# Patient Record
Sex: FEMALE | Race: WHITE | NOT HISPANIC OR LATINO | Employment: FULL TIME | URBAN - METROPOLITAN AREA
[De-identification: names, ages, dates, MRNs, and addresses within clinical notes are randomized per-mention and may not be internally consistent; named-entity substitution may affect disease eponyms.]

---

## 2017-01-04 ENCOUNTER — OFFICE VISIT (OUTPATIENT)
Dept: FAMILY MEDICINE | Facility: CLINIC | Age: 52
End: 2017-01-04
Payer: COMMERCIAL

## 2017-01-04 VITALS
OXYGEN SATURATION: 99 % | HEIGHT: 71 IN | TEMPERATURE: 99 F | SYSTOLIC BLOOD PRESSURE: 120 MMHG | HEART RATE: 55 BPM | WEIGHT: 217.25 LBS | RESPIRATION RATE: 16 BRPM | DIASTOLIC BLOOD PRESSURE: 82 MMHG | BODY MASS INDEX: 30.41 KG/M2

## 2017-01-04 DIAGNOSIS — H92.02 OTALGIA OF LEFT EAR: ICD-10-CM

## 2017-01-04 DIAGNOSIS — J01.90 ACUTE RHINOSINUSITIS: Primary | ICD-10-CM

## 2017-01-04 PROCEDURE — 1159F MED LIST DOCD IN RCRD: CPT | Mod: S$GLB,,, | Performed by: NURSE PRACTITIONER

## 2017-01-04 PROCEDURE — 99214 OFFICE O/P EST MOD 30 MIN: CPT | Mod: S$GLB,,, | Performed by: NURSE PRACTITIONER

## 2017-01-04 PROCEDURE — 99999 PR PBB SHADOW E&M-EST. PATIENT-LVL IV: CPT | Mod: PBBFAC,,, | Performed by: NURSE PRACTITIONER

## 2017-01-04 RX ORDER — PSEUDOEPHEDRINE HCL 120 MG/1
120 TABLET, FILM COATED, EXTENDED RELEASE ORAL 2 TIMES DAILY
Qty: 20 TABLET | Refills: 0
Start: 2017-01-04 | End: 2017-01-14

## 2017-01-04 RX ORDER — AZELASTINE 1 MG/ML
1 SPRAY, METERED NASAL 2 TIMES DAILY
Qty: 30 ML | Refills: 0 | Status: SHIPPED | OUTPATIENT
Start: 2017-01-04 | End: 2018-05-26

## 2017-01-04 RX ORDER — AZITHROMYCIN 250 MG/1
TABLET, FILM COATED ORAL
Qty: 6 TABLET | Refills: 0 | Status: SHIPPED | OUTPATIENT
Start: 2017-01-04 | End: 2017-07-18

## 2017-01-04 NOTE — PATIENT INSTRUCTIONS
Sinusitis (Antibiotic Treatment)    The sinuses are air-filled spaces within the bones of the face. They connect to the inside of the nose. Sinusitis is an inflammation of the tissue lining the sinus cavity. Sinus inflammation can occur during a cold. It can also be due to allergies to pollens and other particles in the air. Sinusitis can cause symptoms of sinus congestion and fullness. A sinus infection causes fever, headache and facial pain. There is often green or yellow drainage from the nose or into the back of the throat (post-nasal drip). You have been given antibiotics to treat this condition.  Home care:  · Take the full course of antibiotics as instructed. Do not stop taking them, even if you feel better.  · Drink plenty of water, hot tea, and other liquids. This may help thin mucus. It also may promote sinus drainage.  · Heat may help soothe painful areas of the face. Use a towel soaked in hot water. Or,  the shower and direct the hot spray onto your face. Using a vaporizer along with a menthol rub at night may also help.   · An expectorant containing guaifenesin may help thin the mucus and promote drainage from the sinuses.  · Over-the-counter decongestants may be used unless a similar medicine was prescribed. Nasal sprays work the fastest. Use one that contains phenylephrine or oxymetazoline. First blow the nose gently. Then use the spray. Do not use these medicines more often than directed on the label or symptoms may get worse. You may also use tablets containing pseudoephedrine. Avoid products that combine ingredients, because side effects may be increased. Read labels. You can also ask the pharmacist for help. (NOTE: Persons with high blood pressure should not use decongestants. They can raise blood pressure.)  · Over-the-counter antihistamines may help if allergies contributed to your sinusitis.    · Do not use nasal rinses or irrigation during an acute sinus infection, unless told to by  your health care provider. Rinsing may spread the infection to other sinuses.  · Use acetaminophen or ibuprofen to control pain, unless another pain medicine was prescribed. (If you have chronic liver or kidney disease or ever had a stomach ulcer, talk with your doctor before using these medicines. Aspirin should never be used in anyone under 18 years of age who is ill with a fever. It may cause severe liver damage.)  · Don't smoke. This can worsen symptoms.  Follow-up care  Follow up with your healthcare provider or our staff if you are not improving within the next week.  When to seek medical advice  Call your healthcare provider if any of these occur:  · Facial pain or headache becoming more severe  · Stiff neck  · Unusual drowsiness or confusion  · Swelling of the forehead or eyelids  · Vision problems, including blurred or double vision  · Fever of 100.4ºF (38ºC) or higher, or as directed by your healthcare provider  · Seizure  · Breathing problems  · Symptoms not resolving within 10 days  © 9785-3802 [employer]. [address] [place] [address], [place] [address]. All rights reserved. This information is not intended as a substitute for professional medical care. Always follow your healthcare professional's instructions.

## 2017-01-04 NOTE — MR AVS SNAPSHOT
[facility]  [address]  [place] [address]  Phone: [phone]  Fax: [phone]                  Estephania Rogers   2017 12:30 PM   Office Visit    Description:  Female : 1965   Provider:  [facility]   Department:  [facility]           Reason for Visit     Sinus Problem     Otalgia           Diagnoses this Visit        Comments    Acute rhinosinusitis    -  Primary     Otalgia of left ear                To Do List           Future Appointments        Provider Department Dept Phone    2017 3:30 PM CHAIR 01 [facility] [phone]      Goals (5 Years of Data)     None      Follow-Up and Disposition     Return if symptoms worsen or fail to improve.       These Medications        Disp Refills Start End    azithromycin (Z-LUCHO) 250 MG tablet 6 tablet 0 2017     Take 2 tablets by mouth on day 1; Take 1 tablet by mouth on days 2-5    Pharmacy: \[facility]"" [facility] - [facility] [address] Ph #: [phone]       azelastine (ASTELIN) 137 mcg (0.1 %) nasal spray 30 mL 0 2017    1 spray (137 mcg total) by Nasal route 2 (two) times daily. - Nasal    Pharmacy: [facility] [address] Ph #: [phone]       pseudoephedrine (SUDAFED 12 HOUR) 120 mg 12 hr tablet 20 tablet 0 2017    Take 1 tablet (120 mg total) by mouth 2 (two) times daily. - Oral    Pharmacy: [facility] [place] [address] Ph #: [phone]         [facility]     [facility] -  Assistance  Registered nurses in the [facility] provide clinical advisement, health education, appointment booking, and other advisory services.  Call for this free service at [phone].             Medications           Message regarding Medications     Verify the changes and/or additions to your medication regime listed below are the  same as discussed with your clinician today.  If any of these changes or additions are incorrect, please notify your healthcare provider.        START taking these NEW medications        Refills    azithromycin (Z-LUCHO) 250 MG tablet 0    Sig: Take 2 tablets by mouth on day 1; Take 1 tablet by mouth on days 2-5    Class: Normal    azelastine (ASTELIN) 137 mcg (0.1 %) nasal spray 0    Si spray (137 mcg total) by Nasal route 2 (two) times daily.    Class: Normal    Route: Nasal    pseudoephedrine (SUDAFED 12 HOUR) 120 mg 12 hr tablet 0    Sig: Take 1 tablet (120 mg total) by mouth 2 (two) times daily.    Class: No Print    Route: Oral           Verify that the below list of medications is an accurate representation of the medications you are currently taking.  If none reported, the list may be blank. If incorrect, please contact your healthcare provider. Carry this list with you in case of emergency.           Current Medications     alprazolam (XANAX) 0.25 MG tablet Take 1 tablet (0.25 mg total) by mouth daily as needed.    ascorbic acid (VITAMIN C) 500 MG tablet Take 500 mg by mouth once daily.      butalbital-acetaminophen-caffeine -40 mg (FIORICET, ESGIC) -40 mg per tablet Take 1 tablet by mouth every 6 (six) hours as needed for Headaches.    calcium citrate-vitamin D (CITRACAL+D) 315-200 mg-unit per tablet Take 1 tablet by mouth 2 (two) times daily.      cholecalciferol, vitamin D3, 50,000 unit Wafr Take 50,000 Units by mouth once daily.      etodolac (LODINE XL) 400 MG 24 hr tablet Take 1 tablet (400 mg total) by mouth daily as needed.    multivitamin (MULTIVITAMIN) per tablet Take 1 tablet by mouth before breakfast.      RESTASIS 0.05 % ophthalmic emulsion     vitamin A 69905 UNIT capsule Take 25,000 Units by mouth once daily.      azelastine (ASTELIN) 137 mcg (0.1 %) nasal spray 1 spray (137 mcg total) by Nasal route 2 (two) times daily.    azithromycin (Z-LUCHO) 250 MG tablet Take 2 tablets by  "mouth on day 1; Take 1 tablet by mouth on days 2-5    levocetirizine (XYZAL) 5 MG tablet Take 1 tablet (5 mg total) by mouth every evening.    pseudoephedrine (SUDAFED 12 HOUR) 120 mg 12 hr tablet Take 1 tablet (120 mg total) by mouth 2 (two) times daily.           Clinical Reference Information           Vital Signs - Last Recorded  Most recent update: 1/4/2017 12:26 PM by Jennifer Limon LPN    BP Pulse Temp Resp Ht Wt    120/82 (BP Location: Right arm, Patient Position: Sitting, BP Method: Manual) (!) 55 98.6 °F (37 °C) (Oral) 16 5' 11" (1.803 m) 98.5 kg (217 lb 4.2 oz)    LMP SpO2 BMI          11/16/2014 99% 30.3 kg/m2        Blood Pressure          Most Recent Value    BP  120/82      Allergies as of 1/4/2017     No Known Allergies      Immunizations Administered on Date of Encounter - 1/4/2017     None      Instructions      Sinusitis (Antibiotic Treatment)    The sinuses are air-filled spaces within the bones of the face. They connect to the inside of the nose. Sinusitis is an inflammation of the tissue lining the sinus cavity. Sinus inflammation can occur during a cold. It can also be due to allergies to pollens and other particles in the air. Sinusitis can cause symptoms of sinus congestion and fullness. A sinus infection causes fever, headache and facial pain. There is often green or yellow drainage from the nose or into the back of the throat (post-nasal drip). You have been given antibiotics to treat this condition.  Home care:  · Take the full course of antibiotics as instructed. Do not stop taking them, even if you feel better.  · Drink plenty of water, hot tea, and other liquids. This may help thin mucus. It also may promote sinus drainage.  · Heat may help soothe painful areas of the face. Use a towel soaked in hot water. Or,  the shower and direct the hot spray onto your face. Using a vaporizer along with a menthol rub at night may also help.   · An expectorant containing guaifenesin may help " thin the mucus and promote drainage from the sinuses.  · Over-the-counter decongestants may be used unless a similar medicine was prescribed. Nasal sprays work the fastest. Use one that contains phenylephrine or oxymetazoline. First blow the nose gently. Then use the spray. Do not use these medicines more often than directed on the label or symptoms may get worse. You may also use tablets containing pseudoephedrine. Avoid products that combine ingredients, because side effects may be increased. Read labels. You can also ask the pharmacist for help. (NOTE: Persons with high blood pressure should not use decongestants. They can raise blood pressure.)  · Over-the-counter antihistamines may help if allergies contributed to your sinusitis.    · Do not use nasal rinses or irrigation during an acute sinus infection, unless told to by your health care provider. Rinsing may spread the infection to other sinuses.  · Use acetaminophen or ibuprofen to control pain, unless another pain medicine was prescribed. (If you have chronic liver or kidney disease or ever had a stomach ulcer, talk with your doctor before using these medicines. Aspirin should never be used in anyone under 18 years of age who is ill with a fever. It may cause severe liver damage.)  · Don't smoke. This can worsen symptoms.  Follow-up care  Follow up with your healthcare provider or our staff if you are not improving within the next week.  When to seek medical advice  Call your healthcare provider if any of these occur:  · Facial pain or headache becoming more severe  · Stiff neck  · Unusual drowsiness or confusion  · Swelling of the forehead or eyelids  · Vision problems, including blurred or double vision  · Fever of 100.4ºF (38ºC) or higher, or as directed by your healthcare provider  · Seizure  · Breathing problems  · Symptoms not resolving within 10 days  © 9567-2540 [employer]. [address] [place] [address], [place] [address]. All rights  reserved. This information is not intended as a substitute for professional medical care. Always follow your healthcare professional's instructions.

## 2017-01-04 NOTE — PROGRESS NOTES
Subjective:       Patient ID: Estephania Rogers is a 51 y.o. female.    Chief Complaint: Sinus Problem (on and off x's 10 days) and Otalgia    Sinus Problem   This is a new problem. The current episode started 1 to 4 weeks ago. The problem has been gradually worsening since onset. There has been no fever. Her pain is at a severity of 5/10. The pain is mild. Associated symptoms include congestion, ear pain, headaches and sinus pressure. Pertinent negatives include no chills, coughing, diaphoresis, neck pain, shortness of breath, sneezing, sore throat or swollen glands. Past treatments include spray decongestants and saline sprays.   Otalgia    There is pain in the left ear. This is a new problem. The current episode started 1 to 4 weeks ago. The problem occurs every few minutes. The problem has been gradually worsening. There has been no fever. The pain is at a severity of 5/10. The pain is moderate. Associated symptoms include headaches and rhinorrhea. Pertinent negatives include no coughing, diarrhea, ear discharge, hearing loss, neck pain, sore throat or vomiting. She has tried nothing for the symptoms.     Review of Systems   Constitutional: Positive for fatigue. Negative for activity change, appetite change, chills, diaphoresis and fever.   HENT: Positive for congestion, ear pain, postnasal drip, rhinorrhea and sinus pressure. Negative for ear discharge, hearing loss, sneezing, sore throat and voice change.    Respiratory: Negative for cough, shortness of breath and wheezing.    Cardiovascular: Negative for chest pain.   Gastrointestinal: Negative for diarrhea, nausea and vomiting.   Musculoskeletal: Negative for neck pain.   Neurological: Positive for headaches.       Objective:      Physical Exam   Constitutional: She is oriented to person, place, and time. She appears well-developed and well-nourished. No distress.   HENT:   Head: Normocephalic and atraumatic.   Right Ear: Tympanic membrane, external ear  and ear canal normal.   Left Ear: Tympanic membrane, external ear and ear canal normal.   Nose: Mucosal edema and rhinorrhea present. Right sinus exhibits maxillary sinus tenderness and frontal sinus tenderness. Left sinus exhibits maxillary sinus tenderness and frontal sinus tenderness.   Mouth/Throat: Uvula is midline, oropharynx is clear and moist and mucous membranes are normal. Mucous membranes are not dry. No oropharyngeal exudate, posterior oropharyngeal edema or posterior oropharyngeal erythema.   Cardiovascular: Normal rate and regular rhythm.    No murmur heard.  Pulmonary/Chest: Effort normal and breath sounds normal. She has no wheezes. She has no rales.   Lymphadenopathy:     She has no cervical adenopathy.   Neurological: She is alert and oriented to person, place, and time.   Skin: Skin is warm and dry.   Psychiatric: She has a normal mood and affect.   Nursing note and vitals reviewed.      Assessment:       1. Acute rhinosinusitis    2. Otalgia of left ear        Plan:       Estephania was seen today for sinus problem and otalgia.    Diagnoses and all orders for this visit:    Acute rhinosinusitis  -     azithromycin (Z-LUCHO) 250 MG tablet; Take 2 tablets by mouth on day 1; Take 1 tablet by mouth on days 2-5  -     azelastine (ASTELIN) 137 mcg (0.1 %) nasal spray; 1 spray (137 mcg total) by Nasal route 2 (two) times daily.  -     pseudoephedrine (SUDAFED 12 HOUR) 120 mg 12 hr tablet; Take 1 tablet (120 mg total) by mouth 2 (two) times daily.    Otalgia of left ear  -     azithromycin (Z-LUCHO) 250 MG tablet; Take 2 tablets by mouth on day 1; Take 1 tablet by mouth on days 2-5  -     azelastine (ASTELIN) 137 mcg (0.1 %) nasal spray; 1 spray (137 mcg total) by Nasal route 2 (two) times daily.  -     pseudoephedrine (SUDAFED 12 HOUR) 120 mg 12 hr tablet; Take 1 tablet (120 mg total) by mouth 2 (two) times daily.    Home care:  · Take the full course of antibiotics as instructed. Do not stop taking them,  even if you feel better.  · Drink plenty of water, hot tea, and other liquids. This may help thin mucus. It also may promote sinus drainage.  · Heat may help soothe painful areas of the face. Use a towel soaked in hot water. Or,  the shower and direct the hot spray onto your face. Using a vaporizer along with a menthol rub at night may also help.   · An expectorant containing guaifenesin may help thin the mucus and promote drainage from the sinuses.  · Over-the-counter decongestants may be used unless a similar medicine was prescribed. Nasal sprays work the fastest. Use one that contains phenylephrine or oxymetazoline. First blow the nose gently. Then use the spray. Do not use these medicines more often than directed on the label or symptoms may get worse. You may also use tablets containing pseudoephedrine. Avoid products that combine ingredients, because side effects may be increased. Read labels. You can also ask the pharmacist for help. (NOTE: Persons with high blood pressure should not use decongestants. They can raise blood pressure.)  · Over-the-counter antihistamines may help if allergies contributed to your sinusitis.    · Do not use nasal rinses or irrigation during an acute sinus infection, unless told to by your health care provider. Rinsing may spread the infection to other sinuses.  · Use acetaminophen or ibuprofen to control pain, unless another pain medicine was prescribed. (If you have chronic liver or kidney disease or ever had a stomach ulcer, talk with your doctor before using these medicines. Aspirin should never be used in anyone under 18 years of age who is ill with a fever. It may cause severe liver damage.)  · Don't smoke. This can worsen symptoms.  Follow-up care  Follow up with your healthcare provider or our staff if you are not improving within the next week.  When to seek medical advice  Call your healthcare provider if any of these occur:  · Facial pain or headache becoming  more severe  · Stiff neck  · Unusual drowsiness or confusion  · Swelling of the forehead or eyelids  · Vision problems, including blurred or double vision  · Fever of 100.4ºF (38ºC) or higher, or as directed by your healthcare provider  · Seizure  · Breathing problems  · Symptoms not resolving within 10 days  © 2657-4246 [employer]. [address] [place] [address], [place] [address]. All rights reserved. This information is not intended as a substitute for professional medical care. Always follow your healthcare professional's instructions.

## 2017-02-02 DIAGNOSIS — D50.9 IRON DEFICIENCY ANEMIA, UNSPECIFIED IRON DEFICIENCY ANEMIA TYPE: Primary | ICD-10-CM

## 2017-02-22 ENCOUNTER — INFUSION (OUTPATIENT)
Dept: INFUSION THERAPY | Facility: HOSPITAL | Age: 52
End: 2017-02-22
Attending: INTERNAL MEDICINE
Payer: COMMERCIAL

## 2017-02-22 DIAGNOSIS — D50.9 IRON DEFICIENCY ANEMIA: Primary | ICD-10-CM

## 2017-02-22 PROCEDURE — 63600175 PHARM REV CODE 636 W HCPCS: Performed by: INTERNAL MEDICINE

## 2017-02-22 PROCEDURE — 25000003 PHARM REV CODE 250: Performed by: INTERNAL MEDICINE

## 2017-02-22 PROCEDURE — 96365 THER/PROPH/DIAG IV INF INIT: CPT

## 2017-02-22 RX ORDER — HEPARIN 100 UNIT/ML
500 SYRINGE INTRAVENOUS
Status: CANCELLED | OUTPATIENT
Start: 2017-02-22

## 2017-02-22 RX ORDER — SODIUM CHLORIDE 0.9 % (FLUSH) 0.9 %
10 SYRINGE (ML) INJECTION
Status: CANCELLED | OUTPATIENT
Start: 2017-02-22

## 2017-02-22 RX ADMIN — IRON SUCROSE 100 MG: 20 INJECTION, SOLUTION INTRAVENOUS at 03:02

## 2017-03-07 ENCOUNTER — TELEPHONE (OUTPATIENT)
Dept: ADMINISTRATIVE | Facility: OTHER | Age: 52
End: 2017-03-07

## 2017-03-07 NOTE — TELEPHONE ENCOUNTER
----- Message from Janene Kwok sent at 3/6/2017  1:43 PM CST -----  Contact: Pt can be reached at [phone]  Scheduled on 3/9, iesha!    ----- Message -----     From: Otilio Chun     Sent: 3/6/2017   1:37 PM       To: Janene Mar, please give me a time on 3/8 the schedule is full. Thanks   ----- Message -----     From: Janene Kwok     Sent: 3/6/2017  12:31 PM       To: Otilio Chun    Scheduled on 3/8    ----- Message -----     From: Gabi Chappell     Sent: 3/6/2017  12:09 PM       To: Dmitry SANTANA Staff    Pt is calling to reschedule her appt time, please contact pt for rescheduling.      Thank you!

## 2017-03-16 ENCOUNTER — TELEPHONE (OUTPATIENT)
Dept: ADMINISTRATIVE | Facility: OTHER | Age: 52
End: 2017-03-16

## 2017-03-16 NOTE — TELEPHONE ENCOUNTER
----- Message from Lucila Steve sent at 3/7/2017  9:46 AM CST -----  Contact: Pt  Pt states she missed a call from you regarding rescheduling her appt with      Pt contact number [phone]  Thanks

## 2017-04-19 ENCOUNTER — INFUSION (OUTPATIENT)
Dept: INFUSION THERAPY | Facility: HOSPITAL | Age: 52
End: 2017-04-19
Attending: INTERNAL MEDICINE
Payer: COMMERCIAL

## 2017-04-19 VITALS — SYSTOLIC BLOOD PRESSURE: 140 MMHG | DIASTOLIC BLOOD PRESSURE: 75 MMHG | RESPIRATION RATE: 16 BRPM | HEART RATE: 54 BPM

## 2017-04-19 DIAGNOSIS — D64.9 ANEMIA: Primary | ICD-10-CM

## 2017-04-19 PROCEDURE — 25000003 PHARM REV CODE 250: Performed by: INTERNAL MEDICINE

## 2017-04-19 PROCEDURE — 63600175 PHARM REV CODE 636 W HCPCS: Performed by: INTERNAL MEDICINE

## 2017-04-19 PROCEDURE — 96365 THER/PROPH/DIAG IV INF INIT: CPT

## 2017-04-19 RX ORDER — HEPARIN 100 UNIT/ML
500 SYRINGE INTRAVENOUS
Status: CANCELLED | OUTPATIENT
Start: 2017-04-19

## 2017-04-19 RX ORDER — SODIUM CHLORIDE 0.9 % (FLUSH) 0.9 %
10 SYRINGE (ML) INJECTION
Status: CANCELLED | OUTPATIENT
Start: 2017-04-19

## 2017-04-19 RX ADMIN — IRON SUCROSE 100 MG: 20 INJECTION, SOLUTION INTRAVENOUS at 03:04

## 2017-06-10 ENCOUNTER — OFFICE VISIT (OUTPATIENT)
Dept: FAMILY MEDICINE | Facility: CLINIC | Age: 52
End: 2017-06-10
Payer: COMMERCIAL

## 2017-06-10 VITALS
HEIGHT: 71 IN | TEMPERATURE: 98 F | OXYGEN SATURATION: 97 % | DIASTOLIC BLOOD PRESSURE: 88 MMHG | HEART RATE: 72 BPM | WEIGHT: 216.06 LBS | RESPIRATION RATE: 17 BRPM | SYSTOLIC BLOOD PRESSURE: 146 MMHG | BODY MASS INDEX: 30.25 KG/M2

## 2017-06-10 DIAGNOSIS — M76.61 TENDONITIS, ACHILLES, RIGHT: Primary | ICD-10-CM

## 2017-06-10 PROCEDURE — 99999 PR PBB SHADOW E&M-EST. PATIENT-LVL IV: CPT | Mod: PBBFAC,,, | Performed by: FAMILY MEDICINE

## 2017-06-10 PROCEDURE — 96372 THER/PROPH/DIAG INJ SC/IM: CPT | Mod: S$GLB,,, | Performed by: FAMILY MEDICINE

## 2017-06-10 PROCEDURE — 99214 OFFICE O/P EST MOD 30 MIN: CPT | Mod: 25,S$GLB,, | Performed by: FAMILY MEDICINE

## 2017-06-10 RX ORDER — DEXAMETHASONE SODIUM PHOSPHATE 4 MG/ML
8 INJECTION, SOLUTION INTRA-ARTICULAR; INTRALESIONAL; INTRAMUSCULAR; INTRAVENOUS; SOFT TISSUE
Status: COMPLETED | OUTPATIENT
Start: 2017-06-10 | End: 2017-06-10

## 2017-06-10 RX ADMIN — DEXAMETHASONE SODIUM PHOSPHATE 8 MG: 4 INJECTION, SOLUTION INTRA-ARTICULAR; INTRALESIONAL; INTRAMUSCULAR; INTRAVENOUS; SOFT TISSUE at 08:06

## 2017-06-10 NOTE — PROGRESS NOTES
"Routine Office Visit    Patient Name: Estephania Rogers    : 1965  MRN: [id number]    Subjective:  Estephania is a 51 y.o. female who presents today for:    1. Right heel pain  Patient presenting today after sudden onset of right heel pain yesterday.  She states that flexing the foot and putting weight on it makes her heel hurt significantly.  She has never had this pain before, but she thinks it may be due to wearing "not the best shoes" for 5 days in a row.  There has been no redness, swelling or warmth to the area.  There has been no direct trauma to the heel.      Past Medical History  Past Medical History:   Diagnosis Date    Anemia     iron deficiency    Anxiety     Diabetes mellitus type II     not since     Hypertension     Malabsorption     Migraine headache     Nephrolithiasis     Other specified intestinal malabsorption        Past Surgical History  Past Surgical History:   Procedure Laterality Date    ANAL FISTULOTOMY      APPENDECTOMY      CHOLECYSTECTOMY      GASTRIC BYPASS      SINUS SURGERY      Dr. Oral Cobb    SPINE SURGERY      TONSILLECTOMY         Family History  Family History   Problem Relation Age of Onset    Cancer Father      pancreatic    Diabetes Father     Diabetes Mother     Hypertension Mother     Miscarriages / Stillbirths Mother     Stroke Mother     Diabetes Sister     Cancer Maternal Aunt      breast    Arthritis Maternal Grandmother     Arthritis Paternal Grandmother     Diabetes Sister        Social History  Social History     Social History    Marital status:      Spouse name: N/A    Number of children: N/A    Years of education: N/A     Occupational History    Not on file.     Social History Main Topics    Smoking status: Never Smoker    Smokeless tobacco: Not on file      Comment: Clerical work    Alcohol use No      Comment: socially    Drug use: No    Sexual activity: Yes     Partners: Male     Birth " control/ protection: None      Comment: :  Gianni     Other Topics Concern    Not on file     Social History Narrative    No narrative on file       Current Medications  Current Outpatient Prescriptions on File Prior to Visit   Medication Sig Dispense Refill    alprazolam (XANAX) 0.25 MG tablet Take 1 tablet (0.25 mg total) by mouth daily as needed. 30 tablet 2    ascorbic acid (VITAMIN C) 500 MG tablet Take 500 mg by mouth once daily.        azelastine (ASTELIN) 137 mcg (0.1 %) nasal spray 1 spray (137 mcg total) by Nasal route 2 (two) times daily. 30 mL 0    azithromycin (Z-LUCHO) 250 MG tablet Take 2 tablets by mouth on day 1; Take 1 tablet by mouth on days 2-5 6 tablet 0    butalbital-acetaminophen-caffeine -40 mg (FIORICET, ESGIC) -40 mg per tablet Take 1 tablet by mouth every 6 (six) hours as needed for Headaches. 30 tablet 3    calcium citrate-vitamin D (CITRACAL+D) 315-200 mg-unit per tablet Take 1 tablet by mouth 2 (two) times daily.        cholecalciferol, vitamin D3, 50,000 unit Wafr Take 50,000 Units by mouth once daily.        etodolac (LODINE XL) 400 MG 24 hr tablet Take 1 tablet (400 mg total) by mouth daily as needed. 30 tablet 2    multivitamin (MULTIVITAMIN) per tablet Take 1 tablet by mouth before breakfast.        RESTASIS 0.05 % ophthalmic emulsion   3    vitamin A 18152 UNIT capsule Take 25,000 Units by mouth once daily.        levocetirizine (XYZAL) 5 MG tablet Take 1 tablet (5 mg total) by mouth every evening. 30 tablet 0     No current facility-administered medications on file prior to visit.        Allergies   Review of patient's allergies indicates:  No Known Allergies    Review of Systems (Pertinent positives)  Review of Systems   Constitutional: Negative.    HENT: Negative.    Eyes: Negative.    Respiratory: Negative.    Cardiovascular: Negative.    Gastrointestinal: Negative.    Musculoskeletal:        +right heel pain   Skin: Negative.          BP (!)  "146/88 (BP Location: Left arm, Patient Position: Sitting, BP Method: Manual)   Pulse 72   Temp 98.4 °F (36.9 °C) (Oral)   Resp 17   Ht 5' 11" (1.803 m)   Wt 98 kg (216 lb 0.8 oz)   LMP 11/16/2014   SpO2 97%   BMI 30.13 kg/m²     GENERAL APPEARANCE: in no apparent distress and well developed and well nourished  HEENT: PERRL, EOMI, Sclera clear, anicteric, Oropharynx clear, no lesions, Neck supple with midline trachea  NECK: normal, supple, no adenopathy, thyroid normal in size  RESPIRATORY: appears well, vitals normal, no respiratory distress, acyanotic, normal RR, chest clear, no wheezing, crepitations, rhonchi, normal symmetric air entry  HEART: regular rate and rhythm, S1, S2 normal, no murmur, click, rub or gallop.    ABDOMEN: abdomen is soft without tenderness, no masses, no hernias, no organomegaly, no rebound, no guarding. Suprapubic tenderness absent. No CVA tenderness.  MS:  Pain on palpation of right right lateral heel without redness or swelling  SKIN: no rashes, no wounds, no other lesions  PSYCH: Alert, oriented x 3, thought content appropriate, speech normal, pleasant and cooperative, good eye contact, well groomed    Assessment/Plan:  Estephania Rogers is a 51 y.o. female who presents today for :    Estephania was seen today for foot pain.    Diagnoses and all orders for this visit:    Tendonitis, Achilles, right  -     dexamethasone injection 8 mg; Inject 2 mLs (8 mg total) into the muscle one time.  -     Ambulatory referral to Podiatry      1.  Patient to take ibuprofen 800mg TID prn  2.  Will refer to podiatry for evaluation  3.  Follow up as needed    Patel Keller MD    "

## 2017-06-12 ENCOUNTER — PATIENT MESSAGE (OUTPATIENT)
Dept: FAMILY MEDICINE | Facility: CLINIC | Age: 52
End: 2017-06-12

## 2017-06-14 ENCOUNTER — INFUSION (OUTPATIENT)
Dept: INFUSION THERAPY | Facility: HOSPITAL | Age: 52
End: 2017-06-14
Attending: INTERNAL MEDICINE
Payer: COMMERCIAL

## 2017-06-14 VITALS — RESPIRATION RATE: 16 BRPM | DIASTOLIC BLOOD PRESSURE: 82 MMHG | HEART RATE: 59 BPM | SYSTOLIC BLOOD PRESSURE: 171 MMHG

## 2017-06-14 DIAGNOSIS — D64.9 ANEMIA: Primary | ICD-10-CM

## 2017-06-14 PROCEDURE — 25000003 PHARM REV CODE 250: Performed by: INTERNAL MEDICINE

## 2017-06-14 PROCEDURE — 96365 THER/PROPH/DIAG IV INF INIT: CPT

## 2017-06-14 PROCEDURE — 63600175 PHARM REV CODE 636 W HCPCS: Performed by: INTERNAL MEDICINE

## 2017-06-14 RX ORDER — HEPARIN 100 UNIT/ML
500 SYRINGE INTRAVENOUS
Status: CANCELLED | OUTPATIENT
Start: 2017-06-14

## 2017-06-14 RX ORDER — SODIUM CHLORIDE 0.9 % (FLUSH) 0.9 %
10 SYRINGE (ML) INJECTION
Status: CANCELLED | OUTPATIENT
Start: 2017-06-14

## 2017-06-14 RX ADMIN — IRON SUCROSE 100 MG: 20 INJECTION, SOLUTION INTRAVENOUS at 03:06

## 2017-06-14 NOTE — PLAN OF CARE
Problem: Patient Care Overview (Adult)  Goal: Individualization & Mutuality  Outcome: Ongoing (interventions implemented as appropriate)  Pt likes to read during visit.

## 2017-06-14 NOTE — TELEPHONE ENCOUNTER
----- Message from Vidhya Lara sent at 6/13/2017  5:40 PM CDT -----  REFERRAL: Pt scheduled to see Dr. Vasquez on 6/19/17 @ 10:30a.

## 2017-06-14 NOTE — PLAN OF CARE
Problem: Patient Care Overview (Adult)  Goal: Discharge Needs Assessment  Outcome: Ongoing (interventions implemented as appropriate)  Will follow up with physician if any changes.

## 2017-06-14 NOTE — PLAN OF CARE
Problem: Patient Care Overview (Adult)  Goal: Plan of Care Review  Outcome: Ongoing (interventions implemented as appropriate)  Pt will report increased fatigue and or SOB to md while on Venofer.

## 2017-06-14 NOTE — NURSING
Pt tolerated Venofer. No reactions noted. Pt received discharge instructions and verbalized understanding.

## 2017-07-18 ENCOUNTER — OFFICE VISIT (OUTPATIENT)
Dept: FAMILY MEDICINE | Facility: CLINIC | Age: 52
End: 2017-07-18
Payer: COMMERCIAL

## 2017-07-18 VITALS
BODY MASS INDEX: 30.53 KG/M2 | HEART RATE: 59 BPM | OXYGEN SATURATION: 98 % | TEMPERATURE: 99 F | HEIGHT: 71 IN | WEIGHT: 218.06 LBS | SYSTOLIC BLOOD PRESSURE: 136 MMHG | DIASTOLIC BLOOD PRESSURE: 80 MMHG

## 2017-07-18 DIAGNOSIS — M72.2 PLANTAR FASCIITIS, RIGHT: Primary | ICD-10-CM

## 2017-07-18 DIAGNOSIS — M79.671 FOOT PAIN, RIGHT: ICD-10-CM

## 2017-07-18 PROCEDURE — 99999 PR PBB SHADOW E&M-EST. PATIENT-LVL IV: CPT | Mod: PBBFAC,,, | Performed by: NURSE PRACTITIONER

## 2017-07-18 PROCEDURE — 99214 OFFICE O/P EST MOD 30 MIN: CPT | Mod: 25,S$GLB,, | Performed by: NURSE PRACTITIONER

## 2017-07-18 PROCEDURE — 96372 THER/PROPH/DIAG INJ SC/IM: CPT | Mod: S$GLB,,, | Performed by: NURSE PRACTITIONER

## 2017-07-18 RX ORDER — TRAMADOL HYDROCHLORIDE 50 MG/1
50 TABLET ORAL EVERY 6 HOURS PRN
Qty: 30 TABLET | Refills: 0 | Status: SHIPPED | OUTPATIENT
Start: 2017-07-18 | End: 2017-07-28

## 2017-07-18 RX ORDER — IBUPROFEN 800 MG/1
800 TABLET ORAL 3 TIMES DAILY
Qty: 30 TABLET | Refills: 0 | Status: SHIPPED | OUTPATIENT
Start: 2017-07-18 | End: 2018-03-26 | Stop reason: ALTCHOICE

## 2017-07-18 RX ORDER — TRIAMCINOLONE ACETONIDE 40 MG/ML
40 INJECTION, SUSPENSION INTRA-ARTICULAR; INTRAMUSCULAR
Status: COMPLETED | OUTPATIENT
Start: 2017-07-18 | End: 2017-07-18

## 2017-07-18 RX ORDER — NALTREXONE HYDROCHLORIDE AND BUPROPION HYDROCHLORIDE 8; 90 MG/1; MG/1
TABLET, EXTENDED RELEASE ORAL
Refills: 0 | COMMUNITY
Start: 2017-05-25 | End: 2017-07-18

## 2017-07-18 RX ADMIN — TRIAMCINOLONE ACETONIDE 40 MG: 40 INJECTION, SUSPENSION INTRA-ARTICULAR; INTRAMUSCULAR at 03:07

## 2017-07-18 NOTE — PROGRESS NOTES
Subjective:       Patient ID: Estephania Rogers is a 51 y.o. female.    Chief Complaint: Foot Pain (right foot)    Foot Injury    The incident occurred 3 to 5 days ago. The incident occurred at home. There was no injury mechanism. The pain is present in the right foot. The quality of the pain is described as aching. The pain is at a severity of 6/10. The pain is moderate. The pain has been worsening since onset. Pertinent negatives include no inability to bear weight, loss of motion, loss of sensation, muscle weakness, numbness or tingling. She reports no foreign bodies present. The symptoms are aggravated by movement, palpation and weight bearing (Hurts worse in the morning). She has tried nothing for the symptoms.     Review of Systems   Constitutional: Negative for chills and fatigue.   Respiratory: Negative for cough, chest tightness and wheezing.    Cardiovascular: Negative for chest pain, palpitations and leg swelling.   Musculoskeletal: Positive for arthralgias (right foot ) and gait problem (hurts to walk on it ). Negative for back pain and joint swelling.   Neurological: Negative for dizziness, tingling, weakness and numbness.   Hematological: Negative for adenopathy. Does not bruise/bleed easily.       Objective:      Physical Exam   Constitutional: She is oriented to person, place, and time. Vital signs are normal. She appears well-developed and well-nourished.   Cardiovascular: Normal rate, regular rhythm and normal heart sounds.    Pulmonary/Chest: Effort normal and breath sounds normal.   Musculoskeletal:        Right foot: There is tenderness. There is normal range of motion, no bony tenderness, no swelling, no crepitus, no deformity and no laceration.        Feet:    Neurological: She is alert and oriented to person, place, and time.   Skin: Skin is warm, dry and intact. Capillary refill takes less than 2 seconds.   Psychiatric: She has a normal mood and affect.       Assessment:       1.  Plantar fasciitis, right    2. Foot pain, right        Plan:       Estephania was seen today for foot pain.    Diagnoses and all orders for this visit:    Plantar fasciitis, right  -     tramadol (ULTRAM) 50 mg tablet; Take 1 tablet (50 mg total) by mouth every 6 (six) hours as needed.  -     ibuprofen (ADVIL,MOTRIN) 800 MG tablet; Take 1 tablet (800 mg total) by mouth 3 (three) times daily.  -     Ambulatory Referral to Podiatry  -     triamcinolone acetonide injection 40 mg; Inject 1 mL (40 mg total) into the muscle one time.    Foot pain, right  -     tramadol (ULTRAM) 50 mg tablet; Take 1 tablet (50 mg total) by mouth every 6 (six) hours as needed.  -     ibuprofen (ADVIL,MOTRIN) 800 MG tablet; Take 1 tablet (800 mg total) by mouth 3 (three) times daily.  -     Ambulatory Referral to Podiatry  -     triamcinolone acetonide injection 40 mg; Inject 1 mL (40 mg total) into the muscle one time.    Home care  · If you are overweight, lose weight to help healing.  · Choose supportive shoes with good arch support and shock absorbency. Replace athletic shoes when they become worn out. Dont walk or run barefoot.  · Premade or custom-fitted shoe inserts may be helpful. Inserts made of silicone seem to be the most effective. Custom-made inserts can be provided by a podiatrist or foot specialist, physical therapist, or orthopedist.  · Premade or custom-made night splints keep the heel stretched out while you sleep. They may prevent morning pain.  · Avoid activities that stress the feet: jogging, prolonged standing or walking, contact sports, etc.  · First thing in the morning and before sports, stretch the bottom of your feet. Gently flex your ankle so the toes move toward your knee.  · Icing may help control heel pain. Apply an ice pack to the heel for 10-20 minutes as a preventive. Or ice your heel after a severe flare-up of symptoms. You may repeat this every 1-2 hours as needed.  · You may use over-the-counter pain  medicine to control pain, unless another medicine was prescribed. Anti-inflammatory pain medicines, such as ibuprofen or naproxen, may work better than acetaminophen. If you have chronic liver or kidney disease or ever had a stomach ulcer or GI bleeding, talk with your healthcare provider before using these medicines.  Follow-up care  Follow up with your healthcare provider, physical therapist, or podiatrist or foot specialist as advised.  Call for an appointment if pain worsens or there is no relief after a few weeks of home treatment. Shoe inserts, a night splint, or a special boot may be required.  If X-rays were taken, you will be told of any new findings that may affect your care.  When to seek medical advice  Call your healthcare provider right away if any of these occur:  · Foot swelling  · Redness with increasing pain  Date Last Reviewed: 11/21/2015  © 0554-8508 [employer]. [address] [place] [address], [place] [address]. All rights reserved. This information is not intended as a substitute for professional medical care. Always follow your healthcare professional's instructions.

## 2017-07-18 NOTE — PATIENT INSTRUCTIONS
Plantar Fasciitis  Plantar fasciitis is a painful swelling of the plantar fascia. The plantar fascia is a thick, fibrous layer of tissue. It covers the bones on the bottom of your foot. And it supports the foot bones in an arched position.  This can happen gradually or suddenly. It usually affects one foot at a time. Heel pain can be sharp, like a knife sticking into the bottom of your foot. You may feel pain after exercising, long-distance jogging, stair climbing, long periods of standing, or after standing up.  Risk factors include: non-active lifestyle, arthritis, diabetes, obesity or recent weight gain, flat foot, high arch. Wearing high heels, loose shoes, or shoes with poor arch support for long periods of time adds to the risk. This problem is commonly found in runners and dancers. It also found in people who stand on hard surfaces for long periods of time.  Foot pain from this condition is usually worse in the morning. But it improves with walking. By the end of the day there may be a dull aching. Treatment requires short-term rest and controlling swelling. It may take up to 9 months before all symptoms go away. Rarely, a steroid injection into the foot, or surgery, may be needed.  Home care  · If you are overweight, lose weight to help healing.  · Choose supportive shoes with good arch support and shock absorbency. Replace athletic shoes when they become worn out. Dont walk or run barefoot.  · Premade or custom-fitted shoe inserts may be helpful. Inserts made of silicone seem to be the most effective. Custom-made inserts can be provided by a podiatrist or foot specialist, physical therapist, or orthopedist.  · Premade or custom-made night splints keep the heel stretched out while you sleep. They may prevent morning pain.  · Avoid activities that stress the feet: jogging, prolonged standing or walking, contact sports, etc.  · First thing in the morning and before sports, stretch the bottom of your feet.  Gently flex your ankle so the toes move toward your knee.  · Icing may help control heel pain. Apply an ice pack to the heel for 10-20 minutes as a preventive. Or ice your heel after a severe flare-up of symptoms. You may repeat this every 1-2 hours as needed.  · You may use over-the-counter pain medicine to control pain, unless another medicine was prescribed. Anti-inflammatory pain medicines, such as ibuprofen or naproxen, may work better than acetaminophen. If you have chronic liver or kidney disease or ever had a stomach ulcer or GI bleeding, talk with your healthcare provider before using these medicines.  Follow-up care  Follow up with your healthcare provider, physical therapist, or podiatrist or foot specialist as advised.  Call for an appointment if pain worsens or there is no relief after a few weeks of home treatment. Shoe inserts, a night splint, or a special boot may be required.  If X-rays were taken, you will be told of any new findings that may affect your care.  When to seek medical advice  Call your healthcare provider right away if any of these occur:  · Foot swelling  · Redness with increasing pain  Date Last Reviewed: 11/21/2015  © 5075-8724 [employer]. [address] [place] [address], [place] [address]. All rights reserved. This information is not intended as a substitute for professional medical care. Always follow your healthcare professional's instructions.

## 2017-08-07 ENCOUNTER — OFFICE VISIT (OUTPATIENT)
Dept: PODIATRY | Facility: CLINIC | Age: 52
End: 2017-08-07
Payer: COMMERCIAL

## 2017-08-07 VITALS — BODY MASS INDEX: 30.52 KG/M2 | WEIGHT: 218 LBS | HEIGHT: 71 IN

## 2017-08-07 DIAGNOSIS — M20.42 HAMMER TOES OF BOTH FEET: ICD-10-CM

## 2017-08-07 DIAGNOSIS — M20.5X9 HALLUX LIMITUS, UNSPECIFIED LATERALITY: ICD-10-CM

## 2017-08-07 DIAGNOSIS — M20.41 HAMMER TOES OF BOTH FEET: ICD-10-CM

## 2017-08-07 DIAGNOSIS — M21.6X2 ACQUIRED EQUINUS DEFORMITY OF BOTH FEET: Primary | ICD-10-CM

## 2017-08-07 DIAGNOSIS — M21.41 PES PLANUS OF BOTH FEET: ICD-10-CM

## 2017-08-07 DIAGNOSIS — M21.6X1 ACQUIRED EQUINUS DEFORMITY OF BOTH FEET: Primary | ICD-10-CM

## 2017-08-07 DIAGNOSIS — M21.42 PES PLANUS OF BOTH FEET: ICD-10-CM

## 2017-08-07 PROCEDURE — 99203 OFFICE O/P NEW LOW 30 MIN: CPT | Mod: S$GLB,,, | Performed by: PODIATRIST

## 2017-08-07 PROCEDURE — 99999 PR PBB SHADOW E&M-EST. PATIENT-LVL III: CPT | Mod: PBBFAC,,, | Performed by: PODIATRIST

## 2017-08-07 PROCEDURE — 3008F BODY MASS INDEX DOCD: CPT | Mod: S$GLB,,, | Performed by: PODIATRIST

## 2017-08-07 NOTE — LETTER
August 7, 2017      Edward Romo, ZOHRA  [address] [facility]  [place] [address]           [facility]  [address] [facility]  [place] [address]  Phone: [phone]          Patient: Estephania Rogers   MR Number: [id number]   YOB: 1965   Date of Visit: 8/7/2017       Dear Edward Romo:    Thank you for referring Estephania Rogers to me for evaluation. Attached you will find relevant portions of my assessment and plan of care.    If you have questions, please do not hesitate to call me. I look forward to following Estephania Rogers along with you.    Sincerely,    Lise Vasquez, TRAN    Enclosure  CC:  No Recipients    If you would like to receive this communication electronically, please contact [email or web address] or [phone] to request more information on [employer] Link access.    For providers and/or their staff who would like to refer a patient to [facility], please contact us through our one-stop-shop provider referral line, [facility], at [phone].    If you feel you have received this communication in error or would no longer like to receive these types of communications, please e-mail [email or web address]

## 2017-08-07 NOTE — PROGRESS NOTES
"Subjective:      Patient ID: Estephania Rogers is a 51 y.o. female.    Chief Complaint: Foot Pain (right pcp Dr. Parker 7/18/17)    Estephania is a 51 y.o. female who presents to the podiatry clinic  with complaint of right foot pain (lateral foot, achilles, plantar fascia). Onset of the symptoms was several months ago. Precipitating event: wearing a "crappy shoe." Current symptoms include: pain has resolved since last injection 07/18.  Patient has had prior foot problems, patient relates that previous to weight loss she had plantar fasciitis several times. Evaluation to date: seen by PCP. Treatment to date: steroid injections to the hips with relief of pain. Patients rates pain 0/10 on pain scale.    Current shoe gear:  Wedged sandals        Patient Active Problem List   Diagnosis    Recurrent nephrolithiasis    Other specified intestinal malabsorption    Iron deficiency anemia    B12 deficiency anemia    Anxiety       Current Outpatient Prescriptions on File Prior to Visit   Medication Sig Dispense Refill    alprazolam (XANAX) 0.25 MG tablet Take 1 tablet (0.25 mg total) by mouth daily as needed. 30 tablet 2    ascorbic acid (VITAMIN C) 500 MG tablet Take 500 mg by mouth once daily.        azelastine (ASTELIN) 137 mcg (0.1 %) nasal spray 1 spray (137 mcg total) by Nasal route 2 (two) times daily. 30 mL 0    butalbital-acetaminophen-caffeine -40 mg (FIORICET, ESGIC) -40 mg per tablet Take 1 tablet by mouth every 6 (six) hours as needed for Headaches. 30 tablet 3    calcium citrate-vitamin D (CITRACAL+D) 315-200 mg-unit per tablet Take 1 tablet by mouth 2 (two) times daily.        cholecalciferol, vitamin D3, 50,000 unit Wafr Take 50,000 Units by mouth once daily.        ibuprofen (ADVIL,MOTRIN) 800 MG tablet Take 1 tablet (800 mg total) by mouth 3 (three) times daily. 30 tablet 0    RESTASIS 0.05 % ophthalmic emulsion   3    vitamin A 43306 UNIT capsule Take 25,000 Units by mouth once " "daily.        levocetirizine (XYZAL) 5 MG tablet Take 1 tablet (5 mg total) by mouth every evening. 30 tablet 0     No current facility-administered medications on file prior to visit.        Review of patient's allergies indicates:  No Known Allergies    Past Surgical History:   Procedure Laterality Date    ANAL FISTULOTOMY  2005    APPENDECTOMY  2005    CHOLECYSTECTOMY  2005    GASTRIC BYPASS  2005    SINUS SURGERY      Dr. Oral Cobb    SPINE SURGERY      TONSILLECTOMY         Family History   Problem Relation Age of Onset    Cancer Father      pancreatic    Diabetes Father     Diabetes Mother     Hypertension Mother     Miscarriages / Stillbirths Mother     Stroke Mother     Diabetes Sister     Cancer Maternal Aunt      breast    Arthritis Maternal Grandmother     Arthritis Paternal Grandmother     Diabetes Sister        Social History     Social History    Marital status:      Spouse name: N/A    Number of children: N/A    Years of education: N/A     Occupational History    Not on file.     Social History Main Topics    Smoking status: Never Smoker    Smokeless tobacco: Never Used      Comment: Clerical work    Alcohol use No      Comment: socially    Drug use: No    Sexual activity: Yes     Partners: Male     Birth control/ protection: None      Comment: :  Gianni     Other Topics Concern    Not on file     Social History Narrative    No narrative on file       ROS        Objective:       Vitals:    08/07/17 0730   Weight: 98.9 kg (218 lb)   Height: 5' 11" (1.803 m)   PainSc: 0-No pain       Physical Exam   Constitutional:  Non-toxic appearance. She does not have a sickly appearance. No distress.   Cardiovascular:   Pulses:       Dorsalis pedis pulses are 2+ on the right side, and 2+ on the left side.        Posterior tibial pulses are 2+ on the right side, and 2+ on the left side.   Pulmonary/Chest: No respiratory distress.   Musculoskeletal:        Right ankle: " She exhibits decreased range of motion. She exhibits no swelling. No tenderness. No lateral malleolus, no medial malleolus, no AITFL, no CF ligament and no posterior TFL tenderness found. Achilles tendon exhibits no pain, no defect and normal Villegas's test results.        Left ankle: She exhibits decreased range of motion. She exhibits no swelling. No tenderness. No lateral malleolus, no medial malleolus, no AITFL, no CF ligament and no posterior TFL tenderness found. Achilles tendon exhibits no pain, no defect and normal Villegas's test results.        Right foot: There is no bony tenderness.        Left foot: There is no bony tenderness.   Biomechanical exam: There is equinus deformity bilateral with decreased dorsiflexion at the ankle joint bilateral. No tenderness with compression of heel. Negative tinels sign. Gait analysis reveals excessive pronation through midstance and propulsion with early heel off. Shoes reveals lateral heel counter wear bilateral     Patient has hammertoes of digits 2-5 bilateral partially reducible without symptom today.    Visible and palpable bunion without pain at dorsomedial 1st metatarsal head right and left.  Hallux abducted right and left partially reducible, tracks laterally without being track bound.  No ecchymosis, erythema, edema, or cardinal signs infection or signs of trauma same foot.    Decreased first MPJ range of motion both weightbearing and nonweightbearing, no crepitus observed the first MP joint, + dorsal flag sign.Mild  bunion deformity is observed .     Neurological: She has normal reflexes. She displays no atrophy and no tremor. No sensory deficit. She exhibits normal muscle tone.   Skin: Skin is warm, dry and intact. No bruising, no burn, no laceration, no lesion and no rash noted. She is not diaphoretic. No cyanosis. No pallor. Nails show no clubbing.   Psychiatric: Her mood appears not anxious. Her affect is not inappropriate. Her speech is not slurred. She  is not combative. She is communicative. She is attentive.   Nursing note reviewed.            Assessment:       Encounter Diagnoses   Name Primary?    Pes planus of both feet     Acquired equinus deformity of both feet Yes    Hammer toes of both feet     Hallux limitus, unspecified laterality          Plan:       Estephania was seen today for foot pain.    Diagnoses and all orders for this visit:    Acquired equinus deformity of both feet    Pes planus of both feet    Hammer toes of both feet    Hallux limitus, unspecified laterality      I counseled the patient on her conditions, their implications and medical management.    Discussed biomechanical deformity correction surgically vs conservative management     Educated patient on products such as heel cups, arch supports, and orthotics. Encouraged patient to rest. Patient instructed on adequate icing techniques. Patient should ice the affected area at least once per day x 10 minutes for 10 days . I advised the  patient that extra icing would also be beneficial to ensure adequate anti inflammatory effect. Patient is to take medrol dosepak as prescribed for antiinflammatory effects. He is advised to closely monitor sugars while taking steroid.    Instructions on elevation to reduce pain and swelling. When sleeping, place a pillow under the injured leg. When sitting, support the injured leg so it is level with your waist.     Stretching handout dispensed to patient. Instructions on adequate stretching reviewed in clinic     RTC PRN

## 2017-08-07 NOTE — PATIENT INSTRUCTIONS
Recommend lotions: eucerin, aquaphor, A&D ointment, gold bond for diabetics, sween; urea 40 with aloe (found on amazon.com)    Shoe recommendations: (try 6pm.com, zappos.com , nordstromrack.com, or shoes.com for discounted prices) you can visit [employer] shoes in [place] as well    Asics (GT 2000 or gel foundations), new balance, saucony (stabil c3),  Smith (GTS or Beast or transcend), vionic, propet (tennis shoe)    sofft brand, clarks, crocs, aerosoles, naturalizers, SAS, ecco, madeline, sergey carbone, rockports (dress shoes)    Vionic, burkenstocks, fitflops, propet (sandals)    Nike comfort thong sandals, crocs, propet (house shoes)    Nail Home remedy:  Vicks Vapor rub to nails for easier managability    Occasional soaks for 15-20 mins in luke warm water with 1 cup of listerine and 1 cup of apple cider vinegar are ok You may add several drops of oil of oregano or tea tree oil as well        Understanding Achilles Tendonitis    Achilles tendonitis is an overuse injury. It results in inflammation of the Achilles tendon. This tendon is found on the back of the ankle. It links the calf muscle to the heel bone. It helps you do pushing-off movements like running or standing on your toes.     How to say it  [id number]-KILL-[id number]-dun-I-tis   What causes Achilles tendonitis?  Achilles tendonitis can happen if you do an activity like running, walking, or jumping too much. This overuse can strain, or pull, the tendon. It may lead to minor tearing of the tendon. An injury to the lower leg or foot can also cause it.  If you dont warm up before taking part in sports such as basketball, you are more likely to suffer from this condition. You are also more prone to it if you do too much of such an activity too quickly. Proper training and rest can help prevent it.  Symptoms of Achilles tendonitis  The main symptom of Achilles tendonitis is pain. This pain mostly happens when you move the ankle. The tendon may also feel stiff after a period of  no activity, such as sleeping. It may also become swollen. You may hear a crackling sound when you move your ankle.  Treatment for Achilles tendonitis  Symptoms often get better after starting treatment. A full recovery may take several months. Treatments include:  · Rest. You should stop or change the activity that caused the injury. The tendon will then have time to heal.  · Cold or heat pack. These help reduce pain and swelling.  · Prescription or over-the-counter pain medicines. These help reduce pain and swelling.  · Shoe inserts. These devices can reduce strain on the Achilles tendon when you move. You may then feel less pain.  · Stretching and strengthening exercises. Certain exercises can help you regain flexibility and strength in your Achilles tendon.  · Surgery. This option can fix the injured tendon. But you dont often need it unless other treatments dont work.     When to call your healthcare provider   Call your healthcare provider right away if you have any of these:  · Fever of 100.4°F (38°C) or higher, or as directed  · Pain that gets worse  · Symptoms that dont get better, or get worse  · New symptoms    Date Last Reviewed: 3/10/2016  © 4617-7491 [employer]. [address] [place] [address]. All rights reserved. This information is not intended as a substitute for professional medical care. Always follow your healthcare professional's instructions.        Achilles Tendon Rupture, Partial or Complete    The Achilles tendon connects the muscles in the back of your lower leg to your heel bone. It lets you move your foot down--called a step on the gas motion. This movement is important for walking, running, and jumping. A sudden strong contraction of the lower leg can partially tear (rupture) the Achilles tendon. This injury can happen while playing sports. This injury is more likely if you have injured the tendon in the past. It is also more likely if the tendon is  inflamed from stress.   When the injury happens, you may feel a pop or snap, or like you have been kicked. An Achilles tendon tear will cause swelling, bruising, and pain in the ankle area. You will have difficulty walking or pushing off with your foot.   A complete Achilles tear is usually treated with surgery to attach the torn ends of the tendon. This is followed by up to 8 weeks in a walking cast, boot, or splint. The injury can also be treated without surgery, but the tendon will take longer to heal. The risk of rupturing it again is also greater than with surgery. With either type of treatment, you will need physical therapy to strengthen your Achilles tendon. It usually takes up to 6 months to return to your former level of activity and about a year to fully recover.  Home care  Medicine  The doctor may prescribe medicines for pain. Or you may use acetaminophen or Ibuprofen to control the pain.  Talk with your doctor before taking these medicines if you have chronic liver or kidney disease. Also talk with your doctor if you have had an ulcer or GI bleeding.  General care  · Rest. Use crutches as directed. Do not put any weight on the injured leg until your doctor says it is OK.  You will be told to see an orthopedic doctor as soon as possible. This is a doctor with special training to treat foot and ankle problems.  · Use ice. Put a cold pack on the injured area for 20 minutes at a time. Do this at least 4 to 8 times a day for the first 48 hours. After the first 48 hours, use the cold pack to ease pain and swelling, as needed. You can make your own cold pack from a sealed bag of frozen peas or ice. Wrap the bag in a towel. Dont put the cold source directly on your skin. (If you are using ice, be careful to avoid getting the cast wet as the ice melts.) If you have a splint that can't be removed, put the cold pack over the splint.  · Use compression. The doctor may give you an elastic wrap, boot, air cast or  splint to help ease swelling. Use this as the doctor tells you to.  · Elevate your leg. During the first 48 hours, keep your injured leg elevated on a pillow when you are sitting or lying down. The pillow should be at a level above your heart. This is very important to reduce swelling.  · Keep the splint or cast dry. When bathing, protect it with a large plastic bag. Make sure to tape the plastic bag closed. If a fiberglass splint or cast gets wet, you can dry it with a hair dryer.  Follow-up care  You will be referred to an orthopedic doctor for follow-up care. Surgery may be needed to repair this injury, so it is very important to make an appointment with the specialist as soon as you can.  When to seek medical advice  Call your healthcare provider right away if any of these occur:  · A plaster splint or cast gets wet or soft or breaks  · A fiberglass splint or cast gets wet and does not dry in 24 hours  · Pain or swelling gets worse, or redness appears  · Toes or the injured area become cold, blue, numb or tingly  · You cannot put weight on the injured leg  Date Last Reviewed: 9/29/2015  © 3059-6596 [employer]. [address] [place] [address], [place] [address]. All rights reserved. This information is not intended as a substitute for professional medical care. Always follow your healthcare professional's       Wearing Proper Shoes                    You walk on your feet every day, forcing them to support the weight of your body. Repeated stress on your feet can cause damage over time. The right shoes can help protect your feet. The wrong shoes can cause more foot problems. Read the information below to help you find a shoe that fits your foot needs.     A good shoe fit will cover your foot outline.       A shoe that doesnt cover the outline is a bad fit.      Whats your foot shape?  To get a good fit, you need to know the shape of your foot. Do this simple test: While standing, place your foot on a  piece of paper and trace around it. Is your foot straight or curved? Do you have a foot problem, such as a bunion, that causes your foot outline to show a bulge on the side of your big toe?  Finding your fit  Bring your foot outline to the shoe store to help you find the right shoe. Place a shoe you like on top of the outline to see if it matches the shape. The shoe should cover the outline. (If you have a bunion, the shoe may not cover the bulge on the outline. Look for soft leather shoes to stretch over the bunion.) Once youve found a pair of proper shoes, put them on. Walk around. Be sure the shoes dont rub or pinch. If the shoes feel good, youve found your fit!  The right shoe for you  A good shoe has features that provide comfort and support. It must also be the right size and shape for your feet. Look for a shoe made of breathable fabric and lining, such as leather or canvas. Be sure that shoes have enough tread to prevent slipping. Go to a good shoe store for help finding the right shoe.  Good shoe features  An ideal shoe has the following:  · Laces for support. If tying laces is a problem for you, try shoes with Velcro fasteners or flako.  · A front of the shoe (toe box) with ½ inch space in front of your longest toes.  · An arch shape that supports your foot.  · No more than 1½ inches of heel.  · A stiff, snug back of the shoe to keep your foot from sliding around.  · A smooth lining with no rough seams.  Shoe shopping tips  Below are some dos and donts for when you go to the shoe store.  Do:  · Select the shoes that feel right. Wear them around the house. Then bring them to your foot healthcare provider to check for fit. If they dont fit well, return them.  · Shop late in the day, when your feet will be slightly bigger.  · Each time you buy shoes, have both your feet measured while you are standing. Foot size changes with time.  · Pick shoes to suit their purpose. High heels are OK for an  occasional night on the town. But for everyday wear, choose a more sensible shoe.  · Try on shoes while wearing any inserts specially made for your feet (orthoses).  · Try on both the right and left shoes. If your feet are different sizes, pick a pair that fits the larger foot.  Dont:  · Dont buy shoes based on shoe size alone. Always try on shoes, as sizes differ from brand to brand and within brands.  · Dont expect shoes to break in. If they dont fit at the store, dont buy them.  · Dont buy a shoe that doesnt match your foot shape.  What about socks?  Always wear socks with shoes. Socks help absorb sweat and reduce friction and blistering. When shopping for shoes, choose soft, padded socks with seams that dont irritate your feet.  If you have foot problems  Some foot problems cause deformities. This can make it hard to find a good fit. Look for shoes made of soft leather to stretch over the deformity. If you have bunions, buy shoes with a wider toe box. To fit hammertoes, look for shoes with a tall toe box. If you have arch problems, you may need inserts. In some cases, youll need to have custom footwear or orthoses made for your feet.  Suggested footwear  Ask your healthcare provider what kind of footwear you need. He or she may recommend a certain brand or shoe store.  Date Last Reviewed: 8/1/2016  © 2843-2386 [employer]. [address] [place] [address], [place] [address]. All rights reserved. This information is not intended as a substitute for professional medical care. Always follow your healthcare professional's instructions.              Wearing Proper Shoes                    You walk on your feet every day, forcing them to support the weight of your body. Repeated stress on your feet can cause damage over time. The right shoes can help protect your feet. The wrong shoes can cause more foot problems. Read the information below to help you find a shoe that fits your foot needs.     A good  shoe fit will cover your foot outline.       A shoe that doesnt cover the outline is a bad fit.      Whats Your Foot Shape?  To get a good fit, you need to know the shape of your foot. Do this simple test: While standing, place your foot on a piece of paper and trace around it. Is your foot straight or curved? Do you have a foot problem, such as a bunion, that causes your foot outline to show a bulge on the side of your big toe?  Finding Your Fit  Bring your foot outline to the shore store to help you find the right shoe. Place a shoe you like on top of the outline to see if it matches the shape. The shoe should cover the outline. (If you have a bunion, the shoe may not cover the bulge on the outline. Look for soft leather shoes to stretch over the bunion.) Once youve found a pair of proper shoes, put them on. Walk around. Be sure the shoes dont rub or pinch. If the shoes feel good, youve found your fit!  The Right Shoe for You  A good shoe has features that provide comfort and support. It must also be the right size and shape for your feet. Look for a shoe made of breathable fabric and lining, such as leather or canvas. Be sure that shoes have enough tread to prevent slipping. Go to a good shoe store for help finding the right shoe.  Good Shoe Features  An ideal shoe has the following:  · Laces for support. If tying laces is a problem for you, try shoes with Velcro fasteners or flako.  · A front of the shoe (toe box) with ½ inch space in front of your longest toes.  · An arch shape that supports your foot.  · No more than 1½ inches of heel.  · A stiff, snug back of the shoe to keep your foot from sliding around.  · A smooth lining with no rough seams.  Shoe Shopping Tips  Below are some dos and donts for when you go to the shoe store.  Do:  · Select the shoes that feel right. Wear them around the house. Then bring them to your foot doctor to check for fit. If they dont fit well, return them.  · Shop late  in the day, when your feet will be slightly bigger.  · Each time you buy shoes, have both your feet measured while you are standing. Foot size changes with time.  · Pick shoes to suit their purpose. High heels are okay for an occasional night on the town. But for everyday wear, choose a more sensible shoe.  · Try on shoes while wearing any inserts specially made for your feet (orthoses).  · Try on both the right and left shoes. If your feet are different sizes, pick a pair that fits the larger foot.  Dont:  · Dont buy shoes based on shoe size alone. Always try on shoes, as sizes differ from brand to brand and within brands.  · Dont expect shoes to break in. If they dont fit at the store, dont buy them.  · Dont buy a shoe that doesnt match your foot shape.  What About Socks?  Always wear socks with shoes. Socks help absorb sweat and reduce friction and blistering. When shopping for shoes, choose soft, padded socks with seams that dont irritate your feet.  If You Have Foot Problems  Some foot problems cause deformities. This can make it hard to find a good fit. Look for shoes made of soft leather to stretch over the deformity. If you have bunions, buy shoes with a wider toe box. To fit hammertoes, look for shoes with a tall toe box. If you have arch problems, you may need inserts. In some cases, youll need to have custom footwear or orthoses made for your feet.  Suggested Footwear  Ask your healthcare provider what kind of footwear you need. He or she may recommend a certain brand or shoe store.  © 9969-5691 [employer]. [address] [place] [address], [place] [address]. All rights reserved. This information is not intended as a substitute for professional medical care. Always follow your healthcare professional's instructions.      Leg Cramps  A muscle cramp or spasm is a strong contraction of the muscle fibers. It is also called a charley horse. This may occur in the foot, calf, or thigh at night  when the legs are elevated. If the spasm is prolonged, it can become very painful.  This may be caused by sleeping in an uncomfortable position, muscle fatigue, poor muscle tone from lack of exercise and stretching, dehydration, electrolyte imbalance, diabetes, alcohol use, and certain medicine.  Home care  · Drink plenty of fluids during the day to prevent dehydration.  · Stretch your legs before bedtime.  · Eat a diet high in potassium. These foods include fresh fruit, such as bananas, oranges, cantaloupe, and honeydew melon. It also includes apple, prune, orange, grape and pineapple juices. Other foods high in potassium are white, red, and casillas beans, baked potatoes, raw spinach, cod, flounder, halibut, salmon, and scallops.  · Talk with your healthcare provider about taking mineral and vitamin supplements that contain magnesium and vitamin B-12 if you are not already taking these. Other prescription medicines may also be used.  · Avoid stimulants such as caffeine, nicotine, decongestants.  How to relieve an acute leg cramp  · For mild pain, getting out of the bed and walking may help. Some people find relief with heat and massage. You can apply heat with a warm shower, bath, or compress. Some people feel better with a cold packs. You can make an ice pack by filling a plastic bag that seals at the top with ice cubes and then wrapping it with a thin towel. Try both and use the method that feels best for 15 to 20 minutes at a time.  · For severe pain, stretching the muscle that is in spasm may quickly relieve the pain.  · When the spasm is in your foot, your toes may curl up or down. To stretch the muscle in spasm, bend your toes in the opposite direction. If the spasm pulls your toes up, bend them down. If the spasm pulls them down, bend them up.  · When the spasm is in your calf, bend the ankle so the foot points upward toward your knee.  · When the spasm is in your thigh, bend or straighten the knee and hip  until you feel relief.  Follow-up care  Follow up with your healthcare provider, or as advised.  When to seek medical advice  Call your healthcare provider right away if any of these occur:  · Walking makes your pain worse and rest makes it better  · You develop weakness in the affected leg  · Pain or frequency of spasms increases and is not controlled by the above measures  Date Last Reviewed: 11/23/2015  © 9670-8395 [employer]. [address] [place] [address]. All rights reserved. This information is not intended as a substitute for professional medical care. Always follow your healthcare professional's instructions.          Toe Extension (Flexibility)    These instructions are for your right foot. Switch sides for your left foot.  1. Sit in a chair. Rest your right ankle on your left knee.  2. Hold your toes with your right hand. Gently bend the toes backward. Feel a stretch in the undersides of the toes and ball of the foot. Hold for 30 to 60 seconds.  3. Then gently bend the toes in the other direction. Gently press on them until your foot is pointed. Hold for 30 to 60 seconds.  4. Repeat 5 times, or as instructed.  © 2434-8164 [employer]. [address] [place] [address]. All rights reserved. This information is not intended as a substitute for professional medical care. Always follow your healthcare professional's instructions.      Ankle Alphabet (Flexibility)    These instructions are for your right foot. Switch sides for your left foot.  5. Sit on the floor with your legs straight in front of you.  6. Rest your right calf on a rolled-up towel. Use your foot to write the letters of the alphabet in mid-air.  7. Repeat this exercise 3 times a day, or as instructed.  Date Last Reviewed: 3/10/2016  © 2215-7051 [employer]. [address] [place] [address]. All rights reserved. This information is not intended as a substitute for  professional medical care. Always follow your healthcare professional's instructions.        Calf Raise (Strength)    8. Stand up straight with both feet flat on the floor, slightly apart. Hold onto a sturdy chair, railing, counter, or table.  9. Raise both heels so youre standing on the balls of your feet. Dont lock your knees or arch your back. Hold for 5 seconds. Then slowly lower your heels back down to the floor.  10. Repeat 10 times, or as instructed.  11. Do this exercise 3 times a day, or as instructed.     Challenge yourself  As you become stronger, do this exercise on one foot at a time.   Date Last Reviewed: 3/10/2016  © 8375-5889 [employer]. [address] [place] [address]. All rights reserved. This information is not intended as a substitute for professional medical care. Always follow your healthcare professional's instructions.        Bent-Knee Calf Stretch  This exercise is designed to stretch and strengthen your feet and ankles. Before beginning the exercise, read through all the instructions. While exercising, breathe normally and dont bounce. If you feel any pain, stop the exercise. If pain persists, inform your healthcare provider:    · Stand an arms length away from a wall. Place the palms of your hands on the wall. Step forward about 12 inches with your ______ foot.  · Keeping toes pointed forward and both heels on the floor, bend both knees and lean forward. Hold for ______ seconds. Relax.  · Repeat ______ times. Do ______ sets a day.  Date Last Reviewed: 8/16/2015  © 5209-6581 [employer]. [address] [place] [address]. All rights reserved. This information is not intended as a substitute for professional medical care. Always follow your healthcare professional's instructions.        Arch retraining    These exercises are for your right foot. Switch sides for your left foot.  12. Sit in a chair or stand with both feet flat on the floor.  Press down with the ball of your right foot, but only on the left side of the foot, just under the big toe.  13. Then pull the bottom of your big toe back toward your heel. This should pull up the arch of your foot. Dont flex your toes while doing this. It is a subtle movement of the arch.  14. Hold for 5 seconds. Relax.  Date Last Reviewed: 3/10/2016  © 5098-3620 [employer]. [address] [place] [address]. All rights reserved. This information is not intended as a substitute for professional medical care. Always follow your healthcare professional's instructions.        Ankle Dorsiflexion/Plantarflexion (Flexibility)    15. Sit on the floor or in bed with your legs straight in front of you.  16. Point both feet. Then flex both feet.  17. Do this 10 to 30 times in a row.  18. Repeat this exercise 2 times a day, or as instructed.  Date Last Reviewed: 5/1/2016 © 2000-2016 [employer]. [address] [place] [address]. All rights reserved. This information is not intended as a substitute for professional medical care. Always follow your healthcare professional's instructions.

## 2017-08-09 ENCOUNTER — INFUSION (OUTPATIENT)
Dept: INFUSION THERAPY | Facility: HOSPITAL | Age: 52
End: 2017-08-09
Attending: INTERNAL MEDICINE
Payer: COMMERCIAL

## 2017-08-09 VITALS — RESPIRATION RATE: 16 BRPM | DIASTOLIC BLOOD PRESSURE: 72 MMHG | HEART RATE: 55 BPM | SYSTOLIC BLOOD PRESSURE: 140 MMHG

## 2017-08-09 DIAGNOSIS — D64.9 ANEMIA: Primary | ICD-10-CM

## 2017-08-09 PROCEDURE — 63600175 PHARM REV CODE 636 W HCPCS: Performed by: INTERNAL MEDICINE

## 2017-08-09 PROCEDURE — 96365 THER/PROPH/DIAG IV INF INIT: CPT

## 2017-08-09 PROCEDURE — 25000003 PHARM REV CODE 250: Performed by: INTERNAL MEDICINE

## 2017-08-09 RX ORDER — SODIUM CHLORIDE 0.9 % (FLUSH) 0.9 %
10 SYRINGE (ML) INJECTION
Status: CANCELLED | OUTPATIENT
Start: 2017-08-09

## 2017-08-09 RX ORDER — HEPARIN 100 UNIT/ML
500 SYRINGE INTRAVENOUS
Status: CANCELLED | OUTPATIENT
Start: 2017-08-09

## 2017-08-09 RX ADMIN — IRON SUCROSE 100 MG: 20 INJECTION, SOLUTION INTRAVENOUS at 03:08

## 2017-08-09 NOTE — PLAN OF CARE
Problem: Patient Care Overview (Adult)  Goal: Plan of Care Review  Pt will report any increased SOB, fatigue or mental changes.

## 2017-08-09 NOTE — PLAN OF CARE
Problem: Patient Care Overview (Adult)  Goal: Individualization & Mutuality  Outcome: Ongoing (interventions implemented as appropriate)  Likes to read or use tablet while at visit.

## 2017-08-22 ENCOUNTER — TELEPHONE (OUTPATIENT)
Dept: HEMATOLOGY/ONCOLOGY | Facility: CLINIC | Age: 52
End: 2017-08-22

## 2017-08-22 NOTE — TELEPHONE ENCOUNTER
Returned call to pt.   Pt returning nurse call to provide additional information about need for f/u.   Pt stated Sunday evening she had blurry vision and missing patches in her right eye that would come and go.   Pt stated she contacted eye doctor and he informed her to go to ER as could be a TIA and to take an ASA.   Pt stated she went to ER and they did bloodwork and scan and was ruled out no TIA.   Pt stated she saw eye doctor yesterday and he did a full workup and saw some bleeding in spots of back of eye and referred to retina specialist for central vein occulusion.   Pt stated that eye doctor informed her to f/u with Dr. Andres as eye condition could be related to AMINTA.   Informed pt I would fwd message to Dr. Andres.   Pt verbalized understanding.     Message routed to Dr. Andres.       ----- Message from Alberto Rain sent at 8/22/2017  9:28 AM CDT -----  Contact: PT   PT is returning call     Pt contact: [phone]

## 2017-08-22 NOTE — TELEPHONE ENCOUNTER
Called pt to get additional information regarding diagnosis and need to f/u with Dr. Andres.   Pt unavailable.   Left VM---callback number provided.     ----- Message from Awilda Andres MD sent at 8/21/2017  4:54 PM CDT -----  Contact: PT   ??    ----- Message -----  From: Janene Kwok  Sent: 8/21/2017   4:24 PM  To: Awilda Andres MD, Otilio Chun    Please advise on f/u.   Pt stated recently diagnosed with eye issue and eye doctor informed her could be correlated with AMINTA and would like to f/u with you.   Thanks     ----- Message -----  From: Otilio Chun  Sent: 8/21/2017   4:20 PM  To: Dmitry SANTANA Staff        ----- Message -----  From: Alberto Rain  Sent: 8/21/2017   3:50 PM  To: Otilio Chun    PT will like to schedule an appointment based on vision lost in one eye     PT contact: [phone]

## 2017-08-22 NOTE — TELEPHONE ENCOUNTER
MD Janene Hammond   Caller: Unspecified (Today,  9:53 AM)             Called patient   Unclear what optho referring to   Sees retinal specialist tomorrow-   She has no elevated platelet count related to anemia so we discussed she needs to wait for actual diagnosis but not related to this one

## 2017-09-07 ENCOUNTER — PATIENT MESSAGE (OUTPATIENT)
Dept: HEMATOLOGY/ONCOLOGY | Facility: CLINIC | Age: 52
End: 2017-09-07

## 2017-10-04 ENCOUNTER — INFUSION (OUTPATIENT)
Dept: INFUSION THERAPY | Facility: HOSPITAL | Age: 52
End: 2017-10-04
Attending: INTERNAL MEDICINE
Payer: COMMERCIAL

## 2017-10-04 DIAGNOSIS — D64.9 ANEMIA: Primary | ICD-10-CM

## 2017-10-04 PROCEDURE — 25000003 PHARM REV CODE 250: Performed by: INTERNAL MEDICINE

## 2017-10-04 PROCEDURE — 63600175 PHARM REV CODE 636 W HCPCS: Performed by: INTERNAL MEDICINE

## 2017-10-04 PROCEDURE — 96365 THER/PROPH/DIAG IV INF INIT: CPT

## 2017-10-04 RX ADMIN — IRON SUCROSE 100 MG: 20 INJECTION, SOLUTION INTRAVENOUS at 03:10

## 2017-10-04 NOTE — PLAN OF CARE
Problem: Patient Care Overview (Adult)  Goal: Plan of Care Review  Tolerated Venofer. No reactions noted.

## 2017-11-29 ENCOUNTER — PATIENT MESSAGE (OUTPATIENT)
Dept: HEMATOLOGY/ONCOLOGY | Facility: CLINIC | Age: 52
End: 2017-11-29

## 2017-11-29 ENCOUNTER — TELEPHONE (OUTPATIENT)
Dept: HEMATOLOGY/ONCOLOGY | Facility: CLINIC | Age: 52
End: 2017-11-29

## 2017-11-29 DIAGNOSIS — T14.8XXA BRUISING: ICD-10-CM

## 2017-11-29 DIAGNOSIS — D50.9 IRON DEFICIENCY ANEMIA, UNSPECIFIED IRON DEFICIENCY ANEMIA TYPE: Primary | ICD-10-CM

## 2017-11-29 NOTE — TELEPHONE ENCOUNTER
Called pt and scheduled labs ordered by MD.   Pt verbalized understanding of location/time/date.

## 2017-11-30 ENCOUNTER — LAB VISIT (OUTPATIENT)
Dept: LAB | Facility: HOSPITAL | Age: 52
End: 2017-11-30
Attending: INTERNAL MEDICINE
Payer: COMMERCIAL

## 2017-11-30 DIAGNOSIS — D50.9 IRON DEFICIENCY ANEMIA, UNSPECIFIED IRON DEFICIENCY ANEMIA TYPE: ICD-10-CM

## 2017-11-30 DIAGNOSIS — T14.8XXA BRUISING: ICD-10-CM

## 2017-11-30 LAB
APTT BLDCRRT: 24.7 SEC
BASOPHILS # BLD AUTO: 0.02 K/UL
BASOPHILS NFR BLD: 0.4 %
DIFFERENTIAL METHOD: NORMAL
EOSINOPHIL # BLD AUTO: 0.1 K/UL
EOSINOPHIL NFR BLD: 1.7 %
ERYTHROCYTE [DISTWIDTH] IN BLOOD BY AUTOMATED COUNT: 12.3 %
ERYTHROCYTE [DISTWIDTH] IN BLOOD BY AUTOMATED COUNT: 12.3 %
FERRITIN SERPL-MCNC: 135 NG/ML
FERRITIN SERPL-MCNC: 135 NG/ML
HCT VFR BLD AUTO: 40.4 %
HCT VFR BLD AUTO: 40.4 %
HGB BLD-MCNC: 13 G/DL
HGB BLD-MCNC: 13 G/DL
IRON SERPL-MCNC: 69 UG/DL
IRON SERPL-MCNC: 69 UG/DL
LYMPHOCYTES # BLD AUTO: 1.4 K/UL
LYMPHOCYTES NFR BLD: 30.8 %
MCH RBC QN AUTO: 30.7 PG
MCH RBC QN AUTO: 30.7 PG
MCHC RBC AUTO-ENTMCNC: 32.2 G/DL
MCHC RBC AUTO-ENTMCNC: 32.2 G/DL
MCV RBC AUTO: 95 FL
MCV RBC AUTO: 95 FL
MONOCYTES # BLD AUTO: 0.4 K/UL
MONOCYTES NFR BLD: 8.2 %
NEUTROPHILS # BLD AUTO: 2.7 K/UL
NEUTROPHILS # BLD AUTO: 2.7 K/UL
NEUTROPHILS NFR BLD: 58.7 %
NRBC BLD-RTO: 0 /100 WBC
PLATELET # BLD AUTO: 215 K/UL
PLATELET # BLD AUTO: 215 K/UL
PMV BLD AUTO: 11.1 FL
PMV BLD AUTO: 11.1 FL
RBC # BLD AUTO: 4.24 M/UL
RBC # BLD AUTO: 4.24 M/UL
SATURATED IRON: 19 %
SATURATED IRON: 19 %
TOTAL IRON BINDING CAPACITY: 369 UG/DL
TOTAL IRON BINDING CAPACITY: 369 UG/DL
TRANSFERRIN SERPL-MCNC: 249 MG/DL
TRANSFERRIN SERPL-MCNC: 249 MG/DL
VIT B12 SERPL-MCNC: 563 PG/ML
WBC # BLD AUTO: 4.61 K/UL
WBC # BLD AUTO: 4.61 K/UL

## 2017-11-30 PROCEDURE — 83540 ASSAY OF IRON: CPT

## 2017-11-30 PROCEDURE — 85025 COMPLETE CBC W/AUTO DIFF WBC: CPT

## 2017-11-30 PROCEDURE — 82607 VITAMIN B-12: CPT

## 2017-11-30 PROCEDURE — 82728 ASSAY OF FERRITIN: CPT

## 2017-11-30 PROCEDURE — 85730 THROMBOPLASTIN TIME PARTIAL: CPT

## 2017-11-30 PROCEDURE — 36415 COLL VENOUS BLD VENIPUNCTURE: CPT | Mod: PO

## 2017-12-01 ENCOUNTER — INFUSION (OUTPATIENT)
Dept: INFUSION THERAPY | Facility: HOSPITAL | Age: 52
End: 2017-12-01
Attending: INTERNAL MEDICINE
Payer: COMMERCIAL

## 2017-12-01 VITALS — HEART RATE: 53 BPM | DIASTOLIC BLOOD PRESSURE: 65 MMHG | RESPIRATION RATE: 16 BRPM | SYSTOLIC BLOOD PRESSURE: 126 MMHG

## 2017-12-01 DIAGNOSIS — D64.9 ANEMIA: Primary | ICD-10-CM

## 2017-12-01 PROCEDURE — 96365 THER/PROPH/DIAG IV INF INIT: CPT

## 2017-12-01 PROCEDURE — 25000003 PHARM REV CODE 250: Performed by: INTERNAL MEDICINE

## 2017-12-01 PROCEDURE — 63600175 PHARM REV CODE 636 W HCPCS: Performed by: INTERNAL MEDICINE

## 2017-12-01 RX ADMIN — IRON SUCROSE 100 MG: 20 INJECTION, SOLUTION INTRAVENOUS at 03:12

## 2017-12-01 NOTE — PLAN OF CARE
Problem: Patient Care Overview (Adult)  Goal: Plan of Care Review  Outcome: Ongoing (interventions implemented as appropriate)  Patient received Venofer IVPB. Tolerated well. No reactions noted during visit. VSS. Patient received discharge instructions and verbalized understanding.

## 2018-01-24 ENCOUNTER — INFUSION (OUTPATIENT)
Dept: INFUSION THERAPY | Facility: HOSPITAL | Age: 53
End: 2018-01-24
Attending: INTERNAL MEDICINE
Payer: COMMERCIAL

## 2018-01-24 DIAGNOSIS — D64.9 ANEMIA: Primary | ICD-10-CM

## 2018-01-24 PROCEDURE — 96365 THER/PROPH/DIAG IV INF INIT: CPT

## 2018-01-24 PROCEDURE — 63600175 PHARM REV CODE 636 W HCPCS: Performed by: INTERNAL MEDICINE

## 2018-01-24 PROCEDURE — 25000003 PHARM REV CODE 250: Performed by: INTERNAL MEDICINE

## 2018-01-24 RX ADMIN — IRON SUCROSE 100 MG: 20 INJECTION, SOLUTION INTRAVENOUS at 04:01

## 2018-01-26 ENCOUNTER — OFFICE VISIT (OUTPATIENT)
Dept: URGENT CARE | Facility: CLINIC | Age: 53
End: 2018-01-26
Payer: COMMERCIAL

## 2018-01-26 VITALS
DIASTOLIC BLOOD PRESSURE: 81 MMHG | WEIGHT: 218 LBS | OXYGEN SATURATION: 98 % | BODY MASS INDEX: 30.4 KG/M2 | TEMPERATURE: 97 F | SYSTOLIC BLOOD PRESSURE: 148 MMHG | HEART RATE: 67 BPM

## 2018-01-26 DIAGNOSIS — S39.012A STRAIN OF LUMBAR REGION, INITIAL ENCOUNTER: Primary | ICD-10-CM

## 2018-01-26 PROCEDURE — 96372 THER/PROPH/DIAG INJ SC/IM: CPT | Mod: S$GLB,,, | Performed by: FAMILY MEDICINE

## 2018-01-26 PROCEDURE — 99214 OFFICE O/P EST MOD 30 MIN: CPT | Mod: 25,S$GLB,, | Performed by: PHYSICIAN ASSISTANT

## 2018-01-26 RX ORDER — KETOROLAC TROMETHAMINE 30 MG/ML
30 INJECTION, SOLUTION INTRAMUSCULAR; INTRAVENOUS
Status: COMPLETED | OUTPATIENT
Start: 2018-01-26 | End: 2018-01-26

## 2018-01-26 RX ORDER — HEPARIN 100 UNIT/ML
500 SYRINGE INTRAVENOUS
Status: CANCELLED | OUTPATIENT
Start: 2018-01-26

## 2018-01-26 RX ORDER — SODIUM CHLORIDE 0.9 % (FLUSH) 0.9 %
10 SYRINGE (ML) INJECTION
Status: CANCELLED | OUTPATIENT
Start: 2018-01-26

## 2018-01-26 RX ORDER — CYCLOBENZAPRINE HCL 5 MG
TABLET ORAL
Qty: 15 TABLET | Refills: 0 | Status: SHIPPED | OUTPATIENT
Start: 2018-01-26 | End: 2018-05-30 | Stop reason: SDUPTHER

## 2018-01-26 RX ADMIN — KETOROLAC TROMETHAMINE 30 MG: 30 INJECTION, SOLUTION INTRAMUSCULAR; INTRAVENOUS at 10:01

## 2018-01-26 NOTE — PROGRESS NOTES
Subjective:       Patient ID: Estephania Rogers is a 52 y.o. female.    Vitals:  weight is 98.9 kg (218 lb). Her temperature is 97.4 °F (36.3 °C). Her blood pressure is 148/81 (abnormal) and her pulse is 67. Her oxygen saturation is 98%.     Chief Complaint: Back Pain    Pt states lower back is throbbing and stiff. Pt was moving boxes last weekend and again 2 days ago. Pt has limited ROM. Pt tried to rest all day yesterday and it made the pain worse. She states there was not an actual injury of her back - just overuse. Pt states she has had a low back strain in the past and that these symptoms are similar. She tried muscle relaxers and rest yesterday, which she states provided relief temporarily.       Back Pain   This is a new problem. The current episode started in the past 7 days. The problem occurs constantly. The problem has been gradually worsening since onset. The pain is present in the lumbar spine. The pain does not radiate. The pain is at a severity of 8/10. The pain is severe. The pain is worse during the night. The symptoms are aggravated by position, bending, twisting and lying down. Pertinent negatives include no abdominal pain, bladder incontinence, bowel incontinence, chest pain, dysuria, fever, headaches, leg pain, numbness, paresis, paresthesias, pelvic pain, perianal numbness or tingling. She has tried heat, muscle relaxant and bed rest (motrin) for the symptoms. The treatment provided mild relief.     Review of Systems   Constitution: Negative for fever and malaise/fatigue.   Eyes: Negative for pain.   Cardiovascular: Negative for chest pain.   Respiratory: Negative for cough.    Skin: Negative for color change and rash.   Musculoskeletal: Positive for back pain and stiffness. Negative for falls and muscle weakness.   Gastrointestinal: Negative for abdominal pain, change in bowel habit, bowel incontinence, diarrhea, nausea and vomiting.   Genitourinary: Negative for bladder incontinence,  dysuria, hematuria, pelvic pain and urgency.   Neurological: Negative for disturbances in coordination, headaches, numbness, paresthesias and tingling.       Objective:      Physical Exam   Constitutional: She is oriented to person, place, and time. She appears well-developed and well-nourished. No distress.   HENT:   Head: Normocephalic and atraumatic.   Right Ear: External ear normal.   Left Ear: External ear normal.   Nose: Nose normal.   Eyes: Conjunctivae, EOM and lids are normal. Right eye exhibits no discharge. Left eye exhibits no discharge.   Neck: Normal range of motion. Neck supple. No spinous process tenderness and no muscular tenderness present. No neck rigidity. No edema, no erythema and normal range of motion present.   Cardiovascular: Normal rate, regular rhythm and normal heart sounds.  Exam reveals no gallop and no friction rub.    No murmur heard.  Pulmonary/Chest: Effort normal and breath sounds normal. No respiratory distress. She has no decreased breath sounds. She has no wheezes. She has no rhonchi. She has no rales.   Musculoskeletal:        Cervical back: Normal.        Thoracic back: She exhibits decreased range of motion (restricted by pain), tenderness (lower thoracic tenderness), pain and spasm. She exhibits no bony tenderness, no swelling, no edema, no deformity, no laceration and normal pulse.        Lumbar back: She exhibits decreased range of motion (restricted by pain), tenderness, pain and spasm. She exhibits no bony tenderness, no swelling, no edema, no deformity, no laceration and normal pulse.        Back:    Tenderness to palpation of lower thoracic and lumbar back; normal strength, sensation, and ROM of BLE   Neurological: She is alert and oriented to person, place, and time.   Skin: Skin is warm and dry. No rash noted. No erythema.   Psychiatric: She has a normal mood and affect. Her behavior is normal.   Nursing note and vitals reviewed.      Assessment:       1. Strain of  lumbar region, initial encounter        Plan:       Advised patient to follow up with her PCP if symptoms do not improve. Discussed treatment options with patient. Patient expressed verbal understanding and agreement with treatment plan.     Strain of lumbar region, initial encounter  -     ketorolac injection 30 mg; Inject 30 mg into the muscle one time.  -     cyclobenzaprine (FLEXERIL) 5 MG tablet; Take 1-2 tablets 3 times daily as needed for pain.  Dispense: 15 tablet; Refill: 0      Patient Instructions     -Discontinue taking ibuprofen for the rest of the day.  -Take muscle relaxers as prescribed to help with back spasms. Be aware this medication will cause drowsiness.   -See attached handout on how to care for lower back pain at home.  -Please follow up with your primary care provider as needed.    Please follow up with your primary care provider within 2-5 days if your signs and symptoms have not resolved or worsen.     If your condition worsens or fails to improve we recommend that you receive another evaluation at the emergency room immediately or contact your primary medical clinic to discuss your concerns.   You must understand that you have received an Urgent Care treatment only and that you may be released before all of your medical problems are known or treated. You, the patient, will arrange for follow up care as instructed.         Self-Care for Low Back Pain    Most people have low back pain now and then. In many cases, it isnt serious and self-care can help. Sometimes low back pain can be a sign of a bigger problem. Call your healthcare provider if your pain returns often or gets worse over time. For the long-term care of your back, get regular exercise, lose any excess weight and learn good posture.  Take a short rest  Lying down during the day may be beneficial for short periods of time if severe pain increases with sitting or standing. Long-term bed rest could be detrimental.  Reduce pain and  swelling  Cold reduces swelling. Both cold and heat can reduce pain. Protect your skin by placing a towel between your body and the ice or heat source.  · For the first few days, apply an ice pack for 15 to 20 minutes .  · After the first few days, try heat for 15 minutes at a time to ease pain. Never sleep on a heating pad.  · Over-the-counter medicine can help control pain and swelling. Try aspirin or ibuprofen.  Exercise  Exercise can help your back heal. It also helps your back get stronger and more flexible, preventing any reinjury. Ask your healthcare provider about specific exercises for your back.  Use good posture to avoid reinjury  · When moving, bend at the hips and knees. Dont bend at the waist or twist around.  · When lifting, keep the object close to your body. Dont try to lift more than you can handle.  · When sitting, keep your lower back supported. Use a rolled-up towel as needed.  Seek immediate medical care if:  · Youre unable to stand or walk.  · You have a temperature over 100.4°F (38.0°C)  · You have frequent, painful, or bloody urination.  · You have severe abdominal pain.  · You have a sharp, stabbing pain.  · Your pain is constant.  · You have pain or numbness in your leg.  · You feel pain in a new area of your back.  · You notice that the pain isnt decreasing after more than a week.   Date Last Reviewed: 9/29/2015  © 2311-3279 [employer]. [address] [place] [address]. All rights reserved. This information is not intended as a substitute for professional medical care. Always follow your healthcare professional's instructions.        Back Sprain or Strain    Injury to the muscles (strain) or ligaments (sprain) around the spine can be troubling. Injury may occur after a sudden forceful twisting or bending force such as in a car accident, after a simple awkward movement, or after lifting something heavy with poor body positioning. In any case, muscle spasm is  often present and adds to the pain.  Thankfully, most people feel better in 1 to 2 weeks, and most of the rest in 1 to 2 months. Most people can remain active. Unless you had a forceful or traumatic physical injury such as a car accident or fall, X-rays may not be ordered for the first evaluation of a back sprain or strain. If pain continues and does not respond to medical treatment, your healthcare provider may then order X-rays and other tests.  Home care  The following guidelines will help you care for your injury at home:  · When in bed, try to find a comfortable position. A firm mattress is best. Try lying flat on your back with pillows under your knees. You can also try lying on your side with your knees bent up toward your chest and a pillow between your knees.  · Don't sit for long periods. Try not to take long car rides or take other trips that have you sitting for a long time. This puts more stress on the lower back than standing or walking.  · During the first 24 to 72 hours after an injury or flare-up, apply an ice pack to the painful area for 20 minutes. Then remove it for 20 minutes. Do this for 60 to 90 minutes, or several times a day. This will reduce swelling and pain. Be sure to wrap the ice pack in a thin towel or plastic to protect your skin.  · You can start with ice, then switch to heat. Heat from a hot shower, hot bath, or heating pad reduces pain and works well for muscle spasms. Put heat on the painful area for 20 minutes, then remove for 20 minutes. Do this for 60 to 90 minutes, or several times a day. Do not use a heating pad while sleeping. It can burn the skin.  · You can alternate the ice and heat. Talk with your healthcare provider to find out the best treatment or therapy for your back pain.  · Therapeutic massage will help relax the back muscles without stretching them.  · Be aware of safe lifting methods. Do not lift anything over 15 pounds until all of the pain is  gone.  Medicines  Talk to your healthcare provider before using medicines, especially if you have other health problems or are taking other medicines.  · You may use acetaminophen or ibuprofen to control pain, unless another pain medicine was prescribed. If you have chronic conditions like diabetes, liver or kidney disease, stomach ulcers, or gastrointestinal bleeding, or are taking blood-thinner medicines, talk with your doctor before taking any medicines.  · Be careful if you are given prescription medicines, narcotics, or medicine for muscle spasm. They can cause drowsiness, and affect your coordination, reflexes, and judgment. Do not drive or operate heavy machinery when taking these types of medicines. Only take pain medicine as prescribed by your healthcare provider.  Follow-up care  Follow up with your healthcare provider, or as advised. You may need physical therapy or more tests if your symptoms get worse.  If you had X-rays your healthcare provider may be checking for any broken bones, breaks, or fractures. Bruises and sprains can sometimes hurt as much as a fracture. These injuries can take time to heal completely. If your symptoms dont improve or they get worse, talk with your healthcare provider. You may need a repeat X-ray or other tests.  Call 911  Call for emergency care if any of the following occur:  · Trouble breathing  · Confused  · Very drowsy or trouble awakening  · Fainting or loss of consciousness  · Rapid or very slow heart rate  · Loss of bowel or bladder control  When to seek medical advice  Call your healthcare provider right away if any of the following occur:  · Pain gets worse or spreads to your arms or legs  · Weakness or numbness in one or both arms or legs  · Numbness in the groin or genital area  Date Last Reviewed: 6/1/2016 © 2000-2017 [employer]. [address] [place] [address], [place] [address]. All rights reserved. This information is not intended as a substitute for  professional medical care. Always follow your healthcare professional's instructions.

## 2018-01-26 NOTE — PATIENT INSTRUCTIONS
-Discontinue taking ibuprofen for the rest of the day.  -Take muscle relaxers as prescribed to help with back spasms. Be aware this medication will cause drowsiness.   -See attached handout on how to care for lower back pain at home.  -Please follow up with your primary care provider as needed.    Please follow up with your primary care provider within 2-5 days if your signs and symptoms have not resolved or worsen.     If your condition worsens or fails to improve we recommend that you receive another evaluation at the emergency room immediately or contact your primary medical clinic to discuss your concerns.   You must understand that you have received an Urgent Care treatment only and that you may be released before all of your medical problems are known or treated. You, the patient, will arrange for follow up care as instructed.         Self-Care for Low Back Pain    Most people have low back pain now and then. In many cases, it isnt serious and self-care can help. Sometimes low back pain can be a sign of a bigger problem. Call your healthcare provider if your pain returns often or gets worse over time. For the long-term care of your back, get regular exercise, lose any excess weight and learn good posture.  Take a short rest  Lying down during the day may be beneficial for short periods of time if severe pain increases with sitting or standing. Long-term bed rest could be detrimental.  Reduce pain and swelling  Cold reduces swelling. Both cold and heat can reduce pain. Protect your skin by placing a towel between your body and the ice or heat source.  · For the first few days, apply an ice pack for 15 to 20 minutes .  · After the first few days, try heat for 15 minutes at a time to ease pain. Never sleep on a heating pad.  · Over-the-counter medicine can help control pain and swelling. Try aspirin or ibuprofen.  Exercise  Exercise can help your back heal. It also helps your back get stronger and more flexible,  preventing any reinjury. Ask your healthcare provider about specific exercises for your back.  Use good posture to avoid reinjury  · When moving, bend at the hips and knees. Dont bend at the waist or twist around.  · When lifting, keep the object close to your body. Dont try to lift more than you can handle.  · When sitting, keep your lower back supported. Use a rolled-up towel as needed.  Seek immediate medical care if:  · Youre unable to stand or walk.  · You have a temperature over 100.4°F (38.0°C)  · You have frequent, painful, or bloody urination.  · You have severe abdominal pain.  · You have a sharp, stabbing pain.  · Your pain is constant.  · You have pain or numbness in your leg.  · You feel pain in a new area of your back.  · You notice that the pain isnt decreasing after more than a week.   Date Last Reviewed: 9/29/2015  © 4289-7642 [employer]. [address] [place] [address]. All rights reserved. This information is not intended as a substitute for professional medical care. Always follow your healthcare professional's instructions.        Back Sprain or Strain    Injury to the muscles (strain) or ligaments (sprain) around the spine can be troubling. Injury may occur after a sudden forceful twisting or bending force such as in a car accident, after a simple awkward movement, or after lifting something heavy with poor body positioning. In any case, muscle spasm is often present and adds to the pain.  Thankfully, most people feel better in 1 to 2 weeks, and most of the rest in 1 to 2 months. Most people can remain active. Unless you had a forceful or traumatic physical injury such as a car accident or fall, X-rays may not be ordered for the first evaluation of a back sprain or strain. If pain continues and does not respond to medical treatment, your healthcare provider may then order X-rays and other tests.  Home care  The following guidelines will help you care for your injury  at home:  · When in bed, try to find a comfortable position. A firm mattress is best. Try lying flat on your back with pillows under your knees. You can also try lying on your side with your knees bent up toward your chest and a pillow between your knees.  · Don't sit for long periods. Try not to take long car rides or take other trips that have you sitting for a long time. This puts more stress on the lower back than standing or walking.  · During the first 24 to 72 hours after an injury or flare-up, apply an ice pack to the painful area for 20 minutes. Then remove it for 20 minutes. Do this for 60 to 90 minutes, or several times a day. This will reduce swelling and pain. Be sure to wrap the ice pack in a thin towel or plastic to protect your skin.  · You can start with ice, then switch to heat. Heat from a hot shower, hot bath, or heating pad reduces pain and works well for muscle spasms. Put heat on the painful area for 20 minutes, then remove for 20 minutes. Do this for 60 to 90 minutes, or several times a day. Do not use a heating pad while sleeping. It can burn the skin.  · You can alternate the ice and heat. Talk with your healthcare provider to find out the best treatment or therapy for your back pain.  · Therapeutic massage will help relax the back muscles without stretching them.  · Be aware of safe lifting methods. Do not lift anything over 15 pounds until all of the pain is gone.  Medicines  Talk to your healthcare provider before using medicines, especially if you have other health problems or are taking other medicines.  · You may use acetaminophen or ibuprofen to control pain, unless another pain medicine was prescribed. If you have chronic conditions like diabetes, liver or kidney disease, stomach ulcers, or gastrointestinal bleeding, or are taking blood-thinner medicines, talk with your doctor before taking any medicines.  · Be careful if you are given prescription medicines, narcotics, or medicine  for muscle spasm. They can cause drowsiness, and affect your coordination, reflexes, and judgment. Do not drive or operate heavy machinery when taking these types of medicines. Only take pain medicine as prescribed by your healthcare provider.  Follow-up care  Follow up with your healthcare provider, or as advised. You may need physical therapy or more tests if your symptoms get worse.  If you had X-rays your healthcare provider may be checking for any broken bones, breaks, or fractures. Bruises and sprains can sometimes hurt as much as a fracture. These injuries can take time to heal completely. If your symptoms dont improve or they get worse, talk with your healthcare provider. You may need a repeat X-ray or other tests.  Call 911  Call for emergency care if any of the following occur:  · Trouble breathing  · Confused  · Very drowsy or trouble awakening  · Fainting or loss of consciousness  · Rapid or very slow heart rate  · Loss of bowel or bladder control  When to seek medical advice  Call your healthcare provider right away if any of the following occur:  · Pain gets worse or spreads to your arms or legs  · Weakness or numbness in one or both arms or legs  · Numbness in the groin or genital area  Date Last Reviewed: 6/1/2016  © 9410-9591 [employer]. [address] [place] [address], [place] [address]. All rights reserved. This information is not intended as a substitute for professional medical care. Always follow your healthcare professional's instructions.

## 2018-03-16 ENCOUNTER — PATIENT OUTREACH (OUTPATIENT)
Dept: ADMINISTRATIVE | Facility: HOSPITAL | Age: 53
End: 2018-03-16

## 2018-03-16 NOTE — PROGRESS NOTES
A letter was mailed to the patient on today in regards to the information below.    The patient is due for a physical and fasting blood work w/ urine. The patient is also due for immunizations(tetanus and flu), mammogram, and a pap smear.

## 2018-03-21 ENCOUNTER — INFUSION (OUTPATIENT)
Dept: INFUSION THERAPY | Facility: HOSPITAL | Age: 53
End: 2018-03-21
Attending: INTERNAL MEDICINE
Payer: COMMERCIAL

## 2018-03-21 VITALS
HEART RATE: 64 BPM | DIASTOLIC BLOOD PRESSURE: 84 MMHG | SYSTOLIC BLOOD PRESSURE: 142 MMHG | RESPIRATION RATE: 20 BRPM | TEMPERATURE: 98 F | OXYGEN SATURATION: 100 %

## 2018-03-21 DIAGNOSIS — D50.0 IRON DEFICIENCY ANEMIA DUE TO CHRONIC BLOOD LOSS: Primary | ICD-10-CM

## 2018-03-21 PROCEDURE — 63600175 PHARM REV CODE 636 W HCPCS: Performed by: INTERNAL MEDICINE

## 2018-03-21 PROCEDURE — 25000003 PHARM REV CODE 250: Performed by: INTERNAL MEDICINE

## 2018-03-21 PROCEDURE — 96365 THER/PROPH/DIAG IV INF INIT: CPT

## 2018-03-21 RX ORDER — SODIUM CHLORIDE 0.9 % (FLUSH) 0.9 %
10 SYRINGE (ML) INJECTION
Status: CANCELLED | OUTPATIENT
Start: 2018-03-21

## 2018-03-21 RX ORDER — HEPARIN 100 UNIT/ML
500 SYRINGE INTRAVENOUS
Status: CANCELLED | OUTPATIENT
Start: 2018-03-21

## 2018-03-21 RX ORDER — SODIUM CHLORIDE 0.9 % (FLUSH) 0.9 %
10 SYRINGE (ML) INJECTION
Status: DISCONTINUED | OUTPATIENT
Start: 2018-03-21 | End: 2018-03-21 | Stop reason: HOSPADM

## 2018-03-21 RX ADMIN — IRON SUCROSE 100 MG: 20 INJECTION, SOLUTION INTRAVENOUS at 04:03

## 2018-03-26 ENCOUNTER — OFFICE VISIT (OUTPATIENT)
Dept: FAMILY MEDICINE | Facility: CLINIC | Age: 53
End: 2018-03-26
Payer: COMMERCIAL

## 2018-03-26 VITALS
WEIGHT: 220.44 LBS | DIASTOLIC BLOOD PRESSURE: 80 MMHG | HEIGHT: 71 IN | SYSTOLIC BLOOD PRESSURE: 142 MMHG | BODY MASS INDEX: 30.86 KG/M2 | TEMPERATURE: 98 F | HEART RATE: 50 BPM | OXYGEN SATURATION: 97 %

## 2018-03-26 DIAGNOSIS — G43.909 MIGRAINE WITHOUT STATUS MIGRAINOSUS, NOT INTRACTABLE, UNSPECIFIED MIGRAINE TYPE: Primary | ICD-10-CM

## 2018-03-26 PROCEDURE — 99999 PR PBB SHADOW E&M-EST. PATIENT-LVL IV: CPT | Mod: PBBFAC,,, | Performed by: NURSE PRACTITIONER

## 2018-03-26 PROCEDURE — 96372 THER/PROPH/DIAG INJ SC/IM: CPT | Mod: S$GLB,,, | Performed by: INTERNAL MEDICINE

## 2018-03-26 PROCEDURE — 99214 OFFICE O/P EST MOD 30 MIN: CPT | Mod: 25,S$GLB,, | Performed by: NURSE PRACTITIONER

## 2018-03-26 RX ORDER — NAPROXEN 500 MG/1
500 TABLET ORAL 2 TIMES DAILY
Qty: 90 TABLET | Refills: 0 | Status: SHIPPED | OUTPATIENT
Start: 2018-03-26 | End: 2018-07-09 | Stop reason: ALTCHOICE

## 2018-03-26 RX ORDER — BUTALBITAL, ACETAMINOPHEN AND CAFFEINE 50; 325; 40 MG/1; MG/1; MG/1
1 TABLET ORAL EVERY 6 HOURS PRN
Qty: 30 TABLET | Refills: 3 | Status: SHIPPED | OUTPATIENT
Start: 2018-03-26 | End: 2018-05-26 | Stop reason: ALTCHOICE

## 2018-03-26 RX ORDER — KETOROLAC TROMETHAMINE 30 MG/ML
60 INJECTION, SOLUTION INTRAMUSCULAR; INTRAVENOUS
Status: COMPLETED | OUTPATIENT
Start: 2018-03-26 | End: 2018-03-26

## 2018-03-26 RX ADMIN — KETOROLAC TROMETHAMINE 60 MG: 30 INJECTION, SOLUTION INTRAMUSCULAR; INTRAVENOUS at 12:03

## 2018-03-26 NOTE — PATIENT INSTRUCTIONS

## 2018-03-26 NOTE — MEDICAL/APP STUDENT
53 y/o  F here for migraine x 6 days. Pain 6/10 at this time, but has been 7-8/10 during this period, as well.  Denies this being the worse headache ever.  Pt describes a squeezing pain at the base of skull/occipital region. Denies vision changes or blurry vision. Denies loss of consciousness. Denies vomiting. Reports nausea. Reports photosensitivity, and sensitivity to sound, smell, and touch. Reports the weight of her eyeglasses causes pain. The only pain-free times is during sleep and about 30 minutes upon waking, until she starts her daily activities, then pain is back.  Pt reports relieving factors are a toradol injection and fioricet and naproxin used together.  Pt has not missed work d/t migraine, but is in pain while at work.  History of migraines in teens and 20s, that subsided, then came back over the last 8 years, which she wonders if it could be hormonal. No real known triggers, except she has been under stress this week, with her , sister, and aunt all being sick.  Usually gets migraines for 1-2 weeks, then they subside over 6-9 months.  She also noticed she has been getting hot flashes lately. She saw HA specialist years ago, and tried keeping a HA diary, but it was a long time before she got another migraine, so let it go........ TMarlys Demarco RN ([facility], [place] student)

## 2018-03-26 NOTE — PROGRESS NOTES
Subjective:       Patient ID: Estephania Rogers is a 52 y.o. female.    Chief Complaint: Migraine (refill on medication)    Migraine    This is a new problem. The current episode started in the past 7 days. The problem occurs constantly. The problem has been unchanged. The pain is located in the occipital and parietal region. The pain does not radiate. The pain quality is similar to prior headaches. The quality of the pain is described as squeezing. The pain is at a severity of 6/10. The pain is moderate. Associated symptoms include nausea, photophobia and weakness. Pertinent negatives include no abdominal pain, back pain, blurred vision, coughing, dizziness, fever, rhinorrhea, sinus pressure, visual change or vomiting. The symptoms are aggravated by noise.     Review of Systems   Constitutional: Negative for chills, diaphoresis, fatigue and fever.   HENT: Negative for congestion, nosebleeds, postnasal drip, rhinorrhea, sinus pressure and sneezing.    Eyes: Positive for photophobia. Negative for blurred vision.   Respiratory: Negative for cough, chest tightness, shortness of breath and wheezing.    Cardiovascular: Negative for chest pain, palpitations and leg swelling.   Gastrointestinal: Positive for nausea. Negative for abdominal pain, constipation, diarrhea and vomiting.   Genitourinary: Negative for dysuria, vaginal discharge and vaginal pain.   Musculoskeletal: Negative for arthralgias, back pain and myalgias.   Skin: Negative for color change and rash.   Neurological: Positive for weakness. Negative for dizziness, light-headedness and headaches.       Objective:      Physical Exam   Constitutional: She is oriented to person, place, and time. Vital signs are normal. She appears well-developed and well-nourished.   HENT:   Head: Normocephalic and atraumatic.   Right Ear: External ear normal.   Left Ear: External ear normal.   Nose: Nose normal.   Mouth/Throat: Oropharynx is clear and moist. No oropharyngeal  exudate.   Cardiovascular: Normal rate, regular rhythm and normal heart sounds.    Pulmonary/Chest: Effort normal and breath sounds normal.   Neurological: She is alert and oriented to person, place, and time.   Skin: Skin is warm, dry and intact. Capillary refill takes less than 2 seconds.   Psychiatric: She has a normal mood and affect.       Assessment:       1. Migraine without status migrainosus, not intractable, unspecified migraine type        Plan:       Estephania was seen today for migraine.    Diagnoses and all orders for this visit:    Migraine without status migrainosus, not intractable, unspecified migraine type  -     ketorolac injection 60 mg; Inject 2 mLs (60 mg total) into the muscle one time.  -     naproxen (EC NAPROSYN) 500 MG EC tablet; Take 1 tablet (500 mg total) by mouth 2 (two) times daily.  -     butalbital-acetaminophen-caffeine -40 mg (FIORICET, ESGIC) -40 mg per tablet; Take 1 tablet by mouth every 6 (six) hours as needed for Headaches.    Home care  Follow these tips when taking care of yourself at home:  · Dont drive yourself home if you were given pain medicine for your headache or are having visual symptoms. Instead, have someone else drive you home. Try to sleep when you get home. You should feel much better when you wake up.  · Cold can help ease migraine symptoms. Put an ice pack on your forehead or at the base of your skull. Put heat on the back of your neck to help ease any neck spasm.  · Drink only clear liquids or eat a light diet until your symptoms get better. This will help you avoid nausea and vomiting.  How to prevent migraines  Pay attention to what seems to trigger your headache. Try to avoid the triggers when you can. If you have frequent headaches, consider keeping a headache diary. In it, write down what you were doing, feeling, or eating in the hours before each headache. Show this to your healthcare provider to help find the cause of your headaches.  If  stress seems to be a trigger for your headaches, figure out what is causing stress in your life. Learn new ways to handle your stress. Ideas include regular exercise, biofeedback, self-hypnosis, yoga, and meditation. Talk with your healthcare provider to find out more information about managing stress. Many books and digital media are also available on this subject.  Tyramine is a substance found in many foods. It can trigger a migraine in some people. These foods contain tyramine:  · Chocolate  · Yogurt  · All cheeses, but especially aged cheeses  · Smoked or pickled fish and meat, including herring, caviar, bologna, pepperoni, and salami  · Liver  · Avocados  · Bananas  · Figs  · Raisins  · Red wine  Try staying away from these foods for 1 to 2 months to see if you have fewer headaches.  How to treat future headaches  · Take time out at the first sign of a headache, if possible. Find a quiet, dark, comfortable place to sit or lie down. Let yourself relax or sleep.  · Put an ice pack on your forehead or on the area of greatest pain. A heating pad and massage may help if you are having a muscle spasm and tightness in your neck.  · If you have been prescribed a medicine to stop a migraine headache, use this at the first warning sign of the headache for best results. First signs may be an aura or pain.  · If you need to take medicine often for your migraine, talk with your healthcare provider about other ways to prevent your headaches.  Follow-up care  Follow up with your healthcare provider, or as advised. Talk with your provider if you have frequent headaches. He or she can figure out a treatment plan. Ask if you can have medicine to take at home the next time you get a bad headache. This may keep you from having to visit the emergency department in the future. You may need to see a headache specialist (neurologist) if you continue to have headaches.  When to seek medical advice  Call your healthcare provider right  away if any of these occur:  · Your head pain gets worse, or doesnt get better within 24 hours  · You cant keep liquids down (repeated vomiting)  · Pain in your sinuses, ears, or throat  · Fever of 100.4º F (38º C) or higher, or as directed by your healthcare provider  · Stiff neck  · Extreme drowsiness, confusion, or fainting  · Dizziness, or dizziness with spinning sensation (vertigo)  · Weakness in an arm or leg, or on one side of your face  · Difficulty talking or seeing  Date Last Reviewed: 8/1/2016  © 9949-5757 [employer]. [address] [place] [address]. All rights reserved. This information is not intended as a substitute for professional medical care. Always follow your healthcare professional's instructions.

## 2018-04-18 ENCOUNTER — OFFICE VISIT (OUTPATIENT)
Dept: FAMILY MEDICINE | Facility: CLINIC | Age: 53
End: 2018-04-18
Payer: COMMERCIAL

## 2018-04-18 VITALS
BODY MASS INDEX: 30.98 KG/M2 | HEIGHT: 71 IN | DIASTOLIC BLOOD PRESSURE: 80 MMHG | SYSTOLIC BLOOD PRESSURE: 110 MMHG | TEMPERATURE: 98 F | OXYGEN SATURATION: 98 % | HEART RATE: 50 BPM | WEIGHT: 221.31 LBS

## 2018-04-18 DIAGNOSIS — F41.9 ANXIETY: ICD-10-CM

## 2018-04-18 DIAGNOSIS — B96.89 ACUTE BACTERIAL SINUSITIS: Primary | ICD-10-CM

## 2018-04-18 DIAGNOSIS — J01.90 ACUTE BACTERIAL SINUSITIS: Primary | ICD-10-CM

## 2018-04-18 PROCEDURE — 99999 PR PBB SHADOW E&M-EST. PATIENT-LVL III: CPT | Mod: PBBFAC,,, | Performed by: FAMILY MEDICINE

## 2018-04-18 PROCEDURE — 99214 OFFICE O/P EST MOD 30 MIN: CPT | Mod: S$GLB,,, | Performed by: FAMILY MEDICINE

## 2018-04-18 RX ORDER — ALPRAZOLAM 0.25 MG/1
0.25 TABLET ORAL DAILY PRN
Qty: 30 TABLET | Refills: 2 | Status: SHIPPED | OUTPATIENT
Start: 2018-04-18 | End: 2018-11-09 | Stop reason: SDUPTHER

## 2018-04-18 RX ORDER — SULFAMETHOXAZOLE AND TRIMETHOPRIM 800; 160 MG/1; MG/1
1 TABLET ORAL 2 TIMES DAILY
Qty: 20 TABLET | Refills: 0 | Status: SHIPPED | OUTPATIENT
Start: 2018-04-18 | End: 2018-04-28

## 2018-04-18 NOTE — PROGRESS NOTES
Office Visit    Patient Name: Estephania Rogers    : 1965  MRN: [id number]    Subjective:  Estephania is a 52 y.o. female who presents today for:    Sinus Problem and Medication Refill      This patient has multiple medical diagnoses as noted below.  This patient is known to me and to this clinic. History of ballon sinuplasty 5 years ago.  Increased soreness in the ear and temple.  Pain extends to the neck.  Pain is located on the left side.  She reports increased at night, but not severe.  +fever.  +achiness.  She has had symptoms for 2 to 3 days.  Symptoms have worsened over the last few day.      The ASCVD Risk score (Michael DC Jr., et al., 2013) failed to calculate for the following reasons:    Cannot find a previous HDL lab    Cannot find a previous total cholesterol lab    Patient Active Problem List   Diagnosis    Recurrent nephrolithiasis    Other specified intestinal malabsorption    Iron deficiency anemia    B12 deficiency anemia    Anxiety       Past Surgical History:   Procedure Laterality Date    ANAL FISTULOTOMY      APPENDECTOMY      CHOLECYSTECTOMY      GASTRIC BYPASS      SINUS SURGERY      Dr. Oral Cobb    SPINE SURGERY      TONSILLECTOMY         Family History   Problem Relation Age of Onset    Cancer Father      pancreatic    Diabetes Father     Diabetes Mother     Hypertension Mother     Miscarriages / Stillbirths Mother     Stroke Mother     Diabetes Sister     Cancer Maternal Aunt      breast    Arthritis Maternal Grandmother     Arthritis Paternal Grandmother     Diabetes Sister        Social History     Social History    Marital status:      Spouse name: N/A    Number of children: N/A    Years of education: N/A     Occupational History    Not on file.     Social History Main Topics    Smoking status: Never Smoker    Smokeless tobacco: Never Used      Comment: Clerical work    Alcohol use No      Comment: socially    Drug use: No     Sexual activity: Yes     Partners: Male     Birth control/ protection: None      Comment: :  Gianni     Other Topics Concern    Not on file     Social History Narrative    No narrative on file       Current Medications  Medications reviewed and updated.     Allergies   Review of patient's allergies indicates:  No Known Allergies      Labs  No results found for: LABA1C, HGBA1C  Lab Results   Component Value Date     01/08/2015    K 4.4 01/08/2015     01/08/2015    CO2 23 01/08/2015    BUN 10 01/08/2015    CREATININE 0.7 01/08/2015    CALCIUM 9.3 01/08/2015    ANIONGAP 10 01/08/2015    ESTGFRAFRICA >60.0 01/08/2015    EGFRNONAA >60.0 01/08/2015     Lab Results   Component Value Date    CHOL 150 02/27/2012     Lab Results   Component Value Date    HDL 66 02/27/2012     Lab Results   Component Value Date    LDLCALC 74.0 02/27/2012     Lab Results   Component Value Date    TRIG 50 02/27/2012     Lab Results   Component Value Date    CHOLHDL 44.0 02/27/2012     Last set of blood work has been reviewed as noted above.    Review of Systems   Constitutional: Negative for activity change, appetite change, fatigue, fever and unexpected weight change.   HENT: Positive for congestion, rhinorrhea, sinus pressure and sore throat. Negative for ear discharge and ear pain.    Eyes: Negative.    Respiratory: Negative for apnea, cough, chest tightness, shortness of breath and wheezing.    Cardiovascular: Negative for chest pain, palpitations and leg swelling.   Gastrointestinal: Negative for abdominal distention, abdominal pain, constipation, diarrhea and vomiting.   Endocrine: Negative for cold intolerance, heat intolerance, polydipsia and polyuria.   Genitourinary: Negative for decreased urine volume, menstrual problem, urgency, vaginal bleeding, vaginal discharge and vaginal pain.   Musculoskeletal: Negative.    Skin: Negative for rash.   Neurological: Negative for dizziness and headaches.   Hematological:  "Does not bruise/bleed easily.   Psychiatric/Behavioral: Negative for agitation, sleep disturbance and suicidal ideas.       /80 (BP Location: Left arm, Patient Position: Sitting, BP Method: Large (Manual))   Pulse (!) 50   Temp 98 °F (36.7 °C) (Oral)   Ht 5' 11" (1.803 m)   Wt 100.4 kg (221 lb 5.5 oz)   LMP 12/04/2014   SpO2 98%   BMI 30.87 kg/m²      Physical Exam   Constitutional: She is oriented to person, place, and time. She appears well-developed and well-nourished.   HENT:   Head: Normocephalic.   Right Ear: External ear normal.   Left Ear: External ear normal.   Nose: Nose normal.   Mouth/Throat: Oropharynx is clear and moist.   Eyes: Conjunctivae and EOM are normal. Pupils are equal, round, and reactive to light.   Cardiovascular: Normal rate, regular rhythm and normal heart sounds.    Pulmonary/Chest: Effort normal and breath sounds normal.   Neurological: She is alert and oriented to person, place, and time.   Skin: Skin is warm and dry.   Vitals reviewed.      Health Maintenance  Health Maintenance       Date Due Completion Date    TETANUS VACCINE 10/01/1984 ---    Lipid Panel 02/27/2017 2/27/2012    Influenza Vaccine 08/01/2017 10/14/2015 (Done)    Override on 10/14/2015: Done    Mammogram 11/18/2017 11/18/2016    Pap Smear with HPV Cotest 11/16/2018 11/16/2015    Colonoscopy 12/30/2020 12/30/2010 (Done)    Override on 12/30/2010: Done (Dr. Nelly Putnam/[facility]- grade 1 internal hemorrhoids)          Assessment/Plan:  Estephania Rogers is a 52 y.o. female who presents today for :    1. Acute bacterial sinusitis    2. Anxiety        Problem List Items Addressed This Visit        Unprioritized    Anxiety    Relevant Medications    ALPRAZolam (XANAX) 0.25 MG tablet      Other Visit Diagnoses     Acute bacterial sinusitis    -  Primary  -  Medication sent to pharmacy   -  RTC if symptoms worsen or persist.  -   I have discussed the common side effects of this medication with the " patient and answered all of the questions they had at the time of this visit regarding this medication.            No Follow-up on file.     This note was created by combination of typed  and Dragon dictation.  Transcription errors may be present.  If there are any questions, please contact me.

## 2018-05-16 ENCOUNTER — INFUSION (OUTPATIENT)
Dept: INFUSION THERAPY | Facility: HOSPITAL | Age: 53
End: 2018-05-16
Attending: INTERNAL MEDICINE
Payer: COMMERCIAL

## 2018-05-16 VITALS — RESPIRATION RATE: 16 BRPM | DIASTOLIC BLOOD PRESSURE: 70 MMHG | HEART RATE: 56 BPM | SYSTOLIC BLOOD PRESSURE: 133 MMHG

## 2018-05-16 DIAGNOSIS — D50.0 IRON DEFICIENCY ANEMIA DUE TO CHRONIC BLOOD LOSS: Primary | ICD-10-CM

## 2018-05-16 PROCEDURE — 96365 THER/PROPH/DIAG IV INF INIT: CPT

## 2018-05-16 PROCEDURE — 63600175 PHARM REV CODE 636 W HCPCS: Performed by: INTERNAL MEDICINE

## 2018-05-16 PROCEDURE — 25000003 PHARM REV CODE 250: Performed by: INTERNAL MEDICINE

## 2018-05-16 RX ORDER — HEPARIN 100 UNIT/ML
500 SYRINGE INTRAVENOUS
Status: DISCONTINUED | OUTPATIENT
Start: 2018-05-16 | End: 2018-05-16 | Stop reason: HOSPADM

## 2018-05-16 RX ORDER — HEPARIN 100 UNIT/ML
500 SYRINGE INTRAVENOUS
Status: CANCELLED | OUTPATIENT
Start: 2018-05-16

## 2018-05-16 RX ADMIN — IRON SUCROSE 100 MG: 20 INJECTION, SOLUTION INTRAVENOUS at 03:05

## 2018-05-16 NOTE — PLAN OF CARE
Problem: Patient Care Overview (Adult)  Goal: Plan of Care Review  Outcome: Ongoing (interventions implemented as appropriate)  Tolerated IV Venofer. No reactions noted.

## 2018-05-26 ENCOUNTER — OFFICE VISIT (OUTPATIENT)
Dept: URGENT CARE | Facility: CLINIC | Age: 53
End: 2018-05-26
Payer: COMMERCIAL

## 2018-05-26 VITALS
BODY MASS INDEX: 30.82 KG/M2 | WEIGHT: 221 LBS | OXYGEN SATURATION: 98 % | HEART RATE: 51 BPM | TEMPERATURE: 98 F | DIASTOLIC BLOOD PRESSURE: 77 MMHG | SYSTOLIC BLOOD PRESSURE: 153 MMHG

## 2018-05-26 DIAGNOSIS — G43.519 INTRACTABLE PERSISTENT MIGRAINE AURA WITHOUT CEREBRAL INFARCTION AND WITHOUT STATUS MIGRAINOSUS: Primary | ICD-10-CM

## 2018-05-26 PROCEDURE — 99214 OFFICE O/P EST MOD 30 MIN: CPT | Mod: 25,S$GLB,, | Performed by: SURGERY

## 2018-05-26 PROCEDURE — 96372 THER/PROPH/DIAG INJ SC/IM: CPT | Mod: S$GLB,,, | Performed by: SURGERY

## 2018-05-26 RX ORDER — KETOROLAC TROMETHAMINE 30 MG/ML
60 INJECTION, SOLUTION INTRAMUSCULAR; INTRAVENOUS
Status: COMPLETED | OUTPATIENT
Start: 2018-05-26 | End: 2018-05-26

## 2018-05-26 RX ADMIN — KETOROLAC TROMETHAMINE 60 MG: 30 INJECTION, SOLUTION INTRAMUSCULAR; INTRAVENOUS at 10:05

## 2018-05-26 NOTE — PATIENT INSTRUCTIONS
What Are Migraine and Tension Headaches?    Although there are several types of headaches, migraine and tension headaches affect the most people. When you have a headache, it isn't your brain that's hurting. Your head aches because nerves in the bones, blood vessels, meninges, and muscles of your head are irritated. These irritated nerves send pain signals to the brain, which identifies where you hurt and how bad the pain is.  Talk with your healthcare provider about a treatment plan that may help relieve pain and prevent future headaches.   What causes your headache?  The actual headache process is not yet understood. Only rarely are headaches a sign of a serious medical problem. Headache pain may be caused by abnormal interaction between the brain and the nerves and blood vessels in the head. Environmental stresses or certain foods and drinks may trigger headache pain.  What is referred pain?  Headache pain can be referred pain, which is pain that has its source in one place but is felt in another. For example, pain behind the eyes may actually be caused by tense muscles in the neck and shoulders. This means that the place that hurts may not be the part of the body that needs treatment.  Is it a migraine?  Migraine is a vascular headache that causes throbbing pain felt on one or both sides of the head. You may feel nauseated or vomit. This headache may also be preceded or associated with changes in sight (like seeing spots or flashes of light), ability to speak, or sensation (aura). There are a wide variety of environmental and food-related triggers for migraines. The pain may last for 4 to 72 hours. Afterward, you may feel shaky for a day or so. If this is the first time you experience these symptoms, you should immediately seek medical attention because you could be having a stroke.  Is it a tension headache?  This type of headache is usually a dull ache or a sensation of pressure on both sides of the head. It  "may be associated with pain or tension in the neck and shoulders. Depression, anxiety, and stress can cause a tension headache. The pain may not have a definite beginning or end. It may come and go, or seem never to go away.  When to call the healthcare provider  Call your healthcare provider for headaches that happen along with any of these symptoms:  · Sudden, severe headache that is different from your usual headache pain  · High fever along with a stiff neck  · Recurring headache in children   · Ongoing numbness or muscle weakness  · Loss of vision  · Pain following a head injury  · Convulsions, or a change in mental awareness  · A headache you would call "the worst headache you've ever had"   Date Last Reviewed: 9/14/2015  © 8707-6029 [employer]. [address] [place] [address], [place] [address]. All rights reserved. This information is not intended as a substitute for professional medical care. Always follow your healthcare professional's instructions.        "

## 2018-05-26 NOTE — PROGRESS NOTES
Subjective:       Patient ID: Estephania Rogers is a 52 y.o. female.    Vitals:  weight is 100.2 kg (221 lb). Her temperature is 97.9 °F (36.6 °C). Her blood pressure is 153/77 (abnormal) and her pulse is 51 (abnormal). Her oxygen saturation is 98%.     Chief Complaint: Migraine    Pt has tried her fioricet w/ little relief.      Migraine    This is a new problem. The current episode started in the past 7 days. The problem occurs intermittently. The pain is located in the occipital region. The pain radiates to the left shoulder and right shoulder. The pain quality is similar to prior headaches. The quality of the pain is described as squeezing. The pain is at a severity of 7/10. The pain is moderate. Associated symptoms include nausea and photophobia. Pertinent negatives include no blurred vision, dizziness, fever, neck pain, numbness, seizures, tinnitus, vomiting or weakness. The treatment provided no relief. Her past medical history is significant for migraine headaches.     Review of Systems   Constitution: Negative for chills, fever and weakness.   HENT: Negative for congestion and tinnitus.    Eyes: Positive for photophobia. Negative for blurred vision.   Skin: Negative for rash.   Musculoskeletal: Negative for neck pain.   Gastrointestinal: Positive for nausea. Negative for vomiting.   Neurological: Positive for headaches. Negative for disturbances in coordination, dizziness, numbness and seizures.   Psychiatric/Behavioral: Negative for altered mental status. The patient is not nervous/anxious.    All other systems reviewed and are negative.      Objective:      Physical Exam   Constitutional: She is oriented to person, place, and time. She appears well-developed and well-nourished. She is cooperative.  Non-toxic appearance. She does not appear ill. No distress.   Sitting in dark room in distress due to headache   HENT:   Head: Normocephalic and atraumatic.   Right Ear: Hearing, tympanic membrane,  external ear and ear canal normal.   Left Ear: Hearing, tympanic membrane, external ear and ear canal normal.   Nose: Nose normal. No mucosal edema, rhinorrhea or nasal deformity. No epistaxis. Right sinus exhibits no maxillary sinus tenderness and no frontal sinus tenderness. Left sinus exhibits no maxillary sinus tenderness and no frontal sinus tenderness.   Mouth/Throat: Uvula is midline, oropharynx is clear and moist and mucous membranes are normal. No trismus in the jaw. Normal dentition. No uvula swelling. No posterior oropharyngeal erythema.   Eyes: Conjunctivae and lids are normal. Right eye exhibits no discharge. Left eye exhibits no discharge. No scleral icterus.   Sclera clear bilat   Neck: Trachea normal, normal range of motion, full passive range of motion without pain and phonation normal. Neck supple.   Cardiovascular: Normal rate, regular rhythm, normal heart sounds, intact distal pulses and normal pulses.    Pulmonary/Chest: Effort normal and breath sounds normal. No respiratory distress.   Abdominal: Soft. Normal appearance and bowel sounds are normal. She exhibits no distension, no pulsatile midline mass and no mass. There is no tenderness.   Musculoskeletal: Normal range of motion. She exhibits no edema or deformity.   Neurological: She is alert and oriented to person, place, and time. She exhibits normal muscle tone. Coordination normal.   Skin: Skin is warm, dry and intact. She is not diaphoretic. No pallor.   Psychiatric: She has a normal mood and affect. Her speech is normal and behavior is normal. Judgment and thought content normal. Cognition and memory are normal.   Nursing note and vitals reviewed.      Assessment:       1. Intractable persistent migraine aura without cerebral infarction and without status migrainosus        Plan:         Intractable persistent migraine aura without cerebral infarction and without status migrainosus  -     ketorolac injection 60 mg; Inject 2 mLs (60 mg  total) into the muscle one time.  -     isometheptene-apap-dichloralphenazone [id number] (MIDRIN) -325 mg per capsule; Take 1 capsule by mouth every 4 (four) hours as needed.  Dispense: 30 capsule; Refill: 0

## 2018-05-29 ENCOUNTER — OFFICE VISIT (OUTPATIENT)
Dept: FAMILY MEDICINE | Facility: CLINIC | Age: 53
End: 2018-05-29
Payer: COMMERCIAL

## 2018-05-29 ENCOUNTER — PATIENT MESSAGE (OUTPATIENT)
Dept: FAMILY MEDICINE | Facility: CLINIC | Age: 53
End: 2018-05-29

## 2018-05-29 VITALS
OXYGEN SATURATION: 98 % | SYSTOLIC BLOOD PRESSURE: 124 MMHG | TEMPERATURE: 98 F | HEIGHT: 71 IN | HEART RATE: 58 BPM | BODY MASS INDEX: 31.68 KG/M2 | WEIGHT: 226.31 LBS | DIASTOLIC BLOOD PRESSURE: 74 MMHG

## 2018-05-29 DIAGNOSIS — G44.209 TENSION HEADACHE: Primary | ICD-10-CM

## 2018-05-29 PROCEDURE — 99214 OFFICE O/P EST MOD 30 MIN: CPT | Mod: 25,S$GLB,, | Performed by: NURSE PRACTITIONER

## 2018-05-29 PROCEDURE — 99999 PR PBB SHADOW E&M-EST. PATIENT-LVL IV: CPT | Mod: PBBFAC,,, | Performed by: NURSE PRACTITIONER

## 2018-05-29 PROCEDURE — 96372 THER/PROPH/DIAG INJ SC/IM: CPT | Mod: S$GLB,,, | Performed by: NURSE PRACTITIONER

## 2018-05-29 RX ORDER — BUTALBITAL, ACETAMINOPHEN AND CAFFEINE 50; 325; 40 MG/1; MG/1; MG/1
TABLET ORAL
COMMUNITY
Start: 2018-03-26 | End: 2021-03-03 | Stop reason: SDUPTHER

## 2018-05-29 RX ORDER — KETOROLAC TROMETHAMINE 30 MG/ML
60 INJECTION, SOLUTION INTRAMUSCULAR; INTRAVENOUS
Status: COMPLETED | OUTPATIENT
Start: 2018-05-29 | End: 2018-05-29

## 2018-05-29 RX ADMIN — KETOROLAC TROMETHAMINE 60 MG: 30 INJECTION, SOLUTION INTRAMUSCULAR; INTRAVENOUS at 05:05

## 2018-05-29 NOTE — PATIENT INSTRUCTIONS
Managing Tension-type Headache Symptoms  A tension-type headache can develop slowly. Being aware of the symptoms helps you recognize a headache early. Then you can act to reduce pain and relieve tension. Methods for relieving your symptoms include self-care and medicine.    Tension-type symptoms  The earlier you recognize the symptoms of a tension-type headache, the easier it is to treat. Tension-type headaches may:  · Begin with fatigue, tension, or pain in the neck and shoulders  · Feel like a band around the head  · Be concentrated in the temple, the back of the head, behind the eyes, or in the face  · Come and go, or last for days, weeks, or even longer  · Involve referred pain -- this means that the area that hurts may not be where the problem begins  Self-care during a tension-type headache  When you have a tension-type headache, there are things you can do to relax, loosen muscles, and reduce the pain:  · Brush your scalp lightly with a soft hairbrush.  · Give yourself a massage. Knead the muscles running from your shoulders up the back of your skull. Or ask a friend to gently massage your neck and shoulders.  · Use an ice pack. Wrap a thin cloth around a cold pack, a cold can of soda, or a bag of frozen vegetables. Apply this directly to the place where you feel pain (such as your neck or temples).  · Use moist heat to relax your muscles. Take a warm shower or bath. Or drape a warm, moist towel around your neck and shoulders.  Relieving pain and tension  Over-the-counter or prescription medicine can help relieve pain. Another way to reduce your pain is to use relaxation techniques to loosen tight muscles.  Medicine  Medicine used for tension-type headaches include the following:  · NSAIDs (nonsteroidal anti-inflammatories), such as aspirin and ibuprofen, relieve inflammation and help block pain signals.  · Acetaminophen treats pain, and some formulations contain caffeine.   · Muscle relaxants can reduce  painful muscle contractions.  Taking medicine safely  Be aware that:  · Taking analgesics (pain relievers) or drinking too much coffee may lead to rebound headaches (frequent or severe headaches that can happen if you miss a dose of medication), so take pain medications only as needed. If you think you have these headaches, contact your health care provider.  · Taking too much medication can cause sleep problems or stomach upset. Some over-the-counter headache medications may contain caffeine. These may disrupt sleep and worsen pain.  Relaxation techniques  A , class, book, or tape may help you learn these techniques. One or more of these methods may work for you:  · Deep breathing. Slow, calm, deep breathing can help you relax. Breathe in for a count of 5 or more. Then slowly let the breath out.  · Visualization. Imagining a peaceful, secure scene can give you a sense of control over your body and surroundings.  · Progressive relaxation. This is done by tightening and then releasing muscle groups. Start at the top of your head and work your way down your body. Tighten each muscle group for 5 to 10 seconds. Then release the muscle group for the same amount of time.  · Biofeedback. This is a type of training in which you learn to control certain physical functions and responses. This helps you learn to reduce muscle tension.     Date Last Reviewed: 11/9/2015 © 2000-2017 [employer]. [address] [place] [address], [place] [address]. All rights reserved. This information is not intended as a substitute for professional medical care. Always follow your healthcare professional's instructions.        What Are Migraine and Tension Headaches?    Although there are several types of headaches, migraine and tension headaches affect the most people. When you have a headache, it isn't your brain that's hurting. Your head aches because nerves in the bones, blood vessels, meninges, and muscles of your head are  irritated. These irritated nerves send pain signals to the brain, which identifies where you hurt and how bad the pain is.  Talk with your healthcare provider about a treatment plan that may help relieve pain and prevent future headaches.   What causes your headache?  The actual headache process is not yet understood. Only rarely are headaches a sign of a serious medical problem. Headache pain may be caused by abnormal interaction between the brain and the nerves and blood vessels in the head. Environmental stresses or certain foods and drinks may trigger headache pain.  What is referred pain?  Headache pain can be referred pain, which is pain that has its source in one place but is felt in another. For example, pain behind the eyes may actually be caused by tense muscles in the neck and shoulders. This means that the place that hurts may not be the part of the body that needs treatment.  Is it a migraine?  Migraine is a vascular headache that causes throbbing pain felt on one or both sides of the head. You may feel nauseated or vomit. This headache may also be preceded or associated with changes in sight (like seeing spots or flashes of light), ability to speak, or sensation (aura). There are a wide variety of environmental and food-related triggers for migraines. The pain may last for 4 to 72 hours. Afterward, you may feel shaky for a day or so. If this is the first time you experience these symptoms, you should immediately seek medical attention because you could be having a stroke.  Is it a tension headache?  This type of headache is usually a dull ache or a sensation of pressure on both sides of the head. It may be associated with pain or tension in the neck and shoulders. Depression, anxiety, and stress can cause a tension headache. The pain may not have a definite beginning or end. It may come and go, or seem never to go away.  When to call the healthcare provider  Call your healthcare provider for headaches  "that happen along with any of these symptoms:  · Sudden, severe headache that is different from your usual headache pain  · High fever along with a stiff neck  · Recurring headache in children   · Ongoing numbness or muscle weakness  · Loss of vision  · Pain following a head injury  · Convulsions, or a change in mental awareness  · A headache you would call "the worst headache you've ever had"   Date Last Reviewed: 9/14/2015  © 0951-7567 [employer]. [address] [place] [address], [place] [address]. All rights reserved. This information is not intended as a substitute for professional medical care. Always follow your healthcare professional's instructions.        "

## 2018-05-29 NOTE — PROGRESS NOTES
Patient Name: Estephania Rogers    : 1965  MRN: [id number]    Subjective:  Estephania is a 52 y.o. female who presents today for     1. Headache x 1 week, taking fiorecet and naproxen, pain is squeezing, pulsating back of the neck, sensitive to light and sound, neck feels tight. Went to  3 days prior and improved after toradol injection but still persist.     Past Medical History  Past Medical History:   Diagnosis Date    Anemia     iron deficiency    Anxiety     Diabetes mellitus, type 2     Hypertension     Malabsorption     Migraine headache     Nephrolithiasis     Other specified intestinal malabsorption        Past Surgical History  Past Surgical History:   Procedure Laterality Date    ANAL FISTULOTOMY      APPENDECTOMY      CHOLECYSTECTOMY      GASTRIC BYPASS      SINUS SURGERY      Dr. Oral Cobb    SPINE SURGERY      TONSILLECTOMY         Family History  Family History   Problem Relation Age of Onset    Cancer Father         pancreatic    Diabetes Father     Diabetes Mother     Hypertension Mother     Miscarriages / Stillbirths Mother     Stroke Mother     Diabetes Sister     Cancer Maternal Aunt         breast    Arthritis Maternal Grandmother     Arthritis Paternal Grandmother     Diabetes Sister        Social History  Social History     Social History    Marital status:      Spouse name: N/A    Number of children: N/A    Years of education: N/A     Occupational History    Not on file.     Social History Main Topics    Smoking status: Never Smoker    Smokeless tobacco: Never Used      Comment: Clerical work    Alcohol use No      Comment: socially    Drug use: No    Sexual activity: Yes     Partners: Male     Birth control/ protection: None      Comment: :  Gainni     Other Topics Concern    Not on file     Social History Narrative    No narrative on file       Allergies  Review of patient's allergies indicates:  No Known  "Allergies -reviewed and updated      Medications  Reviewed and updated.   Current Outpatient Prescriptions   Medication Sig Dispense Refill    ALPRAZolam (XANAX) 0.25 MG tablet Take 1 tablet (0.25 mg total) by mouth daily as needed. 30 tablet 2    ascorbic acid (VITAMIN C) 500 MG tablet Take 500 mg by mouth once daily.        calcium citrate-vitamin D (CITRACAL+D) 315-200 mg-unit per tablet Take 1 tablet by mouth 2 (two) times daily.        cholecalciferol, vitamin D3, 50,000 unit Wafr Take 50,000 Units by mouth once daily.        naproxen (EC NAPROSYN) 500 MG EC tablet Take 1 tablet (500 mg total) by mouth 2 (two) times daily. 90 tablet 0    RESTASIS 0.05 % ophthalmic emulsion   3    vitamin A 33460 UNIT capsule Take 25,000 Units by mouth once daily.        butalbital-acetaminophen-caffeine -40 mg (FIORICET, ESGIC) -40 mg per tablet       cyclobenzaprine (FLEXERIL) 5 MG tablet Take 1-2 tablets 3 times daily as needed for pain. 30 tablet 0     No current facility-administered medications for this visit.          Review of Systems   Constitutional: Negative for chills and fever.   Respiratory: Negative for shortness of breath.    Cardiovascular: Negative for chest pain.   Musculoskeletal: Positive for neck pain.   Neurological: Positive for sensory change and headaches.         Physical Exam  /74 (BP Location: Right arm, Patient Position: Sitting)   Pulse (!) 58   Temp 98.1 °F (36.7 °C) (Oral)   Ht 5' 11" (1.803 m)   Wt 102.6 kg (226 lb 4.8 oz)   LMP 12/04/2014   SpO2 98%   BMI 31.56 kg/m²   Physical Exam   Constitutional: She appears well-developed. No distress.   HENT:   Head: Normocephalic.   Right Ear: External ear normal.   Left Ear: External ear normal.   Nose: Nose normal.   Mouth/Throat: Oropharynx is clear and moist.   Eyes: Conjunctivae and EOM are normal. Pupils are equal, round, and reactive to light.   Neck: Neck supple.   Cardiovascular: Normal rate, regular rhythm and " normal heart sounds.    Pulmonary/Chest: Effort normal and breath sounds normal.   Skin: She is not diaphoretic.         Assessment/Plan:  Estephania Rogers is a 52 y.o. female who presents today for :    Tension headache  Advise muscle relaxant which pt had, may continue naproxen, given second dose toradol  -     ketorolac injection 60 mg; Inject 2 mLs (60 mg total) into the muscle one time.        Follow-up if symptoms worsen or fail to improve.

## 2018-05-30 ENCOUNTER — PATIENT MESSAGE (OUTPATIENT)
Dept: FAMILY MEDICINE | Facility: CLINIC | Age: 53
End: 2018-05-30

## 2018-05-30 DIAGNOSIS — S39.012A STRAIN OF LUMBAR REGION, INITIAL ENCOUNTER: ICD-10-CM

## 2018-05-30 DIAGNOSIS — K90.89 OTHER SPECIFIED INTESTINAL MALABSORPTION: Primary | ICD-10-CM

## 2018-05-30 RX ORDER — CYCLOBENZAPRINE HCL 5 MG
TABLET ORAL
Qty: 30 TABLET | Refills: 0 | Status: SHIPPED | OUTPATIENT
Start: 2018-05-30 | End: 2019-05-30

## 2018-07-09 ENCOUNTER — TELEPHONE (OUTPATIENT)
Dept: FAMILY MEDICINE | Facility: CLINIC | Age: 53
End: 2018-07-09

## 2018-07-09 ENCOUNTER — OFFICE VISIT (OUTPATIENT)
Dept: FAMILY MEDICINE | Facility: CLINIC | Age: 53
End: 2018-07-09
Payer: COMMERCIAL

## 2018-07-09 VITALS
OXYGEN SATURATION: 98 % | TEMPERATURE: 99 F | WEIGHT: 223.13 LBS | DIASTOLIC BLOOD PRESSURE: 96 MMHG | SYSTOLIC BLOOD PRESSURE: 140 MMHG | HEIGHT: 71 IN | BODY MASS INDEX: 31.24 KG/M2 | HEART RATE: 62 BPM

## 2018-07-09 DIAGNOSIS — M72.2 PLANTAR FASCIITIS, BILATERAL: Primary | ICD-10-CM

## 2018-07-09 DIAGNOSIS — M79.671 FOOT PAIN, BILATERAL: ICD-10-CM

## 2018-07-09 DIAGNOSIS — R03.0 ELEVATED BLOOD-PRESSURE READING WITHOUT DIAGNOSIS OF HYPERTENSION: ICD-10-CM

## 2018-07-09 DIAGNOSIS — M79.672 FOOT PAIN, BILATERAL: ICD-10-CM

## 2018-07-09 PROCEDURE — 99214 OFFICE O/P EST MOD 30 MIN: CPT | Mod: 25,S$GLB,, | Performed by: NURSE PRACTITIONER

## 2018-07-09 PROCEDURE — 96372 THER/PROPH/DIAG INJ SC/IM: CPT | Mod: S$GLB,,, | Performed by: NURSE PRACTITIONER

## 2018-07-09 PROCEDURE — 99999 PR PBB SHADOW E&M-EST. PATIENT-LVL IV: CPT | Mod: PBBFAC,,, | Performed by: NURSE PRACTITIONER

## 2018-07-09 RX ORDER — MULTIVITAMIN
1 TABLET ORAL DAILY
COMMUNITY

## 2018-07-09 RX ORDER — TRIAMCINOLONE ACETONIDE 40 MG/ML
40 INJECTION, SUSPENSION INTRA-ARTICULAR; INTRAMUSCULAR
Status: COMPLETED | OUTPATIENT
Start: 2018-07-09 | End: 2018-07-09

## 2018-07-09 RX ORDER — HYDROCODONE BITARTRATE AND ACETAMINOPHEN 5; 325 MG/1; MG/1
1 TABLET ORAL EVERY 6 HOURS PRN
Qty: 10 TABLET | Refills: 0 | Status: SHIPPED | OUTPATIENT
Start: 2018-07-09 | End: 2018-07-10

## 2018-07-09 RX ORDER — METHYLPREDNISOLONE 4 MG/1
TABLET ORAL
Qty: 1 PACKAGE | Refills: 0 | Status: SHIPPED | OUTPATIENT
Start: 2018-07-09 | End: 2018-07-30

## 2018-07-09 RX ORDER — IBUPROFEN 800 MG/1
800 TABLET ORAL 3 TIMES DAILY
Qty: 30 TABLET | Refills: 0 | Status: SHIPPED | OUTPATIENT
Start: 2018-07-09 | End: 2018-09-24

## 2018-07-09 RX ADMIN — TRIAMCINOLONE ACETONIDE 40 MG: 40 INJECTION, SUSPENSION INTRA-ARTICULAR; INTRAMUSCULAR at 10:07

## 2018-07-09 NOTE — PATIENT INSTRUCTIONS
Plantar Fasciitis  Plantar fasciitis is a painful swelling of the plantar fascia. The plantar fascia is a thick, fibrous layer of tissue. It covers the bones on the bottom of your foot. And it supports the foot bones in an arched position.  This can happen gradually or suddenly. It usually affects one foot at a time. Heel pain can be sharp, like a knife sticking into the bottom of your foot. You may feel pain after exercising, long-distance jogging, stair climbing, long periods of standing, or after standing up.  Risk factors include: non-active lifestyle, arthritis, diabetes, obesity or recent weight gain, flat foot, high arch. Wearing high heels, loose shoes, or shoes with poor arch support for long periods of time adds to the risk. This problem is commonly found in runners and dancers. It also found in people who stand on hard surfaces for long periods of time.  Foot pain from this condition is usually worse in the morning. But it improves with walking. By the end of the day there may be a dull aching. Treatment requires short-term rest and controlling swelling. It may take up to 9 months before all symptoms go away. Rarely, a steroid injection into the foot, or surgery, may be needed.  Home care  · If you are overweight, lose weight to help healing.  · Choose supportive shoes with good arch support and shock absorbency. Replace athletic shoes when they become worn out. Dont walk or run barefoot.  · Premade or custom-fitted shoe inserts may be helpful. Inserts made of silicone seem to be the most effective. Custom-made inserts can be provided by a podiatrist or foot specialist, physical therapist, or orthopedist.  · Premade or custom-made night splints keep the heel stretched out while you sleep. They may prevent morning pain.  · Avoid activities that stress the feet: jogging, prolonged standing or walking, contact sports, etc.  · First thing in the morning and before sports, stretch the bottom of your feet.  Gently flex your ankle so the toes move toward your knee.  · Icing may help control heel pain. Apply an ice pack to the heel for 10-20 minutes as a preventive. Or ice your heel after a severe flare-up of symptoms. You may repeat this every 1-2 hours as needed.  · You may use over-the-counter pain medicine to control pain, unless another medicine was prescribed. Anti-inflammatory pain medicines, such as ibuprofen or naproxen, may work better than acetaminophen. If you have chronic liver or kidney disease or ever had a stomach ulcer or GI bleeding, talk with your healthcare provider before using these medicines.  Follow-up care  Follow up with your healthcare provider, physical therapist, or podiatrist or foot specialist as advised.  Call for an appointment if pain worsens or there is no relief after a few weeks of home treatment. Shoe inserts, a night splint, or a special boot may be required.  If X-rays were taken, you will be told of any new findings that may affect your care.  When to seek medical advice  Call your healthcare provider right away if any of these occur:  · Foot swelling  · Redness with increasing pain  Date Last Reviewed: 11/21/2015  © 8145-7868 [employer]. [address] [place] [address], [place] [address]. All rights reserved. This information is not intended as a substitute for professional medical care. Always follow your healthcare professional's instructions.

## 2018-07-09 NOTE — TELEPHONE ENCOUNTER
----- Message from Elvia Barrera sent at 7/9/2018  2:50 PM CDT -----  Contact: [facility] [phone]  Calling TO see if script Norco  [phone] can be changed Norco [phone]

## 2018-07-09 NOTE — PROGRESS NOTES
Subjective:       Patient ID: Estephania Rogers is a 52 y.o. female.    Chief Complaint: Plantar Fasciitis (in right foot)    Foot Injury    The incident occurred 3 to 5 days ago. The incident occurred at home. There was no injury mechanism. The pain is present in the right foot. The quality of the pain is described as aching. The pain is at a severity of 6/10. The pain is moderate. The pain has been worsening since onset. Pertinent negatives include no inability to bear weight, loss of motion, loss of sensation, muscle weakness, numbness or tingling. She reports no foreign bodies present. The symptoms are aggravated by movement, palpation and weight bearing (Hurts worse in the morning). She has tried nothing for the symptoms.     Review of Systems   Constitutional: Negative for chills and fatigue.   Respiratory: Negative for cough, chest tightness and wheezing.    Cardiovascular: Negative for chest pain, palpitations and leg swelling.   Musculoskeletal: Positive for arthralgias (right foot ) and gait problem (hurts to walk on it ). Negative for back pain and joint swelling.   Neurological: Negative for dizziness, tingling, weakness and numbness.   Hematological: Negative for adenopathy. Does not bruise/bleed easily.       Objective:      Physical Exam   Constitutional: She is oriented to person, place, and time. Vital signs are normal. She appears well-developed and well-nourished.   Cardiovascular: Normal rate, regular rhythm and normal heart sounds.    Pulmonary/Chest: Effort normal and breath sounds normal.   Musculoskeletal:        Right foot: There is tenderness. There is normal range of motion, no bony tenderness, no swelling, no crepitus, no deformity and no laceration.        Feet:    Neurological: She is alert and oriented to person, place, and time.   Skin: Skin is warm, dry and intact. Capillary refill takes less than 2 seconds.   Psychiatric: She has a normal mood and affect.       Assessment:        1. Plantar fasciitis, bilateral    2. Foot pain, bilateral    3. Elevated blood-pressure reading without diagnosis of hypertension        Plan:       Estephania was seen today for plantar fasciitis.    Diagnoses and all orders for this visit:    Plantar fasciitis, bilateral  -     methylPREDNISolone (MEDROL DOSEPACK) 4 mg tablet; use as directed  -     ibuprofen (ADVIL,MOTRIN) 800 MG tablet; Take 1 tablet (800 mg total) by mouth 3 (three) times daily.  -     triamcinolone acetonide injection 40 mg; Inject 1 mL (40 mg total) into the muscle one time.  -     HYDROcodone-acetaminophen (NORCO) 5-325 mg per tablet; Take 1 tablet by mouth every 6 (six) hours as needed for Pain.    Foot pain, bilateral  -     methylPREDNISolone (MEDROL DOSEPACK) 4 mg tablet; use as directed  -     ibuprofen (ADVIL,MOTRIN) 800 MG tablet; Take 1 tablet (800 mg total) by mouth 3 (three) times daily.  -     triamcinolone acetonide injection 40 mg; Inject 1 mL (40 mg total) into the muscle one time.  -     HYDROcodone-acetaminophen (NORCO) 5-325 mg per tablet; Take 1 tablet by mouth every 6 (six) hours as needed for Pain.  Home care  · If you are overweight, lose weight to help healing.  · Choose supportive shoes with good arch support and shock absorbency. Replace athletic shoes when they become worn out. Dont walk or run barefoot.  · Premade or custom-fitted shoe inserts may be helpful. Inserts made of silicone seem to be the most effective. Custom-made inserts can be provided by a podiatrist or foot specialist, physical therapist, or orthopedist.  · Premade or custom-made night splints keep the heel stretched out while you sleep. They may prevent morning pain.  · Avoid activities that stress the feet: jogging, prolonged standing or walking, contact sports, etc.  · First thing in the morning and before sports, stretch the bottom of your feet. Gently flex your ankle so the toes move toward your knee.  · Icing may help control heel pain. Apply  an ice pack to the heel for 10-20 minutes as a preventive. Or ice your heel after a severe flare-up of symptoms. You may repeat this every 1-2 hours as needed.  · You may use over-the-counter pain medicine to control pain, unless another medicine was prescribed. Anti-inflammatory pain medicines, such as ibuprofen or naproxen, may work better than acetaminophen. If you have chronic liver or kidney disease or ever had a stomach ulcer or GI bleeding, talk with your healthcare provider before using these medicines.  Follow-up care  Follow up with your healthcare provider, physical therapist, or podiatrist or foot specialist as advised.  Call for an appointment if pain worsens or there is no relief after a few weeks of home treatment. Shoe inserts, a night splint, or a special boot may be required.  If X-rays were taken, you will be told of any new findings that may affect your care.  When to seek medical advice  Call your healthcare provider right away if any of these occur:  · Foot swelling  · Redness with increasing pain  Date Last Reviewed: 11/21/2015 © 2000-2017 [employer]. [address] [place] [address], [place] [address]. All rights reserved. This information is not intended as a substitute for professional medical care. Always follow your healthcare professional's instructions.        Elevated blood-pressure reading without diagnosis of hypertension  Patient is asked to monitor BP at home or work, several times per month and return with written values at next office visit.

## 2018-07-09 NOTE — TELEPHONE ENCOUNTER
[facility] is requesting a lower dose of hydrocodone-acetaminophen (Norco) 5-300 mg per tablet.

## 2018-07-10 RX ORDER — HYDROCODONE BITARTRATE AND ACETAMINOPHEN 7.5; 325 MG/1; MG/1
1 TABLET ORAL EVERY 6 HOURS PRN
Qty: 10 TABLET | Refills: 0 | Status: SHIPPED | OUTPATIENT
Start: 2018-07-10 | End: 2018-09-24

## 2018-07-10 NOTE — TELEPHONE ENCOUNTER
----- Message from Joey Mclaughlin sent at 7/10/2018  9:10 AM CDT -----  Contact: Nora/[phone]  Nora from [facility] called in stating that she needs a hard copy of the prescription  [phone] since its a new prescription.      Thank you

## 2018-07-11 ENCOUNTER — INFUSION (OUTPATIENT)
Dept: INFUSION THERAPY | Facility: HOSPITAL | Age: 53
End: 2018-07-11
Attending: INTERNAL MEDICINE
Payer: COMMERCIAL

## 2018-07-11 VITALS
OXYGEN SATURATION: 98 % | SYSTOLIC BLOOD PRESSURE: 152 MMHG | DIASTOLIC BLOOD PRESSURE: 76 MMHG | RESPIRATION RATE: 17 BRPM | HEART RATE: 53 BPM | TEMPERATURE: 98 F

## 2018-07-11 DIAGNOSIS — D64.9 ANEMIA: Primary | ICD-10-CM

## 2018-07-11 PROCEDURE — 25000003 PHARM REV CODE 250: Performed by: INTERNAL MEDICINE

## 2018-07-11 PROCEDURE — 63600175 PHARM REV CODE 636 W HCPCS: Performed by: INTERNAL MEDICINE

## 2018-07-11 PROCEDURE — 96365 THER/PROPH/DIAG IV INF INIT: CPT

## 2018-07-11 RX ORDER — HEPARIN 100 UNIT/ML
500 SYRINGE INTRAVENOUS
Status: CANCELLED | OUTPATIENT
Start: 2018-07-11

## 2018-07-11 RX ADMIN — IRON SUCROSE 200 MG: 20 INJECTION, SOLUTION INTRAVENOUS at 03:07

## 2018-07-11 NOTE — PLAN OF CARE
Problem: Patient Care Overview (Adult)  Goal: Plan of Care Review  Outcome: Ongoing (interventions implemented as appropriate)  Patient tolerated Venofer IVPB. VSS. Received discharge instructions and verbalized understanding.

## 2018-08-06 ENCOUNTER — TELEPHONE (OUTPATIENT)
Dept: FAMILY MEDICINE | Facility: CLINIC | Age: 53
End: 2018-08-06

## 2018-08-06 DIAGNOSIS — Z12.31 ENCOUNTER FOR SCREENING MAMMOGRAM FOR BREAST CANCER: Primary | ICD-10-CM

## 2018-08-06 NOTE — TELEPHONE ENCOUNTER
----- Message from Giuliana Alexander sent at 8/6/2018 11:13 AM CDT -----  Contact: Self   Patient says she need orders for her Mammogram and Lipid Profile. Please call at [phone]

## 2018-08-15 ENCOUNTER — HOSPITAL ENCOUNTER (OUTPATIENT)
Dept: RADIOLOGY | Facility: HOSPITAL | Age: 53
Discharge: HOME | End: 2018-08-15
Attending: FAMILY MEDICINE
Payer: COMMERCIAL

## 2018-08-15 DIAGNOSIS — Z12.31 ENCOUNTER FOR SCREENING MAMMOGRAM FOR BREAST CANCER: ICD-10-CM

## 2018-08-15 PROCEDURE — 77067 SCR MAMMO BI INCL CAD: CPT | Mod: 26,,, | Performed by: RADIOLOGY

## 2018-08-15 PROCEDURE — 77063 BREAST TOMOSYNTHESIS BI: CPT | Mod: 26,,, | Performed by: RADIOLOGY

## 2018-08-15 PROCEDURE — 77067 SCR MAMMO BI INCL CAD: CPT | Mod: TC,PO

## 2018-08-29 ENCOUNTER — TELEPHONE (OUTPATIENT)
Dept: PODIATRY | Facility: CLINIC | Age: 53
End: 2018-08-29

## 2018-09-24 ENCOUNTER — HOSPITAL ENCOUNTER (OUTPATIENT)
Dept: RADIOLOGY | Facility: HOSPITAL | Age: 53
Discharge: HOME | End: 2018-09-24
Attending: PODIATRIST
Payer: COMMERCIAL

## 2018-09-24 ENCOUNTER — OFFICE VISIT (OUTPATIENT)
Dept: PODIATRY | Facility: CLINIC | Age: 53
End: 2018-09-24
Payer: COMMERCIAL

## 2018-09-24 VITALS — DIASTOLIC BLOOD PRESSURE: 90 MMHG | SYSTOLIC BLOOD PRESSURE: 130 MMHG

## 2018-09-24 DIAGNOSIS — M20.5X1 HALLUX LIMITUS, ACQUIRED, RIGHT: ICD-10-CM

## 2018-09-24 DIAGNOSIS — M20.42 HAMMER TOES OF BOTH FEET: ICD-10-CM

## 2018-09-24 DIAGNOSIS — M77.41 METATARSALGIA OF RIGHT FOOT: ICD-10-CM

## 2018-09-24 DIAGNOSIS — M76.71 PERONEUS BREVIS TENDONITIS, RIGHT: ICD-10-CM

## 2018-09-24 DIAGNOSIS — M21.6X1 ACQUIRED EQUINUS DEFORMITY OF BOTH FEET: ICD-10-CM

## 2018-09-24 DIAGNOSIS — M79.671 FOOT PAIN, RIGHT: Primary | ICD-10-CM

## 2018-09-24 DIAGNOSIS — M21.6X2 ACQUIRED EQUINUS DEFORMITY OF BOTH FEET: ICD-10-CM

## 2018-09-24 DIAGNOSIS — M79.671 FOOT PAIN, RIGHT: ICD-10-CM

## 2018-09-24 DIAGNOSIS — M20.41 HAMMER TOES OF BOTH FEET: ICD-10-CM

## 2018-09-24 DIAGNOSIS — M20.5X2 HALLUX LIMITUS, ACQUIRED, LEFT: ICD-10-CM

## 2018-09-24 PROCEDURE — 73630 X-RAY EXAM OF FOOT: CPT | Mod: 26,RT,, | Performed by: RADIOLOGY

## 2018-09-24 PROCEDURE — 99214 OFFICE O/P EST MOD 30 MIN: CPT | Mod: S$GLB,,, | Performed by: PODIATRIST

## 2018-09-24 PROCEDURE — 99999 PR PBB SHADOW E&M-EST. PATIENT-LVL III: CPT | Mod: PBBFAC,,, | Performed by: PODIATRIST

## 2018-09-24 PROCEDURE — 73630 X-RAY EXAM OF FOOT: CPT | Mod: TC,FY,PO,RT

## 2018-09-24 RX ORDER — MELOXICAM 15 MG/1
15 TABLET ORAL DAILY
Qty: 30 TABLET | Refills: 2 | Status: SHIPPED | OUTPATIENT
Start: 2018-09-24 | End: 2019-01-29 | Stop reason: SDUPTHER

## 2018-09-24 NOTE — PATIENT INSTRUCTIONS
Recommend lotions: eucerin, eucerin for diabetics, aquaphor, A&D ointment, gold bond for diabetics, sween, [place]'s Bees all purpose baby ointment,  urea 40 with aloe (found on amazon.com)    Shoe recommendations: (try 6pm.com, zappos.Xymogen , nordstromrack.Xymogen, or shoes.Xymogen for discounted prices) you can visit [facility] in [place]  or [facility] the [facility] (there are also several shoe brand outlets in the [facility])    Asics (GT 2000 or gel foundations), new balance stability type shoes (900 series), saucony (stabil c3),  Smith (GTS or Beast or transcend), vionic, propet (tennis shoe)    sofft brand, clarks, crocs, aerosoles, naturalizers, SAS, ecco, born, sergey carbone, rockports (dress shoes)    Vionic, burkenstocks, fitflops, propet (sandals)  Nike comfort thong sandals, crocs, propet (house shoes)    Nail Home remedy:  Vicks Vapor rub to nails for easier managability        Strain, Sprain, or Contusion  Strains, sprains, and contusions are common injuries. These injuries are similar, but involve different types of body tissue. Most of these injuries happen during sports or active play. But they can occur at any time. A strain, sprain, or contusion can be painful. With the right treatment, most heal with no lasting problems.              What is a strain?  A strain is an injury to a muscle or to a tendon (tissue that connects muscle to bone). It is sometimes called a pulled muscle. A strain happens when a muscle or tendon is stretched too far or is partially torn. Symptoms of a strain are pain, swelling, and having a problem moving or using the injured area. The hamstring (thigh muscle), calf muscle, and Achilles tendon are commonly strained.   What is a sprain?  A sprain is an injury to a ligament (tissue that connects bones to other bones). Joints contain many ligaments. A sprain happens when a joint is twisted or pulled and the ligament stretches or tears. Symptoms of a sprain are pain, swelling, and  having a problem moving or using the injured area. Ankles, knees, and wrists are the joints most commonly sprained.   What is a contusion?  A contusion is commonly called a bruise. It is injury to tissue that causes bleeding without breaking the skin. It is often a result of being hit by a blunt object such as a ball or bat. Symptoms of a contusion are discoloration of the skin, pain (which can be severe), and swelling. Contusions usually arent serious and dont need medical attention. But a large, painful, or very swollen bruise, or a bruise that limits movement of a joint such as the knee should be seen by a doctor.   How are strains, sprains, and contusions diagnosed?  An examination is also done. An X-ray (test that creates images of bones) may be done to rule out broken bones.  How are strains, sprains, and contusions treated?  · Strains and sprains can take up to months to heal. If not treated and allowed to heal, a strain or sprain can lead to long-term problems. These include lasting pain and stiffness. So it is important to follow the doctors instructions.  · The pain of a contusion often resolves within the first week. But the swelling and discoloration may take weeks to go away.  Treatment consists of one or more of the following:  · RICE (which stands for Rest, Ice, Compression, and Elevation)  ¨ Rest. As much as possible, you should not use the injured area.  ¨ Ice. Put ice on the injured area 3-4 times a day for 20 minutes at a time. Use an ice pack or bag of frozen peas wrapped in a thin towel.   ¨ Compression. If instructed, wrap the area to keep swelling down. Use an elastic bandage. .  ¨ Elevation.  Raise the injured body part above the level of your heart.  · Medications to relieve inflammation and pain. These will likely be NSAIDs (non-steroidal anti-inflammatory drugs). NSAIDs include ibuprofen and naproxen.   · Physical therapy (PT) to strengthen the injured area. This is especially helpful  for moderate to severe strains or sprains.  · Casting of the affected area to keep it still and allow the strain or sprain to heal.  · Surgery may be needed if the strain or sprain is severe and there is tearing. During surgery, the torn muscle, tendon, or ligament is repaired.  What are the long-term concerns?  If allowed to heal, most strains, sprains, and contusions cause no further problems. Strains or sprains that are not treated and dont heal properly can lead to pain or stiffness that doesnt go away. Be sure to follow your childs treatment plan. Your childs doctor can tell you more about the expected outcome based on your childs injury.     Preventing strains, sprains, and contusions  If playing sports or doing other athletic activity, be sure you:  · Has proper training.  · Wears protective gear.  · Warms up before activity and cools down afterward.  · Uses proper equipment.   © 5317-4411 [employer]. [address] [place] [address]. All rights reserved. This information is not intended as a substitute for professional medical care. Always follow your healthcare professional's instructions.        Wearing Proper Shoes                    You walk on your feet every day, forcing them to support the weight of your body. Repeated stress on your feet can cause damage over time. The right shoes can help protect your feet. The wrong shoes can cause more foot problems. Read the information below to help you find a shoe that fits your foot needs.      A good shoe fit will cover your foot outline. A shoe that doesnt cover the outline is a bad fit.   Whats your foot shape?  To get a good fit, you need to know the shape of your foot. Do this simple test: While standing, place your foot on a piece of paper and trace around it. Is your foot straight or curved? Do you have a foot problem, such as a bunion, that causes your foot outline to show a bulge on the side of your big toe?  Finding  your fit  Bring your foot outline to the shoe store to help you find the right shoe. Place a shoe you like on top of the outline to see if it matches the shape. The shoe should cover the outline. (If you have a bunion, the shoe may not cover the bulge on the outline. Look for soft leather shoes to stretch over the bunion.) Once youve found a pair of proper shoes, put them on. Walk around. Be sure the shoes dont rub or pinch. If the shoes feel good, youve found your fit!  The right shoe for you  A good shoe has features that provide comfort and support. It must also be the right size and shape for your feet. Look for a shoe made of breathable fabric and lining, such as leather or canvas. Be sure that shoes have enough tread to prevent slipping. Go to a good shoe store for help finding the right shoe.  Good shoe features  An ideal shoe has the following:  · Laces for support. If tying laces is a problem for you, try shoes with Velcro fasteners or flako.  · A front of the shoe (toe box) with ½ inch space in front of your longest toes.  · An arch shape that supports your foot.  · No more than 1½ inches of heel.  · A stiff, snug back of the shoe to keep your foot from sliding around.  · A smooth lining with no rough seams.  Shoe shopping tips  Below are some dos and donts for when you go to the shoe store.  Do:  · Select the shoes that feel right. Wear them around the house. Then bring them to your foot healthcare provider to check for fit. If they dont fit well, return them.  · Shop late in the day, when your feet will be slightly bigger.  · Each time you buy shoes, have both your feet measured while you are standing. Foot size changes with time.  · Pick shoes to suit their purpose. High heels are OK for an occasional night on the town. But for everyday wear, choose a more sensible shoe.  · Try on shoes while wearing any inserts specially made for your feet (orthoses).  · Try on both the right and left shoes. If  your feet are different sizes, pick a pair that fits the larger foot.  Dont:  · Dont buy shoes based on shoe size alone. Always try on shoes, as sizes differ from brand to brand and within brands.  · Dont expect shoes to break in. If they dont fit at the store, dont buy them.  · Dont buy a shoe that doesnt match your foot shape.  What about socks?  Always wear socks with shoes. Socks help absorb sweat and reduce friction and blistering. When shopping for shoes, choose soft, padded socks with seams that dont irritate your feet.  If you have foot problems  Some foot problems cause deformities. This can make it hard to find a good fit. Look for shoes made of soft leather to stretch over the deformity. If you have bunions, buy shoes with a wider toe box. To fit hammertoes, look for shoes with a tall toe box. If you have arch problems, you may need inserts. In some cases, youll need to have custom footwear or orthoses made for your feet.  Suggested footwear  Ask your healthcare provider what kind of footwear you need. He or she may recommend a certain brand or shoe store.  Date Last Reviewed: 8/1/2016  © 9261-4076 [employer]). [address] [place] [address]. All rights reserved. This information is not intended as a substitute for professional medical care. Always follow your healthcare professional's instructions.        Step-by-Step:  Inspecting Your Feet   Date Last Reviewed: 10/1/2016  © 1788-2416 [employer]). [address] [place] [address]. All rights reserved. This information is not intended as a substitute for professional medical care. Always follow your healthcare professional's instructions.          What is Metatarsalgia?  Metatarsalgia is pain in the ball of your foot. It is often caused by wearing shoes with thin soles and high heels. This puts extra pressure on the bones in the ball of the foot. Standing or walking on a hard surface for long  "periods also puts added pressure on the bones, causing pain. The pain can occur under any of the five metatarsal bones, although it's most common in the second. Bent or twisted toes and bunions can make the problem worse. So can being overweight. Sometimes high arches or arthritis can also cause metatarsalgia.    Inside the ball of your foot  The long bones in the middle of your foot are called the metatarsal bones. Each metatarsal bone ends in the ball of the foot. When you walk, these bones bear the weight of your body as your foot pushes off the ground. If there is more pressure on the end of one bone, it presses on the skin beneath it. This causes pain and inflammation in the ball of the foot (metatarsalgia). A callus (a hard growth of skin) may also form on the ball of the foot.    Symptoms  The most common symptom of metatarsalgia is pain in the ball of the foot. It may feel as if you have a stone in your shoe. The ball of the foot may also become red and inflamed, and a callus may form under the end of the metatarsal bone.   Date Last Reviewed: 9/29/2015  © 5203-7362 [employer]. [address] [place] [address]. All rights reserved. This information is not intended as a substitute for professional medical care. Always follow your healthcare professional's instructions.        Treating Metatarsalgia    Metatarsalgia is pain in the "ball of your foot," the area between your arch and your toes. The pain usually starts in one or more of the five bones in this area under the toes. These bones are called the metatarsals. Often, a callus builds up in this area. In most cases, wearing low-heeled, well-cushioned shoes and filing down the callus will ease the pain. If these steps dont work, you may need surgery to remove part of the bone. To take pressure off the ball of your foot and ease the pain, your healthcare provider may suggest that you do one or more of the following.  Wear shoes with " padded soles  Wear shoes with thick padding in the soles. Keep heels low. Make sure the shoe is wide across the ball of your foot and your toes.  Using a metatarsal pad  Put a metatarsal pad in your shoe. This lifts and takes pressure off the painful area. To insert the pad:  · Put a small lipstick mario on the callus.  · Step into the shoe to leave a mario on the insole.  · Peel the backing off the pad. Then put the pad in the shoe just behind the lipstick mario.  You may also be able to find inserts with built-in metatarsal pads.  Filing the callus  1. Soak your foot in warm water for a few minutes to soften the skin.  2. Dry the foot.  3. Gently rub the callus with a pumice stone or nail file to remove the hard skin. Stop if bleeding or pain occurs.  If you have diabetes, see your diabetes healthcare provider for foot care.  Date Last Reviewed: 9/29/2015 © 2000-2017 [employer]. [address] [place] [address], [place] [address]. All rights reserved. This information is not intended as a substitute for professional medical care. Always follow your healthcare professional's instructions.

## 2018-09-26 ENCOUNTER — INFUSION (OUTPATIENT)
Dept: INFUSION THERAPY | Facility: HOSPITAL | Age: 53
End: 2018-09-26
Attending: INTERNAL MEDICINE
Payer: COMMERCIAL

## 2018-09-26 VITALS
SYSTOLIC BLOOD PRESSURE: 141 MMHG | OXYGEN SATURATION: 97 % | DIASTOLIC BLOOD PRESSURE: 69 MMHG | RESPIRATION RATE: 17 BRPM | TEMPERATURE: 98 F | HEART RATE: 55 BPM

## 2018-09-26 DIAGNOSIS — D64.9 ANEMIA: Primary | ICD-10-CM

## 2018-09-26 PROCEDURE — 63600175 PHARM REV CODE 636 W HCPCS: Performed by: INTERNAL MEDICINE

## 2018-09-26 PROCEDURE — 96365 THER/PROPH/DIAG IV INF INIT: CPT

## 2018-09-26 PROCEDURE — 25000003 PHARM REV CODE 250: Performed by: INTERNAL MEDICINE

## 2018-09-26 RX ADMIN — IRON SUCROSE 200 MG: 20 INJECTION, SOLUTION INTRAVENOUS at 03:09

## 2018-09-26 NOTE — PLAN OF CARE
Problem: Patient Care Overview (Adult)  Goal: Plan of Care Review  Outcome: Ongoing (interventions implemented as appropriate)  Patient received Venofer IVPB. Tolerated well. VSS. Received discharge instructions and verbalized understanding.

## 2018-10-16 ENCOUNTER — OFFICE VISIT (OUTPATIENT)
Dept: FAMILY MEDICINE | Facility: CLINIC | Age: 53
End: 2018-10-16
Payer: COMMERCIAL

## 2018-10-16 VITALS
BODY MASS INDEX: 31.44 KG/M2 | HEART RATE: 59 BPM | WEIGHT: 224.56 LBS | DIASTOLIC BLOOD PRESSURE: 96 MMHG | TEMPERATURE: 98 F | HEIGHT: 71 IN | SYSTOLIC BLOOD PRESSURE: 144 MMHG | OXYGEN SATURATION: 99 %

## 2018-10-16 DIAGNOSIS — R03.0 ELEVATED BLOOD-PRESSURE READING WITHOUT DIAGNOSIS OF HYPERTENSION: ICD-10-CM

## 2018-10-16 DIAGNOSIS — G43.709 CHRONIC MIGRAINE WITHOUT AURA WITHOUT STATUS MIGRAINOSUS, NOT INTRACTABLE: Primary | ICD-10-CM

## 2018-10-16 PROCEDURE — 99214 OFFICE O/P EST MOD 30 MIN: CPT | Mod: 25,S$GLB,, | Performed by: FAMILY MEDICINE

## 2018-10-16 PROCEDURE — 96372 THER/PROPH/DIAG INJ SC/IM: CPT | Mod: S$GLB,,, | Performed by: FAMILY MEDICINE

## 2018-10-16 PROCEDURE — 99999 PR PBB SHADOW E&M-EST. PATIENT-LVL III: CPT | Mod: PBBFAC,,, | Performed by: FAMILY MEDICINE

## 2018-10-16 RX ORDER — KETOROLAC TROMETHAMINE 30 MG/ML
60 INJECTION, SOLUTION INTRAMUSCULAR; INTRAVENOUS ONCE
Status: COMPLETED | OUTPATIENT
Start: 2018-10-16 | End: 2018-10-16

## 2018-10-16 RX ADMIN — KETOROLAC TROMETHAMINE 60 MG: 30 INJECTION, SOLUTION INTRAMUSCULAR; INTRAVENOUS at 08:10

## 2018-10-16 NOTE — PROGRESS NOTES
Office Visit    Patient Name: Estephania Rogers    : 1965  MRN: [id number]      Assessment/Plan:  Estephania Rogers is a 53 y.o. female who presents today for :    Chronic migraine without aura without status migrainosus, not intractable  -     ketorolac injection 60 mg; Inject 2 mLs (60 mg total) into the muscle once.    -advised adequate water intake daily, advised patient that it is safe to take Tylenol once every 4 hours as needed for headaches. Pt declined other oral medication at this time, states that she has enough of the Fioricet to take as needed.  -also d/w pt about modifying environment to avoid triggers, such as avoiding bright lights/loud noises  -also d/w pt regarding stress management, meditation, mm stretching exercises, heat/cold packs.    Elevated blood-pressure reading without diagnosis of hypertension  -no prior Dx of HTN, likely secondary to migraine flare up.  -BP log advised and patient was counseled on DASH diet and regular exercise, avoiding smoking and keeping alcohol consumption in moderation  -RTC in 2 weeks for BP f/u if not consistently <140/90      Follow-up for worsening Sx or as needed.     This note was created by combination of typed  and Dragon dictation.  Transcription errors may be present.  If there are any questions, please contact me.        ----------------------------------------------------------------------------------------------------------------------      HPI:  Estephania is a 53 y.o. female who presents today for:  Migraine        Patient Care Team:  Dulce Castillo MD as PCP - General (Family Medicine)  Awilda Andres MD as Consulting Physician (Hematology and Oncology)    This patient has multiple medical diagnoses as noted below.      This patient is new to me   Patient is here today for migraine Headache  Located on the right front part of the head, with increased sensitivity to light and certain noises. Her last episode was about 6  months ago, for which she takes a Toradol shot with good relief. She has been taking Fioricet as needed at home for minor flare ups of migraine with good relief, but occasionally it gets severe enough that only the Toradol shot will help.  Her headache Sx has been persistent the past 4 days, which is typical for her.   Denies any F/C/N/V/palpitations/CP/FUENTES/SOB//syncope/facial or muscular weakness/neck stiffness/tingling/numbness/abd pain/swelling/bowel or bladder changes.  No slurring of speech/confusion/falling . Sx are slightly improved with sitting in a quiet and dark room.         Patient Active Problem List   Diagnosis    Recurrent nephrolithiasis    Other specified intestinal malabsorption    Iron deficiency anemia    B12 deficiency anemia    Anxiety       Current Medications  Medications reviewed and updated.       Current Outpatient Medications:     ALPRAZolam (XANAX) 0.25 MG tablet, Take 1 tablet (0.25 mg total) by mouth daily as needed., Disp: 30 tablet, Rfl: 2    ascorbic acid (VITAMIN C) 500 MG tablet, Take 500 mg by mouth once daily.  , Disp: , Rfl:     butalbital-acetaminophen-caffeine -40 mg (FIORICET, ESGIC) -40 mg per tablet, , Disp: , Rfl:     cholecalciferol, vitamin D3, 50,000 unit Wafr, Take 50,000 Units by mouth once daily.  , Disp: , Rfl:     cyclobenzaprine (FLEXERIL) 5 MG tablet, Take 1-2 tablets 3 times daily as needed for pain., Disp: 30 tablet, Rfl: 0    meloxicam (MOBIC) 15 MG tablet, Take 1 tablet (15 mg total) by mouth once daily., Disp: 30 tablet, Rfl: 2    multivitamin (ONE DAILY MULTIVITAMIN) per tablet, Take 1 tablet by mouth once daily., Disp: , Rfl:     RESTASIS 0.05 % ophthalmic emulsion, , Disp: , Rfl: 3    vitamin A 04391 UNIT capsule, Take 25,000 Units by mouth once daily.  , Disp: , Rfl:     Current Facility-Administered Medications:     ketorolac injection 60 mg, 60 mg, Intramuscular, Once, Mika Aldridge MD    Past Surgical History:   Procedure  "Laterality Date    ANAL FISTULOTOMY  2005    APPENDECTOMY  2005    BREAST SURGERY  2014    CHOLECYSTECTOMY  2005    GASTRIC BYPASS  2005    SINUS SURGERY      Dr. Oral Cobb    SPINE SURGERY      TONSILLECTOMY      TOTAL REDUCTION MAMMOPLASTY  2014       Family History   Problem Relation Age of Onset    Cancer Father         pancreatic    Diabetes Father     Diabetes Mother     Hypertension Mother     Miscarriages / Stillbirths Mother     Stroke Mother     Diabetes Sister     Cancer Maternal Aunt         breast    Arthritis Maternal Grandmother     Arthritis Paternal Grandmother     Diabetes Sister     Breast cancer Paternal Aunt        Social History     Socioeconomic History    Marital status:      Spouse name: Not on file    Number of children: Not on file    Years of education: Not on file    Highest education level: Not on file   Social Needs    Financial resource strain: Not on file    Food insecurity - worry: Not on file    Food insecurity - inability: Not on file    Transportation needs - medical: Not on file    Transportation needs - non-medical: Not on file   Occupational History    Not on file   Tobacco Use    Smoking status: Never Smoker    Smokeless tobacco: Never Used    Tobacco comment: Clerical work   Substance and Sexual Activity    Alcohol use: No     Comment: socially    Drug use: No    Sexual activity: Yes     Partners: Male     Birth control/protection: None     Comment: :  Gianni   Other Topics Concern    Not on file   Social History Narrative    Not on file           Allergies   Review of patient's allergies indicates:  No Known Allergies          Review of Systems  See HPI      Physical Exam  BP (!) 144/96   Pulse (!) 59   Temp 98.4 °F (36.9 °C) (Oral)   Ht 5' 11" (1.803 m)   Wt 101.9 kg (224 lb 8.6 oz)   LMP 12/04/2014   SpO2 99%   BMI 31.32 kg/m²     GEN: NAD, well developed, pleasant, well nourished  HEENT: NCAT, PERRLA, EOMI, " sclera clear, anicteric, O/P clear, MMM with no lesions  NECK: normal, supple with midline trachea, no LAD, no thyromegaly  LUNGS: CTAB, no w/r/r, no increased work of breathing   HEART: RRR, normal S1 and S2, no m/r/g, no edema  ABD: s/nt/nd, NABS  SKIN: normal turgor, no rashes  PSYCH: AOx3, appropriate mood and affect  MSK: warm/well perfused, normal ROM in all extremities, no c/c/e.  NEURO: normal without focal findings, CN II-XII are grossly intact.  Sensation/strength grossly normal, gait and station normal. Normal hearing test. No light sensitivity.  Reflexes 2+ for biceps, brachial, patellar and achilles bilateral and symmetric, finger to nose and cerebellar exam normal

## 2018-10-25 ENCOUNTER — OFFICE VISIT (OUTPATIENT)
Dept: FAMILY MEDICINE | Facility: CLINIC | Age: 53
End: 2018-10-25
Payer: COMMERCIAL

## 2018-10-25 VITALS
HEART RATE: 64 BPM | OXYGEN SATURATION: 96 % | WEIGHT: 225.19 LBS | HEIGHT: 71 IN | SYSTOLIC BLOOD PRESSURE: 130 MMHG | TEMPERATURE: 99 F | BODY MASS INDEX: 31.52 KG/M2 | DIASTOLIC BLOOD PRESSURE: 84 MMHG

## 2018-10-25 DIAGNOSIS — R68.84 JAW PAIN: ICD-10-CM

## 2018-10-25 DIAGNOSIS — F43.9 SITUATIONAL STRESS: Primary | ICD-10-CM

## 2018-10-25 DIAGNOSIS — R51.9 INTRACTABLE HEADACHE, UNSPECIFIED CHRONICITY PATTERN, UNSPECIFIED HEADACHE TYPE: ICD-10-CM

## 2018-10-25 DIAGNOSIS — M54.2 NECK PAIN: ICD-10-CM

## 2018-10-25 PROCEDURE — 96372 THER/PROPH/DIAG INJ SC/IM: CPT | Mod: S$GLB,,, | Performed by: NURSE PRACTITIONER

## 2018-10-25 PROCEDURE — 99999 PR PBB SHADOW E&M-EST. PATIENT-LVL IV: CPT | Mod: PBBFAC,,, | Performed by: NURSE PRACTITIONER

## 2018-10-25 PROCEDURE — 99214 OFFICE O/P EST MOD 30 MIN: CPT | Mod: 25,S$GLB,, | Performed by: NURSE PRACTITIONER

## 2018-10-25 RX ORDER — FLUOXETINE HYDROCHLORIDE 20 MG/1
20 CAPSULE ORAL DAILY
Qty: 30 CAPSULE | Refills: 2 | Status: SHIPPED | OUTPATIENT
Start: 2018-10-25 | End: 2018-11-29 | Stop reason: SDUPTHER

## 2018-10-25 RX ORDER — KETOROLAC TROMETHAMINE 30 MG/ML
60 INJECTION, SOLUTION INTRAMUSCULAR; INTRAVENOUS
Status: COMPLETED | OUTPATIENT
Start: 2018-10-25 | End: 2018-10-25

## 2018-10-25 RX ADMIN — KETOROLAC TROMETHAMINE 60 MG: 30 INJECTION, SOLUTION INTRAMUSCULAR; INTRAVENOUS at 09:10

## 2018-10-25 NOTE — PROGRESS NOTES
Subjective:       Patient ID: Estephania Rogers is a 53 y.o. female.    Chief Complaint: Headache (10 days)    53-year-old female presents to the clinic today with complaint of neck tightness and TMJ for the last 10 days.  She also has had a migraine headache off and on for the last 10 days.  She states is pressure mostly to both of her temple areas.  Presently, she denies any nausea, sensitivity to light or noise.  She presently rates her headache as 6/10.  She states she took two Zanaflex yesterday.  She thought that she was mostly having problems with migraines but she thinks that she is mostly stress because she is in a  intense situation due to the fact that her sister has been hospitalized for 8 weeks due to osteomyelitis and ulcer in her foot.  She states that she has been doing hyperbaric now trying to save her leg.  She states she was just discharged from the hospital and they are going to try hyperbaric for 1 more month to see if they can heal the osteomyelitus.  She says that she is spending a lot of time taking care of her sister along with her other 2 sisters.  She is also seeing her chiropractor that is helping with her neck and TMJ pain. She has tried Xanax that is not helping at all.  She thinks she could benefit from an antidepressant.  She took Prozac several years ago when her mother and father were both dying and she said it did help her.  She denies any suicidal thoughts, homicidal ideations, or hallucinations.      Past Medical History:   Diagnosis Date    Anemia     iron deficiency    Anxiety     Diabetes mellitus, type 2     Hypertension     Malabsorption     Migraine headache     Nephrolithiasis     Other specified intestinal malabsorption      Past Surgical History:   Procedure Laterality Date    ANAL FISTULOTOMY  2005    APPENDECTOMY  2005    BREAST SURGERY  2014    CHOLECYSTECTOMY  2005    GASTRIC BYPASS  2005    SINUS SURGERY      Dr. Oral Cobb    SPINE SURGERY       TONSILLECTOMY      TOTAL REDUCTION MAMMOPLASTY  2014      reports that  has never smoked. she has never used smokeless tobacco. She reports that she does not drink alcohol or use drugs.  Review of Systems   HENT: Negative for congestion, ear discharge, sinus pressure, sinus pain and sore throat.         TMJ pain and tightness    Respiratory: Negative for cough, shortness of breath and wheezing.    Cardiovascular: Negative for chest pain, palpitations and leg swelling.   Gastrointestinal: Negative for abdominal pain, diarrhea, nausea and vomiting.   Musculoskeletal: Positive for neck pain. Negative for back pain and gait problem.   Neurological: Positive for headaches.   Psychiatric/Behavioral: Negative for hallucinations, sleep disturbance and suicidal ideas. The patient is nervous/anxious.        Objective:      Physical Exam   Constitutional: She is oriented to person, place, and time. She appears well-developed and well-nourished. No distress.   Eyes: Conjunctivae and EOM are normal. Pupils are equal, round, and reactive to light. Right eye exhibits no discharge. Left eye exhibits no discharge. No scleral icterus.   Jaw opens and closes without difficulty non-tender to palpation no popping sensation    Neck: Normal range of motion. Neck supple.   c- spine FROM without difficulty non-tender to palpation    Cardiovascular: Normal rate, regular rhythm and normal heart sounds. Exam reveals no gallop.   No murmur heard.  Pulmonary/Chest: Effort normal and breath sounds normal. No stridor. No respiratory distress. She has no wheezes.   Abdominal: Soft. Bowel sounds are normal. There is no tenderness.   Musculoskeletal: Normal range of motion. She exhibits no edema.   Neurological: She is alert and oriented to person, place, and time.   Skin: Skin is warm and dry. She is not diaphoretic.   Psychiatric: Her behavior is normal. Judgment and thought content normal.   Tearful        Assessment:       1. Situational  stress    2. Neck pain    3. Jaw pain    4. Intractable headache, unspecified chronicity pattern, unspecified headache type        Plan:         Situational stress  -     FLUoxetine (PROZAC) 20 MG capsule; Take 1 capsule (20 mg total) by mouth once daily.  Dispense: 30 capsule; Refill: 2  - Start Prozac 20 mg po daily     Neck pain  - Toradol injection    Jaw pain  - Zanaflex as needed    Intractable headache, unspecified chronicity pattern, unspecified headache type  - Toradol injection    Other orders  -     ketorolac injection 60 mg

## 2018-10-25 NOTE — PROGRESS NOTES
Subjective:       Patient ID: Estephania Rogers is a 53 y.o. female.    Chief Complaint: No chief complaint on file.    HPI  Past Medical History:   Diagnosis Date    Anemia     iron deficiency    Anxiety     Diabetes mellitus, type 2     Hypertension     Malabsorption     Migraine headache     Nephrolithiasis     Other specified intestinal malabsorption      Past Surgical History:   Procedure Laterality Date    ANAL FISTULOTOMY  2005    APPENDECTOMY  2005    BREAST SURGERY  2014    CHOLECYSTECTOMY  2005    GASTRIC BYPASS  2005    SINUS SURGERY      Dr. Oral Cobb    SPINE SURGERY      TONSILLECTOMY      TOTAL REDUCTION MAMMOPLASTY  2014      reports that  has never smoked. she has never used smokeless tobacco. She reports that she does not drink alcohol or use drugs.  Review of Systems    Objective:      Physical Exam    Assessment:       No diagnosis found.    Plan:       ***    No Follow-up on file.

## 2018-11-09 DIAGNOSIS — F41.9 ANXIETY: ICD-10-CM

## 2018-11-13 RX ORDER — ALPRAZOLAM 0.25 MG/1
TABLET ORAL
Qty: 30 TABLET | Refills: 0 | Status: SHIPPED | OUTPATIENT
Start: 2018-11-13 | End: 2021-08-23

## 2018-11-21 ENCOUNTER — INFUSION (OUTPATIENT)
Dept: INFUSION THERAPY | Facility: HOSPITAL | Age: 53
End: 2018-11-21
Attending: INTERNAL MEDICINE
Payer: COMMERCIAL

## 2018-11-21 DIAGNOSIS — D64.9 ANEMIA: Primary | ICD-10-CM

## 2018-11-21 PROCEDURE — 96365 THER/PROPH/DIAG IV INF INIT: CPT

## 2018-11-21 PROCEDURE — 63600175 PHARM REV CODE 636 W HCPCS: Performed by: INTERNAL MEDICINE

## 2018-11-21 PROCEDURE — 25000003 PHARM REV CODE 250: Performed by: INTERNAL MEDICINE

## 2018-11-21 RX ORDER — HEPARIN 100 UNIT/ML
500 SYRINGE INTRAVENOUS
Status: CANCELLED | OUTPATIENT
Start: 2018-11-21

## 2018-11-21 RX ADMIN — IRON SUCROSE 200 MG: 20 INJECTION, SOLUTION INTRAVENOUS at 03:11

## 2018-11-21 NOTE — PLAN OF CARE
Problem: Patient Care Overview (Adult)  Goal: Plan of Care Review  Outcome: Ongoing (interventions implemented as appropriate)  Patient received Venofer IVPB. Tolerated well. Received discharge instructions and verbalized understanding.

## 2018-11-29 ENCOUNTER — OFFICE VISIT (OUTPATIENT)
Dept: FAMILY MEDICINE | Facility: CLINIC | Age: 53
End: 2018-11-29
Payer: COMMERCIAL

## 2018-11-29 VITALS
WEIGHT: 225.75 LBS | TEMPERATURE: 99 F | SYSTOLIC BLOOD PRESSURE: 120 MMHG | BODY MASS INDEX: 31.6 KG/M2 | HEIGHT: 71 IN | HEART RATE: 58 BPM | OXYGEN SATURATION: 97 % | DIASTOLIC BLOOD PRESSURE: 80 MMHG

## 2018-11-29 DIAGNOSIS — F43.9 SITUATIONAL STRESS: ICD-10-CM

## 2018-11-29 DIAGNOSIS — J30.9 ALLERGIC RHINITIS: ICD-10-CM

## 2018-11-29 DIAGNOSIS — R09.82 POST-NASAL DRIP: ICD-10-CM

## 2018-11-29 DIAGNOSIS — R05.9 COUGH: ICD-10-CM

## 2018-11-29 PROCEDURE — 99999 PR PBB SHADOW E&M-EST. PATIENT-LVL III: CPT | Mod: PBBFAC,,, | Performed by: FAMILY MEDICINE

## 2018-11-29 PROCEDURE — 99213 OFFICE O/P EST LOW 20 MIN: CPT | Mod: S$GLB,,, | Performed by: FAMILY MEDICINE

## 2018-11-29 RX ORDER — FLUOXETINE HYDROCHLORIDE 20 MG/1
20 CAPSULE ORAL DAILY
Qty: 30 CAPSULE | Refills: 2 | Status: SHIPPED | OUTPATIENT
Start: 2018-11-29 | End: 2019-01-04 | Stop reason: SDUPTHER

## 2018-11-29 RX ORDER — FLUTICASONE PROPIONATE 50 MCG
2 SPRAY, SUSPENSION (ML) NASAL DAILY
Qty: 16 G | Refills: 2 | Status: SHIPPED | OUTPATIENT
Start: 2018-11-29 | End: 2019-02-27

## 2018-11-29 RX ORDER — FLUTICASONE PROPIONATE 50 MCG
2 SPRAY, SUSPENSION (ML) NASAL DAILY
Qty: 16 G | Refills: 0 | Status: SHIPPED | OUTPATIENT
Start: 2018-11-29 | End: 2018-11-29 | Stop reason: SDUPTHER

## 2018-11-29 NOTE — PROGRESS NOTES
Assessment & Plan  Problem List Items Addressed This Visit     None      Visit Diagnoses     Situational stress        Relevant Medications    FLUoxetine (PROZAC) 20 MG capsule    Allergic rhinitis        Relevant Medications    fluticasone (FLONASE) 50 mcg/actuation nasal spray    Post-nasal drip        Relevant Medications    fluticasone (FLONASE) 50 mcg/actuation nasal spray    Cough        Relevant Medications    fluticasone (FLONASE) 50 mcg/actuation nasal spray            Health Maintenance reviewed.    Follow-up: Follow-up if symptoms worsen or fail to improve.    ______________________________________________________________________    Chief Complaint  Chief Complaint   Patient presents with    Stress    Follow-up       HPI  Estephania Rogers is a 53 y.o. female with multiple medical diagnoses as listed in the medical history and problem list that presents for follow up on anxiety.  Pt is known to me with last appointment 4/18/2018.      She has noted improvement with fluoxetine.  She is doing well.  She is handling the stress/anxiety better.        PAST MEDICAL HISTORY:  Past Medical History:   Diagnosis Date    Anemia     iron deficiency    Anxiety     Diabetes mellitus, type 2     Hypertension     Malabsorption     Migraine headache     Nephrolithiasis     Other specified intestinal malabsorption        PAST SURGICAL HISTORY:  Past Surgical History:   Procedure Laterality Date    ANAL FISTULOTOMY  2005    APPENDECTOMY  2005    BREAST SURGERY  2014    CHOLECYSTECTOMY  2005    GASTRIC BYPASS  2005    SINUS SURGERY      Dr. Oral oCbb    SPINE SURGERY      TONSILLECTOMY      TOTAL REDUCTION MAMMOPLASTY  2014       SOCIAL HISTORY:  Social History     Socioeconomic History    Marital status:      Spouse name: Not on file    Number of children: Not on file    Years of education: Not on file    Highest education level: Not on file   Social Needs    Financial resource strain:  Not on file    Food insecurity - worry: Not on file    Food insecurity - inability: Not on file    Transportation needs - medical: Not on file    Transportation needs - non-medical: Not on file   Occupational History    Not on file   Tobacco Use    Smoking status: Never Smoker    Smokeless tobacco: Never Used    Tobacco comment: Clerical work   Substance and Sexual Activity    Alcohol use: No     Comment: socially    Drug use: No    Sexual activity: Yes     Partners: Male     Birth control/protection: None     Comment: :  Gianni   Other Topics Concern    Not on file   Social History Narrative    Not on file       FAMILY HISTORY:  Family History   Problem Relation Age of Onset    Cancer Father         pancreatic    Diabetes Father     Diabetes Mother     Hypertension Mother     Miscarriages / Stillbirths Mother     Stroke Mother     Diabetes Sister     Cancer Maternal Aunt         breast    Arthritis Maternal Grandmother     Arthritis Paternal Grandmother     Diabetes Sister     Breast cancer Paternal Aunt        ALLERGIES AND MEDICATIONS: updated and reviewed.  Review of patient's allergies indicates:  No Known Allergies  Current Outpatient Medications   Medication Sig Dispense Refill    ALPRAZolam (XANAX) 0.25 MG tablet TAKE ONE Tablet BY MOUTH EVERY DAY AS NEEDED 30 tablet 0    ascorbic acid (VITAMIN C) 500 MG tablet Take 500 mg by mouth once daily.        butalbital-acetaminophen-caffeine -40 mg (FIORICET, ESGIC) -40 mg per tablet       cholecalciferol, vitamin D3, 50,000 unit Wafr Take 50,000 Units by mouth once daily.        cyclobenzaprine (FLEXERIL) 5 MG tablet Take 1-2 tablets 3 times daily as needed for pain. 30 tablet 0    FLUoxetine (PROZAC) 20 MG capsule Take 1 capsule (20 mg total) by mouth once daily. 30 capsule 2    fluticasone (FLONASE) 50 mcg/actuation nasal spray 2 sprays (100 mcg total) by Each Nare route once daily. 16 g 2    meloxicam (MOBIC)  "15 MG tablet Take 1 tablet (15 mg total) by mouth once daily. 30 tablet 2    multivitamin (ONE DAILY MULTIVITAMIN) per tablet Take 1 tablet by mouth once daily.      RESTASIS 0.05 % ophthalmic emulsion   3    vitamin A 91260 UNIT capsule Take 25,000 Units by mouth once daily.         No current facility-administered medications for this visit.          ROS  Review of Systems   Constitutional: Negative for activity change, appetite change, fatigue, fever and unexpected weight change.   HENT: Negative.  Negative for ear discharge, ear pain, rhinorrhea and sore throat.    Eyes: Negative.    Respiratory: Negative for apnea, cough, chest tightness, shortness of breath and wheezing.    Cardiovascular: Negative for chest pain, palpitations and leg swelling.   Gastrointestinal: Negative for abdominal distention, abdominal pain, constipation, diarrhea and vomiting.   Endocrine: Negative for cold intolerance, heat intolerance, polydipsia and polyuria.   Genitourinary: Negative for decreased urine volume, menstrual problem, urgency, vaginal bleeding, vaginal discharge and vaginal pain.   Musculoskeletal: Negative.    Skin: Negative for rash.   Neurological: Negative for dizziness and headaches.   Hematological: Does not bruise/bleed easily.   Psychiatric/Behavioral: Negative for agitation, sleep disturbance and suicidal ideas.         Physical Exam  Vitals:    11/29/18 0724   BP: 120/80   BP Location: Left arm   Patient Position: Sitting   BP Method: Large (Manual)   Pulse: (!) 58   Temp: 98.7 °F (37.1 °C)   TempSrc: Oral   SpO2: 97%   Weight: 102.4 kg (225 lb 12 oz)   Height: 5' 11" (1.803 m)    Body mass index is 31.49 kg/m².  Weight: 102.4 kg (225 lb 12 oz)   Height: 5' 11" (180.3 cm)   Physical Exam   Constitutional: She is oriented to person, place, and time. She appears well-developed and well-nourished.   HENT:   Head: Normocephalic and atraumatic.   Eyes: Conjunctivae and EOM are normal. Pupils are equal, round, and " reactive to light.   Neurological: She is oriented to person, place, and time.   Skin: Skin is warm and dry.   Psychiatric: She has a normal mood and affect.   Vitals reviewed.        Health Maintenance       Date Due Completion Date    TETANUS VACCINE 10/01/1984 ---    Influenza Vaccine 08/01/2018 10/14/2015 (Done)    Override on 10/14/2015: Done    Pap Smear with HPV Cotest 11/16/2018 11/16/2015    Mammogram 08/15/2019 8/15/2018    Colonoscopy 12/30/2020 12/30/2010 (Done)    Override on 12/30/2010: Done (Dr. Nelly Putnam/[facility]- grade 1 internal hemorrhoids)    Lipid Panel 05/25/2023 5/25/2018

## 2018-12-04 ENCOUNTER — OFFICE VISIT (OUTPATIENT)
Dept: PODIATRY | Facility: CLINIC | Age: 53
End: 2018-12-04
Payer: COMMERCIAL

## 2018-12-04 VITALS
BODY MASS INDEX: 31.5 KG/M2 | HEIGHT: 71 IN | DIASTOLIC BLOOD PRESSURE: 80 MMHG | WEIGHT: 225 LBS | SYSTOLIC BLOOD PRESSURE: 118 MMHG

## 2018-12-04 DIAGNOSIS — M20.5X2 HALLUX LIMITUS, ACQUIRED, LEFT: ICD-10-CM

## 2018-12-04 DIAGNOSIS — M21.6X2 ACQUIRED EQUINUS DEFORMITY OF BOTH FEET: ICD-10-CM

## 2018-12-04 DIAGNOSIS — M76.71 PERONEUS BREVIS TENDONITIS, RIGHT: ICD-10-CM

## 2018-12-04 DIAGNOSIS — M21.6X1 ACQUIRED EQUINUS DEFORMITY OF BOTH FEET: ICD-10-CM

## 2018-12-04 DIAGNOSIS — M20.41 HAMMER TOES OF BOTH FEET: ICD-10-CM

## 2018-12-04 DIAGNOSIS — M20.5X1 HALLUX LIMITUS, ACQUIRED, RIGHT: ICD-10-CM

## 2018-12-04 DIAGNOSIS — M21.42 PES PLANUS OF BOTH FEET: ICD-10-CM

## 2018-12-04 DIAGNOSIS — M79.671 FOOT PAIN, RIGHT: Primary | ICD-10-CM

## 2018-12-04 DIAGNOSIS — M20.42 HAMMER TOES OF BOTH FEET: ICD-10-CM

## 2018-12-04 DIAGNOSIS — M21.41 PES PLANUS OF BOTH FEET: ICD-10-CM

## 2018-12-04 PROCEDURE — 99999 PR PBB SHADOW E&M-EST. PATIENT-LVL III: CPT | Mod: PBBFAC,,, | Performed by: PODIATRIST

## 2018-12-04 PROCEDURE — 99212 OFFICE O/P EST SF 10 MIN: CPT | Mod: S$GLB,,, | Performed by: PODIATRIST

## 2018-12-04 NOTE — PATIENT INSTRUCTIONS
Over the counter pain cream: Arnicare, Biofreeze, Bengay, tiger balm, two old goat, lidocaine gel,  Absorbine Veterinary Liniment Gel Topical Analgesic Sore Muscle and Joint Pain Relief    Recommend lotions: eucerin, eucerin for diabetics, aquaphor, A&D ointment, gold bond for diabetics, sween, Bristol's Bees all purpose baby ointment,  urea 40 with aloe (found on amazon.com)    Shoe recommendations: (try 6pm.com, zappos.com , nordstromrack.PanX, or shoes.PanX for discounted prices) you can visit [employer] in [place]  or [employer] in the [facility] (there are also several shoe brand outlets in the [facility])    Asics (GT 2000 or gel foundations), new balance stability type shoes, saucony (stabil c3),  Smith (GTS or Beast or transcend), propet (tennis shoe)    Ana Holguin (women) Mary&Ferdinand (men), clarks, crocs, aerosoles, naturalizers, SAS, ecco, born, sergey carbone, rockports (dress shoes)    Vionic, burkenstocks, fitflops, propet (sandals)  Nike comfort thong sandals, crocs, propet (house shoes)    Nail Home remedy:  Vicks Vapor rub to nails for easier manageability    Occasional soaks for 15-20 mins in luke warm water with 1/2 cup of listerine and 1 cup of apple cider vinegar are ok You may add several drops of oil of oregano or tea tree oil as well        Wearing Proper Shoes                    You walk on your feet every day, forcing them to support the weight of your body. Repeated stress on your feet can cause damage over time. The right shoes can help protect your feet. The wrong shoes can cause more foot problems. Read the information below to help you find a shoe that fits your foot needs.      A good shoe fit will cover your foot outline. A shoe that doesnt cover the outline is a bad fit.   Whats your foot shape?  To get a good fit, you need to know the shape of your foot. Do this simple test: While standing, place your foot on a piece of paper and trace around it. Is your foot straight or curved? Do  you have a foot problem, such as a bunion, that causes your foot outline to show a bulge on the side of your big toe?  Finding your fit  Bring your foot outline to the shoe store to help you find the right shoe. Place a shoe you like on top of the outline to see if it matches the shape. The shoe should cover the outline. (If you have a bunion, the shoe may not cover the bulge on the outline. Look for soft leather shoes to stretch over the bunion.) Once youve found a pair of proper shoes, put them on. Walk around. Be sure the shoes dont rub or pinch. If the shoes feel good, youve found your fit!  The right shoe for you  A good shoe has features that provide comfort and support. It must also be the right size and shape for your feet. Look for a shoe made of breathable fabric and lining, such as leather or canvas. Be sure that shoes have enough tread to prevent slipping. Go to a good shoe store for help finding the right shoe.  Good shoe features  An ideal shoe has the following:  · Laces for support. If tying laces is a problem for you, try shoes with Velcro fasteners or flako.  · A front of the shoe (toe box) with ½ inch space in front of your longest toes.  · An arch shape that supports your foot.  · No more than 1½ inches of heel.  · A stiff, snug back of the shoe to keep your foot from sliding around.  · A smooth lining with no rough seams.  Shoe shopping tips  Below are some dos and donts for when you go to the shoe store.  Do:  · Select the shoes that feel right. Wear them around the house. Then bring them to your foot healthcare provider to check for fit. If they dont fit well, return them.  · Shop late in the day, when your feet will be slightly bigger.  · Each time you buy shoes, have both your feet measured while you are standing. Foot size changes with time.  · Pick shoes to suit their purpose. High heels are OK for an occasional night on the town. But for everyday wear, choose a more sensible  shoe.  · Try on shoes while wearing any inserts specially made for your feet (orthoses).  · Try on both the right and left shoes. If your feet are different sizes, pick a pair that fits the larger foot.  Dont:  · Dont buy shoes based on shoe size alone. Always try on shoes, as sizes differ from brand to brand and within brands.  · Dont expect shoes to break in. If they dont fit at the store, dont buy them.  · Dont buy a shoe that doesnt match your foot shape.  What about socks?  Always wear socks with shoes. Socks help absorb sweat and reduce friction and blistering. When shopping for shoes, choose soft, padded socks with seams that dont irritate your feet.  If you have foot problems  Some foot problems cause deformities. This can make it hard to find a good fit. Look for shoes made of soft leather to stretch over the deformity. If you have bunions, buy shoes with a wider toe box. To fit hammertoes, look for shoes with a tall toe box. If you have arch problems, you may need inserts. In some cases, youll need to have custom footwear or orthoses made for your feet.  Suggested footwear  Ask your healthcare provider what kind of footwear you need. He or she may recommend a certain brand or shoe store.  Date Last Reviewed: 8/1/2016  © 0740-4943 [employer]. [address] [place] [address]. All rights reserved. This information is not intended as a substitute for professional medical care. Always follow your healthcare professional's instructions.        Toe Extension (Flexibility)    These instructions are for your right foot. Switch sides for your left foot.  1. Sit in a chair. Rest your right ankle on your left knee.  2. Hold your toes with your right hand. Gently bend the toes backward. Feel a stretch in the undersides of the toes and ball of the foot. Hold for 30 to 60 seconds.  3. Then gently bend the toes in the other direction. Gently press on them until your foot is pointed.  Hold for 30 to 60 seconds.  4. Repeat 5 times, or as instructed.  © 6980-5297 [employer]. [address] [place] [address]. All rights reserved. This information is not intended as a substitute for professional medical care. Always follow your healthcare professional's instructions.      Ankle Alphabet (Flexibility)    These instructions are for your right foot. Switch sides for your left foot.  5. Sit on the floor with your legs straight in front of you.  6. Rest your right calf on a rolled-up towel. Use your foot to write the letters of the alphabet in mid-air.  7. Repeat this exercise 3 times a day, or as instructed.  Date Last Reviewed: 3/10/2016  © 1248-6556 [employer]. [address] [place] [address]. All rights reserved. This information is not intended as a substitute for professional medical care. Always follow your healthcare professional's instructions.        Calf Raise (Strength)    8. Stand up straight with both feet flat on the floor, slightly apart. Hold onto a sturdy chair, railing, counter, or table.  9. Raise both heels so youre standing on the balls of your feet. Dont lock your knees or arch your back. Hold for 5 seconds. Then slowly lower your heels back down to the floor.  10. Repeat 10 times, or as instructed.  11. Do this exercise 3 times a day, or as instructed.     Challenge yourself  As you become stronger, do this exercise on one foot at a time.   Date Last Reviewed: 3/10/2016  © 9623-6845 [employer]. [address] [place] [address]. All rights reserved. This information is not intended as a substitute for professional medical care. Always follow your healthcare professional's instructions.        Bent-Knee Calf Stretch  This exercise is designed to stretch and strengthen your feet and ankles. Before beginning the exercise, read through all the instructions. While exercising, breathe normally and dont bounce. If  you feel any pain, stop the exercise. If pain persists, inform your healthcare provider:    · Stand an arms length away from a wall. Place the palms of your hands on the wall. Step forward about 12 inches with your ______ foot.  · Keeping toes pointed forward and both heels on the floor, bend both knees and lean forward. Hold for ______ seconds. Relax.  · Repeat ______ times. Do ______ sets a day.  Date Last Reviewed: 8/16/2015  © 2889-9717 [employer]. [address] [place] [address]. All rights reserved. This information is not intended as a substitute for professional medical care. Always follow your healthcare professional's instructions.        Arch retraining    These exercises are for your right foot. Switch sides for your left foot.  12. Sit in a chair or stand with both feet flat on the floor. Press down with the ball of your right foot, but only on the left side of the foot, just under the big toe.  13. Then pull the bottom of your big toe back toward your heel. This should pull up the arch of your foot. Dont flex your toes while doing this. It is a subtle movement of the arch.  14. Hold for 5 seconds. Relax.  Date Last Reviewed: 3/10/2016  © 7236-9131 [employer]. [address] [place] [address]. All rights reserved. This information is not intended as a substitute for professional medical care. Always follow your healthcare professional's instructions.        Ankle Dorsiflexion/Plantarflexion (Flexibility)    15. Sit on the floor or in bed with your legs straight in front of you.  16. Point both feet. Then flex both feet.  17. Do this 10 to 30 times in a row.  18. Repeat this exercise 2 times a day, or as instructed.  Date Last Reviewed: 5/1/2016  © 4618-5111 [employer]. [address] [place] [address]. All rights reserved. This information is not intended as a substitute for professional medical care. Always follow your  healthcare professional's instructions.

## 2018-12-04 NOTE — PROGRESS NOTES
Subjective:      Patient ID: Estephania Rogers is a 53 y.o. female.    Chief Complaint: Foot Pain and Foot Problem    Estephania is a 53 y.o. female who returns to clinic for follow up of right foot pain (lateral foot). She wore CAM boot while at work for 3 weeks and then transitioned at her own accord into ryka tennis shoes.  She denies ice and elevation but relates a 75 % improvement.      Current shoe gear: Ryka tennis shoes      Patient Active Problem List   Diagnosis    Recurrent nephrolithiasis    Other specified intestinal malabsorption    Iron deficiency anemia    B12 deficiency anemia    Anxiety       Current Outpatient Medications on File Prior to Visit   Medication Sig Dispense Refill    ALPRAZolam (XANAX) 0.25 MG tablet TAKE ONE Tablet BY MOUTH EVERY DAY AS NEEDED 30 tablet 0    ascorbic acid (VITAMIN C) 500 MG tablet Take 500 mg by mouth once daily.        butalbital-acetaminophen-caffeine -40 mg (FIORICET, ESGIC) -40 mg per tablet       cholecalciferol, vitamin D3, 50,000 unit Wafr Take 50,000 Units by mouth once daily.        cyclobenzaprine (FLEXERIL) 5 MG tablet Take 1-2 tablets 3 times daily as needed for pain. 30 tablet 0    FLUoxetine (PROZAC) 20 MG capsule Take 1 capsule (20 mg total) by mouth once daily. 30 capsule 2    fluticasone (FLONASE) 50 mcg/actuation nasal spray 2 sprays (100 mcg total) by Each Nare route once daily. 16 g 2    meloxicam (MOBIC) 15 MG tablet Take 1 tablet (15 mg total) by mouth once daily. 30 tablet 2    multivitamin (ONE DAILY MULTIVITAMIN) per tablet Take 1 tablet by mouth once daily.      RESTASIS 0.05 % ophthalmic emulsion   3    vitamin A 74978 UNIT capsule Take 25,000 Units by mouth once daily.         No current facility-administered medications on file prior to visit.        Review of patient's allergies indicates:  No Known Allergies    Past Surgical History:   Procedure Laterality Date    ANAL FISTULOTOMY  2005    APPENDECTOMY   2005    BREAST SURGERY  2014    CHOLECYSTECTOMY  2005    GASTRIC BYPASS  2005    SINUS SURGERY      Dr. Oral Cobb    SPINE SURGERY      TONSILLECTOMY      TOTAL REDUCTION MAMMOPLASTY  2014       Family History   Problem Relation Age of Onset    Cancer Father         pancreatic    Diabetes Father     Diabetes Mother     Hypertension Mother     Miscarriages / Stillbirths Mother     Stroke Mother     Diabetes Sister     Cancer Maternal Aunt         breast    Arthritis Maternal Grandmother     Arthritis Paternal Grandmother     Diabetes Sister     Breast cancer Paternal Aunt        Social History     Socioeconomic History    Marital status:      Spouse name: Not on file    Number of children: Not on file    Years of education: Not on file    Highest education level: Not on file   Social Needs    Financial resource strain: Not on file    Food insecurity - worry: Not on file    Food insecurity - inability: Not on file    Transportation needs - medical: Not on file    Transportation needs - non-medical: Not on file   Occupational History    Not on file   Tobacco Use    Smoking status: Never Smoker    Smokeless tobacco: Never Used    Tobacco comment: Clerical work   Substance and Sexual Activity    Alcohol use: No     Comment: socially    Drug use: No    Sexual activity: Yes     Partners: Male     Birth control/protection: None     Comment: :  Gianni   Other Topics Concern    Not on file   Social History Narrative    Not on file       Review of Systems   Constitution: Negative for chills, fever and weakness.   Cardiovascular: Negative for claudication and leg swelling.   Respiratory: Negative for cough and shortness of breath.    Skin: Positive for dry skin. Negative for itching, nail changes and rash.   Musculoskeletal: Positive for arthritis, joint pain and myalgias. Negative for falls, joint swelling and muscle weakness.   Gastrointestinal: Negative for diarrhea,  "nausea and vomiting.   Neurological: Positive for paresthesias. Negative for numbness and tremors.   Psychiatric/Behavioral: Negative for altered mental status and hallucinations. The patient is nervous/anxious.            Objective:       Vitals:    12/04/18 0722   BP: 118/80   Weight: 102.1 kg (225 lb)   Height: 5' 11" (1.803 m)   PainSc: 0-No pain       Physical Exam   Constitutional:  Non-toxic appearance. She does not have a sickly appearance. No distress.   Cardiovascular:   Pulses:       Dorsalis pedis pulses are 2+ on the right side, and 2+ on the left side.        Posterior tibial pulses are 2+ on the right side, and 2+ on the left side.   Pulmonary/Chest: No respiratory distress.   Musculoskeletal:        Right ankle: She exhibits decreased range of motion. She exhibits no swelling. No tenderness. No lateral malleolus, no medial malleolus, no AITFL, no CF ligament and no posterior TFL tenderness found. Achilles tendon exhibits no pain, no defect and normal Villegas's test results.        Left ankle: She exhibits decreased range of motion. She exhibits no swelling. No tenderness. No lateral malleolus, no medial malleolus, no AITFL, no CF ligament and no posterior TFL tenderness found. Achilles tendon exhibits no pain, no defect and normal Villegas's test results.        Right foot: There is tenderness (Pain on palpation to styloid process region of base of the 5th metatarsal and sub 5th MTPJ). There is no bony tenderness.        Left foot: There is no bony tenderness.   Biomechanical exam: Pain on palpation right medial calcaneal tubercle at origin of plantar fascia. There is equinus deformity bilateral with decreased dorsiflexion at the ankle joint bilateral. No tenderness with compression of heel. Negative tinels sign. Gait analysis reveals excessive pronation through midstance and propulsion with early heel off. Shoes reveals lateral heel counter wear bilateral     Decreased first MPJ range of motion " both weightbearing and nonweightbearing, no crepitus observed the first MP joint, + dorsal flag sign. Mild  bunion deformity is observed .    Patient has hammertoes of digits 2-5 bilateral partially reducible        Neurological: She has normal reflexes. She displays no atrophy and no tremor. No sensory deficit. She exhibits normal muscle tone.   Skin: Skin is warm, dry and intact. No bruising, no burn, no laceration, no lesion and no rash noted. She is not diaphoretic. No cyanosis. No pallor. Nails show no clubbing.   Psychiatric: Her mood appears not anxious. Her affect is not inappropriate. Her speech is not slurred. She is not combative. She is communicative. She is attentive.   Nursing note reviewed.            Assessment:       Encounter Diagnoses   Name Primary?    Foot pain, right Yes    Acquired equinus deformity of both feet     Peroneus brevis tendonitis, right     Hammer toes of both feet     Hallux limitus, acquired, left     Hallux limitus, acquired, right     Pes planus of both feet          Plan:       Estephania was seen today for foot pain and foot problem.    Diagnoses and all orders for this visit:    Foot pain, right    Acquired equinus deformity of both feet    Peroneus brevis tendonitis, right    Hammer toes of both feet    Hallux limitus, acquired, left    Hallux limitus, acquired, right    Pes planus of both feet      I counseled the patient on her conditions, their implications and medical management.    Discussed biomechanical deformity correction surgically vs conservative   management       Greater than 50% of this visit spent on counseling and coordination of care.    Significant difference in pain reaction on physical exam today than on previous encounter.    Discussed tendonitis and importance of immobilization for healing as well as the lengthy course of treatment for complete resolution.    Encouraged patient to stretch aggressively. Continue to ice as needed. Mobic PRN pain.   Discussed wearing appropriate shoe gear and avoiding flats, slippers, sandals, and going barefoot.    RTC PRN

## 2018-12-31 ENCOUNTER — OFFICE VISIT (OUTPATIENT)
Dept: PODIATRY | Facility: CLINIC | Age: 53
End: 2018-12-31
Payer: COMMERCIAL

## 2018-12-31 VITALS
WEIGHT: 225 LBS | BODY MASS INDEX: 31.5 KG/M2 | DIASTOLIC BLOOD PRESSURE: 86 MMHG | SYSTOLIC BLOOD PRESSURE: 140 MMHG | HEIGHT: 71 IN

## 2018-12-31 DIAGNOSIS — R20.2 PARESTHESIA OF LEFT FOOT: ICD-10-CM

## 2018-12-31 DIAGNOSIS — M21.6X1 ACQUIRED EQUINUS DEFORMITY OF BOTH FEET: ICD-10-CM

## 2018-12-31 DIAGNOSIS — M25.572 ARTHRALGIA OF FOOT, LEFT: ICD-10-CM

## 2018-12-31 DIAGNOSIS — M21.6X2 ACQUIRED EQUINUS DEFORMITY OF BOTH FEET: ICD-10-CM

## 2018-12-31 DIAGNOSIS — M20.5X2 HALLUX LIMITUS, ACQUIRED, LEFT: ICD-10-CM

## 2018-12-31 DIAGNOSIS — M20.41 HAMMER TOES OF BOTH FEET: ICD-10-CM

## 2018-12-31 DIAGNOSIS — M20.42 HAMMER TOES OF BOTH FEET: ICD-10-CM

## 2018-12-31 DIAGNOSIS — M79.672 FOOT PAIN, LEFT: Primary | ICD-10-CM

## 2018-12-31 DIAGNOSIS — M20.5X1 HALLUX LIMITUS, ACQUIRED, RIGHT: ICD-10-CM

## 2018-12-31 PROCEDURE — 99999 PR PBB SHADOW E&M-EST. PATIENT-LVL III: CPT | Mod: PBBFAC,,, | Performed by: PODIATRIST

## 2018-12-31 PROCEDURE — 99214 OFFICE O/P EST MOD 30 MIN: CPT | Mod: S$GLB,,, | Performed by: PODIATRIST

## 2018-12-31 RX ORDER — GABAPENTIN 300 MG/1
300 CAPSULE ORAL NIGHTLY
Qty: 30 CAPSULE | Refills: 1 | Status: SHIPPED | OUTPATIENT
Start: 2018-12-31 | End: 2019-02-23 | Stop reason: SDUPTHER

## 2018-12-31 NOTE — PATIENT INSTRUCTIONS
"    Recommend over the counter medicine for nerve homa:  - Capsaicin cream to rub on at night  -  Absorbine Veterinary Liniment Gel Topical Analgesic Sore Muscle and Joint Pain Relief (found at pet store)  - Alpha lipoic acid 600 mg Cap; Take 1 capsule by mouth once daily for neuropathy or a B complex vitamin daily      Recommend lotions: eucerin, eucerin for diabetics, aquaphor, A&D ointment, gold bond for diabetics, sween, Noble's Bees all purpose baby ointment,  urea 40 with aloe (found on amazon.com)    Shoe recommendations: (try 6pm.com, zappos.built.io , nordstromrackVisus Technology, or shoes.built.io for discounted prices) you can visit DSW shoes in [employer]  or [employer] in the [facility] (there are also several shoe brand outlets in the [facility])    Asics (GT 2000 or gel foundations), new balance stability type shoes (such as the 940 series), saucony (stabil c3),  Smith (GTS or Beast or transcend), propet (tennis shoe)    Sofft Brand (women) Mary&Ferdinand (men), clarks, crocs, aerosoles, naturalizers, SAS, ecco, born, sergey raf, reza (dress shoes)    Vionic, burkenstocks, fitflops, propet (sandals)  Nike comfort thong sandals, crocs, propet (house shoes)    Nail Home remedy:  Vicks Vapor rub to nails for easier manageability      Paraesthesias  Paraesthesia is a burning or prickling sensation that is sometimes felt in the hands, arms, legs or feet. It can also occur in other parts of the body. It can also feel like tingling or numbness, skin crawling, or itching. The feeling is not comfortable, but it is not painful. (The "pins and needles" feeling that happens when a foot or hand "falls asleep" is a temporary paraesthesia.)  Paraesthesias that last or come and go may be caused by medical issues that need to be treated. These include stroke, a bulging disk pressing on a nerve, a trapped nerve, vitamin deficiencies, or even certain medicines.  Tests are often done. These tests may include blood tests, X-ray, CT " (computerized tomography) scan, or a muscle test (electromyography). Depending on the cause, treatment may include physical therapy.  Home care  · Tell the healthcare provider about all medicines you take. This includes prescription and over-the-counter medicines, vitamins, and herbs. Ask if any of the medicines may be causing your problems. Do not make any changes to prescription medicines without talking to your healthcare provider first.  · You may be prescribed medicines to help relieve the tingling feeling or for pain. Take all medicines as directed.  · A numb hand or foot may be more prone to injury. To help protect it:  ¨ Always use oven mitts.  ¨ Test water with an unaffected hand or foot.  ¨ Use caution when trimming nails. File sharp areas.  ¨ Wear shoes that fit well to avoid pressure points, blisters, and ulcers.  ¨ Inspect your hands and feet carefully (including the soles of your feet and between your toes) at least once a week. If you see red areas, sores, or other problems, tell your healthcare provider.  Follow-up care  Follow up with your doctor or as advised by our staff. You may need further testing or evaluation.  When to seek medical advice  Call your healthcare provider right away if any of the following occur:  · Numbness or weakness of the face, one arm, or one leg  · Slurred speech, confusion, trouble speaking, walking, or seeing  · Severe headache, fainting spell, dizziness, or seizure  · Chest, arm, neck, or upper back pain  · Loss of bladder or bowel control  · Open wound with redness, swelling, or pus  Date Last Reviewed: 9/25/2015  © 4753-6815 [employer]. [address] [place] [id number]. All rights reserved. This information is not intended as a substitute for professional medical care. Always follow your healthcare professional's instructions.

## 2018-12-31 NOTE — PROGRESS NOTES
Subjective:      Patient ID: Estephania Rogers is a 53 y.o. female.    Chief Complaint: Foot Problem (left foot Pcp Dr. Castillo 11/29/2018) and Foot Pain    Estephania Rogers is a 53 y.o. female who presents to the podiatry clinic  with complaint of  left foot pain. Description: moderate Nature: abnormal, sharp and tingling Location: midfoot and toes Onset of the symptoms was 2 weeks ago while [place] in [place] (with spenco inserts). Precipitating event: none known.  History of injury: no Current symptoms include: ability to bear weight, but with some pain. Aggravating factors: any weight bearing. Alleviating factors: none Symptoms have gradually improved. Patient has had prior foot problems. Evaluation to date: none. Treatment to date: none. Patients rates pain 8/10 on pain scale.    Current shoe gear: [employer] shoes      Patient Active Problem List   Diagnosis    Recurrent nephrolithiasis    Other specified intestinal malabsorption    Iron deficiency anemia    B12 deficiency anemia    Anxiety       Current Outpatient Medications on File Prior to Visit   Medication Sig Dispense Refill    ALPRAZolam (XANAX) 0.25 MG tablet TAKE ONE Tablet BY MOUTH EVERY DAY AS NEEDED 30 tablet 0    ascorbic acid (VITAMIN C) 500 MG tablet Take 500 mg by mouth once daily.        butalbital-acetaminophen-caffeine -40 mg (FIORICET, ESGIC) -40 mg per tablet       cholecalciferol, vitamin D3, 50,000 unit Wafr Take 50,000 Units by mouth once daily.        cyclobenzaprine (FLEXERIL) 5 MG tablet Take 1-2 tablets 3 times daily as needed for pain. 30 tablet 0    FLUoxetine (PROZAC) 20 MG capsule Take 1 capsule (20 mg total) by mouth once daily. 30 capsule 2    fluticasone (FLONASE) 50 mcg/actuation nasal spray 2 sprays (100 mcg total) by Each Nare route once daily. 16 g 2    meloxicam (MOBIC) 15 MG tablet Take 1 tablet (15 mg total) by mouth once daily. 30 tablet 2    multivitamin (ONE DAILY  MULTIVITAMIN) per tablet Take 1 tablet by mouth once daily.      RESTASIS 0.05 % ophthalmic emulsion   3    vitamin A 45745 UNIT capsule Take 25,000 Units by mouth once daily.         No current facility-administered medications on file prior to visit.        Review of patient's allergies indicates:  No Known Allergies    Past Surgical History:   Procedure Laterality Date    ANAL FISTULOTOMY  2005    APPENDECTOMY  2005    BREAST SURGERY  2014    CHOLECYSTECTOMY  2005    GASTRIC BYPASS  2005    SINUS SURGERY      Dr. Oral Cobb    SPINE SURGERY      TONSILLECTOMY      TOTAL REDUCTION MAMMOPLASTY  2014       Family History   Problem Relation Age of Onset    Cancer Father         pancreatic    Diabetes Father     Diabetes Mother     Hypertension Mother     Miscarriages / Stillbirths Mother     Stroke Mother     Diabetes Sister     Cancer Maternal Aunt         breast    Arthritis Maternal Grandmother     Arthritis Paternal Grandmother     Diabetes Sister     Breast cancer Paternal Aunt        Social History     Socioeconomic History    Marital status:      Spouse name: Not on file    Number of children: Not on file    Years of education: Not on file    Highest education level: Not on file   Social Needs    Financial resource strain: Not on file    Food insecurity - worry: Not on file    Food insecurity - inability: Not on file    Transportation needs - medical: Not on file    Transportation needs - non-medical: Not on file   Occupational History    Not on file   Tobacco Use    Smoking status: Never Smoker    Smokeless tobacco: Never Used    Tobacco comment: Clerical work   Substance and Sexual Activity    Alcohol use: No     Comment: socially    Drug use: No    Sexual activity: Yes     Partners: Male     Birth control/protection: None     Comment: :  Gianni   Other Topics Concern    Not on file   Social History Narrative    Not on file       Review of Systems  "  Constitution: Negative for chills, fever and weakness.   Cardiovascular: Negative for claudication and leg swelling.   Respiratory: Negative for cough and shortness of breath.    Skin: Positive for dry skin. Negative for itching, nail changes and rash.   Musculoskeletal: Positive for arthritis, joint pain and myalgias. Negative for falls, joint swelling and muscle weakness.   Gastrointestinal: Negative for diarrhea, nausea and vomiting.   Neurological: Positive for paresthesias. Negative for numbness and tremors.   Psychiatric/Behavioral: Negative for altered mental status and hallucinations. The patient is nervous/anxious.            Objective:       Vitals:    12/31/18 0943   BP: (!) 140/86   Weight: 102.1 kg (225 lb)   Height: 5' 11" (1.803 m)   PainSc:   8   PainLoc: Foot       Physical Exam   Constitutional:  Non-toxic appearance. She does not have a sickly appearance. No distress.   Cardiovascular:   Pulses:       Dorsalis pedis pulses are 2+ on the right side, and 2+ on the left side.        Posterior tibial pulses are 2+ on the right side, and 2+ on the left side.   Pulmonary/Chest: No respiratory distress.   Musculoskeletal:        Right ankle: She exhibits decreased range of motion. She exhibits no swelling. No tenderness. No lateral malleolus, no medial malleolus, no AITFL, no CF ligament and no posterior TFL tenderness found. Achilles tendon exhibits no pain, no defect and normal Villegas's test results.        Left ankle: She exhibits decreased range of motion. She exhibits no swelling. No tenderness. No lateral malleolus, no medial malleolus, no AITFL, no CF ligament and no posterior TFL tenderness found. Achilles tendon exhibits no pain, no defect and normal Villegas's test results.        Right foot: There is tenderness (Pain on palpation to styloid process region of base of the 5th metatarsal and sub 5th MTPJ). There is no bony tenderness.        Left foot: There is tenderness (midfoot) and " crepitus (midfoot). There is no bony tenderness.   There is equinus deformity bilateral with decreased dorsiflexion at the ankle joint bilateral. No tenderness with compression of heel. Negative tinels sign. Gait analysis reveals excessive pronation through midstance and propulsion with early heel off.     Decreased first MPJ range of motion both weightbearing and nonweightbearing, no crepitus observed the first MP joint, + dorsal flag sign. Mild  bunion deformity is observed .    Patient has hammertoes of digits 2-5 bilateral partially reducible        Neurological: She has normal reflexes. She displays no atrophy and no tremor. No sensory deficit. She exhibits normal muscle tone.   Skin: Skin is warm, dry and intact. No bruising, no burn, no laceration, no lesion and no rash noted. She is not diaphoretic. No cyanosis. No pallor. Nails show no clubbing.   Psychiatric: Her mood appears not anxious. Her affect is not inappropriate. Her speech is not slurred. She is not combative. She is communicative. She is attentive.   Nursing note reviewed.            Assessment:       Encounter Diagnoses   Name Primary?    Foot pain, left Yes    Paresthesia of left foot     Acquired equinus deformity of both feet     Hallux limitus, acquired, left     Hallux limitus, acquired, right     Hammer toes of both feet     Arthralgia of foot, left          Plan:       Estephania was seen today for foot problem and foot pain.    Diagnoses and all orders for this visit:    Foot pain, left  -     X-Ray Foot Complete Left; Future    Paresthesia of left foot  -     X-Ray Foot Complete Left; Future    Acquired equinus deformity of both feet  -     X-Ray Foot Complete Left; Future    Hallux limitus, acquired, left  -     X-Ray Foot Complete Left; Future    Hallux limitus, acquired, right  -     X-Ray Foot Complete Left; Future    Hammer toes of both feet  -     X-Ray Foot Complete Left; Future    Arthralgia of foot, left  -     X-Ray Foot  Complete Left; Future    Other orders  -     gabapentin (NEURONTIN) 300 MG capsule; Take 1 capsule (300 mg total) by mouth every evening.      I counseled the patient on her conditions, their implications and medical management.    Discussed biomechanical deformity correction surgically vs conservative   management     Explained possible causes of patients paresthesias including but not limited to systemic illness vs idiopathic vs edema vs low back pathology vs shoe gear. Counseled patient on conservative management of her complaint including compression stockings, inserts, extra depth and width shoe gear avoiding flats, slippers, sandals, and going barefoot. - Tubigrips to BLE; patient is to elevate legs. When sleeping, place a pillow under lower extremities. When sitting, support the legs so that they are level with the waist.    Discussed icing the affected area. Patient instructed on adequate icing techniques. Patient should ice the affected area at least once per day x 10 minutes for 10 days . I advised the patient that extra icing would also be beneficial to ensure adequate anti inflammatory effect.     Alpha lipoic acid 600 mg Cap; Take 1 capsule by mouth once daily for neuropathy or a B complex vitamin daily    Rx Gabapentin prescribed. Potential risk including but not limited to  dizziness, somnolence, ataxia. If averse effects occur patient should discontinue medicationn and notify myself or their PCP immediately. Medication can be titrated with doctor supervision to reach a therapeutic level    RTC in 6-9 weeks, sooner PRN

## 2019-01-03 ENCOUNTER — APPOINTMENT (OUTPATIENT)
Dept: RADIOLOGY | Facility: HOSPITAL | Age: 54
End: 2019-01-03
Attending: PODIATRIST
Payer: COMMERCIAL

## 2019-01-03 ENCOUNTER — PATIENT MESSAGE (OUTPATIENT)
Dept: FAMILY MEDICINE | Facility: CLINIC | Age: 54
End: 2019-01-03

## 2019-01-03 DIAGNOSIS — M20.42 HAMMER TOES OF BOTH FEET: ICD-10-CM

## 2019-01-03 DIAGNOSIS — M20.5X1 HALLUX LIMITUS, ACQUIRED, RIGHT: ICD-10-CM

## 2019-01-03 DIAGNOSIS — M20.41 HAMMER TOES OF BOTH FEET: ICD-10-CM

## 2019-01-03 DIAGNOSIS — R20.2 PARESTHESIA OF LEFT FOOT: ICD-10-CM

## 2019-01-03 DIAGNOSIS — M21.6X1 ACQUIRED EQUINUS DEFORMITY OF BOTH FEET: ICD-10-CM

## 2019-01-03 DIAGNOSIS — M21.6X2 ACQUIRED EQUINUS DEFORMITY OF BOTH FEET: ICD-10-CM

## 2019-01-03 DIAGNOSIS — M79.672 FOOT PAIN, LEFT: ICD-10-CM

## 2019-01-03 DIAGNOSIS — M25.572 ARTHRALGIA OF FOOT, LEFT: ICD-10-CM

## 2019-01-03 DIAGNOSIS — M20.5X2 HALLUX LIMITUS, ACQUIRED, LEFT: ICD-10-CM

## 2019-01-03 PROCEDURE — 73630 XR FOOT COMPLETE 3 VIEW LEFT: ICD-10-PCS | Mod: 26,LT,, | Performed by: RADIOLOGY

## 2019-01-03 PROCEDURE — 73630 X-RAY EXAM OF FOOT: CPT | Mod: 26,LT,, | Performed by: RADIOLOGY

## 2019-01-03 PROCEDURE — 73630 X-RAY EXAM OF FOOT: CPT | Mod: TC,FY,PN,LT

## 2019-01-04 ENCOUNTER — PATIENT MESSAGE (OUTPATIENT)
Dept: FAMILY MEDICINE | Facility: CLINIC | Age: 54
End: 2019-01-04

## 2019-01-04 DIAGNOSIS — F43.9 SITUATIONAL STRESS: ICD-10-CM

## 2019-01-04 RX ORDER — FLUOXETINE HYDROCHLORIDE 20 MG/1
20 CAPSULE ORAL DAILY
Qty: 90 CAPSULE | Refills: 2 | Status: SHIPPED | OUTPATIENT
Start: 2019-01-04 | End: 2019-12-05 | Stop reason: SDUPTHER

## 2019-01-07 ENCOUNTER — PATIENT MESSAGE (OUTPATIENT)
Dept: PODIATRY | Facility: CLINIC | Age: 54
End: 2019-01-07

## 2019-01-12 ENCOUNTER — PATIENT MESSAGE (OUTPATIENT)
Dept: FAMILY MEDICINE | Facility: CLINIC | Age: 54
End: 2019-01-12

## 2019-01-16 ENCOUNTER — PATIENT MESSAGE (OUTPATIENT)
Dept: PODIATRY | Facility: CLINIC | Age: 54
End: 2019-01-16

## 2019-01-16 ENCOUNTER — INFUSION (OUTPATIENT)
Dept: INFUSION THERAPY | Facility: HOSPITAL | Age: 54
End: 2019-01-16
Attending: INTERNAL MEDICINE
Payer: COMMERCIAL

## 2019-01-16 VITALS
TEMPERATURE: 98 F | DIASTOLIC BLOOD PRESSURE: 74 MMHG | HEART RATE: 50 BPM | RESPIRATION RATE: 16 BRPM | SYSTOLIC BLOOD PRESSURE: 143 MMHG | OXYGEN SATURATION: 96 %

## 2019-01-16 DIAGNOSIS — D50.0 IRON DEFICIENCY ANEMIA DUE TO CHRONIC BLOOD LOSS: Primary | ICD-10-CM

## 2019-01-16 PROCEDURE — 96365 THER/PROPH/DIAG IV INF INIT: CPT

## 2019-01-16 PROCEDURE — 63600175 PHARM REV CODE 636 W HCPCS: Performed by: INTERNAL MEDICINE

## 2019-01-16 PROCEDURE — 25000003 PHARM REV CODE 250: Performed by: INTERNAL MEDICINE

## 2019-01-16 RX ORDER — HEPARIN 100 UNIT/ML
500 SYRINGE INTRAVENOUS
Status: CANCELLED | OUTPATIENT
Start: 2019-01-16

## 2019-01-16 RX ADMIN — IRON SUCROSE 200 MG: 20 INJECTION, SOLUTION INTRAVENOUS at 03:01

## 2019-01-16 NOTE — PLAN OF CARE
Problem: Adult Inpatient Plan of Care  Goal: Plan of Care Review  Outcome: Ongoing (interventions implemented as appropriate)  Pt presented for Venofer infusion. Pt tolerated well. Ambulatory into and out of unit. Distress screening tool completed. Future appt information reviewed with pt.

## 2019-01-17 NOTE — TELEPHONE ENCOUNTER
Discussed options for pain.    Steroid injection vs surgical intervention    Patient is electing for continued conservative care until follow up for potential injection.

## 2019-01-29 RX ORDER — MELOXICAM 15 MG/1
15 TABLET ORAL DAILY
Qty: 30 TABLET | Refills: 2 | Status: SHIPPED | OUTPATIENT
Start: 2019-01-29 | End: 2019-08-20 | Stop reason: SDUPTHER

## 2019-02-13 ENCOUNTER — PATIENT MESSAGE (OUTPATIENT)
Dept: FAMILY MEDICINE | Facility: CLINIC | Age: 54
End: 2019-02-13

## 2019-02-13 ENCOUNTER — TELEPHONE (OUTPATIENT)
Dept: FAMILY MEDICINE | Facility: CLINIC | Age: 54
End: 2019-02-13

## 2019-02-23 RX ORDER — GABAPENTIN 300 MG/1
300 CAPSULE ORAL NIGHTLY
Qty: 30 CAPSULE | Refills: 1 | Status: SHIPPED | OUTPATIENT
Start: 2019-02-23 | End: 2019-08-13 | Stop reason: SDUPTHER

## 2019-03-11 ENCOUNTER — TELEPHONE (OUTPATIENT)
Dept: FAMILY MEDICINE | Facility: CLINIC | Age: 54
End: 2019-03-11

## 2019-03-11 ENCOUNTER — OFFICE VISIT (OUTPATIENT)
Dept: FAMILY MEDICINE | Facility: CLINIC | Age: 54
End: 2019-03-11
Payer: COMMERCIAL

## 2019-03-11 VITALS
HEIGHT: 71 IN | WEIGHT: 222.88 LBS | BODY MASS INDEX: 31.2 KG/M2 | OXYGEN SATURATION: 98 % | HEART RATE: 80 BPM | SYSTOLIC BLOOD PRESSURE: 152 MMHG | TEMPERATURE: 98 F | DIASTOLIC BLOOD PRESSURE: 86 MMHG

## 2019-03-11 DIAGNOSIS — H81.10 BENIGN PAROXYSMAL POSITIONAL VERTIGO, UNSPECIFIED LATERALITY: ICD-10-CM

## 2019-03-11 DIAGNOSIS — R03.0 ELEVATED BLOOD PRESSURE READING IN OFFICE WITHOUT DIAGNOSIS OF HYPERTENSION: ICD-10-CM

## 2019-03-11 DIAGNOSIS — J32.9 RHINOSINUSITIS: Primary | ICD-10-CM

## 2019-03-11 PROCEDURE — 99214 PR OFFICE/OUTPT VISIT, EST, LEVL IV, 30-39 MIN: ICD-10-PCS | Mod: S$GLB,,, | Performed by: NURSE PRACTITIONER

## 2019-03-11 PROCEDURE — 99999 PR PBB SHADOW E&M-EST. PATIENT-LVL IV: CPT | Mod: PBBFAC,,, | Performed by: NURSE PRACTITIONER

## 2019-03-11 PROCEDURE — 99214 OFFICE O/P EST MOD 30 MIN: CPT | Mod: S$GLB,,, | Performed by: NURSE PRACTITIONER

## 2019-03-11 PROCEDURE — 99999 PR PBB SHADOW E&M-EST. PATIENT-LVL IV: ICD-10-PCS | Mod: PBBFAC,,, | Performed by: NURSE PRACTITIONER

## 2019-03-11 RX ORDER — FLUTICASONE PROPIONATE 50 MCG
1 SPRAY, SUSPENSION (ML) NASAL DAILY
Qty: 16 G | Refills: 0 | Status: SHIPPED | OUTPATIENT
Start: 2019-03-11 | End: 2020-12-08

## 2019-03-11 RX ORDER — METHYLPREDNISOLONE 4 MG/1
TABLET ORAL
Qty: 1 PACKAGE | Refills: 0 | Status: SHIPPED | OUTPATIENT
Start: 2019-03-11 | End: 2019-05-30

## 2019-03-11 RX ORDER — MECLIZINE HYDROCHLORIDE 25 MG/1
25 TABLET ORAL 3 TIMES DAILY PRN
Qty: 30 TABLET | Refills: 0 | Status: SHIPPED | OUTPATIENT
Start: 2019-03-11 | End: 2019-05-30

## 2019-03-11 RX ORDER — LEVOCETIRIZINE DIHYDROCHLORIDE 5 MG/1
5 TABLET, FILM COATED ORAL NIGHTLY
Qty: 30 TABLET | Refills: 2 | Status: SHIPPED | OUTPATIENT
Start: 2019-03-11 | End: 2019-05-30

## 2019-03-11 NOTE — TELEPHONE ENCOUNTER
----- Message from Neela Swan sent at 3/11/2019 10:33 AM CDT -----  Contact: PT portal Request  Appointment Request From: Estephania Rogers    With Provider: weston    Preferred Date Range: Any    Preferred Times: Any time    Reason for visit: infection    Comments:  possible labrynthitis

## 2019-03-11 NOTE — PROGRESS NOTES
Subjective:       Patient ID: Estephania Rogers is a 53 y.o. female.    Chief Complaint: Dizziness    Dizziness:   Chronicity:  New  Onset:  1 to 4 weeks ago  Progression since onset:  Waxing and waning  Pain Scale:  0/10  Severity:  Moderate  Duration:  Off/on all day   Associated symptoms: nausea.no hearing loss, no headaches, no vomiting, no palpitations and no chest pain.  Aggravated by:  Getting up and lying down  Treatments tried:  Nothing    Review of Systems   Constitutional: Positive for activity change. Negative for unexpected weight change.   HENT: Positive for rhinorrhea and sinus pressure. Negative for hearing loss, postnasal drip, sinus pain, sneezing, sore throat and trouble swallowing.    Eyes: Negative for discharge and visual disturbance.   Respiratory: Negative for chest tightness and wheezing.    Cardiovascular: Negative for chest pain and palpitations.   Gastrointestinal: Positive for nausea. Negative for blood in stool, constipation, diarrhea and vomiting.   Endocrine: Negative for polydipsia and polyuria.   Genitourinary: Negative for difficulty urinating, dysuria, hematuria and menstrual problem.   Musculoskeletal: Positive for neck pain. Negative for arthralgias and joint swelling.   Neurological: Positive for dizziness. Negative for headaches.   Psychiatric/Behavioral: Negative for confusion and dysphoric mood.       Objective:      Physical Exam   Constitutional: She appears well-developed and well-nourished. No distress.   HENT:   Head: Normocephalic and atraumatic.   Right Ear: External ear and ear canal normal. A middle ear effusion is present.   Left Ear: External ear and ear canal normal. A middle ear effusion is present.   Nose: Mucosal edema and rhinorrhea present. Right sinus exhibits no maxillary sinus tenderness and no frontal sinus tenderness. Left sinus exhibits no maxillary sinus tenderness and no frontal sinus tenderness.   Mouth/Throat: Uvula is midline, oropharynx is  clear and moist and mucous membranes are normal. Mucous membranes are not dry. No oropharyngeal exudate, posterior oropharyngeal edema or posterior oropharyngeal erythema.   Cardiovascular: Normal rate and regular rhythm.   No murmur heard.  Pulmonary/Chest: Effort normal and breath sounds normal. She has no wheezes. She has no rales.   Lymphadenopathy:     She has no cervical adenopathy.   Skin: Skin is warm and dry.   Nursing note and vitals reviewed.      Assessment:       1. Rhinosinusitis    2. Benign paroxysmal positional vertigo, unspecified laterality    3. Elevated blood pressure reading in office without diagnosis of hypertension        Plan:       Estephania was seen today for dizziness.    Diagnoses and all orders for this visit:    Rhinosinusitis  -     methylPREDNISolone (MEDROL DOSEPACK) 4 mg tablet; use as directed  -     levocetirizine (XYZAL) 5 MG tablet; Take 1 tablet (5 mg total) by mouth every evening.  HOME CARE:  · Drink plenty of water, hot tea, and other liquids to stay well hydrated. This thins the mucus and promotes sinus drainage.  · Apply heat to the painful areas of the face. Use a towel soaked in hot water. Or,  the shower and direct the hot spray onto your face. This is a good way to inhale warm water vapor and get heat on your face at the same time. (Cover your mouth and nose with your hands so you can still breathe as you do this.)  · Use a vaporizer with products such as Vicks VapoRub (contains menthol) at night. Suck on peppermint, menthol or eucalyptus hard candies during the day.  · An expectorant containing guaifenesin (such as Robitussin), helps to thin the mucus and promote drainage from the sinuses.  · Over-the-counter decongestants may be used unless a similar medicine was prescribed. Nasal sprays work the fastest. Use one that contains phenylephrine (Ujnior-synephrine, Sinex and others) or oxymetazoline (Afrin). First blow the nose gently to remove mucus, then apply the  drops. Do not use these medicines more often than directed on the label or for more than three days or symptoms may worsen. You may also use tablets containing pseudoephedrine (Sudafed). Many sinus remedies combine ingredients, which may increase side effects. Read the labels or ask the pharmacist for help. NOTE: Persons with high blood pressure should not use decongestants. They can raise blood pressure.  · Antihistamines are useful if allergies are a cause of your sinusitis. The mildest one is chlorpheniramine (available without a prescription). The dose for adults is 8-12mg three times a day. [NOTE: Do not use chlorpheniramine if you have glaucoma or if you are a man with trouble urinating due to an enlarged prostate.] Claritin (loratidine) is an antihistamine that causes less drowsiness and is a good alternative for daytime use.  · Do not use nasal rinses or irrigation during an acute sinus infection, unless advised by your doctor. Rinsing may spread the infection to other sinuses.  · You may use acetaminophen (Tylenol) or ibuprofen (Motrin, Advil) to control pain, unless another pain medicine was prescribed. [ NOTE: If you have chronic liver or kidney disease or ever had a stomach ulcer, talk with your doctor before using these medicines.] (Aspirin should never be used in anyone under 18 years of age who is ill with a fever. It may cause severe liver damage.)  · Finish the full course, even if you are feeling better after a few days.  FOLLOW UP with your doctor or this facility in one week or as instructed by our staff if not improving.  GET PROMPT MEDICAL ATTENTION if any of the following occur:  · Facial pain or headache becomes more severe  · Stiff neck  · Unusual drowsiness or confusion, or not acting like your normal self  · Swelling of the forehead or eyelids  · Vision problems including blurred or double vision  · Fever of 100.4ºF (38ºC) or higher, or as directed by your healthcare provider  · Seizure  ©  0645-0519 [facility], [address] [place] [address], [place] [address]. All rights reserved. This information is not intended as a substitute for professional medical care. Always follow your healthcare professional's instructions.      Benign paroxysmal positional vertigo, unspecified laterality  -     meclizine (ANTIVERT) 25 mg tablet; Take 1 tablet (25 mg total) by mouth 3 (three) times daily as needed.    Elevated blood pressure reading in office without diagnosis of hypertension  Avoid using decongestants with your history of High blood pressure. Patient is asked to monitor BP at home or work, several times per month and return with written values at next office visit.

## 2019-03-11 NOTE — PATIENT INSTRUCTIONS
HOME CARE:  · Drink plenty of water, hot tea, and other liquids to stay well hydrated. This thins the mucus and promotes sinus drainage.  · Apply heat to the painful areas of the face. Use a towel soaked in hot water. Or,  the shower and direct the hot spray onto your face. This is a good way to inhale warm water vapor and get heat on your face at the same time. (Cover your mouth and nose with your hands so you can still breathe as you do this.)  · Use a vaporizer with products such as Vicks VapoRub (contains menthol) at night. Suck on peppermint, menthol or eucalyptus hard candies during the day.  · An expectorant containing guaifenesin (such as Robitussin), helps to thin the mucus and promote drainage from the sinuses.  · Over-the-counter decongestants may be used unless a similar medicine was prescribed. Nasal sprays work the fastest. Use one that contains phenylephrine (Junior-synephrine, Sinex and others) or oxymetazoline (Afrin). First blow the nose gently to remove mucus, then apply the drops. Do not use these medicines more often than directed on the label or for more than three days or symptoms may worsen. You may also use tablets containing pseudoephedrine (Sudafed). Many sinus remedies combine ingredients, which may increase side effects. Read the labels or ask the pharmacist for help. NOTE: Persons with high blood pressure should not use decongestants. They can raise blood pressure.  · Antihistamines are useful if allergies are a cause of your sinusitis. The mildest one is chlorpheniramine (available without a prescription). The dose for adults is 8-12mg three times a day. [NOTE: Do not use chlorpheniramine if you have glaucoma or if you are a man with trouble urinating due to an enlarged prostate.] Claritin (loratidine) is an antihistamine that causes less drowsiness and is a good alternative for daytime use.  · Do not use nasal rinses or irrigation during an acute sinus infection, unless advised  by your doctor. Rinsing may spread the infection to other sinuses.  · You may use acetaminophen (Tylenol) or ibuprofen (Motrin, Advil) to control pain, unless another pain medicine was prescribed. [ NOTE: If you have chronic liver or kidney disease or ever had a stomach ulcer, talk with your doctor before using these medicines.] (Aspirin should never be used in anyone under 18 years of age who is ill with a fever. It may cause severe liver damage.)  · Finish the full course, even if you are feeling better after a few days.  FOLLOW UP with your doctor or this facility in one week or as instructed by our staff if not improving.  GET PROMPT MEDICAL ATTENTION if any of the following occur:  · Facial pain or headache becomes more severe  · Stiff neck  · Unusual drowsiness or confusion, or not acting like your normal self  · Swelling of the forehead or eyelids  · Vision problems including blurred or double vision  · Fever of 100.4ºF (38ºC) or higher, or as directed by your healthcare provider  · Seizure  © 4524-8020 [facility], [address] [place] [address], [place] [address]. All rights reserved. This information is not intended as a substitute for professional medical care. Always follow your healthcare professional's instructions.  \

## 2019-03-20 ENCOUNTER — INFUSION (OUTPATIENT)
Dept: INFUSION THERAPY | Facility: HOSPITAL | Age: 54
End: 2019-03-20
Attending: INTERNAL MEDICINE
Payer: COMMERCIAL

## 2019-03-20 VITALS
TEMPERATURE: 98 F | DIASTOLIC BLOOD PRESSURE: 66 MMHG | RESPIRATION RATE: 18 BRPM | OXYGEN SATURATION: 98 % | HEART RATE: 56 BPM | SYSTOLIC BLOOD PRESSURE: 133 MMHG

## 2019-03-20 DIAGNOSIS — D50.0 IRON DEFICIENCY ANEMIA DUE TO CHRONIC BLOOD LOSS: Primary | ICD-10-CM

## 2019-03-20 PROCEDURE — 63600175 PHARM REV CODE 636 W HCPCS: Performed by: INTERNAL MEDICINE

## 2019-03-20 PROCEDURE — 25000003 PHARM REV CODE 250: Performed by: INTERNAL MEDICINE

## 2019-03-20 PROCEDURE — 96365 THER/PROPH/DIAG IV INF INIT: CPT

## 2019-03-20 RX ADMIN — IRON SUCROSE 200 MG: 20 INJECTION, SOLUTION INTRAVENOUS at 03:03

## 2019-03-20 NOTE — PLAN OF CARE
Problem: Adult Inpatient Plan of Care  Goal: Plan of Care Review  Outcome: Ongoing (interventions implemented as appropriate)  Arrived to unit. Pt reports getting over the flu. Had some dizziness but manageable. Tolerated Venofer. No reactions noted. Pt has next appt. Pt discharged from unit.

## 2019-05-29 ENCOUNTER — INFUSION (OUTPATIENT)
Dept: INFUSION THERAPY | Facility: HOSPITAL | Age: 54
End: 2019-05-29
Attending: INTERNAL MEDICINE
Payer: COMMERCIAL

## 2019-05-29 VITALS
HEART RATE: 57 BPM | TEMPERATURE: 98 F | OXYGEN SATURATION: 100 % | SYSTOLIC BLOOD PRESSURE: 154 MMHG | DIASTOLIC BLOOD PRESSURE: 76 MMHG | RESPIRATION RATE: 16 BRPM

## 2019-05-29 DIAGNOSIS — D50.0 IRON DEFICIENCY ANEMIA DUE TO CHRONIC BLOOD LOSS: Primary | ICD-10-CM

## 2019-05-29 PROCEDURE — 25000003 PHARM REV CODE 250: Performed by: INTERNAL MEDICINE

## 2019-05-29 PROCEDURE — 96365 THER/PROPH/DIAG IV INF INIT: CPT

## 2019-05-29 PROCEDURE — 63600175 PHARM REV CODE 636 W HCPCS: Performed by: INTERNAL MEDICINE

## 2019-05-29 RX ADMIN — IRON SUCROSE 200 MG: 20 INJECTION, SOLUTION INTRAVENOUS at 03:05

## 2019-05-29 NOTE — PLAN OF CARE
Problem: Adult Inpatient Plan of Care  Goal: Plan of Care Review  Outcome: Ongoing (interventions implemented as appropriate)  Arrived to unit. No complaints voiced. Tolerated Venofer. No reactions noted. VSS. Pt ambulated off unit.

## 2019-05-30 ENCOUNTER — OFFICE VISIT (OUTPATIENT)
Dept: FAMILY MEDICINE | Facility: CLINIC | Age: 54
End: 2019-05-30
Payer: COMMERCIAL

## 2019-05-30 VITALS
BODY MASS INDEX: 31.06 KG/M2 | DIASTOLIC BLOOD PRESSURE: 70 MMHG | HEART RATE: 54 BPM | SYSTOLIC BLOOD PRESSURE: 122 MMHG | TEMPERATURE: 98 F | WEIGHT: 221.88 LBS | HEIGHT: 71 IN | OXYGEN SATURATION: 97 %

## 2019-05-30 DIAGNOSIS — M77.8 RIGHT ELBOW TENDONITIS: Primary | ICD-10-CM

## 2019-05-30 PROCEDURE — 99999 PR PBB SHADOW E&M-EST. PATIENT-LVL IV: ICD-10-PCS | Mod: PBBFAC,,, | Performed by: NURSE PRACTITIONER

## 2019-05-30 PROCEDURE — 99214 PR OFFICE/OUTPT VISIT, EST, LEVL IV, 30-39 MIN: ICD-10-PCS | Mod: S$GLB,,, | Performed by: NURSE PRACTITIONER

## 2019-05-30 PROCEDURE — 99214 OFFICE O/P EST MOD 30 MIN: CPT | Mod: S$GLB,,, | Performed by: NURSE PRACTITIONER

## 2019-05-30 PROCEDURE — 99999 PR PBB SHADOW E&M-EST. PATIENT-LVL IV: CPT | Mod: PBBFAC,,, | Performed by: NURSE PRACTITIONER

## 2019-05-30 RX ORDER — MELOXICAM 15 MG/1
15 TABLET ORAL DAILY
Qty: 30 TABLET | Refills: 0 | Status: SHIPPED | OUTPATIENT
Start: 2019-05-30 | End: 2019-08-20 | Stop reason: SDUPTHER

## 2019-05-30 RX ORDER — PREDNISONE 20 MG/1
20 TABLET ORAL 2 TIMES DAILY
Qty: 10 TABLET | Refills: 0 | Status: SHIPPED | OUTPATIENT
Start: 2019-05-30 | End: 2019-06-04

## 2019-05-30 NOTE — PATIENT INSTRUCTIONS
Mobic 15 mg one daily x 10 days then as needed  Prednisone 20 mg one twice a day x 5 days  Wear ace wrap x 5 days then as needed

## 2019-05-30 NOTE — PROGRESS NOTES
Subjective:       Patient ID: Estephania Rogers is a 53 y.o. female.    Chief Complaint: Elbow Pain (Right    2 day)    53-year-old female presents to the clinic today with complaint of right elbow pain x2 days.  She states she has been doing a lot a lifting on her sister who just recently had an amputation.  She said her arm jerks while moving her sisters wheelchair 2 days ago.  She states she has been taking ibuprofen 600 mg which has been helping.  She says she had diabetes before she had weight loss surgery.  She took her blood sugar last week and it was 80.  She occasionally has mild weakness in her right hand.  She denies any other complaints.    Past Medical History:   Diagnosis Date    Anemia     iron deficiency    Anxiety     Diabetes mellitus, type 2     Hypertension     Malabsorption     Migraine headache     Nephrolithiasis     Other specified intestinal malabsorption      Past Surgical History:   Procedure Laterality Date    ANAL FISTULOTOMY  2005    APPENDECTOMY  2005    BREAST SURGERY  2014    CHOLECYSTECTOMY  2005    GASTRIC BYPASS  2005    SINUS SURGERY      Dr. Oral Cobb    SPINE SURGERY      TONSILLECTOMY      TOTAL REDUCTION MAMMOPLASTY  2014      reports that she has never smoked. She has never used smokeless tobacco. She reports that she does not drink alcohol or use drugs.  Review of Systems   Constitutional: Negative for activity change.   Respiratory: Negative for cough, chest tightness, shortness of breath and wheezing.    Cardiovascular: Negative for chest pain, palpitations and leg swelling.   Gastrointestinal: Negative for abdominal pain, constipation, diarrhea, nausea and vomiting.   Musculoskeletal: Negative for gait problem.        Right elbow pain   Skin: Negative for color change.   Neurological: Negative for dizziness, syncope and light-headedness.       Objective:      Physical Exam   Constitutional: She is oriented to person, place, and time. She appears  well-developed and well-nourished. No distress.   Eyes: Pupils are equal, round, and reactive to light. Conjunctivae and EOM are normal. Right eye exhibits no discharge. Left eye exhibits no discharge. No scleral icterus.   Neck: Normal range of motion. Neck supple. No JVD present.   Cardiovascular: Normal rate, regular rhythm and normal heart sounds.   No murmur heard.  Pulmonary/Chest: Effort normal and breath sounds normal. No respiratory distress. She has no wheezes. She has no rales.   Abdominal: Soft. Bowel sounds are normal. There is no tenderness.   Musculoskeletal: Normal range of motion. She exhibits tenderness. She exhibits no edema.   Right elbow tenderness anterior side of right elbow no redness or swelling noted FROM without any difficulty strong right hand grasp strong right radial pulse    Neurological: She is alert and oriented to person, place, and time.   Skin: Skin is warm and dry. She is not diaphoretic.   Psychiatric: She has a normal mood and affect.       Assessment:       1. Right elbow tendonitis        Plan:         Right elbow tendonitis  -     predniSONE (DELTASONE) 20 MG tablet; Take 1 tablet (20 mg total) by mouth 2 (two) times daily. for 5 days  Dispense: 10 tablet; Refill: 0    Other orders  -     meloxicam (MOBIC) 15 MG tablet; Take 1 tablet (15 mg total) by mouth once daily.  Dispense: 30 tablet; Refill: 0

## 2019-07-23 ENCOUNTER — OFFICE VISIT (OUTPATIENT)
Dept: FAMILY MEDICINE | Facility: CLINIC | Age: 54
End: 2019-07-23
Payer: COMMERCIAL

## 2019-07-23 VITALS
SYSTOLIC BLOOD PRESSURE: 118 MMHG | WEIGHT: 220.88 LBS | BODY MASS INDEX: 30.92 KG/M2 | DIASTOLIC BLOOD PRESSURE: 82 MMHG | HEIGHT: 71 IN | TEMPERATURE: 99 F | HEART RATE: 64 BPM | OXYGEN SATURATION: 97 %

## 2019-07-23 DIAGNOSIS — S16.1XXA STRAIN OF NECK MUSCLE, INITIAL ENCOUNTER: ICD-10-CM

## 2019-07-23 DIAGNOSIS — M77.8 RIGHT ELBOW TENDONITIS: Primary | ICD-10-CM

## 2019-07-23 PROCEDURE — 99214 OFFICE O/P EST MOD 30 MIN: CPT | Mod: S$GLB,,, | Performed by: NURSE PRACTITIONER

## 2019-07-23 PROCEDURE — 99214 PR OFFICE/OUTPT VISIT, EST, LEVL IV, 30-39 MIN: ICD-10-PCS | Mod: S$GLB,,, | Performed by: NURSE PRACTITIONER

## 2019-07-23 PROCEDURE — 99999 PR PBB SHADOW E&M-EST. PATIENT-LVL IV: ICD-10-PCS | Mod: PBBFAC,,, | Performed by: NURSE PRACTITIONER

## 2019-07-23 PROCEDURE — 99999 PR PBB SHADOW E&M-EST. PATIENT-LVL IV: CPT | Mod: PBBFAC,,, | Performed by: NURSE PRACTITIONER

## 2019-07-23 RX ORDER — MELOXICAM 15 MG/1
15 TABLET ORAL DAILY
Qty: 30 TABLET | Refills: 0 | Status: SHIPPED | OUTPATIENT
Start: 2019-07-23 | End: 2019-12-05

## 2019-07-23 RX ORDER — TIZANIDINE HYDROCHLORIDE 4 MG/1
4 CAPSULE, GELATIN COATED ORAL EVERY 8 HOURS PRN
Qty: 30 CAPSULE | Refills: 0 | Status: SHIPPED | OUTPATIENT
Start: 2019-07-23 | End: 2019-08-02

## 2019-07-23 RX ORDER — PREDNISONE 20 MG/1
20 TABLET ORAL 2 TIMES DAILY
Qty: 10 TABLET | Refills: 0 | Status: SHIPPED | OUTPATIENT
Start: 2019-07-23 | End: 2019-07-28

## 2019-07-23 NOTE — PATIENT INSTRUCTIONS
Continue Mobic 15 mg po daily  Zanaflex 4 mg one every 8 hours prn muscle spasms  Prednisone 20 mg one twice a day x 5 days

## 2019-07-23 NOTE — PROGRESS NOTES
Subjective:       Patient ID: Estephania Rogers is a 53 y.o. female.    Chief Complaint: Neck Pain (2 weeks) and Elbow Pain (Right   2 weeks)    53-year-old female presents to the clinic today with complaint of neck pain and right elbow pain x2 weeks.  She has been taking Mobic with very little relief.  She has seen her chiropractor a few times for her neck pain which is helping some.  However, her sister fell out of her wheelchair this morning and took 3 people to pick her up and she thinks that made her elbow and neck worse today.  She states her right elbow pain radiates down to her right hand with numbness in her fingers at times.  She denies any pain, numbness, tingling, or weakness of left extremity.  She is scheduled to see her chiropractor again tomorrow and then have her iron transfusion in the afternoon.     Past Medical History:   Diagnosis Date    Anemia     iron deficiency    Anxiety     Diabetes mellitus, type 2     Hypertension     Malabsorption     Migraine headache     Nephrolithiasis     Other specified intestinal malabsorption      Past Surgical History:   Procedure Laterality Date    ANAL FISTULOTOMY  2005    APPENDECTOMY  2005    BREAST SURGERY  2014    CHOLECYSTECTOMY  2005    GASTRIC BYPASS  2005    SINUS SURGERY      Dr. Oral Cobb    SPINE SURGERY      TONSILLECTOMY      TOTAL REDUCTION MAMMOPLASTY  2014      reports that she has never smoked. She has never used smokeless tobacco. She reports that she does not drink alcohol or use drugs.  Review of Systems   Constitutional: Negative for activity change.   Respiratory: Negative for cough, chest tightness, shortness of breath and wheezing.    Cardiovascular: Negative for chest pain, palpitations and leg swelling.   Gastrointestinal: Negative for abdominal pain, constipation, diarrhea, nausea and vomiting.   Musculoskeletal: Positive for neck pain and neck stiffness. Negative for gait problem.        Right elbow pain     Skin: Negative for color change.   Neurological: Negative for dizziness, syncope and light-headedness.       Objective:      Physical Exam   Constitutional: She appears well-developed and well-nourished. No distress.   Eyes: Pupils are equal, round, and reactive to light. Conjunctivae and EOM are normal. Right eye exhibits no discharge. Left eye exhibits no discharge. No scleral icterus.   Neck: Normal range of motion. Neck supple.   C-spine non-tender to palpation tender bilateral paracervical muscle and bilateral trapezius muscle tightness    Cardiovascular: Normal rate, regular rhythm and normal heart sounds. Exam reveals no gallop and no friction rub.   No murmur heard.  Pulmonary/Chest: Effort normal and breath sounds normal. No respiratory distress. She has no wheezes.   Abdominal: Soft. Bowel sounds are normal. There is no tenderness.   Musculoskeletal: Normal range of motion. She exhibits tenderness.   Mild tenderness right elbow FROM without any difficulty strong bilateral hand grasp    Skin: Skin is warm and dry. She is not diaphoretic.   Psychiatric: She has a normal mood and affect.       Assessment:       1. Right elbow tendonitis    2. Strain of neck muscle, initial encounter        Plan:         Right elbow tendonitis  -     meloxicam (MOBIC) 15 MG tablet; Take 1 tablet (15 mg total) by mouth once daily.  Dispense: 30 tablet; Refill: 0  -     predniSONE (DELTASONE) 20 MG tablet; Take 1 tablet (20 mg total) by mouth 2 (two) times daily. for 5 days  Dispense: 10 tablet; Refill: 0    Strain of neck muscle, initial encounter  -     meloxicam (MOBIC) 15 MG tablet; Take 1 tablet (15 mg total) by mouth once daily.  Dispense: 30 tablet; Refill: 0  -     tiZANidine 4 mg Cap; Take 4 mg by mouth every 8 (eight) hours as needed.  Dispense: 30 capsule; Refill: 0

## 2019-07-30 ENCOUNTER — INFUSION (OUTPATIENT)
Dept: INFUSION THERAPY | Facility: HOSPITAL | Age: 54
End: 2019-07-30
Attending: PSYCHIATRY & NEUROLOGY
Payer: COMMERCIAL

## 2019-07-30 VITALS
OXYGEN SATURATION: 97 % | SYSTOLIC BLOOD PRESSURE: 161 MMHG | DIASTOLIC BLOOD PRESSURE: 77 MMHG | HEART RATE: 50 BPM | RESPIRATION RATE: 18 BRPM | TEMPERATURE: 99 F

## 2019-07-30 DIAGNOSIS — D50.0 IRON DEFICIENCY ANEMIA DUE TO CHRONIC BLOOD LOSS: Primary | ICD-10-CM

## 2019-07-30 PROCEDURE — 63600175 PHARM REV CODE 636 W HCPCS: Performed by: INTERNAL MEDICINE

## 2019-07-30 PROCEDURE — 96365 THER/PROPH/DIAG IV INF INIT: CPT

## 2019-07-30 RX ADMIN — IRON SUCROSE 200 MG: 20 INJECTION, SOLUTION INTRAVENOUS at 03:07

## 2019-07-30 NOTE — PLAN OF CARE
"Problem: Adult Inpatient Plan of Care  Goal: Plan of Care Review  Outcome: Ongoing (interventions implemented as appropriate)  Patient received Venofer IVPB. Tolerated well. VSS. Reports increased bruising and fatigue. States this is "normal" when it is time for her iron infusions. Expresses concerns for her sisters health. No other complaints at this time. Patient received discharge instructions and follow up appointments. Verbalized understanding and ambulated unassisted off unit by self.       "

## 2019-08-13 RX ORDER — GABAPENTIN 300 MG/1
300 CAPSULE ORAL NIGHTLY
Qty: 90 CAPSULE | Refills: 1 | Status: SHIPPED | OUTPATIENT
Start: 2019-08-13 | End: 2020-04-30

## 2019-08-20 ENCOUNTER — PATIENT MESSAGE (OUTPATIENT)
Dept: FAMILY MEDICINE | Facility: CLINIC | Age: 54
End: 2019-08-20

## 2019-08-20 ENCOUNTER — OFFICE VISIT (OUTPATIENT)
Dept: FAMILY MEDICINE | Facility: CLINIC | Age: 54
End: 2019-08-20
Payer: COMMERCIAL

## 2019-08-20 VITALS
WEIGHT: 221.56 LBS | OXYGEN SATURATION: 95 % | BODY MASS INDEX: 31.02 KG/M2 | SYSTOLIC BLOOD PRESSURE: 130 MMHG | HEART RATE: 65 BPM | DIASTOLIC BLOOD PRESSURE: 70 MMHG | HEIGHT: 71 IN | TEMPERATURE: 99 F

## 2019-08-20 DIAGNOSIS — R30.0 DYSURIA: Primary | ICD-10-CM

## 2019-08-20 DIAGNOSIS — N30.00 ACUTE CYSTITIS WITHOUT HEMATURIA: Primary | ICD-10-CM

## 2019-08-20 PROCEDURE — 99999 PR PBB SHADOW E&M-EST. PATIENT-LVL V: CPT | Mod: PBBFAC,,, | Performed by: NURSE PRACTITIONER

## 2019-08-20 PROCEDURE — 99214 PR OFFICE/OUTPT VISIT, EST, LEVL IV, 30-39 MIN: ICD-10-PCS | Mod: S$GLB,,, | Performed by: NURSE PRACTITIONER

## 2019-08-20 PROCEDURE — 99214 OFFICE O/P EST MOD 30 MIN: CPT | Mod: S$GLB,,, | Performed by: NURSE PRACTITIONER

## 2019-08-20 PROCEDURE — 87086 URINE CULTURE/COLONY COUNT: CPT

## 2019-08-20 PROCEDURE — 99999 PR PBB SHADOW E&M-EST. PATIENT-LVL V: ICD-10-PCS | Mod: PBBFAC,,, | Performed by: NURSE PRACTITIONER

## 2019-08-20 PROCEDURE — 87186 SC STD MICRODIL/AGAR DIL: CPT

## 2019-08-20 PROCEDURE — 81001 URINALYSIS AUTO W/SCOPE: CPT

## 2019-08-20 PROCEDURE — 87088 URINE BACTERIA CULTURE: CPT

## 2019-08-20 PROCEDURE — 87077 CULTURE AEROBIC IDENTIFY: CPT

## 2019-08-20 RX ORDER — TIZANIDINE 4 MG/1
4 TABLET ORAL EVERY 8 HOURS PRN
Refills: 0 | COMMUNITY
Start: 2019-07-23 | End: 2020-08-04 | Stop reason: SDUPTHER

## 2019-08-20 NOTE — PATIENT INSTRUCTIONS
UA and urine culture pending  Continue AZO  Drink lots of water and cranberry juice   Will call results when available

## 2019-08-20 NOTE — PROGRESS NOTES
Subjective:       Patient ID: Estephania Rogers is a 53 y.o. female.    Chief Complaint: Urinary Tract Infection (Dysuria/Frequent urination/Urgency  2 days)    53-year-old female presents to the clinic today with complaint of dysuria, urinary frequency, and urinary urgency for the past 2 days.  She denies any hematuria, cloudy urine, vaginal discharge, fever, chills, abdominal pain, nausea, vomiting, diarrhea, or back pain. She took a AZO prior to coming today which she said helped a lot.    Past Medical History:   Diagnosis Date    Anemia     iron deficiency    Anxiety     Diabetes mellitus, type 2     Hypertension     Malabsorption     Migraine headache     Nephrolithiasis     Other specified intestinal malabsorption      Past Surgical History:   Procedure Laterality Date    ANAL FISTULOTOMY  2005    APPENDECTOMY  2005    BREAST SURGERY  2014    CHOLECYSTECTOMY  2005    GASTRIC BYPASS  2005    SINUS SURGERY      Dr. Oral Cobb    SPINE SURGERY      TONSILLECTOMY      TOTAL REDUCTION MAMMOPLASTY  2014      reports that she has never smoked. She has never used smokeless tobacco. She reports that she does not drink alcohol or use drugs.  Review of Systems   Constitutional: Negative for chills and fever.   Gastrointestinal: Negative for abdominal pain, constipation, diarrhea, nausea and vomiting.   Genitourinary: Positive for dysuria, frequency and urgency. Negative for difficulty urinating, flank pain and hematuria.   Musculoskeletal: Negative for back pain and gait problem.       Objective:      Physical Exam   Constitutional: She is oriented to person, place, and time. She appears well-developed and well-nourished. No distress.   Eyes: Pupils are equal, round, and reactive to light. Conjunctivae and EOM are normal. Right eye exhibits no discharge. Left eye exhibits no discharge. No scleral icterus.   Neck: Normal range of motion. Neck supple.   Cardiovascular: Normal rate and regular rhythm.  Exam reveals no gallop and no friction rub.   No murmur heard.  Pulmonary/Chest: Effort normal and breath sounds normal. No respiratory distress. She has no wheezes. She has no rales.   Abdominal: Soft. Bowel sounds are normal. There is no tenderness.   Genitourinary:   Genitourinary Comments: No CVA tendereness   Musculoskeletal: Normal range of motion. She exhibits no edema.   Neurological: She is alert and oriented to person, place, and time.   Skin: Skin is warm and dry. She is not diaphoretic.   Psychiatric: She has a normal mood and affect.       Assessment:       1. Dysuria        Plan:         Dysuria  -     Urinalysis  -     Urine culture      UA unable to read due to AZO

## 2019-08-21 ENCOUNTER — PATIENT MESSAGE (OUTPATIENT)
Dept: FAMILY MEDICINE | Facility: CLINIC | Age: 54
End: 2019-08-21

## 2019-08-21 ENCOUNTER — TELEPHONE (OUTPATIENT)
Dept: FAMILY MEDICINE | Facility: CLINIC | Age: 54
End: 2019-08-21

## 2019-08-21 DIAGNOSIS — N39.0 URINARY TRACT INFECTION WITHOUT HEMATURIA, SITE UNSPECIFIED: Primary | ICD-10-CM

## 2019-08-21 LAB
BACTERIA #/AREA URNS AUTO: ABNORMAL /HPF
BILIRUB UR QL STRIP: NEGATIVE
CLARITY UR REFRACT.AUTO: ABNORMAL
COLOR UR AUTO: ABNORMAL
GLUCOSE UR QL STRIP: NEGATIVE
HGB UR QL STRIP: ABNORMAL
KETONES UR QL STRIP: NEGATIVE
LEUKOCYTE ESTERASE UR QL STRIP: ABNORMAL
MICROSCOPIC COMMENT: ABNORMAL
NITRITE UR QL STRIP: NEGATIVE
PH UR STRIP: 5 [PH] (ref 5–8)
PROT UR QL STRIP: NEGATIVE
RBC #/AREA URNS AUTO: 2 /HPF (ref 0–4)
SP GR UR STRIP: 1.01 (ref 1–1.03)
URN SPEC COLLECT METH UR: ABNORMAL
WBC #/AREA URNS AUTO: 21 /HPF (ref 0–5)

## 2019-08-21 RX ORDER — AMOXICILLIN AND CLAVULANATE POTASSIUM 875; 125 MG/1; MG/1
1 TABLET, FILM COATED ORAL 2 TIMES DAILY
Qty: 20 TABLET | Refills: 0 | Status: SHIPPED | OUTPATIENT
Start: 2019-08-21 | End: 2019-08-31

## 2019-08-22 NOTE — TELEPHONE ENCOUNTER
I spoke with the patient and she is still having a lot of urinary symptoms and now has a sore throat and ear ache. She would like to start a antibiotic pending the culture result.

## 2019-08-23 LAB — BACTERIA UR CULT: ABNORMAL

## 2019-08-24 ENCOUNTER — TELEPHONE (OUTPATIENT)
Dept: FAMILY MEDICINE | Facility: CLINIC | Age: 54
End: 2019-08-24

## 2019-08-24 NOTE — TELEPHONE ENCOUNTER
I spoke with the patient and explained that her urine culture was sensitive to Augmentin. She says she is feeling much better now. Patient verbalized understanding.

## 2019-09-25 ENCOUNTER — INFUSION (OUTPATIENT)
Dept: INFUSION THERAPY | Facility: HOSPITAL | Age: 54
End: 2019-09-25
Attending: PSYCHIATRY & NEUROLOGY
Payer: COMMERCIAL

## 2019-09-25 VITALS
OXYGEN SATURATION: 98 % | DIASTOLIC BLOOD PRESSURE: 70 MMHG | RESPIRATION RATE: 17 BRPM | SYSTOLIC BLOOD PRESSURE: 138 MMHG | HEART RATE: 57 BPM | TEMPERATURE: 98 F

## 2019-09-25 DIAGNOSIS — D50.0 IRON DEFICIENCY ANEMIA DUE TO CHRONIC BLOOD LOSS: Primary | ICD-10-CM

## 2019-09-25 PROCEDURE — 96365 THER/PROPH/DIAG IV INF INIT: CPT

## 2019-09-25 PROCEDURE — 63600175 PHARM REV CODE 636 W HCPCS: Performed by: INTERNAL MEDICINE

## 2019-09-25 RX ADMIN — IRON SUCROSE 200 MG: 20 INJECTION, SOLUTION INTRAVENOUS at 03:09

## 2019-09-25 NOTE — PLAN OF CARE
Pt arrived to unit. Reports some fatigue. VSS. Tolerated Venofer. No reactions noted. Pt has next appt. Pt ambulated off unit, no distress noted.

## 2019-10-03 ENCOUNTER — PATIENT OUTREACH (OUTPATIENT)
Dept: ADMINISTRATIVE | Facility: OTHER | Age: 54
End: 2019-10-03

## 2019-10-03 DIAGNOSIS — Z12.31 ENCOUNTER FOR SCREENING MAMMOGRAM FOR MALIGNANT NEOPLASM OF BREAST: Primary | ICD-10-CM

## 2019-10-07 ENCOUNTER — OFFICE VISIT (OUTPATIENT)
Dept: OBSTETRICS AND GYNECOLOGY | Facility: CLINIC | Age: 54
End: 2019-10-07
Payer: COMMERCIAL

## 2019-10-07 VITALS
HEIGHT: 71 IN | BODY MASS INDEX: 30.56 KG/M2 | DIASTOLIC BLOOD PRESSURE: 78 MMHG | WEIGHT: 218.25 LBS | SYSTOLIC BLOOD PRESSURE: 124 MMHG

## 2019-10-07 DIAGNOSIS — Z01.419 VISIT FOR GYNECOLOGIC EXAMINATION: Primary | ICD-10-CM

## 2019-10-07 DIAGNOSIS — N95.9 MENOPAUSAL AND PERIMENOPAUSAL DISORDER: ICD-10-CM

## 2019-10-07 PROCEDURE — 88175 CYTOPATH C/V AUTO FLUID REDO: CPT

## 2019-10-07 PROCEDURE — 87624 HPV HI-RISK TYP POOLED RSLT: CPT

## 2019-10-07 PROCEDURE — 99999 PR PBB SHADOW E&M-EST. PATIENT-LVL III: CPT | Mod: PBBFAC,,, | Performed by: OBSTETRICS & GYNECOLOGY

## 2019-10-07 PROCEDURE — 99386 PREV VISIT NEW AGE 40-64: CPT | Mod: S$GLB,,, | Performed by: OBSTETRICS & GYNECOLOGY

## 2019-10-07 PROCEDURE — 99999 PR PBB SHADOW E&M-EST. PATIENT-LVL III: ICD-10-PCS | Mod: PBBFAC,,, | Performed by: OBSTETRICS & GYNECOLOGY

## 2019-10-07 PROCEDURE — 99386 PR PREVENTIVE VISIT,NEW,40-64: ICD-10-PCS | Mod: S$GLB,,, | Performed by: OBSTETRICS & GYNECOLOGY

## 2019-10-07 NOTE — PROGRESS NOTES
HISTORY OF PRESENT ILLNESS:    Estephania Rogers is a 54 y.o. female , presents for a routine exam and has no complaints.  COTESTING, MAMMO HAS BEEN SCHEDULED.  DISCUSSED LOW LIBIDO, POSS USE OF INJECTABLE, [place] LIQUIBEADS.  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 .     LAST VISIT :   PAP SUBMITTED AND MAMMO HAS BEEN ORDERED.  MILD HOT FLUSHES; LMP WAS APPROX 1 YR AGO, SOME DRYNESS.  PLANNED REMOVAL KIDNEY STONE NEXT MONTH AND ABDOMINOPLASTY LAST February . .   LAST VISIT 2014:  PAP DUE  AND MAMMO HAS BEEN ORDERED. MENSES EVERY 1-2 MONTHS. DISCUSSED LUBRICANTS FOR VAG DRYNESS.  CERVICAL POLYP ON EXAM, REMOVED, APPEARS BENIGN  PRIOR VISIT 2012:  ROUTINE VISIT; MENSES EVERY 26 DAYS TO 6 WEEKS. DISCUSSED PERIMENOPAUSAL BLEEDING. REFERRED TO ACOG.ORG TRANSITIONS.  OCCAS MIGRAINES, WORSE RECENTLY. HAS HAD MUCH STRESS SINCE THIS SUMMER WHEN MOTHER IN HOSPICE AND PASSED AWAY. ?MIGRAINES ASSOCIATED  WITH HOT FLUSHES.   REF FOR MAMMO  PAP SUBMITTED AND DISCUSSED 3YR SCREENING INTERVAL    Past Medical History:   Diagnosis Date    Anemia     iron deficiency    Anxiety     Diabetes mellitus, type 2     Hypertension     Malabsorption     Migraine headache     Nephrolithiasis     Other specified intestinal malabsorption        Past Surgical History:   Procedure Laterality Date    ANAL FISTULOTOMY      APPENDECTOMY      BREAST SURGERY      CHOLECYSTECTOMY      GASTRIC BYPASS  2005    SINUS SURGERY      Dr. Oral Cobb    SPINE SURGERY      TONSILLECTOMY      TOTAL REDUCTION MAMMOPLASTY          MEDICATIONS AND ALLERGIES:      Current Outpatient Medications:     ALPRAZolam (XANAX) 0.25 MG tablet, TAKE ONE Tablet BY MOUTH EVERY DAY AS NEEDED, Disp: 30 tablet, Rfl: 0    ascorbic acid (VITAMIN C) 500 MG tablet, Take 500 mg by mouth once daily.  , Disp: , Rfl:     AQUILES ASPIRIN ORAL, Take 81 mg by mouth once daily.,  Disp: , Rfl:     butalbital-acetaminophen-caffeine -40 mg (FIORICET, ESGIC) -40 mg per tablet, , Disp: , Rfl:     cholecalciferol, vitamin D3, 50,000 unit Wafr, Take 50,000 Units by mouth once daily.  , Disp: , Rfl:     FLUoxetine 20 MG capsule, Take 1 capsule (20 mg total) by mouth once daily., Disp: 90 capsule, Rfl: 2    fluticasone (FLONASE) 50 mcg/actuation nasal spray, 1 spray (50 mcg total) by Each Nare route once daily., Disp: 16 g, Rfl: 0    gabapentin (NEURONTIN) 300 MG capsule, Take 1 capsule (300 mg total) by mouth every evening., Disp: 90 capsule, Rfl: 1    meloxicam (MOBIC) 15 MG tablet, Take 1 tablet (15 mg total) by mouth once daily., Disp: 30 tablet, Rfl: 0    multivitamin (ONE DAILY MULTIVITAMIN) per tablet, Take 1 tablet by mouth once daily., Disp: , Rfl:     RESTASIS 0.05 % ophthalmic emulsion, , Disp: , Rfl: 3    tiZANidine (ZANAFLEX) 4 MG tablet, Take 4 mg by mouth every 8 (eight) hours as needed., Disp: , Rfl: 0    vitamin A 13905 UNIT capsule, Take 25,000 Units by mouth once daily.  , Disp: , Rfl:     Review of patient's allergies indicates:  No Known Allergies    Family History   Problem Relation Age of Onset    Cancer Father         pancreatic    Diabetes Father     Diabetes Mother     Hypertension Mother     Miscarriages / Stillbirths Mother     Stroke Mother     Diabetes Sister     Cancer Maternal Aunt         breast    Arthritis Maternal Grandmother     Arthritis Paternal Grandmother     Diabetes Sister     Breast cancer Paternal Aunt        Social History     Socioeconomic History    Marital status:      Spouse name: Not on file    Number of children: Not on file    Years of education: Not on file    Highest education level: Not on file   Occupational History    Not on file   Social Needs    Financial resource strain: Not on file    Food insecurity:     Worry: Not on file     Inability: Not on file    Transportation needs:     Medical: Not  "on file     Non-medical: Not on file   Tobacco Use    Smoking status: Never Smoker    Smokeless tobacco: Never Used    Tobacco comment: Clerical work   Substance and Sexual Activity    Alcohol use: No     Comment: socially    Drug use: No    Sexual activity: Yes     Partners: Male     Birth control/protection: None     Comment: :  Gianni   Lifestyle    Physical activity:     Days per week: Not on file     Minutes per session: Not on file    Stress: Not on file   Relationships    Social connections:     Talks on phone: Not on file     Gets together: Not on file     Attends [employer] service: Not on file     Active member of club or organization: Not on file     Attends meetings of clubs or organizations: Not on file     Relationship status: Not on file   Other Topics Concern    Not on file   Social History Narrative    Not on file       COMPREHENSIVE GYN HISTORY:  PAP History:  Denies abnormal Paps except a noted above.  Infection History: Denies STDs. Denies PID.  Benign History: Denies uterine fibroids. Denies ovarian cysts. Denies endometriosis.  Denies other conditions.  Cancer History: Denies cervical cancer. Denies uterine cancer or hyperplasia. Denies ovarian cancer. Denies vulvar cancer or pre-cancer. Denies vaginal cancer or pre-cancer. Denies breast cancer. Denies colon cancer.    ROS:  GENERAL: No weight changes. No swelling. No fatigue. No fever.  CARDIOVASCULAR: No chest pain. No shortness of breath. No leg cramps.   NEUROLOGICAL: No headaches. No vision changes.  BREASTS: No pain. No lumps. No discharge.  ABDOMEN: No pain. No nausea. No vomiting. No diarrhea. No constipation.  REPRODUCTIVE: No abnormal bleeding.   VULVA: No pain. No lesions. No itching.  VAGINA: No relaxation. No itching. No odor. No discharge. No lesions.  URINARY: No incontinence. No nocturia. No frequency. No dysuria.    /78   Ht 5' 11" (1.803 m)   Wt 99 kg (218 lb 4.1 oz)   LMP 12/04/2014   BMI 30.44 " kg/m²     PE:  APPEARANCE: Well nourished, well developed, in no acute distress.  AFFECT: WNL, alert and oriented x 3.  SKIN: No hirsutism or acne.  NECK: Neck symmetric without masses or thyromegaly.  NODES: No inguinal, cervical, axillary or femoral lymph node enlargement.  CHEST: Good respiratory effort.   ABDOMEN: Soft. No tenderness or masses. No hepatosplenomegaly. No hernias.  BREASTS: Symmetrical, no skin changes or visible lesions. No palpable masses, nipple discharge bilaterally.  PELVIC: ATROPHIC EXTERNAL FEMALE GENITALIA without lesions. Normal hair distribution. Adequate perineal body, normal urethral meatus. VAGINA DRY without lesions or discharge. CERVIX STENOTIC without lesions, discharge or tenderness. No significant cystocele or rectocele. Bimanual exam shows uterus to be normal size, regular, mobile and nontender. Adnexa without masses or tenderness.  EXTREMITIES: No edema.    PROCEDURES:  Pap    DIAGNOSIS:  1. Visit for gynecologic examination  Liquid-based pap smear, screening    HPV High Risk Genotypes, PCR   2. Menopausal and perimenopausal disorder         PLAN:    LABS AND TESTS ORDERED:  Mammogram    COUNSELING:  The patient was counseled today on osteoporosis prevention, calcium supplementation, and regular weight bearing exercise. The patient was also counseled today on ACS PAP guidelines, with recommendations for yearly pelvic exams unless their uterus, cervix, and ovaries were removed for benign reasons; in that case, examinations every 3-5 years are recommended.  The patient was also counseled regarding monthly breast self-examination, routine STD screening for at-risk populations, prophylactic immunizations for transmitted infections such as  HPV, Pertussis, or Influenza as appropriate, and yearly mammograms when indicated by ACS guidelines.  She was advised to see her primary care physician for all other health maintenance.    FOLLOW-UP with me annually.

## 2019-10-10 ENCOUNTER — PATIENT MESSAGE (OUTPATIENT)
Dept: OBSTETRICS AND GYNECOLOGY | Facility: CLINIC | Age: 54
End: 2019-10-10

## 2019-10-10 LAB
HPV HR 12 DNA CVX QL NAA+PROBE: NEGATIVE
HPV16 AG SPEC QL: NEGATIVE
HPV18 DNA SPEC QL NAA+PROBE: NEGATIVE

## 2019-10-29 ENCOUNTER — OFFICE VISIT (OUTPATIENT)
Dept: FAMILY MEDICINE | Facility: CLINIC | Age: 54
End: 2019-10-29
Payer: COMMERCIAL

## 2019-10-29 VITALS
SYSTOLIC BLOOD PRESSURE: 130 MMHG | HEART RATE: 60 BPM | TEMPERATURE: 98 F | DIASTOLIC BLOOD PRESSURE: 78 MMHG | BODY MASS INDEX: 30.77 KG/M2 | WEIGHT: 219.81 LBS | OXYGEN SATURATION: 97 % | HEIGHT: 71 IN

## 2019-10-29 DIAGNOSIS — R30.0 DYSURIA: Primary | ICD-10-CM

## 2019-10-29 DIAGNOSIS — N39.0 URINARY TRACT INFECTION WITH HEMATURIA, SITE UNSPECIFIED: ICD-10-CM

## 2019-10-29 DIAGNOSIS — R31.9 HEMATURIA, UNSPECIFIED TYPE: ICD-10-CM

## 2019-10-29 DIAGNOSIS — Z87.442 HISTORY OF KIDNEY STONES: ICD-10-CM

## 2019-10-29 DIAGNOSIS — R35.0 URINARY FREQUENCY: ICD-10-CM

## 2019-10-29 DIAGNOSIS — R31.9 URINARY TRACT INFECTION WITH HEMATURIA, SITE UNSPECIFIED: ICD-10-CM

## 2019-10-29 LAB
BACTERIA #/AREA URNS AUTO: ABNORMAL /HPF
BILIRUB UR QL STRIP: NEGATIVE
CLARITY UR REFRACT.AUTO: ABNORMAL
COLOR UR AUTO: ABNORMAL
GLUCOSE UR QL STRIP: NEGATIVE
HGB UR QL STRIP: ABNORMAL
KETONES UR QL STRIP: NEGATIVE
LEUKOCYTE ESTERASE UR QL STRIP: ABNORMAL
MICROSCOPIC COMMENT: ABNORMAL
NITRITE UR QL STRIP: NEGATIVE
PH UR STRIP: 6 [PH] (ref 5–8)
PROT UR QL STRIP: NEGATIVE
RBC #/AREA URNS AUTO: 1 /HPF (ref 0–4)
SP GR UR STRIP: 1 (ref 1–1.03)
URN SPEC COLLECT METH UR: ABNORMAL
WBC #/AREA URNS AUTO: 49 /HPF (ref 0–5)
WBC CLUMPS UR QL AUTO: ABNORMAL

## 2019-10-29 PROCEDURE — 87086 URINE CULTURE/COLONY COUNT: CPT

## 2019-10-29 PROCEDURE — 99999 PR PBB SHADOW E&M-EST. PATIENT-LVL V: ICD-10-PCS | Mod: PBBFAC,,, | Performed by: NURSE PRACTITIONER

## 2019-10-29 PROCEDURE — 99214 OFFICE O/P EST MOD 30 MIN: CPT | Mod: S$GLB,,, | Performed by: NURSE PRACTITIONER

## 2019-10-29 PROCEDURE — 81001 URINALYSIS AUTO W/SCOPE: CPT

## 2019-10-29 PROCEDURE — 87088 URINE BACTERIA CULTURE: CPT

## 2019-10-29 PROCEDURE — 87077 CULTURE AEROBIC IDENTIFY: CPT

## 2019-10-29 PROCEDURE — 99214 PR OFFICE/OUTPT VISIT, EST, LEVL IV, 30-39 MIN: ICD-10-PCS | Mod: S$GLB,,, | Performed by: NURSE PRACTITIONER

## 2019-10-29 PROCEDURE — 87186 SC STD MICRODIL/AGAR DIL: CPT

## 2019-10-29 PROCEDURE — 99999 PR PBB SHADOW E&M-EST. PATIENT-LVL V: CPT | Mod: PBBFAC,,, | Performed by: NURSE PRACTITIONER

## 2019-10-29 RX ORDER — AMOXICILLIN AND CLAVULANATE POTASSIUM 875; 125 MG/1; MG/1
1 TABLET, FILM COATED ORAL 2 TIMES DAILY
Qty: 10 TABLET | Refills: 0 | Status: SHIPPED | OUTPATIENT
Start: 2019-10-29 | End: 2019-11-04 | Stop reason: SDUPTHER

## 2019-10-29 NOTE — PROGRESS NOTES
Subjective:       Patient ID: Estephania Rogers is a 54 y.o. female.    Chief Complaint: Urinary Tract Infection (Urinary frequent/Dysuria/Cloudy urine)    54-year-old female presents to the clinic today with complaint of dysuria, urinary frequency, and cloudy urine for the past 5 days.  She denies any difficulty with urination, flank pain, fever, chills, abdominal pain, nausea, vomiting, or diarrhea.  She has a normal appetite.  She has a history of kidney stones and says she still has several kidney stones that have not passed.  She has not seen a urologist in several years.  She would like to see a new urologist.  I will refer her to Dr. Milligan.  The    Past Medical History:   Diagnosis Date    Anemia     iron deficiency    Anxiety     Diabetes mellitus, type 2     Hypertension     Malabsorption     Migraine headache     Nephrolithiasis     Other specified intestinal malabsorption      Past Surgical History:   Procedure Laterality Date    ANAL FISTULOTOMY  2005    APPENDECTOMY  2005    BREAST SURGERY  2014    CHOLECYSTECTOMY  2005    GASTRIC BYPASS  2005    SINUS SURGERY      Dr. Oral Cobb    SPINE SURGERY      TONSILLECTOMY      TOTAL REDUCTION MAMMOPLASTY  2014      reports that she has never smoked. She has never used smokeless tobacco. She reports that she does not drink alcohol or use drugs.  Review of Systems   Constitutional: Negative for chills and fever.   Gastrointestinal: Negative for abdominal pain, constipation, diarrhea, nausea and vomiting.   Genitourinary: Positive for dysuria and frequency. Negative for difficulty urinating, flank pain and hematuria.        Cloudy urine    Musculoskeletal: Negative for back pain and gait problem.       Objective:      Physical Exam   Constitutional: She is oriented to person, place, and time. She appears well-developed and well-nourished. No distress.   Eyes: Pupils are equal, round, and reactive to light. Conjunctivae and EOM are normal.  Right eye exhibits no discharge. Left eye exhibits no discharge. No scleral icterus.   Neck: Normal range of motion. Neck supple.   Cardiovascular: Normal rate and regular rhythm. Exam reveals no gallop and no friction rub.   No murmur heard.  Pulmonary/Chest: Effort normal and breath sounds normal. No respiratory distress. She has no wheezes. She has no rales.   Abdominal: Soft. Bowel sounds are normal. There is no tenderness.   Genitourinary:   Genitourinary Comments: No CVA tendereness   Musculoskeletal: Normal range of motion. She exhibits no edema.   Neurological: She is alert and oriented to person, place, and time.   Skin: Skin is warm and dry. She is not diaphoretic.   Psychiatric: She has a normal mood and affect.       Assessment:       1. Dysuria    2. Urinary frequency    3. Urinary tract infection with hematuria, site unspecified    4. History of kidney stones    5. Hematuria, unspecified type        Plan:         Dysuria  -     POCT urinalysis, dipstick or tablet reag  - UA plus 2 leukocytes 250 blood    Urinary frequency  -     POCT urinalysis, dipstick or tablet reag    Urinary tract infection with hematuria, site unspecified  -     Urinalysis  -     Urine culture  -     amoxicillin-clavulanate 875-125mg (AUGMENTIN) 875-125 mg per tablet; Take 1 tablet by mouth 2 (two) times daily. for 5 days  Dispense: 10 tablet; Refill: 0    History of kidney stones  -     Ambulatory referral to Urology    Hematuria, unspecified type  -     Ambulatory referral to Urology    - I explained to her that her UA and urine culture was pending. I may have to change antibiotics.

## 2019-10-29 NOTE — PATIENT INSTRUCTIONS
Drink lots of water   AZO standard for burning and frequency   Augmentin 875 mg twice a day with food   UA and urine culture pending   May need to change antibiotics  Referral to urology   Follow up with Dr. Castillo for a physical

## 2019-11-01 ENCOUNTER — HOSPITAL ENCOUNTER (OUTPATIENT)
Dept: RADIOLOGY | Facility: HOSPITAL | Age: 54
Discharge: HOME | End: 2019-11-01
Attending: FAMILY MEDICINE
Payer: COMMERCIAL

## 2019-11-01 DIAGNOSIS — Z12.31 ENCOUNTER FOR SCREENING MAMMOGRAM FOR MALIGNANT NEOPLASM OF BREAST: ICD-10-CM

## 2019-11-01 LAB — BACTERIA UR CULT: ABNORMAL

## 2019-11-01 PROCEDURE — 77067 SCR MAMMO BI INCL CAD: CPT | Mod: 26,,, | Performed by: RADIOLOGY

## 2019-11-01 PROCEDURE — 77063 MAMMO DIGITAL SCREENING BILAT WITH TOMOSYNTHESIS_CAD: ICD-10-PCS | Mod: 26,,, | Performed by: RADIOLOGY

## 2019-11-01 PROCEDURE — 77067 MAMMO DIGITAL SCREENING BILAT WITH TOMOSYNTHESIS_CAD: ICD-10-PCS | Mod: 26,,, | Performed by: RADIOLOGY

## 2019-11-01 PROCEDURE — 77063 BREAST TOMOSYNTHESIS BI: CPT | Mod: 26,,, | Performed by: RADIOLOGY

## 2019-11-01 PROCEDURE — 77067 SCR MAMMO BI INCL CAD: CPT | Mod: TC,PO

## 2019-11-03 ENCOUNTER — PATIENT MESSAGE (OUTPATIENT)
Dept: FAMILY MEDICINE | Facility: CLINIC | Age: 54
End: 2019-11-03

## 2019-11-04 ENCOUNTER — TELEPHONE (OUTPATIENT)
Dept: FAMILY MEDICINE | Facility: CLINIC | Age: 54
End: 2019-11-04

## 2019-11-04 DIAGNOSIS — N39.0 URINARY TRACT INFECTION WITH HEMATURIA, SITE UNSPECIFIED: ICD-10-CM

## 2019-11-04 DIAGNOSIS — R31.9 URINARY TRACT INFECTION WITH HEMATURIA, SITE UNSPECIFIED: ICD-10-CM

## 2019-11-04 RX ORDER — AMOXICILLIN AND CLAVULANATE POTASSIUM 875; 125 MG/1; MG/1
1 TABLET, FILM COATED ORAL 2 TIMES DAILY
Qty: 10 TABLET | Refills: 0 | Status: SHIPPED | OUTPATIENT
Start: 2019-11-04 | End: 2019-11-09

## 2019-11-04 NOTE — TELEPHONE ENCOUNTER
I spoke with the patient and told her that I sent 5 more days of Augmentin to her pharmacy.I told her to call the office and ask for the referrals department regarding her urology referral. Patient verbalized understanding of above.

## 2019-11-13 ENCOUNTER — PATIENT OUTREACH (OUTPATIENT)
Dept: ADMINISTRATIVE | Facility: OTHER | Age: 54
End: 2019-11-13

## 2019-11-18 ENCOUNTER — OFFICE VISIT (OUTPATIENT)
Dept: UROLOGY | Facility: CLINIC | Age: 54
End: 2019-11-18
Payer: COMMERCIAL

## 2019-11-18 VITALS
DIASTOLIC BLOOD PRESSURE: 78 MMHG | WEIGHT: 219.56 LBS | BODY MASS INDEX: 30.74 KG/M2 | HEIGHT: 71 IN | SYSTOLIC BLOOD PRESSURE: 122 MMHG

## 2019-11-18 DIAGNOSIS — N39.0 RECURRENT UTI: ICD-10-CM

## 2019-11-18 DIAGNOSIS — N20.0 NEPHROLITHIASIS: ICD-10-CM

## 2019-11-18 DIAGNOSIS — R31.29 MICROHEMATURIA: Primary | ICD-10-CM

## 2019-11-18 PROCEDURE — 99244 PR OFFICE CONSULTATION,LEVEL IV: ICD-10-PCS | Mod: S$GLB,,, | Performed by: UROLOGY

## 2019-11-18 PROCEDURE — 99999 PR PBB SHADOW E&M-EST. PATIENT-LVL III: CPT | Mod: PBBFAC,,, | Performed by: UROLOGY

## 2019-11-18 PROCEDURE — 99999 PR PBB SHADOW E&M-EST. PATIENT-LVL III: ICD-10-PCS | Mod: PBBFAC,,, | Performed by: UROLOGY

## 2019-11-18 PROCEDURE — 99244 OFF/OP CNSLTJ NEW/EST MOD 40: CPT | Mod: S$GLB,,, | Performed by: UROLOGY

## 2019-11-18 NOTE — PROGRESS NOTES
"  Subjective:       Estephania Rogers is a 54 y.o. female who is a new patient who was referred by [facility] NP for evaluation of hematuria, h/o stones.      Referred for hematuria. UA micro only showed 1 RBC and +UCx. She denies gross hematuria.     She reports h/o nephrolithiasis. No recent upper tract imaging (none in Epic). Last stone about 4 years ago. She has had ESWL (2012 for 1 cm stone) and PCNL (2015 for 2cm stone) in the past. Now with occasional L flank pain when she moves in certain positions. She has not passed any stones that she is aware of.     She also c/o recent UTIs - pansensitive E coli. Both treated with Augmentin. She has not had issues of UTI in many years. UTI symptoms - urgency, frequency, dysuria.     Bariatric surgery in 2014 that may have affected her Ca metabolism by her report.     PVR (bladder scan) today - 41cc    UCx:  8/19 - >100k E coli  10/19 - >100k E coli      The following portions of the patient's history were reviewed and updated as appropriate: allergies, current medications, past family history, past medical history, past social history, past surgical history and problem list.    Review of Systems  Constitutional: no fever or chills  ENT: no nasal congestion or sore throat  Respiratory: no cough or shortness of breath  Cardiovascular: no chest pain or palpitations  Gastrointestinal: no nausea or vomiting, tolerating diet  Genitourinary: as per HPI  Hematologic/Lymphatic: no easy bruising or lymphadenopathy  Musculoskeletal: no arthralgias or myalgias  Skin: no rashes or lesions  Neurological: no seizures or tremors  Behavioral/Psych: no auditory or visual hallucinations        Objective:    Vitals: /78   Ht 5' 11" (1.803 m)   Wt 99.6 kg (219 lb 9.3 oz)   LMP 12/04/2014   BMI 30.62 kg/m²     Physical Exam   General: well developed, well nourished in no acute distress  Head: normocephalic, atraumatic  Neck: supple, trachea midline, no obvious enlargement of " thyroid  HEENT: EOMI, mucus membranes moist, sclera anicteric, no hearing impairment  Lungs: symmetric expansion, non-labored breathing  Cardiovascular: regular rate and rhythm, normal pulses  Abdomen: soft, non tender, non distended, no palpable masses, no hepatosplenomegaly, no hernias, no CVA tenderness  Musculoskeletal: no peripheral edema, normal ROM in bilateral upper and lower extremities  Lymphatics: no cervical or inguinal lymphadenopathy  Skin: no rashes or lesions  Neuro: alert and oriented x 3, no gross deficits  Psych: normal judgment and insight, normal mood/affect and non-anxious  Genitourinary:   patient declined exam      Lab Review   Urine analysis today in clinic shows positive for red blood cells 5-10    Lab Results   Component Value Date    WBC 4.61 11/30/2017    WBC 4.61 11/30/2017    HGB 13.0 11/30/2017    HGB 13.0 11/30/2017    HCT 40.4 11/30/2017    HCT 40.4 11/30/2017    MCV 95 11/30/2017    MCV 95 11/30/2017     11/30/2017     11/30/2017     Lab Results   Component Value Date    CREATININE 0.7 01/08/2015    BUN 10 01/08/2015       Imaging  NA         Assessment/Plan:      1. Microhematuria    - UA micro does not support this. Also was done at time of culture proven UTI.   - Okay to hold on workup for now.     2. Nephrolithiasis    - Prior history   - No recent imaging available   - CT RSS now     3. Recurrent UTI    - Two UTIs this year       Follow up in 3-4 weeks with CT

## 2019-11-18 NOTE — LETTER
November 18, 2019      Diane Chaparro, FNP-C  [address] [facility] [place] [address]           [facility]  [address]  [facility] [address]  Phone: [phone]  Fax: [phone]          Patient: Estephania Rogers   MR Number: [id number]   YOB: 1965   Date of Visit: 11/18/2019       Dear Diane Chaparro:    Thank you for referring Estephania Rogers to me for evaluation. Attached you will find relevant portions of my assessment and plan of care.    If you have questions, please do not hesitate to call me. I look forward to following Estephania Rogers along with you.    Sincerely,    Jocelyn Milligan MD    Enclosure  CC:  No Recipients    If you would like to receive this communication electronically, please contact [email or web address] or [phone] to request more information on [employer] access.    For providers and/or their staff who would like to refer a patient to [facility], please contact us through our one-stop-shop provider referral line, [facility], at [phone].    If you feel you have received this communication in error or would no longer like to receive these types of communications, please e-mail [email or web address]

## 2019-11-20 ENCOUNTER — INFUSION (OUTPATIENT)
Dept: INFUSION THERAPY | Facility: HOSPITAL | Age: 54
End: 2019-11-20
Attending: PSYCHIATRY & NEUROLOGY
Payer: COMMERCIAL

## 2019-11-20 VITALS
SYSTOLIC BLOOD PRESSURE: 144 MMHG | TEMPERATURE: 99 F | RESPIRATION RATE: 17 BRPM | OXYGEN SATURATION: 97 % | DIASTOLIC BLOOD PRESSURE: 70 MMHG | HEART RATE: 56 BPM

## 2019-11-20 DIAGNOSIS — D50.0 IRON DEFICIENCY ANEMIA DUE TO CHRONIC BLOOD LOSS: Primary | ICD-10-CM

## 2019-11-20 PROCEDURE — 96365 THER/PROPH/DIAG IV INF INIT: CPT

## 2019-11-20 PROCEDURE — 63600175 PHARM REV CODE 636 W HCPCS: Performed by: INTERNAL MEDICINE

## 2019-11-20 RX ADMIN — IRON SUCROSE 200 MG: 20 INJECTION, SOLUTION INTRAVENOUS at 03:11

## 2019-11-20 NOTE — PLAN OF CARE
Pt arrived to unit. Reports some fatigue but manageable. VSS. Tolerated Venofer. No reactions noted. Pt has next appt. Pt ambulated off unit, no distress noted.

## 2019-11-27 ENCOUNTER — HOSPITAL ENCOUNTER (OUTPATIENT)
Dept: RADIOLOGY | Facility: HOSPITAL | Age: 54
Discharge: HOME | End: 2019-11-27
Attending: UROLOGY
Payer: COMMERCIAL

## 2019-11-27 DIAGNOSIS — N20.0 NEPHROLITHIASIS: ICD-10-CM

## 2019-11-27 DIAGNOSIS — N39.0 RECURRENT UTI: ICD-10-CM

## 2019-11-27 PROCEDURE — 74176 CT ABD & PELVIS W/O CONTRAST: CPT | Mod: TC

## 2019-11-27 PROCEDURE — 74176 CT RENAL STONE STUDY ABD PELVIS WO: ICD-10-PCS | Mod: 26,,, | Performed by: RADIOLOGY

## 2019-11-27 PROCEDURE — 74176 CT ABD & PELVIS W/O CONTRAST: CPT | Mod: 26,,, | Performed by: RADIOLOGY

## 2019-12-05 ENCOUNTER — OFFICE VISIT (OUTPATIENT)
Dept: FAMILY MEDICINE | Facility: CLINIC | Age: 54
End: 2019-12-05
Payer: COMMERCIAL

## 2019-12-05 VITALS
HEIGHT: 71 IN | WEIGHT: 219.13 LBS | HEART RATE: 61 BPM | BODY MASS INDEX: 30.68 KG/M2 | SYSTOLIC BLOOD PRESSURE: 120 MMHG | TEMPERATURE: 99 F | OXYGEN SATURATION: 98 % | DIASTOLIC BLOOD PRESSURE: 80 MMHG

## 2019-12-05 DIAGNOSIS — F43.9 SITUATIONAL STRESS: ICD-10-CM

## 2019-12-05 DIAGNOSIS — Z00.00 ANNUAL PHYSICAL EXAM: Primary | ICD-10-CM

## 2019-12-05 DIAGNOSIS — D51.8 OTHER VITAMIN B12 DEFICIENCY ANEMIA: ICD-10-CM

## 2019-12-05 DIAGNOSIS — D50.8 OTHER IRON DEFICIENCY ANEMIA: ICD-10-CM

## 2019-12-05 PROCEDURE — 99396 PR PREVENTIVE VISIT,EST,40-64: ICD-10-PCS | Mod: S$GLB,,, | Performed by: FAMILY MEDICINE

## 2019-12-05 PROCEDURE — 99999 PR PBB SHADOW E&M-EST. PATIENT-LVL III: ICD-10-PCS | Mod: PBBFAC,,, | Performed by: FAMILY MEDICINE

## 2019-12-05 PROCEDURE — 99999 PR PBB SHADOW E&M-EST. PATIENT-LVL III: CPT | Mod: PBBFAC,,, | Performed by: FAMILY MEDICINE

## 2019-12-05 PROCEDURE — 99396 PREV VISIT EST AGE 40-64: CPT | Mod: S$GLB,,, | Performed by: FAMILY MEDICINE

## 2019-12-05 RX ORDER — FLUOXETINE HYDROCHLORIDE 20 MG/1
20 CAPSULE ORAL DAILY
Qty: 90 CAPSULE | Refills: 3 | Status: SHIPPED | OUTPATIENT
Start: 2019-12-05 | End: 2020-12-08 | Stop reason: SDUPTHER

## 2019-12-05 NOTE — PROGRESS NOTES
Assessment & Plan  Problem List Items Addressed This Visit        Oncology    Iron deficiency anemia    Current Assessment & Plan     Blood work completed by bariatric surgeon         B12 deficiency anemia      Other Visit Diagnoses     Annual physical exam    -  Primary    Situational stress        Relevant Medications    FLUoxetine 20 MG capsule      I addressed all major concerns as it related to health maintenance.  All were ordered and scheduled based on the patients wishes.  Any additional health maintenance will be readdressed at the next physical if declined or deferred by the patient.        Health Maintenance reviewed.    Follow-up: Follow up in about 1 year (around 12/5/2020).    ______________________________________________________________________    Chief Complaint  Chief Complaint   Patient presents with    Annual Exam       HPI  Estephania Rogers is a 54 y.o. female with multiple medical diagnoses as listed in the medical history and problem list that presents for annual exam.  Pt is known to me with last appointment 11/29/2018.    Patient denies any new symptoms including chest pain, SOB, blurry vision, N/V, diarrhea.  She is doing well.  Increased stress      PAST MEDICAL HISTORY:  Past Medical History:   Diagnosis Date    Anemia     iron deficiency    Anxiety     Diabetes mellitus, type 2     Hypertension     Malabsorption     Migraine headache     Nephrolithiasis     Other specified intestinal malabsorption        PAST SURGICAL HISTORY:  Past Surgical History:   Procedure Laterality Date    ANAL FISTULOTOMY  2005    APPENDECTOMY  2005    AUGMENTATION OF BREAST  2014    BREAST SURGERY  2014    CHOLECYSTECTOMY  2005    GASTRIC BYPASS  2005    SINUS SURGERY      Dr. Oral Cobb    SPINE SURGERY      TONSILLECTOMY      TOTAL REDUCTION MAMMOPLASTY  2014       SOCIAL HISTORY:  Social History     Socioeconomic History    Marital status:      Spouse name: Not on file     Number of children: Not on file    Years of education: Not on file    Highest education level: Not on file   Occupational History    Not on file   Social Needs    Financial resource strain: Not on file    Food insecurity:     Worry: Not on file     Inability: Not on file    Transportation needs:     Medical: Not on file     Non-medical: Not on file   Tobacco Use    Smoking status: Never Smoker    Smokeless tobacco: Never Used    Tobacco comment: Clerical work   Substance and Sexual Activity    Alcohol use: No     Comment: socially    Drug use: No    Sexual activity: Yes     Partners: Male     Birth control/protection: None     Comment: :  Gianni   Lifestyle    Physical activity:     Days per week: Not on file     Minutes per session: Not on file    Stress: Not on file   Relationships    Social connections:     Talks on phone: Not on file     Gets together: Not on file     Attends Oriental orthodox service: Not on file     Active member of club or organization: Not on file     Attends meetings of clubs or organizations: Not on file     Relationship status: Not on file   Other Topics Concern    Not on file   Social History Narrative    Not on file       FAMILY HISTORY:  Family History   Problem Relation Age of Onset    Cancer Father         pancreatic    Diabetes Father     Diabetes Mother     Hypertension Mother     Miscarriages / Stillbirths Mother     Stroke Mother     Diabetes Sister     Cancer Maternal Aunt         breast    Arthritis Maternal Grandmother     Arthritis Paternal Grandmother     Diabetes Sister     Breast cancer Paternal Aunt        ALLERGIES AND MEDICATIONS: updated and reviewed.  Review of patient's allergies indicates:  No Known Allergies  Current Outpatient Medications   Medication Sig Dispense Refill    ALPRAZolam (XANAX) 0.25 MG tablet TAKE ONE Tablet BY MOUTH EVERY DAY AS NEEDED 30 tablet 0    ascorbic acid (VITAMIN C) 500 MG tablet Take 500 mg by mouth once  daily.        AQUILES ASPIRIN ORAL Take 81 mg by mouth once daily.      butalbital-acetaminophen-caffeine -40 mg (FIORICET, ESGIC) -40 mg per tablet       cholecalciferol, vitamin D3, 50,000 unit Wafr Take 50,000 Units by mouth once daily.        FLUoxetine 20 MG capsule Take 1 capsule (20 mg total) by mouth once daily. 90 capsule 3    fluticasone (FLONASE) 50 mcg/actuation nasal spray 1 spray (50 mcg total) by Each Nare route once daily. 16 g 0    gabapentin (NEURONTIN) 300 MG capsule Take 1 capsule (300 mg total) by mouth every evening. 90 capsule 1    multivitamin (ONE DAILY MULTIVITAMIN) per tablet Take 1 tablet by mouth once daily.      RESTASIS 0.05 % ophthalmic emulsion   3    tiZANidine (ZANAFLEX) 4 MG tablet Take 4 mg by mouth every 8 (eight) hours as needed.  0    vitamin A 15664 UNIT capsule Take 25,000 Units by mouth once daily.        vitamin E acetate (VITAMIN E ORAL) Take 1 tablet by mouth once daily.       No current facility-administered medications for this visit.          ROS  Review of Systems   Constitutional: Negative for activity change, appetite change, fatigue, fever and unexpected weight change.   HENT: Negative.  Negative for ear discharge, ear pain, rhinorrhea and sore throat.    Eyes: Negative.    Respiratory: Negative for apnea, cough, chest tightness, shortness of breath and wheezing.    Cardiovascular: Negative for chest pain, palpitations and leg swelling.   Gastrointestinal: Negative for abdominal distention, abdominal pain, constipation, diarrhea and vomiting.   Endocrine: Negative for cold intolerance, heat intolerance, polydipsia and polyuria.   Genitourinary: Negative for decreased urine volume and urgency.   Musculoskeletal: Negative.    Skin: Negative for rash.   Neurological: Negative for dizziness and headaches.   Hematological: Does not bruise/bleed easily.   Psychiatric/Behavioral: Negative for agitation, sleep disturbance and suicidal ideas.  "          Physical Exam  Vitals:    12/05/19 0748   BP: 120/80   BP Location: Right arm   Patient Position: Sitting   BP Method: Large (Manual)   Pulse: 61   Temp: 98.7 °F (37.1 °C)   TempSrc: Oral   SpO2: 98%   Weight: 99.4 kg (219 lb 2.2 oz)   Height: 5' 11" (1.803 m)    Body mass index is 30.56 kg/m².  Weight: 99.4 kg (219 lb 2.2 oz)   Height: 5' 11" (180.3 cm)   Physical Exam   Constitutional: She is oriented to person, place, and time. She appears well-developed and well-nourished.   HENT:   Head: Normocephalic and atraumatic.   Right Ear: External ear normal.   Left Ear: External ear normal.   Nose: Nose normal.   Mouth/Throat: Oropharynx is clear and moist.   Eyes: Pupils are equal, round, and reactive to light. Conjunctivae and EOM are normal.   Cardiovascular: Normal rate, regular rhythm and normal heart sounds.   Pulmonary/Chest: Effort normal and breath sounds normal.   Neurological: She is alert and oriented to person, place, and time.   Skin: Skin is warm and dry.   Vitals reviewed.      Health Maintenance       Date Due Completion Date    TETANUS VACCINE 10/01/1984 ---    Shingles Vaccine (1 of 2) 10/01/2016 ---    Mammogram 11/01/2020 11/1/2019    Colonoscopy 12/30/2020 12/30/2010 (Done)    Override on 12/30/2010: Done (Dr. Nelly Putnam/[facility]- grade 1 internal hemorrhoids)    Pap Smear with HPV Cotest 10/07/2022 10/7/2019    Lipid Panel 05/25/2023 5/25/2018              Patient note was created using "[employer].  Any errors in syntax or even information may not have been identified and edited on initial review prior to signing this note.  "

## 2019-12-12 ENCOUNTER — PATIENT MESSAGE (OUTPATIENT)
Dept: FAMILY MEDICINE | Facility: CLINIC | Age: 54
End: 2019-12-12

## 2020-01-03 ENCOUNTER — OFFICE VISIT (OUTPATIENT)
Dept: URGENT CARE | Facility: CLINIC | Age: 55
End: 2020-01-03
Payer: COMMERCIAL

## 2020-01-03 VITALS
WEIGHT: 219 LBS | SYSTOLIC BLOOD PRESSURE: 146 MMHG | RESPIRATION RATE: 12 BRPM | DIASTOLIC BLOOD PRESSURE: 85 MMHG | BODY MASS INDEX: 30.66 KG/M2 | HEART RATE: 68 BPM | HEIGHT: 71 IN | OXYGEN SATURATION: 98 % | TEMPERATURE: 98 F

## 2020-01-03 DIAGNOSIS — S39.012A LUMBAR STRAIN, INITIAL ENCOUNTER: Primary | ICD-10-CM

## 2020-01-03 DIAGNOSIS — M54.32 SCIATIC NERVE PAIN, LEFT: ICD-10-CM

## 2020-01-03 DIAGNOSIS — M62.830 SPASM OF LUMBAR PARASPINOUS MUSCLE: ICD-10-CM

## 2020-01-03 PROCEDURE — 96372 PR INJECTION,THERAP/PROPH/DIAG2ST, IM OR SUBCUT: ICD-10-PCS | Mod: S$GLB,,, | Performed by: FAMILY MEDICINE

## 2020-01-03 PROCEDURE — 99214 PR OFFICE/OUTPT VISIT, EST, LEVL IV, 30-39 MIN: ICD-10-PCS | Mod: 25,S$GLB,, | Performed by: PHYSICIAN ASSISTANT

## 2020-01-03 PROCEDURE — 96372 THER/PROPH/DIAG INJ SC/IM: CPT | Mod: S$GLB,,, | Performed by: FAMILY MEDICINE

## 2020-01-03 PROCEDURE — 96372 PR INJECTION,THERAP/PROPH/DIAG2ST, IM OR SUBCUT: ICD-10-PCS | Mod: S$GLB,,, | Performed by: PHYSICIAN ASSISTANT

## 2020-01-03 PROCEDURE — 99214 OFFICE O/P EST MOD 30 MIN: CPT | Mod: 25,S$GLB,, | Performed by: PHYSICIAN ASSISTANT

## 2020-01-03 PROCEDURE — 96372 THER/PROPH/DIAG INJ SC/IM: CPT | Mod: S$GLB,,, | Performed by: PHYSICIAN ASSISTANT

## 2020-01-03 RX ORDER — IBUPROFEN 800 MG/1
800 TABLET ORAL 3 TIMES DAILY
Qty: 30 TABLET | Refills: 0 | Status: SHIPPED | OUTPATIENT
Start: 2020-01-03 | End: 2020-03-16 | Stop reason: SDUPTHER

## 2020-01-03 RX ORDER — METHOCARBAMOL 500 MG/1
500 TABLET, FILM COATED ORAL 3 TIMES DAILY
Qty: 21 TABLET | Refills: 0 | Status: SHIPPED | OUTPATIENT
Start: 2020-01-03 | End: 2020-01-10

## 2020-01-03 RX ORDER — BETAMETHASONE SODIUM PHOSPHATE AND BETAMETHASONE ACETATE 3; 3 MG/ML; MG/ML
9 INJECTION, SUSPENSION INTRA-ARTICULAR; INTRALESIONAL; INTRAMUSCULAR; SOFT TISSUE
Status: COMPLETED | OUTPATIENT
Start: 2020-01-03 | End: 2020-01-03

## 2020-01-03 RX ORDER — KETOROLAC TROMETHAMINE 30 MG/ML
60 INJECTION, SOLUTION INTRAMUSCULAR; INTRAVENOUS
Status: COMPLETED | OUTPATIENT
Start: 2020-01-03 | End: 2020-01-03

## 2020-01-03 RX ADMIN — BETAMETHASONE SODIUM PHOSPHATE AND BETAMETHASONE ACETATE 9 MG: 3; 3 INJECTION, SUSPENSION INTRA-ARTICULAR; INTRALESIONAL; INTRAMUSCULAR; SOFT TISSUE at 08:01

## 2020-01-03 RX ADMIN — KETOROLAC TROMETHAMINE 60 MG: 30 INJECTION, SOLUTION INTRAMUSCULAR; INTRAVENOUS at 08:01

## 2020-01-03 NOTE — PATIENT INSTRUCTIONS
Back Sprain or Strain    Injury to the muscles (strain) or ligaments (sprain) around the spine can be troubling. Injury may occur after a sudden forceful twisting or bending force such as in a car accident, after a simple awkward movement, or after lifting something heavy with poor body positioning. In any case, muscle spasm is often present and adds to the pain.  Thankfully, most people feel better in 1 to 2 weeks, and most of the rest in 1 to 2 months. Most people can remain active. Unless you had a forceful or traumatic physical injury such as a car accident or fall, X-rays may not be ordered for the first evaluation of a back sprain or strain. If pain continues and does not respond to medical treatment, your healthcare provider may then order X-rays and other tests.  Home care  The following guidelines will help you care for your injury at home:  · When in bed, try to find a comfortable position. A firm mattress is best. Try lying flat on your back with pillows under your knees. You can also try lying on your side with your knees bent up toward your chest and a pillow between your knees.  · Don't sit for long periods. Try not to take long car rides or take other trips that have you sitting for a long time. This puts more stress on the lower back than standing or walking.  · During the first 24 to 72 hours after an injury or flare-up, apply an ice pack to the painful area for 20 minutes. Then remove it for 20 minutes. Do this for 60 to 90 minutes, or several times a day. This will reduce swelling and pain. Be sure to wrap the ice pack in a thin towel or plastic to protect your skin.  · You can start with ice, then switch to heat. Heat from a hot shower, hot bath, or heating pad reduces pain and works well for muscle spasms. Put heat on the painful area for 20 minutes, then remove for 20 minutes. Do this for 60 to 90 minutes, or several times a day. Do not use a heating pad while sleeping. It can burn the  skin.  · You can alternate the ice and heat. Talk with your healthcare provider to find out the best treatment or therapy for your back pain.  · Therapeutic massage will help relax the back muscles without stretching them.  · Be aware of safe lifting methods. Do not lift anything over 15 pounds until all of the pain is gone.  Medicines  Talk to your healthcare provider before using medicines, especially if you have other health problems or are taking other medicines.  · You may use acetaminophen or ibuprofen to control pain, unless another pain medicine was prescribed. If you have chronic conditions like diabetes, liver or kidney disease, stomach ulcers, or gastrointestinal bleeding, or are taking blood-thinner medicines, talk with your doctor before taking any medicines.  · Be careful if you are given prescription medicines, narcotics, or medicine for muscle spasm. They can cause drowsiness, and affect your coordination, reflexes, and judgment. Do not drive or operate heavy machinery when taking these types of medicines. Only take pain medicine as prescribed by your healthcare provider.  Follow-up care  Follow up with your healthcare provider, or as advised. You may need physical therapy or more tests if your symptoms get worse.  If you had X-rays your healthcare provider may be checking for any broken bones, breaks, or fractures. Bruises and sprains can sometimes hurt as much as a fracture. These injuries can take time to heal completely. If your symptoms dont improve or they get worse, talk with your healthcare provider. You may need a repeat X-ray or other tests.  Call 911  Call for emergency care if any of the following occur:  · Trouble breathing  · Confused  · Very drowsy or trouble awakening  · Fainting or loss of consciousness  · Rapid or very slow heart rate  · Loss of bowel or bladder control  When to seek medical advice  Call your healthcare provider right away if any of the following occur:  · Pain  gets worse or spreads to your arms or legs  · Weakness or numbness in one or both arms or legs  · Numbness in the groin or genital area  Date Last Reviewed: 6/1/2016 © 2000-2017 [employer]. [address] [place] [address], [place] [address]. All rights reserved. This information is not intended as a substitute for professional medical care. Always follow your healthcare professional's instructions.        Back Spasm (No Trauma)    Spasm of the back muscles can occur after a sudden forceful twisting or bending force (such as in a car accident), after a simple awkward movement, or after lifting something heavy with poor body positioning. In any case, muscle spasm adds to the pain. Sleeping in an awkward position or on a poor quality mattress can also cause this. Some people respond to emotional stress by tensing the muscles of their back.  Pain that continues may need further evaluation or other types of treatment such as physical therapy.  You don't always need X-rays for the initial evaluation of back pain, unless you had a physical injury such as from a car accident or fall. If your pain continues and doesn't respond to medical treatment, X-rays and other tests may then be done.   Home care  · As soon as possible, start sitting or walking again to avoid problems from prolonged bed rest (muscle weakness, worsening back stiffness and pain, blood clots in the legs).  · When in bed, try to find a position of comfort. A firm mattress is best. Try lying flat on your back with pillows under your knees. You can also try lying on your side with your knees bent up toward your chest and a pillow between your knees.  · Avoid prolonged sitting, long car rides, or travel. This puts more stress on the lower back than standing or walking.   · During the first 24 to 72 hours after an injury or flare-up, apply an ice pack to the painful area for 20 minutes, then remove it for 20 minutes. Do this over a period of 60 to 90  minutes or several times a day. This will reduce swelling and pain. Always wrap ice packs in a thin towel.  · You can start with ice, then switch to heat. Heat (hot shower, hot bath, or heating pad) reduces pain, and works well for muscle spasms. Apply heat to the painful area for 20 minutes, then remove it for 20 minutes. Do this over a period of 60 to 90 minutes or several times a day. Do not sleep on a heating pad as it can burn or damage skin.  · Alternate ice and heat therapies.  · Be aware of safe lifting methods and do not lift anything over 15 pounds until all the pain is gone.  Gentle stretching will help your back heal faster. Do this simple routine 2 to 3 times a day until your back is feeling better.  · Lie on your back with your knees bent and both feet on the ground  · Slowly raise your left knee to your chest as you flatten your lower back against the floor. Hold for 20 to 30 seconds.  · Relax and repeat the exercise with your right knee.  · Do 2 to 3 of these exercises for each leg.  · Repeat, hugging both knees to your chest at the same time.  · Do not bounce, but use a gentle pull.  Medicines  Talk to your doctor before using medicine, especially if you have other medical problems or are taking other medicines.  You may use acetaminophen or ibuprofen to control pain, unless your healthcare provider prescribed another pain medicine. If you have a chronic condition such as diabetes, liver or kidney disease, stomach ulcer, or gastrointestinal bleeding, or are taking blood thinners, talk with your healthcare provider before taking any medicines.  Be careful if you are given prescription pain medicine, narcotics, or medicine for muscle spasm. They can cause drowsiness, affect your coordination, reflexes, or judgment. Do not drive or operate heavy machinery when taking these medicines. Take pain medicine only as prescribed by your healthcare provider.  Follow-up care  Follow up with your doctor, or as  advised. Physical therapy or further tests may be needed.  If X-rays were taken, they may be reviewed by a radiologist. You will be notified of any new findings that may affect your care.  Call 911  Seek emergency medical care if any of these occur:  · Trouble breathing  · Confusion  · Drowsiness or trouble awakening  · Fainting or loss of consciousness  · Rapid or very slow heart rate  · Loss of bowel or bladder control  When to seek medical advice  Call your healthcare provider right away if any of these occur:  · Pain becomes worse or spreads to your legs  · Weakness or numbness in one or both legs  · Numbness in the groin or genital area  · Unexplained fever over 100.4ºF (38.0ºC)  · Burning or pain when passing urine  Date Last Reviewed: 6/1/2016 © 2000-2017 [employer]. [address] [place] [address], [place] [id number]. All rights reserved. This information is not intended as a substitute for professional medical care. Always follow your healthcare professional's instructions.    You were given a steroid injection today. Risks and benefits were discussed with you.   If you have diabetes this injection will raise your blood sugar. This will normalize over the next 2-3 days. Please make sure you monitor you blood sugar accordingly.   This medication can also increase you blood pressure. Please monitor your blood pressure as discussed.   Please keep in mind that you should not take long term steroids unless prescribed by your physician. You should not exceed 4 steroid injections in a year and if you have multiple injections they should be spaced out adequately. Long term side effects and risks can occur as mentioned at todays visit.       If your symptoms persist or worsen follow-up with your primary care provider.  Even prescribed a muscle relaxer today which can be sedating and cause impairment.  Do not drive or operate machinery while on this medication.  Take this medication at bedtime for relief of  muscle spasm.  You may take ibuprofen for pain throughout the day up to 3 times a day.  Continue to ice and heat as well as do stretches to relieve muscle strain.    Please follow up with your Primary care provider within 2-5 days if your signs and symptoms have not resolved or worsen.     If your condition worsens or fails to improve we recommend that you receive another evaluation at the emergency room immediately or contact your primary medical clinic to discuss your concerns.   You must understand that you have received an Urgent Care treatment only and that you may be released before all of your medical problems are known or treated. You, the patient, will arrange for follow up care as instructed.     RED FLAGS/WARNING SYMPTOMS DISCUSSED WITH PATIENT THAT WOULD WARRANT EMERGENT MEDICAL ATTENTION. PATIENT VERBALIZED UNDERSTANDING.

## 2020-01-03 NOTE — PROGRESS NOTES
"Subjective:       Patient ID: Estephania Rogers is a 54 y.o. female.    Vitals:  height is 5' 11" (1.803 m) and weight is 99.3 kg (219 lb). Her temperature is 98.3 °F (36.8 °C). Her blood pressure is 146/85 (abnormal) and her pulse is 68. Her respiration is 12 and oxygen saturation is 98%.     Chief Complaint: Back Pain    Pt. States low back pain after lifting and transferring out of a wheelchair. She states prior back sx 20+ years ago. L4-L5 L5-S1 discectomy   Some pain radiating down left leg, shes expirenced this in the past.  Afebrile and denies any loss of bowel or bladder control.    Back Pain   This is a new problem. The current episode started yesterday. The problem occurs constantly. The problem has been gradually worsening since onset. The pain is present in the lumbar spine. The pain radiates to the left thigh. The pain is at a severity of 8/10. The symptoms are aggravated by bending, standing and twisting. Pertinent negatives include no chest pain, dysuria, fever, headaches or weakness.       Constitution: Negative for chills, fatigue and fever.   HENT: Negative for congestion and sore throat.    Neck: Negative for painful lymph nodes.   Cardiovascular: Negative for chest pain and leg swelling.   Eyes: Negative for double vision and blurred vision.   Respiratory: Negative for cough and shortness of breath.    Gastrointestinal: Negative for nausea, vomiting and diarrhea.   Genitourinary: Negative for dysuria, frequency, urgency and history of kidney stones.   Musculoskeletal: Positive for pain and back pain. Negative for joint pain, joint swelling, muscle cramps and muscle ache.   Skin: Negative for color change, pale, rash and bruising.   Allergic/Immunologic: Negative for seasonal allergies.   Neurological: Negative for dizziness, history of vertigo, light-headedness, passing out and headaches.   Hematologic/Lymphatic: Negative for swollen lymph nodes.   Psychiatric/Behavioral: Negative for " nervous/anxious, sleep disturbance and depression. The patient is not nervous/anxious.        Objective:      Physical Exam   Constitutional: She is oriented to person, place, and time. Vital signs are normal. She appears well-developed and well-nourished. She is active and cooperative. No distress.   HENT:   Head: Normocephalic and atraumatic.   Nose: Nose normal.   Mouth/Throat: Oropharynx is clear and moist and mucous membranes are normal.   Eyes: Conjunctivae and lids are normal.   Neck: Trachea normal, normal range of motion, full passive range of motion without pain and phonation normal. Neck supple.   Cardiovascular: Normal rate, regular rhythm, normal heart sounds, intact distal pulses and normal pulses. Exam reveals no gallop and no friction rub.   No murmur heard.  Pulmonary/Chest: Effort normal and breath sounds normal. No stridor. No respiratory distress. She has no wheezes. She has no rales.   Abdominal: Soft. Normal appearance and bowel sounds are normal. She exhibits no distension, no abdominal bruit, no pulsatile midline mass and no mass. There is no tenderness. There is no rebound and no guarding.   Musculoskeletal: She exhibits no edema or deformity.        Cervical back: She exhibits normal range of motion, no tenderness, no bony tenderness, no swelling, no edema, no deformity, no laceration, no pain, no spasm and normal pulse.        Thoracic back: She exhibits normal range of motion, no tenderness, no bony tenderness, no swelling, no edema, no deformity, no laceration, no pain, no spasm and normal pulse.        Lumbar back: She exhibits pain and spasm. She exhibits normal range of motion, no tenderness, no bony tenderness, no swelling, no edema, no deformity, no laceration and normal pulse.        Back:    POS ST LEG RAISE BILATERALLY, WORSE ON LEFT  FULL ROM B LE WITH 5/5 STRENGTH  2+DTR PATELLA AND ACHILLES  NVIT DISTALLY WITH SILT AND 2+BCR  ABLE TO AMBULATE WITH SMOOTH RHYTHMIC GAIT      Neurological: She is alert and oriented to person, place, and time. She has normal strength and normal reflexes. No sensory deficit.   Skin: Skin is warm, dry, intact and not diaphoretic.   Psychiatric: She has a normal mood and affect. Her speech is normal and behavior is normal. Judgment and thought content normal. Cognition and memory are normal.   Nursing note and vitals reviewed.        Assessment:       1. Lumbar strain, initial encounter    2. Spasm of lumbar paraspinous muscle    3. Sciatic nerve pain, left        Plan:     Toradol a and Celestone injection given at time of visit and patient tolerated well.  Discussed that Robaxin is sedating and can cause impairment so she should take it only at bedtime.  Discussed that ibuprofen may be taken 3 times a day.  Discussed using ice and heat to relieve inflammation and pain. Discussed that should her symptoms worsen she should to the emergency room.  Should symptoms persist or not totally resolve follow-up with primary care provider.  No cauda equina symptoms at this time.  Exam within normal limits without any numbness or paresthesias in feet.  However she does note some numbness radiating from her left lumbar spine to left mid thigh.    Lumbar strain, initial encounter  -     ketorolac injection 60 mg  -     ibuprofen (ADVIL,MOTRIN) 800 MG tablet; Take 1 tablet (800 mg total) by mouth 3 (three) times daily.  Dispense: 30 tablet; Refill: 0    Spasm of lumbar paraspinous muscle  -     methocarbamol (ROBAXIN) 500 MG Tab; Take 1 tablet (500 mg total) by mouth 3 (three) times daily. As needed for muscle spasm. May cause drowsiness for 7 days  Dispense: 21 tablet; Refill: 0    Sciatic nerve pain, left  -     betamethasone acetate-betamethasone sodium phosphate injection 9 mg    .  Patient Instructions     Back Sprain or Strain    Injury to the muscles (strain) or ligaments (sprain) around the spine can be troubling. Injury may occur after a sudden forceful  twisting or bending force such as in a car accident, after a simple awkward movement, or after lifting something heavy with poor body positioning. In any case, muscle spasm is often present and adds to the pain.  Thankfully, most people feel better in 1 to 2 weeks, and most of the rest in 1 to 2 months. Most people can remain active. Unless you had a forceful or traumatic physical injury such as a car accident or fall, X-rays may not be ordered for the first evaluation of a back sprain or strain. If pain continues and does not respond to medical treatment, your healthcare provider may then order X-rays and other tests.  Home care  The following guidelines will help you care for your injury at home:  · When in bed, try to find a comfortable position. A firm mattress is best. Try lying flat on your back with pillows under your knees. You can also try lying on your side with your knees bent up toward your chest and a pillow between your knees.  · Don't sit for long periods. Try not to take long car rides or take other trips that have you sitting for a long time. This puts more stress on the lower back than standing or walking.  · During the first 24 to 72 hours after an injury or flare-up, apply an ice pack to the painful area for 20 minutes. Then remove it for 20 minutes. Do this for 60 to 90 minutes, or several times a day. This will reduce swelling and pain. Be sure to wrap the ice pack in a thin towel or plastic to protect your skin.  · You can start with ice, then switch to heat. Heat from a hot shower, hot bath, or heating pad reduces pain and works well for muscle spasms. Put heat on the painful area for 20 minutes, then remove for 20 minutes. Do this for 60 to 90 minutes, or several times a day. Do not use a heating pad while sleeping. It can burn the skin.  · You can alternate the ice and heat. Talk with your healthcare provider to find out the best treatment or therapy for your back pain.  · Therapeutic  massage will help relax the back muscles without stretching them.  · Be aware of safe lifting methods. Do not lift anything over 15 pounds until all of the pain is gone.  Medicines  Talk to your healthcare provider before using medicines, especially if you have other health problems or are taking other medicines.  · You may use acetaminophen or ibuprofen to control pain, unless another pain medicine was prescribed. If you have chronic conditions like diabetes, liver or kidney disease, stomach ulcers, or gastrointestinal bleeding, or are taking blood-thinner medicines, talk with your doctor before taking any medicines.  · Be careful if you are given prescription medicines, narcotics, or medicine for muscle spasm. They can cause drowsiness, and affect your coordination, reflexes, and judgment. Do not drive or operate heavy machinery when taking these types of medicines. Only take pain medicine as prescribed by your healthcare provider.  Follow-up care  Follow up with your healthcare provider, or as advised. You may need physical therapy or more tests if your symptoms get worse.  If you had X-rays your healthcare provider may be checking for any broken bones, breaks, or fractures. Bruises and sprains can sometimes hurt as much as a fracture. These injuries can take time to heal completely. If your symptoms dont improve or they get worse, talk with your healthcare provider. You may need a repeat X-ray or other tests.  Call 911  Call for emergency care if any of the following occur:  · Trouble breathing  · Confused  · Very drowsy or trouble awakening  · Fainting or loss of consciousness  · Rapid or very slow heart rate  · Loss of bowel or bladder control  When to seek medical advice  Call your healthcare provider right away if any of the following occur:  · Pain gets worse or spreads to your arms or legs  · Weakness or numbness in one or both arms or legs  · Numbness in the groin or genital area  Date Last Reviewed:  6/1/2016  © 8899-9303 [employer]. [address] [place] [address], [place] [address]. All rights reserved. This information is not intended as a substitute for professional medical care. Always follow your healthcare professional's instructions.        Back Spasm (No Trauma)    Spasm of the back muscles can occur after a sudden forceful twisting or bending force (such as in a car accident), after a simple awkward movement, or after lifting something heavy with poor body positioning. In any case, muscle spasm adds to the pain. Sleeping in an awkward position or on a poor quality mattress can also cause this. Some people respond to emotional stress by tensing the muscles of their back.  Pain that continues may need further evaluation or other types of treatment such as physical therapy.  You don't always need X-rays for the initial evaluation of back pain, unless you had a physical injury such as from a car accident or fall. If your pain continues and doesn't respond to medical treatment, X-rays and other tests may then be done.   Home care  · As soon as possible, start sitting or walking again to avoid problems from prolonged bed rest (muscle weakness, worsening back stiffness and pain, blood clots in the legs).  · When in bed, try to find a position of comfort. A firm mattress is best. Try lying flat on your back with pillows under your knees. You can also try lying on your side with your knees bent up toward your chest and a pillow between your knees.  · Avoid prolonged sitting, long car rides, or travel. This puts more stress on the lower back than standing or walking.   · During the first 24 to 72 hours after an injury or flare-up, apply an ice pack to the painful area for 20 minutes, then remove it for 20 minutes. Do this over a period of 60 to 90 minutes or several times a day. This will reduce swelling and pain. Always wrap ice packs in a thin towel.  · You can start with ice, then switch to heat. Heat  (hot shower, hot bath, or heating pad) reduces pain, and works well for muscle spasms. Apply heat to the painful area for 20 minutes, then remove it for 20 minutes. Do this over a period of 60 to 90 minutes or several times a day. Do not sleep on a heating pad as it can burn or damage skin.  · Alternate ice and heat therapies.  · Be aware of safe lifting methods and do not lift anything over 15 pounds until all the pain is gone.  Gentle stretching will help your back heal faster. Do this simple routine 2 to 3 times a day until your back is feeling better.  · Lie on your back with your knees bent and both feet on the ground  · Slowly raise your left knee to your chest as you flatten your lower back against the floor. Hold for 20 to 30 seconds.  · Relax and repeat the exercise with your right knee.  · Do 2 to 3 of these exercises for each leg.  · Repeat, hugging both knees to your chest at the same time.  · Do not bounce, but use a gentle pull.  Medicines  Talk to your doctor before using medicine, especially if you have other medical problems or are taking other medicines.  You may use acetaminophen or ibuprofen to control pain, unless your healthcare provider prescribed another pain medicine. If you have a chronic condition such as diabetes, liver or kidney disease, stomach ulcer, or gastrointestinal bleeding, or are taking blood thinners, talk with your healthcare provider before taking any medicines.  Be careful if you are given prescription pain medicine, narcotics, or medicine for muscle spasm. They can cause drowsiness, affect your coordination, reflexes, or judgment. Do not drive or operate heavy machinery when taking these medicines. Take pain medicine only as prescribed by your healthcare provider.  Follow-up care  Follow up with your doctor, or as advised. Physical therapy or further tests may be needed.  If X-rays were taken, they may be reviewed by a radiologist. You will be notified of any new findings  that may affect your care.  Call 911  Seek emergency medical care if any of these occur:  · Trouble breathing  · Confusion  · Drowsiness or trouble awakening  · Fainting or loss of consciousness  · Rapid or very slow heart rate  · Loss of bowel or bladder control  When to seek medical advice  Call your healthcare provider right away if any of these occur:  · Pain becomes worse or spreads to your legs  · Weakness or numbness in one or both legs  · Numbness in the groin or genital area  · Unexplained fever over 100.4ºF (38.0ºC)  · Burning or pain when passing urine  Date Last Reviewed: 6/1/2016 © 2000-2017 [employer]. [address] [place] [address], [place] [address]. All rights reserved. This information is not intended as a substitute for professional medical care. Always follow your healthcare professional's instructions.    You were given a steroid injection today. Risks and benefits were discussed with you.   If you have diabetes this injection will raise your blood sugar. This will normalize over the next 2-3 days. Please make sure you monitor you blood sugar accordingly.   This medication can also increase you blood pressure. Please monitor your blood pressure as discussed.   Please keep in mind that you should not take long term steroids unless prescribed by your physician. You should not exceed 4 steroid injections in a year and if you have multiple injections they should be spaced out adequately. Long term side effects and risks can occur as mentioned at todays visit.       If your symptoms persist or worsen follow-up with your primary care provider.  Even prescribed a muscle relaxer today which can be sedating and cause impairment.  Do not drive or operate machinery while on this medication.  Take this medication at bedtime for relief of muscle spasm.  You may take ibuprofen for pain throughout the day up to 3 times a day.  Continue to ice and heat as well as do stretches to relieve muscle  strain.    Please follow up with your Primary care provider within 2-5 days if your signs and symptoms have not resolved or worsen.     If your condition worsens or fails to improve we recommend that you receive another evaluation at the emergency room immediately or contact your primary medical clinic to discuss your concerns.   You must understand that you have received an Urgent Care treatment only and that you may be released before all of your medical problems are known or treated. You, the patient, will arrange for follow up care as instructed.     RED FLAGS/WARNING SYMPTOMS DISCUSSED WITH PATIENT THAT WOULD WARRANT EMERGENT MEDICAL ATTENTION. PATIENT VERBALIZED UNDERSTANDING.

## 2020-01-15 ENCOUNTER — INFUSION (OUTPATIENT)
Dept: INFUSION THERAPY | Facility: HOSPITAL | Age: 55
End: 2020-01-15
Attending: PSYCHIATRY & NEUROLOGY
Payer: COMMERCIAL

## 2020-01-15 VITALS
OXYGEN SATURATION: 100 % | DIASTOLIC BLOOD PRESSURE: 61 MMHG | TEMPERATURE: 98 F | RESPIRATION RATE: 18 BRPM | HEART RATE: 72 BPM | SYSTOLIC BLOOD PRESSURE: 122 MMHG

## 2020-01-15 DIAGNOSIS — D50.0 IRON DEFICIENCY ANEMIA DUE TO CHRONIC BLOOD LOSS: Primary | ICD-10-CM

## 2020-01-15 PROCEDURE — 96365 THER/PROPH/DIAG IV INF INIT: CPT

## 2020-01-15 PROCEDURE — 63600175 PHARM REV CODE 636 W HCPCS: Performed by: INTERNAL MEDICINE

## 2020-01-15 RX ORDER — HEPARIN 100 UNIT/ML
500 SYRINGE INTRAVENOUS
Status: CANCELLED | OUTPATIENT
Start: 2020-01-15

## 2020-01-15 RX ORDER — HEPARIN 100 UNIT/ML
500 SYRINGE INTRAVENOUS
Status: CANCELLED | OUTPATIENT
Start: 2020-01-22

## 2020-01-15 RX ADMIN — IRON SUCROSE 200 MG: 20 INJECTION, SOLUTION INTRAVENOUS at 03:01

## 2020-01-15 NOTE — PLAN OF CARE
Patient received 200mg Venofer IVPB. Tolerated well. VSS. Denies any new or worsening symptoms at this time. Patient received discharge instructions and follow up appointments. Verbalized understanding and ambulated unassisted off unit by self. Patient in NAD at time of discharge.

## 2020-01-31 ENCOUNTER — PATIENT OUTREACH (OUTPATIENT)
Dept: ADMINISTRATIVE | Facility: OTHER | Age: 55
End: 2020-01-31

## 2020-01-31 NOTE — PROGRESS NOTES
LINKS immunization registry failed, Care Everywhere and Health Maintenance updated.  Chart reviewed for overdue Proactive [facility] health maintenance testing.

## 2020-02-03 ENCOUNTER — PATIENT MESSAGE (OUTPATIENT)
Dept: FAMILY MEDICINE | Facility: CLINIC | Age: 55
End: 2020-02-03

## 2020-02-03 DIAGNOSIS — M62.838 MUSCLE SPASM: Primary | ICD-10-CM

## 2020-02-03 RX ORDER — METHOCARBAMOL 500 MG/1
500 TABLET, FILM COATED ORAL 4 TIMES DAILY
Qty: 40 TABLET | Refills: 0 | Status: SHIPPED | OUTPATIENT
Start: 2020-02-03 | End: 2020-02-13

## 2020-03-11 ENCOUNTER — INFUSION (OUTPATIENT)
Dept: INFUSION THERAPY | Facility: HOSPITAL | Age: 55
End: 2020-03-11
Attending: PSYCHIATRY & NEUROLOGY
Payer: COMMERCIAL

## 2020-03-11 VITALS
OXYGEN SATURATION: 97 % | DIASTOLIC BLOOD PRESSURE: 67 MMHG | SYSTOLIC BLOOD PRESSURE: 157 MMHG | RESPIRATION RATE: 18 BRPM | HEART RATE: 51 BPM | TEMPERATURE: 98 F

## 2020-03-11 DIAGNOSIS — D50.0 IRON DEFICIENCY ANEMIA DUE TO CHRONIC BLOOD LOSS: Primary | ICD-10-CM

## 2020-03-11 PROCEDURE — 63600175 PHARM REV CODE 636 W HCPCS: Performed by: INTERNAL MEDICINE

## 2020-03-11 PROCEDURE — 96365 THER/PROPH/DIAG IV INF INIT: CPT

## 2020-03-11 RX ORDER — HEPARIN 100 UNIT/ML
500 SYRINGE INTRAVENOUS
Status: CANCELLED | OUTPATIENT
Start: 2020-03-11

## 2020-03-11 RX ADMIN — IRON SUCROSE 200 MG: 20 INJECTION, SOLUTION INTRAVENOUS at 03:03

## 2020-03-11 NOTE — PLAN OF CARE
Patient arrived to unit for q8wk Venofer infusion. Plan of care reviewed, patient agreeable to plan. Patient tolerated infusion well. VSS. Discharge instructions reviewed, patient instructed to return 5/6/20. Patient ambulated unassisted off unit, in NAD at time of discharge.

## 2020-03-16 ENCOUNTER — TELEPHONE (OUTPATIENT)
Dept: FAMILY MEDICINE | Facility: CLINIC | Age: 55
End: 2020-03-16

## 2020-03-16 ENCOUNTER — PATIENT MESSAGE (OUTPATIENT)
Dept: FAMILY MEDICINE | Facility: CLINIC | Age: 55
End: 2020-03-16

## 2020-03-16 DIAGNOSIS — M54.50 LOW BACK PAIN, UNSPECIFIED BACK PAIN LATERALITY, UNSPECIFIED CHRONICITY, UNSPECIFIED WHETHER SCIATICA PRESENT: Primary | ICD-10-CM

## 2020-03-16 DIAGNOSIS — S39.012A LUMBAR STRAIN, INITIAL ENCOUNTER: ICD-10-CM

## 2020-03-16 RX ORDER — IBUPROFEN 800 MG/1
800 TABLET ORAL 3 TIMES DAILY
Qty: 30 TABLET | Refills: 0 | Status: SHIPPED | OUTPATIENT
Start: 2020-03-16 | End: 2020-12-08

## 2020-03-16 RX ORDER — METHOCARBAMOL 500 MG/1
500 TABLET, FILM COATED ORAL 4 TIMES DAILY
Qty: 40 TABLET | Refills: 0 | Status: SHIPPED | OUTPATIENT
Start: 2020-03-16 | End: 2020-03-26

## 2020-04-30 RX ORDER — GABAPENTIN 300 MG/1
300 CAPSULE ORAL NIGHTLY
Qty: 90 CAPSULE | Refills: 1 | Status: SHIPPED | OUTPATIENT
Start: 2020-04-30 | End: 2021-08-23 | Stop reason: SDUPTHER

## 2020-05-04 ENCOUNTER — TELEPHONE (OUTPATIENT)
Dept: ADMINISTRATIVE | Facility: CLINIC | Age: 55
End: 2020-05-04

## 2020-05-04 ENCOUNTER — LAB VISIT (OUTPATIENT)
Dept: INFUSION THERAPY | Facility: HOSPITAL | Age: 55
End: 2020-05-04
Attending: NURSE PRACTITIONER
Payer: COMMERCIAL

## 2020-05-04 DIAGNOSIS — Z13.9 SCREENING FOR CONDITION: Primary | ICD-10-CM

## 2020-05-04 DIAGNOSIS — Z13.9 SCREENING FOR CONDITION: ICD-10-CM

## 2020-05-04 PROCEDURE — U0002 COVID-19 LAB TEST NON-CDC: HCPCS

## 2020-05-04 NOTE — PROGRESS NOTES
05/04/2020      In an effort to protect our immunocompromised patients from potential exposure to COVID-19, [facility] will now require all patients receiving an infusion, an injection, and/or radiation therapy to be tested for COVID-19 prior to their appointment.  All patients currently under treatment will be tested immediately, and patients initiating new treatment cycles or with one-time appointments (injections, transfusions, etc.) must be tested within 72 hours of their appointment.     Placed COVID-19 test order for patient.  A member of our team is to contact the patient in the near future to explain this process and the rationale behind it, to ask the COVID-19 screening questions, and to get the patient scheduled for their COVID-19 test.     The above was completed in accordance with instructions and guidelines set forth by [facility].     Signed,    Alberto Velásquez, MADYSON     Date:  05/04/2020

## 2020-05-04 NOTE — TELEPHONE ENCOUNTER
05/04/2020      Phoned the patient.      Discussed the following with the patient:  In an effort to protect our immunocompromised patients from potential exposure to COVID-19, [facility] will now require all patients receiving an infusion, an injection, and/or radiation therapy to be tested for COVID-19 prior to their appointment.  All patients currently under treatment will be tested immediately, and patients initiating new treatment cycles or with one-time appointments (injections, transfusions, etc.) must be tested within 72 hours of their appointment.      Symptom Patient's Response   Fever YES/NO: no   Cough YES/NO: no   Shortness of breath  YES/NO: no   Difficulty breathing YES/NO: no   GI symptoms such as diarrhea or nausea YES/NO: no   Loss of taste YES/NO: no   Loss of smell YES/NO: no     Other Screening Patient's Response   Has the patient previously undergone COVID-19 testing? YES/NO: no     If yes to the question directly above, what was the result? not applicable   Has the patient been in close contact with someone who has undergone COVID-19 testing? YES/NO: no     If yes to the question directly above, what was the result? not applicable      The patient's 24-hour COVID-19 test was ordered and scheduled.  Reviewed the appointment date, time, and location with the patient.      Advised the patient that she can expect the results to take approximately 24 hours.  Advised the patient that someone will reach out to her regarding her results if she tests positive, as her treatment appointment will be rescheduled if she tests positive for COVID-19.  Also advised the patient that if she does not hear back from our office or if she is told by our office she has tested negative for COVID-19, she can proceed with treatment as originally planned.     Questions were answered to the patient's satisfaction, and the patient verbalized understanding of information and agreement with the plan.       The above was  completed in accordance with instructions and guidelines set forth by [facility].     Signed,        Leonela Perez RN     Date:  05/04/2020

## 2020-05-05 ENCOUNTER — TELEPHONE (OUTPATIENT)
Dept: HEMATOLOGY/ONCOLOGY | Facility: CLINIC | Age: 55
End: 2020-05-05

## 2020-05-05 LAB — SARS-COV-2 RNA RESP QL NAA+PROBE: NOT DETECTED

## 2020-05-05 NOTE — TELEPHONE ENCOUNTER
----- Message from Alberto Velásquez DNP sent at 5/5/2020  8:50 AM CDT -----  The patient was notified of this result via message, and this result and accompanying note were forwarded to the patient's hematology/oncology team.

## 2020-05-06 ENCOUNTER — INFUSION (OUTPATIENT)
Dept: INFUSION THERAPY | Facility: HOSPITAL | Age: 55
End: 2020-05-06
Attending: INTERNAL MEDICINE
Payer: COMMERCIAL

## 2020-05-06 VITALS
HEART RATE: 63 BPM | TEMPERATURE: 98 F | RESPIRATION RATE: 17 BRPM | OXYGEN SATURATION: 97 % | SYSTOLIC BLOOD PRESSURE: 142 MMHG | DIASTOLIC BLOOD PRESSURE: 69 MMHG

## 2020-05-06 DIAGNOSIS — D50.0 IRON DEFICIENCY ANEMIA DUE TO CHRONIC BLOOD LOSS: Primary | ICD-10-CM

## 2020-05-06 PROCEDURE — 25000003 PHARM REV CODE 250: Performed by: INTERNAL MEDICINE

## 2020-05-06 PROCEDURE — 63600175 PHARM REV CODE 636 W HCPCS: Performed by: INTERNAL MEDICINE

## 2020-05-06 PROCEDURE — 96365 THER/PROPH/DIAG IV INF INIT: CPT

## 2020-05-06 RX ORDER — HEPARIN 100 UNIT/ML
500 SYRINGE INTRAVENOUS
Status: CANCELLED | OUTPATIENT
Start: 2020-05-13

## 2020-05-06 RX ADMIN — IRON SUCROSE 200 MG: 20 INJECTION, SOLUTION INTRAVENOUS at 03:05

## 2020-05-06 NOTE — PLAN OF CARE
Patient arrived to unit for q8wk Venofer infusion. Plan of care reviewed, patient agreeable to plan. Patient tolerated infusion well. VSS. Discharge instructions reviewed, patient instructed to return 7/1/20. Patient ambulated unassisted off unit, in NAD at time of discharge.

## 2020-05-13 ENCOUNTER — OFFICE VISIT (OUTPATIENT)
Dept: FAMILY MEDICINE | Facility: CLINIC | Age: 55
End: 2020-05-13
Payer: COMMERCIAL

## 2020-05-13 VITALS
BODY MASS INDEX: 31.54 KG/M2 | SYSTOLIC BLOOD PRESSURE: 124 MMHG | WEIGHT: 225.31 LBS | TEMPERATURE: 98 F | HEART RATE: 63 BPM | HEIGHT: 71 IN | DIASTOLIC BLOOD PRESSURE: 78 MMHG | OXYGEN SATURATION: 97 %

## 2020-05-13 DIAGNOSIS — R31.9 URINARY TRACT INFECTION WITH HEMATURIA, SITE UNSPECIFIED: ICD-10-CM

## 2020-05-13 DIAGNOSIS — N39.0 URINARY TRACT INFECTION WITH HEMATURIA, SITE UNSPECIFIED: ICD-10-CM

## 2020-05-13 DIAGNOSIS — M54.50 ACUTE RIGHT-SIDED LOW BACK PAIN WITHOUT SCIATICA: Primary | ICD-10-CM

## 2020-05-13 LAB
BACTERIA #/AREA URNS AUTO: ABNORMAL /HPF
BILIRUB SERPL-MCNC: NEGATIVE MG/DL
BILIRUB UR QL STRIP: NEGATIVE
BLOOD URINE, POC: NORMAL
CLARITY UR REFRACT.AUTO: CLEAR
COLOR UR AUTO: ABNORMAL
COLOR, POC UA: YELLOW
GLUCOSE UR QL STRIP: NEGATIVE
GLUCOSE UR QL STRIP: NORMAL
HGB UR QL STRIP: ABNORMAL
KETONES UR QL STRIP: NEGATIVE
KETONES UR QL STRIP: NEGATIVE
LEUKOCYTE ESTERASE UR QL STRIP: ABNORMAL
LEUKOCYTE ESTERASE URINE, POC: NORMAL
MICROSCOPIC COMMENT: ABNORMAL
NITRITE UR QL STRIP: NEGATIVE
NITRITE, POC UA: NEGATIVE
NON-SQ EPI CELLS #/AREA URNS AUTO: <1 /HPF
PH UR STRIP: 5 [PH] (ref 5–8)
PH, POC UA: 5
PROT UR QL STRIP: NEGATIVE
PROTEIN, POC: NEGATIVE
RBC #/AREA URNS AUTO: 5 /HPF (ref 0–4)
SP GR UR STRIP: 1 (ref 1–1.03)
SPECIFIC GRAVITY, POC UA: 1
SQUAMOUS #/AREA URNS AUTO: 0 /HPF
URN SPEC COLLECT METH UR: ABNORMAL
UROBILINOGEN, POC UA: NORMAL
WBC #/AREA URNS AUTO: 36 /HPF (ref 0–5)

## 2020-05-13 PROCEDURE — 87186 SC STD MICRODIL/AGAR DIL: CPT

## 2020-05-13 PROCEDURE — 81001 URINALYSIS AUTO W/SCOPE: CPT

## 2020-05-13 PROCEDURE — 99214 PR OFFICE/OUTPT VISIT, EST, LEVL IV, 30-39 MIN: ICD-10-PCS | Mod: 25,S$GLB,, | Performed by: NURSE PRACTITIONER

## 2020-05-13 PROCEDURE — 81001 URINALYSIS AUTO W/SCOPE: CPT | Mod: S$GLB,,, | Performed by: NURSE PRACTITIONER

## 2020-05-13 PROCEDURE — 87077 CULTURE AEROBIC IDENTIFY: CPT

## 2020-05-13 PROCEDURE — 96372 THER/PROPH/DIAG INJ SC/IM: CPT | Mod: S$GLB,,, | Performed by: NURSE PRACTITIONER

## 2020-05-13 PROCEDURE — 99999 PR PBB SHADOW E&M-EST. PATIENT-LVL V: CPT | Mod: PBBFAC,,, | Performed by: NURSE PRACTITIONER

## 2020-05-13 PROCEDURE — 99214 OFFICE O/P EST MOD 30 MIN: CPT | Mod: 25,S$GLB,, | Performed by: NURSE PRACTITIONER

## 2020-05-13 PROCEDURE — 99999 PR PBB SHADOW E&M-EST. PATIENT-LVL V: ICD-10-PCS | Mod: PBBFAC,,, | Performed by: NURSE PRACTITIONER

## 2020-05-13 PROCEDURE — 87086 URINE CULTURE/COLONY COUNT: CPT

## 2020-05-13 PROCEDURE — 87088 URINE BACTERIA CULTURE: CPT

## 2020-05-13 PROCEDURE — 96372 PR INJECTION,THERAP/PROPH/DIAG2ST, IM OR SUBCUT: ICD-10-PCS | Mod: S$GLB,,, | Performed by: NURSE PRACTITIONER

## 2020-05-13 PROCEDURE — 81001 POCT URINALYSIS, DIPSTICK OR TABLET REAGENT, AUTOMATED, WITH MICROSCOP: ICD-10-PCS | Mod: S$GLB,,, | Performed by: NURSE PRACTITIONER

## 2020-05-13 RX ORDER — KETOROLAC TROMETHAMINE 30 MG/ML
60 INJECTION, SOLUTION INTRAMUSCULAR; INTRAVENOUS
Status: COMPLETED | OUTPATIENT
Start: 2020-05-13 | End: 2020-05-13

## 2020-05-13 RX ORDER — SULFAMETHOXAZOLE AND TRIMETHOPRIM 800; 160 MG/1; MG/1
1 TABLET ORAL 2 TIMES DAILY
Qty: 10 TABLET | Refills: 0 | Status: SHIPPED | OUTPATIENT
Start: 2020-05-13 | End: 2020-05-18

## 2020-05-13 RX ADMIN — KETOROLAC TROMETHAMINE 60 MG: 30 INJECTION, SOLUTION INTRAMUSCULAR; INTRAVENOUS at 09:05

## 2020-05-13 NOTE — PATIENT INSTRUCTIONS
Use Robaxin and Ibuprofen as needed but don't take Ibuprofen until tonight since getting a Toradol injection  Bactrim DS on twice a day x 5 days  AZO standard for burning and urinary frequency  Urine culture pending may need to change antibiotics

## 2020-05-13 NOTE — PROGRESS NOTES
Subjective:       Patient ID: Estephania Rogers is a 54 y.o. female.    Chief Complaint: Back Pain and Urinary Tract Infection    54-year-old female presents to the clinic today with complaint of urinary frequency and dysuria that started last week.  She took 1 Bactrim and the symptoms  improved.  However, she started yesterday with urgency and dysuria.  She denies any fever, chills, hematuria, flank pain, abdominal pain, nausea, vomiting, or diarrhea.  She has been eating and drinking without any difficulty.  She also complains of right-sided lower back pain since yesterday.  Her back pain flares up every once in a while.  This is usually from her lifting her sister who is in a wheelchair.  Last night she tried Robaxin and a pain pill without much relief.  She states the Toradol injection usually helps resolve the pain.  She denies any pain, numbness, tingling, or weakness to lower extremities.  She has normal gait.    Past Medical History:   Diagnosis Date    Anemia     iron deficiency    Anxiety     Diabetes mellitus, type 2     Hypertension     Malabsorption     Migraine headache     Nephrolithiasis     Other specified intestinal malabsorption      Past Surgical History:   Procedure Laterality Date    ANAL FISTULOTOMY  2005    APPENDECTOMY  2005    AUGMENTATION OF BREAST  2014    BREAST SURGERY  2014    CHOLECYSTECTOMY  2005    GASTRIC BYPASS  2005    SINUS SURGERY      Dr. Oral Cobb    SPINE SURGERY      TONSILLECTOMY      TOTAL REDUCTION MAMMOPLASTY  2014      reports that she has never smoked. She has never used smokeless tobacco. She reports that she does not drink alcohol or use drugs.  Review of Systems   Constitutional: Negative for chills and fever.   Respiratory: Negative for cough, shortness of breath and wheezing.    Cardiovascular: Negative for chest pain, palpitations and leg swelling.   Gastrointestinal: Negative for abdominal pain, diarrhea, nausea and vomiting.    Genitourinary: Positive for dysuria and urgency. Negative for difficulty urinating, flank pain and frequency.   Musculoskeletal: Positive for back pain. Negative for gait problem.   Neurological: Negative for weakness and numbness.        She denies any pain, numbness, tingling or weakness to lower extremities        Objective:      Physical Exam   Constitutional: She is oriented to person, place, and time. She appears well-developed and well-nourished. No distress.   Eyes: Pupils are equal, round, and reactive to light. Conjunctivae and EOM are normal. Right eye exhibits no discharge. Left eye exhibits no discharge. No scleral icterus.   Cardiovascular: Normal rate, regular rhythm and normal heart sounds. Exam reveals no gallop and no friction rub.   No murmur heard.  Pulmonary/Chest: Effort normal and breath sounds normal. No respiratory distress. She has no wheezes.   Abdominal: Soft. Bowel sounds are normal. There is no tenderness.   Musculoskeletal: Normal range of motion. She exhibits tenderness.   Spine non-tender to palpation mild tenderness noted to right lower back over L4-L5    Neurological: She is alert and oriented to person, place, and time.   Lower leg reflexes symmetrical negative leg raises    Skin: Skin is warm and dry. She is not diaphoretic.   Psychiatric: She has a normal mood and affect. Her behavior is normal. Judgment and thought content normal.       Assessment:       1. Acute right-sided low back pain without sciatica    2. Urinary tract infection with hematuria, site unspecified        Plan:         Acute right-sided low back pain without sciatica  -     POCT urinalysis, dipstick or tablet reag  -     ketorolac injection 60 mg  - UA plus 2 leukocytes and trace blood otherwise normal  - continue Ibuprofen and Robaxin as needed     Urinary tract infection with hematuria, site unspecified  -     Urinalysis  -     Urine culture  -     sulfamethoxazole-trimethoprim 800-160mg (BACTRIM DS)  800-160 mg Tab; Take 1 tablet by mouth 2 (two) times daily. for 5 days  Dispense: 10 tablet; Refill: 0

## 2020-05-16 LAB — BACTERIA UR CULT: ABNORMAL

## 2020-06-03 ENCOUNTER — TELEPHONE (OUTPATIENT)
Dept: PODIATRY | Facility: CLINIC | Age: 55
End: 2020-06-03

## 2020-06-05 ENCOUNTER — OFFICE VISIT (OUTPATIENT)
Dept: URGENT CARE | Facility: CLINIC | Age: 55
End: 2020-06-05
Payer: COMMERCIAL

## 2020-06-05 VITALS
HEIGHT: 71 IN | SYSTOLIC BLOOD PRESSURE: 159 MMHG | DIASTOLIC BLOOD PRESSURE: 80 MMHG | WEIGHT: 225 LBS | OXYGEN SATURATION: 97 % | BODY MASS INDEX: 31.5 KG/M2 | TEMPERATURE: 98 F | HEART RATE: 58 BPM

## 2020-06-05 DIAGNOSIS — M72.2 PLANTAR FASCIITIS, RIGHT: Primary | ICD-10-CM

## 2020-06-05 PROCEDURE — 96372 THER/PROPH/DIAG INJ SC/IM: CPT | Mod: S$GLB,,, | Performed by: FAMILY MEDICINE

## 2020-06-05 PROCEDURE — 99214 PR OFFICE/OUTPT VISIT, EST, LEVL IV, 30-39 MIN: ICD-10-PCS | Mod: 25,S$GLB,, | Performed by: PHYSICIAN ASSISTANT

## 2020-06-05 PROCEDURE — 96372 PR INJECTION,THERAP/PROPH/DIAG2ST, IM OR SUBCUT: ICD-10-PCS | Mod: S$GLB,,, | Performed by: FAMILY MEDICINE

## 2020-06-05 PROCEDURE — 99214 OFFICE O/P EST MOD 30 MIN: CPT | Mod: 25,S$GLB,, | Performed by: PHYSICIAN ASSISTANT

## 2020-06-05 RX ORDER — KETOROLAC TROMETHAMINE 30 MG/ML
60 INJECTION, SOLUTION INTRAMUSCULAR; INTRAVENOUS
Status: COMPLETED | OUTPATIENT
Start: 2020-06-05 | End: 2020-06-05

## 2020-06-05 RX ORDER — PREDNISONE 20 MG/1
20 TABLET ORAL DAILY
Qty: 7 TABLET | Refills: 0 | Status: SHIPPED | OUTPATIENT
Start: 2020-06-05 | End: 2020-06-12

## 2020-06-05 RX ADMIN — KETOROLAC TROMETHAMINE 60 MG: 30 INJECTION, SOLUTION INTRAMUSCULAR; INTRAVENOUS at 11:06

## 2020-06-05 NOTE — PROGRESS NOTES
"Subjective:       Patient ID: Estephania Rogers is a 54 y.o. female.    Vitals:  height is 5' 11" (1.803 m) and weight is 102.1 kg (225 lb). Her temperature is 98 °F (36.7 °C). Her blood pressure is 159/80 (abnormal) and her pulse is 58 (abnormal). Her oxygen saturation is 97%.     Chief Complaint: Foot Pain (right)    Pt is having extreme pain in right foot starting two weeks ago. Tried taking ibuprofen and icing foot not getting much relief.  Patient has a history of plantar fasciitis and is currently seeing podiatry.  She attempted to get in touch with them but was unsuccessful.  Patient wears good supportive shoes and also has a fascia compression stocking but is still experiencing severe pain.  Her foot pain is described as aching and constant and is worse in the morning.  Denies any injury or trauma.  She states that she has been walking more than usual which may have exacerbated her symptoms.  She has tried ice as well Aleve around the clock without much relief.    Foot Pain   This is a new problem. The current episode started 1 to 4 weeks ago. The problem occurs constantly. The problem has been gradually worsening. Associated symptoms include arthralgias. Pertinent negatives include no chest pain, chills, congestion, coughing, fatigue, fever, headaches, joint swelling, myalgias, nausea, rash, sore throat, vertigo, vomiting or weakness. She has tried ice (ibuprofen) for the symptoms. The treatment provided no relief.       Constitution: Negative for chills, fatigue and fever.   HENT: Negative for congestion and sore throat.    Neck: Negative for painful lymph nodes.   Cardiovascular: Negative for chest pain and leg swelling.   Eyes: Negative for double vision and blurred vision.   Respiratory: Negative for cough and shortness of breath.    Gastrointestinal: Negative for nausea, vomiting and diarrhea.   Genitourinary: Negative for dysuria, frequency, urgency and history of kidney stones. "   Musculoskeletal: Positive for joint pain. Negative for joint swelling, muscle cramps and muscle ache.   Skin: Negative for color change, pale, rash and bruising.   Allergic/Immunologic: Negative for seasonal allergies.   Neurological: Negative for dizziness, history of vertigo, light-headedness, passing out and headaches.   Hematologic/Lymphatic: Negative for swollen lymph nodes.   Psychiatric/Behavioral: Negative for nervous/anxious, sleep disturbance and depression. The patient is not nervous/anxious.        Objective:      Physical Exam   Constitutional: She is oriented to person, place, and time. Vital signs are normal. She appears well-developed and well-nourished. She is active and cooperative. No distress.   HENT:   Head: Normocephalic and atraumatic.   Nose: Nose normal.   Mouth/Throat: Oropharynx is clear and moist and mucous membranes are normal.   Eyes: Conjunctivae and lids are normal. Right eye exhibits no discharge. Left eye exhibits no discharge. No scleral icterus.   Neck: Trachea normal, normal range of motion, full passive range of motion without pain and phonation normal. Neck supple.   Cardiovascular: Normal rate, regular rhythm, normal heart sounds, intact distal pulses and normal pulses. Exam reveals no gallop and no friction rub.   No murmur heard.  Pulmonary/Chest: Effort normal and breath sounds normal. No stridor. No respiratory distress. She has no wheezes. She has no rales.   Abdominal: Normal appearance. She exhibits no abdominal bruit and no pulsatile midline mass.   Musculoskeletal: She exhibits tenderness (Right plantar fascia in center of arch.). She exhibits no edema or deformity.        Right foot: There is tenderness and swelling. There is normal range of motion, no bony tenderness, normal capillary refill, no crepitus, no deformity and no laceration.        Feet:    Neurological: She is alert and oriented to person, place, and time. She has normal strength and normal reflexes.  No sensory deficit.   Skin: Skin is warm, dry, intact and not diaphoretic. not right foot  Psychiatric: She has a normal mood and affect. Her speech is normal and behavior is normal. Judgment and thought content normal. Cognition and memory are normal.   Nursing note and vitals reviewed.        Assessment:       1. Plantar fasciitis, right        Plan:     exam findings as well as patient history so for diagnosis of plantar fasciitis.  Toradol injection given at time of visit and patient tolerated well.  Discussed risks and benefits of steroids however because she has tried supportive treatment for 2 weeks patient will be called in prednisone at this time.  No URI symptoms, chest pain or shortness of breath at this time.  Prednisone called in and discussed to take with food.  Patient has an appointment with podiatry in 2 weeks and discussed further evaluation should symptoms persist or worsen.  Patient verbalized understanding all of her questions were answered.    Plantar fasciitis, right  -     ketorolac injection 60 mg  -     predniSONE (DELTASONE) 20 MG tablet; Take 1 tablet (20 mg total) by mouth once daily. for 7 days  Dispense: 7 tablet; Refill: 0      Patient Instructions       Treating Plantar Fasciitis  First, your healthcare provider tries to determine the cause of your problem in order to suggest ways to relieve pain. If your pain is due to poor foot mechanics, custom-made shoe inserts (orthoses) may help.    Reduce symptoms  · To relieve mild symptoms, try aspirin, ibuprofen, or other medicines as directed. Rubbing ice on the affected area may also help.  · To reduce severe pain and swelling, your healthcare provider may prescribe pills or injections or a walking cast in some instances. Physical therapy, such as ultrasound or a daily stretching program, may also be recommended. Surgery is rarely required.  · To reduce symptoms caused by poor foot mechanics, your foot may be taped. This supports the arch  and temporarily controls movement. Night splints may also help by stretching the fascia.  Control movement  If taping helps, your healthcare provider may prescribe orthoses. Built from plaster casts of your feet, these inserts control the way your foot moves. As a result, your symptoms should go away.  Reduce overuse  Every time your foot strikes the ground, the plantar fascia is stretched. You can reduce the strain on the plantar fascia and the possibility of overuse by following these suggestions:  · Lose any excess weight.  · Avoid running on hard or uneven ground.  · Use orthoses at all times in your shoes and house slippers.  If surgery is needed  Your healthcare provider may consider surgery if other types of treatment don't control your pain. During surgery, the plantar fascia is partially cut to release tension. As you heal, fibrous tissue fills the space between the heel bone and the plantar fascia.   Date Last Reviewed: 10/14/2015  © 3541-7707 [employer]. [address] [place] [address]. All rights reserved. This information is not intended as a substitute for professional medical care. Always follow your healthcare professional's instructions.      Please follow up with your Primary care provider within 2-5 days if your signs and symptoms have not resolved or worsen.     If your condition worsens or fails to improve we recommend that you receive another evaluation at the emergency room immediately or contact your primary medical clinic to discuss your concerns.   You must understand that you have received an Urgent Care treatment only and that you may be released before all of your medical problems are known or treated. You, the patient, will arrange for follow up care as instructed.     RED FLAGS/WARNING SYMPTOMS DISCUSSED WITH PATIENT THAT WOULD WARRANT EMERGENT MEDICAL ATTENTION. PATIENT VERBALIZED UNDERSTANDING.

## 2020-06-05 NOTE — PATIENT INSTRUCTIONS
Treating Plantar Fasciitis  First, your healthcare provider tries to determine the cause of your problem in order to suggest ways to relieve pain. If your pain is due to poor foot mechanics, custom-made shoe inserts (orthoses) may help.    Reduce symptoms  · To relieve mild symptoms, try aspirin, ibuprofen, or other medicines as directed. Rubbing ice on the affected area may also help.  · To reduce severe pain and swelling, your healthcare provider may prescribe pills or injections or a walking cast in some instances. Physical therapy, such as ultrasound or a daily stretching program, may also be recommended. Surgery is rarely required.  · To reduce symptoms caused by poor foot mechanics, your foot may be taped. This supports the arch and temporarily controls movement. Night splints may also help by stretching the fascia.  Control movement  If taping helps, your healthcare provider may prescribe orthoses. Built from plaster casts of your feet, these inserts control the way your foot moves. As a result, your symptoms should go away.  Reduce overuse  Every time your foot strikes the ground, the plantar fascia is stretched. You can reduce the strain on the plantar fascia and the possibility of overuse by following these suggestions:  · Lose any excess weight.  · Avoid running on hard or uneven ground.  · Use orthoses at all times in your shoes and house slippers.  If surgery is needed  Your healthcare provider may consider surgery if other types of treatment don't control your pain. During surgery, the plantar fascia is partially cut to release tension. As you heal, fibrous tissue fills the space between the heel bone and the plantar fascia.   Date Last Reviewed: 10/14/2015  © 7671-7086 [employer]. [address] [place] [address], [place] [address]. All rights reserved. This information is not intended as a substitute for professional medical care. Always follow your healthcare professional's  instructions.      Please follow up with your Primary care provider within 2-5 days if your signs and symptoms have not resolved or worsen.     If your condition worsens or fails to improve we recommend that you receive another evaluation at the emergency room immediately or contact your primary medical clinic to discuss your concerns.   You must understand that you have received an Urgent Care treatment only and that you may be released before all of your medical problems are known or treated. You, the patient, will arrange for follow up care as instructed.     RED FLAGS/WARNING SYMPTOMS DISCUSSED WITH PATIENT THAT WOULD WARRANT EMERGENT MEDICAL ATTENTION. PATIENT VERBALIZED UNDERSTANDING.

## 2020-07-01 ENCOUNTER — INFUSION (OUTPATIENT)
Dept: INFUSION THERAPY | Facility: HOSPITAL | Age: 55
End: 2020-07-01
Attending: INTERNAL MEDICINE
Payer: COMMERCIAL

## 2020-07-01 VITALS
HEART RATE: 52 BPM | OXYGEN SATURATION: 95 % | DIASTOLIC BLOOD PRESSURE: 67 MMHG | SYSTOLIC BLOOD PRESSURE: 144 MMHG | RESPIRATION RATE: 17 BRPM | TEMPERATURE: 97 F

## 2020-07-01 DIAGNOSIS — D50.0 IRON DEFICIENCY ANEMIA DUE TO CHRONIC BLOOD LOSS: Primary | ICD-10-CM

## 2020-07-01 PROCEDURE — 96365 THER/PROPH/DIAG IV INF INIT: CPT

## 2020-07-01 PROCEDURE — 63600175 PHARM REV CODE 636 W HCPCS: Performed by: INTERNAL MEDICINE

## 2020-07-01 PROCEDURE — 25000003 PHARM REV CODE 250: Performed by: INTERNAL MEDICINE

## 2020-07-01 RX ORDER — HEPARIN 100 UNIT/ML
500 SYRINGE INTRAVENOUS
Status: CANCELLED | OUTPATIENT
Start: 2020-07-08

## 2020-07-01 RX ADMIN — IRON SUCROSE 200 MG: 20 INJECTION, SOLUTION INTRAVENOUS at 03:07

## 2020-07-01 NOTE — PLAN OF CARE
Patient arrived to unit for Venofer infusion. Plan of care reviewed, patient agreeable to plan. Patient tolerated infusion well. VSS. Discharge instructions reviewed, patient instructed to return 8/26/20. Patient ambulated unassisted off unit. Patient in NAD at time of discharge.

## 2020-08-04 ENCOUNTER — OFFICE VISIT (OUTPATIENT)
Dept: FAMILY MEDICINE | Facility: CLINIC | Age: 55
End: 2020-08-04
Payer: COMMERCIAL

## 2020-08-04 VITALS
SYSTOLIC BLOOD PRESSURE: 132 MMHG | HEART RATE: 65 BPM | WEIGHT: 224.56 LBS | HEIGHT: 71 IN | BODY MASS INDEX: 31.44 KG/M2 | TEMPERATURE: 98 F | DIASTOLIC BLOOD PRESSURE: 82 MMHG | OXYGEN SATURATION: 97 %

## 2020-08-04 DIAGNOSIS — R31.9 URINARY TRACT INFECTION WITH HEMATURIA, SITE UNSPECIFIED: ICD-10-CM

## 2020-08-04 DIAGNOSIS — R35.0 URINARY FREQUENCY: ICD-10-CM

## 2020-08-04 DIAGNOSIS — N39.0 URINARY TRACT INFECTION WITH HEMATURIA, SITE UNSPECIFIED: ICD-10-CM

## 2020-08-04 DIAGNOSIS — M54.41 ACUTE RIGHT-SIDED LOW BACK PAIN WITH RIGHT-SIDED SCIATICA: ICD-10-CM

## 2020-08-04 DIAGNOSIS — R30.0 DYSURIA: Primary | ICD-10-CM

## 2020-08-04 LAB
AMORPH CRY UR QL COMP ASSIST: ABNORMAL
BACTERIA #/AREA URNS AUTO: ABNORMAL /HPF
BILIRUB SERPL-MCNC: NEGATIVE MG/DL
BILIRUB UR QL STRIP: NEGATIVE
BLOOD URINE, POC: 50
CLARITY UR REFRACT.AUTO: ABNORMAL
COLOR UR AUTO: YELLOW
COLOR, POC UA: ABNORMAL
GLUCOSE UR QL STRIP: NEGATIVE
GLUCOSE UR QL STRIP: NEGATIVE
HGB UR QL STRIP: ABNORMAL
KETONES UR QL STRIP: NEGATIVE
KETONES UR QL STRIP: NEGATIVE
LEUKOCYTE ESTERASE UR QL STRIP: ABNORMAL
LEUKOCYTE ESTERASE URINE, POC: POSITIVE
MICROSCOPIC COMMENT: ABNORMAL
NITRITE UR QL STRIP: NEGATIVE
NITRITE, POC UA: NEGATIVE
PH UR STRIP: 6 [PH] (ref 5–8)
PH, POC UA: 5
PROT UR QL STRIP: NEGATIVE
PROTEIN, POC: NEGATIVE
RBC #/AREA URNS AUTO: 7 /HPF (ref 0–4)
SP GR UR STRIP: 1 (ref 1–1.03)
SPECIFIC GRAVITY, POC UA: 1.01
SQUAMOUS #/AREA URNS AUTO: 0 /HPF
URN SPEC COLLECT METH UR: ABNORMAL
UROBILINOGEN, POC UA: NORMAL
WBC #/AREA URNS AUTO: 16 /HPF (ref 0–5)
WBC CLUMPS UR QL AUTO: ABNORMAL

## 2020-08-04 PROCEDURE — 99214 OFFICE O/P EST MOD 30 MIN: CPT | Mod: 25,S$GLB,, | Performed by: NURSE PRACTITIONER

## 2020-08-04 PROCEDURE — 81001 POCT URINALYSIS, DIPSTICK OR TABLET REAGENT, AUTOMATED, WITH MICROSCOP: ICD-10-PCS | Mod: S$GLB,,, | Performed by: NURSE PRACTITIONER

## 2020-08-04 PROCEDURE — 81001 URINALYSIS AUTO W/SCOPE: CPT

## 2020-08-04 PROCEDURE — 87186 SC STD MICRODIL/AGAR DIL: CPT

## 2020-08-04 PROCEDURE — 99999 PR PBB SHADOW E&M-EST. PATIENT-LVL V: ICD-10-PCS | Mod: PBBFAC,,, | Performed by: NURSE PRACTITIONER

## 2020-08-04 PROCEDURE — 99999 PR PBB SHADOW E&M-EST. PATIENT-LVL V: CPT | Mod: PBBFAC,,, | Performed by: NURSE PRACTITIONER

## 2020-08-04 PROCEDURE — 87088 URINE BACTERIA CULTURE: CPT

## 2020-08-04 PROCEDURE — 99214 PR OFFICE/OUTPT VISIT, EST, LEVL IV, 30-39 MIN: ICD-10-PCS | Mod: 25,S$GLB,, | Performed by: NURSE PRACTITIONER

## 2020-08-04 PROCEDURE — 81001 URINALYSIS AUTO W/SCOPE: CPT | Mod: S$GLB,,, | Performed by: NURSE PRACTITIONER

## 2020-08-04 PROCEDURE — 96372 PR INJECTION,THERAP/PROPH/DIAG2ST, IM OR SUBCUT: ICD-10-PCS | Mod: S$GLB,,, | Performed by: NURSE PRACTITIONER

## 2020-08-04 PROCEDURE — 87077 CULTURE AEROBIC IDENTIFY: CPT

## 2020-08-04 PROCEDURE — 87086 URINE CULTURE/COLONY COUNT: CPT

## 2020-08-04 PROCEDURE — 96372 THER/PROPH/DIAG INJ SC/IM: CPT | Mod: S$GLB,,, | Performed by: NURSE PRACTITIONER

## 2020-08-04 RX ORDER — PREDNISONE 20 MG/1
20 TABLET ORAL 2 TIMES DAILY
Qty: 10 TABLET | Refills: 0 | Status: SHIPPED | OUTPATIENT
Start: 2020-08-04 | End: 2020-08-09

## 2020-08-04 RX ORDER — TIZANIDINE 4 MG/1
4 TABLET ORAL NIGHTLY PRN
Qty: 30 TABLET | Refills: 0 | Status: SHIPPED | OUTPATIENT
Start: 2020-08-04 | End: 2020-09-03

## 2020-08-04 RX ORDER — KETOROLAC TROMETHAMINE 30 MG/ML
60 INJECTION, SOLUTION INTRAMUSCULAR; INTRAVENOUS
Status: COMPLETED | OUTPATIENT
Start: 2020-08-04 | End: 2020-08-04

## 2020-08-04 RX ORDER — AMOXICILLIN AND CLAVULANATE POTASSIUM 875; 125 MG/1; MG/1
1 TABLET, FILM COATED ORAL 2 TIMES DAILY
Qty: 14 TABLET | Refills: 0 | Status: SHIPPED | OUTPATIENT
Start: 2020-08-04 | End: 2020-08-11

## 2020-08-04 RX ORDER — IBUPROFEN 800 MG/1
800 TABLET ORAL 3 TIMES DAILY
Qty: 30 TABLET | Refills: 0 | Status: SHIPPED | OUTPATIENT
Start: 2020-08-04 | End: 2020-09-14

## 2020-08-04 RX ADMIN — KETOROLAC TROMETHAMINE 60 MG: 30 INJECTION, SOLUTION INTRAMUSCULAR; INTRAVENOUS at 12:08

## 2020-08-04 NOTE — PROGRESS NOTES
Subjective:       Patient ID: Estephania Rogers is a 54 y.o. female.    Chief Complaint: Urinary Tract Infection (Urinary urgency/Dysuria  3 days) and Sciatica (Right side  )    54-year-old female presents to the clinic today with complaint of urinary frequency and dysuria x3 days.  She denies any fever, chills, hematuria, abdominal pain, nausea, vomiting, or diarrhea.  She is eating and drinking without any difficulty.  She also complains of right-sided lower back pain radiating down right leg for the last 2 days.  She tried a Robaxin this morning without any relief.  In the past a Toradol injection and ibuprofen has helped her with Zanaflex at night.  He denies any urinary or bowel incontinence.  She does have a history of chronic sciatica.  She also has had several UTIs this year.    Back Pain  This is a recurrent problem. The current episode started in the past 7 days. The problem occurs constantly. The problem has been gradually worsening since onset. The pain is present in the sacro-iliac. The quality of the pain is described as shooting. The pain does not radiate. The pain is at a severity of 7/10. The pain is moderate. The pain is the same all the time. The symptoms are aggravated by bending, position, stress and twisting. Stiffness is present all day. Associated symptoms include bladder incontinence and dysuria. Pertinent negatives include no abdominal pain, bowel incontinence, chest pain, fever, headaches, leg pain, numbness, paresis, paresthesias, pelvic pain, perianal numbness, tingling, weakness or weight loss. She has tried analgesics, heat and muscle relaxant for the symptoms. The treatment provided mild relief.     Past Medical History:   Diagnosis Date    Anemia     iron deficiency    Anxiety     Diabetes mellitus, type 2     Hypertension     Malabsorption     Migraine headache     Nephrolithiasis     Other specified intestinal malabsorption      Past Surgical History:   Procedure  Laterality Date    ANAL FISTULOTOMY  2005    APPENDECTOMY  2005    AUGMENTATION OF BREAST  2014    BREAST SURGERY  2014    CHOLECYSTECTOMY  2005    GASTRIC BYPASS  2005    SINUS SURGERY      Dr. Oral Cobb    SPINE SURGERY      TONSILLECTOMY      TOTAL REDUCTION MAMMOPLASTY  2014      reports that she has never smoked. She has never used smokeless tobacco. She reports that she does not drink alcohol or use drugs.  Review of Systems   Constitutional: Negative for chills, fever and weight loss.   Cardiovascular: Negative for chest pain.   Gastrointestinal: Negative for abdominal pain, bowel incontinence, constipation, diarrhea, nausea and vomiting.   Genitourinary: Positive for bladder incontinence, dysuria and frequency. Negative for decreased urine volume, hematuria and pelvic pain.   Musculoskeletal: Positive for back pain.   Neurological: Negative for dizziness, tingling, weakness, numbness, headaches and paresthesias.       Objective:      Physical Exam  Constitutional:       Appearance: Normal appearance.      Comments: Too painful to get up on stretcher    Cardiovascular:      Rate and Rhythm: Normal rate and regular rhythm.      Heart sounds: Normal heart sounds. No murmur.   Pulmonary:      Effort: Pulmonary effort is normal.      Breath sounds: Normal breath sounds. No wheezing or rhonchi.   Abdominal:      General: Bowel sounds are normal.      Palpations: Abdomen is soft.      Tenderness: There is no abdominal tenderness.   Genitourinary:     Comments: No CVA tenderness   Musculoskeletal:      Comments: Tenderness right paraspinal muscles L4-L5 and over right buttock    Skin:     General: Skin is warm and dry.   Neurological:      Mental Status: She is alert and oriented to person, place, and time.   Psychiatric:         Mood and Affect: Mood normal.         Behavior: Behavior normal.         Thought Content: Thought content normal.         Judgment: Judgment normal.         Assessment:       1.  Dysuria    2. Urinary frequency    3. Urinary tract infection with hematuria, site unspecified    4. Acute right-sided low back pain with right-sided sciatica        Plan:         Dysuria  -     POCT urinalysis, dipstick or tablet reag  - UA plus 2 leukocytes and 50 Blood     Urinary frequency  -     POCT urinalysis, dipstick or tablet reag    Urinary tract infection with hematuria, site unspecified  -     amoxicillin-clavulanate 875-125mg (AUGMENTIN) 875-125 mg per tablet; Take 1 tablet by mouth 2 (two) times daily. for 7 days  Dispense: 14 tablet; Refill: 0    Acute right-sided low back pain with right-sided sciatica  -     ketorolac injection 60 mg  -     ibuprofen (ADVIL,MOTRIN) 800 MG tablet; Take 1 tablet (800 mg total) by mouth 3 (three) times daily.  Dispense: 30 tablet; Refill: 0  -     tiZANidine (ZANAFLEX) 4 MG tablet; Take 1 tablet (4 mg total) by mouth nightly as needed.  Dispense: 30 tablet; Refill: 0  -     predniSONE (DELTASONE) 20 MG tablet; Take 1 tablet (20 mg total) by mouth 2 (two) times daily. for 5 days  Dispense: 10 tablet; Refill: 0          Answers for HPI/ROS submitted by the patient on 8/4/2020   Back pain  genital pain: No

## 2020-08-04 NOTE — PATIENT INSTRUCTIONS
Robaxin during the day for muscle spasms and Zanaflex at night  Augmentin 875 mg one twice a day x 7 days  Urine culture pending  Prednisone 20 mg one every 12 hours x 5 days

## 2020-08-06 LAB — BACTERIA UR CULT: ABNORMAL

## 2020-08-14 DIAGNOSIS — Z11.59 NEED FOR HEPATITIS C SCREENING TEST: ICD-10-CM

## 2020-08-26 ENCOUNTER — INFUSION (OUTPATIENT)
Dept: INFUSION THERAPY | Facility: HOSPITAL | Age: 55
End: 2020-08-26
Attending: INTERNAL MEDICINE
Payer: COMMERCIAL

## 2020-08-26 VITALS
RESPIRATION RATE: 18 BRPM | TEMPERATURE: 98 F | DIASTOLIC BLOOD PRESSURE: 76 MMHG | HEART RATE: 56 BPM | SYSTOLIC BLOOD PRESSURE: 174 MMHG | OXYGEN SATURATION: 100 %

## 2020-08-26 DIAGNOSIS — D50.0 IRON DEFICIENCY ANEMIA DUE TO CHRONIC BLOOD LOSS: Primary | ICD-10-CM

## 2020-08-26 PROCEDURE — 63600175 PHARM REV CODE 636 W HCPCS: Performed by: INTERNAL MEDICINE

## 2020-08-26 PROCEDURE — 25000003 PHARM REV CODE 250: Performed by: INTERNAL MEDICINE

## 2020-08-26 PROCEDURE — 96365 THER/PROPH/DIAG IV INF INIT: CPT

## 2020-08-26 RX ORDER — HEPARIN 100 UNIT/ML
500 SYRINGE INTRAVENOUS
Status: CANCELLED | OUTPATIENT
Start: 2020-09-02

## 2020-08-26 RX ADMIN — IRON SUCROSE 200 MG: 20 INJECTION, SOLUTION INTRAVENOUS at 03:08

## 2020-09-18 ENCOUNTER — OFFICE VISIT (OUTPATIENT)
Dept: URGENT CARE | Facility: CLINIC | Age: 55
End: 2020-09-18
Payer: COMMERCIAL

## 2020-09-18 VITALS
TEMPERATURE: 98 F | OXYGEN SATURATION: 98 % | SYSTOLIC BLOOD PRESSURE: 155 MMHG | RESPIRATION RATE: 15 BRPM | DIASTOLIC BLOOD PRESSURE: 79 MMHG | HEART RATE: 58 BPM

## 2020-09-18 DIAGNOSIS — R10.13 EPIGASTRIC PAIN: Primary | ICD-10-CM

## 2020-09-18 DIAGNOSIS — R14.0 GASSINESS: ICD-10-CM

## 2020-09-18 PROCEDURE — 93010 EKG 12-LEAD: ICD-10-PCS | Mod: S$GLB,,, | Performed by: INTERNAL MEDICINE

## 2020-09-18 PROCEDURE — 93010 ELECTROCARDIOGRAM REPORT: CPT | Mod: S$GLB,,, | Performed by: INTERNAL MEDICINE

## 2020-09-18 PROCEDURE — 71046 X-RAY EXAM CHEST 2 VIEWS: CPT | Mod: S$GLB,,, | Performed by: RADIOLOGY

## 2020-09-18 PROCEDURE — 93005 EKG 12-LEAD: ICD-10-PCS | Mod: S$GLB,,, | Performed by: PHYSICIAN ASSISTANT

## 2020-09-18 PROCEDURE — 99214 PR OFFICE/OUTPT VISIT, EST, LEVL IV, 30-39 MIN: ICD-10-PCS | Mod: S$GLB,,, | Performed by: PHYSICIAN ASSISTANT

## 2020-09-18 PROCEDURE — 74019 RADEX ABDOMEN 2 VIEWS: CPT | Mod: S$GLB,,, | Performed by: RADIOLOGY

## 2020-09-18 PROCEDURE — 99214 OFFICE O/P EST MOD 30 MIN: CPT | Mod: S$GLB,,, | Performed by: PHYSICIAN ASSISTANT

## 2020-09-18 PROCEDURE — 74019 XR ABDOMEN FLAT AND ERECT: ICD-10-PCS | Mod: S$GLB,,, | Performed by: RADIOLOGY

## 2020-09-18 PROCEDURE — 93005 ELECTROCARDIOGRAM TRACING: CPT | Mod: S$GLB,,, | Performed by: PHYSICIAN ASSISTANT

## 2020-09-18 PROCEDURE — 71046 XR CHEST PA AND LATERAL: ICD-10-PCS | Mod: S$GLB,,, | Performed by: RADIOLOGY

## 2020-09-18 RX ORDER — SIMETHICONE 125 MG
125 CAPSULE ORAL 4 TIMES DAILY PRN
Refills: 0 | COMMUNITY
Start: 2020-09-18 | End: 2020-12-08

## 2020-09-18 NOTE — PROGRESS NOTES
"Subjective:       Patient ID: Estephania Roegrs is a 54 y.o. female.    Vitals:  temperature is 97.9 °F (36.6 °C). Her blood pressure is 155/79 (abnormal) and her pulse is 58 (abnormal). Her respiration is 15 and oxygen saturation is 98%.     Chief Complaint: Pain    Mrs. Rogers is a 53yo female with a PMHx of anxiety, migraines, HTN, DM II, recurrent nephrolithiasis, chronic iron deficiency anemia (on VENOFER infusions), and malabsorption s/p gastric bypass, cholecystectomy, appendectomy, and anal fistulotomy in 2005 who presents to the urgent care for evaluation with c/o epigastric pain x 1 week. Describes pain as a soreness that "feels like a bruise." States pain has been progressively worsening since onset. States when she pressed on the epigastric region today, area was a 9/10 in severity of pain. States pain is a 6/10 in severity at rest. States pain radiates horizontally across diaphragm area with certain movements such as twisting/bending and also deep breathing. She denies injury or trauma. Denies fever, chills, cough, congestion, CP, palpitations, SOB, difficulty breathing or swallowing, headaches, numbness/tingling/weakness in extremities, flank pain, pelvic pain, nausea, vomiting, diarrhea, constipation, loss of appetite, heartburn, or belching. States BM are normal for her (usually 3 episodes in the morning and maybe once more throughout the day, reports normal consistency). States pain does not change (worsen or improve) with meals. States since symptom onset, she has been taking 800mg ibuprofen to help with the pain with no relief.     Pain  This is a new problem. The current episode started in the past 7 days. The problem occurs daily. The problem has been unchanged. Associated symptoms include abdominal pain. Pertinent negatives include no anorexia, arthralgias, change in bowel habit, chest pain, chills, congestion, coughing, diaphoresis, fatigue, fever, headaches, joint swelling, myalgias, " nausea, neck pain, numbness, rash, sore throat, swollen glands, urinary symptoms, vertigo, visual change, vomiting or weakness. The symptoms are aggravated by bending and twisting. She has tried NSAIDs, position changes and lying down for the symptoms. The treatment provided no relief.       Constitution: Negative for appetite change, chills, sweating, fatigue, fever and generalized weakness.   HENT: Negative for ear pain, facial swelling, facial trauma, congestion, postnasal drip, sinus pain, sinus pressure, sore throat, trouble swallowing and voice change.    Neck: Negative for neck pain, neck stiffness and painful lymph nodes.   Cardiovascular: Negative for chest trauma, chest pain, leg swelling, palpitations and sob on exertion.   Eyes: Negative for double vision and blurred vision.   Respiratory: Negative for chest tightness, cough, sputum production, shortness of breath, stridor and wheezing.    Gastrointestinal: Positive for abdominal pain and history of abdominal surgery. Negative for abdominal trauma, abdominal bloating, nausea, vomiting, constipation, diarrhea, bright red blood in stool, dark colored stools, rectal pain, heartburn and bowel incontinence.   Genitourinary: Negative for dysuria, frequency, urgency, flank pain, bladder incontinence, hematuria, history of kidney stones, vaginal pain and pelvic pain.   Musculoskeletal: Negative for pain, joint pain, joint swelling, muscle cramps and muscle ache.   Skin: Negative for color change, pale, rash and bruising.   Allergic/Immunologic: Negative for seasonal allergies, itching and sneezing.   Neurological: Negative for dizziness, history of vertigo, light-headedness, passing out, loss of balance, headaches, numbness and tingling.   Hematologic/Lymphatic: Negative for swollen lymph nodes.   Psychiatric/Behavioral: Negative for nervous/anxious, sleep disturbance and depression. The patient is not nervous/anxious.        Objective:      Physical Exam    Constitutional: She is oriented to person, place, and time. She appears well-developed. She is cooperative.  Non-toxic appearance. She does not appear ill. No distress.      Comments:Patient is sitting on exam table in no acute distress. She appears to be uncomfortable, but is Nontoxic appearing.    HENT:   Head: Normocephalic and atraumatic.   Ears:   Right Ear: External ear normal.   Left Ear: External ear normal.   Nose: Nose normal. No rhinorrhea or congestion.   Mouth/Throat: Uvula is midline, oropharynx is clear and moist and mucous membranes are normal. No uvula swelling. No posterior oropharyngeal erythema.   Eyes: Pupils are equal, round, and reactive to light. Conjunctivae and lids are normal.   Neck: Trachea normal, normal range of motion and full passive range of motion without pain. Neck supple. No muscular tenderness present. No neck rigidity.   Cardiovascular: Regular rhythm, normal heart sounds and normal pulses. Bradycardia present.      Comments: The EKG shows a regular Sinus Rhythm, at a rate of 49, there are not ST Changes. There is a previous EKG for comparison, and NO significant change was noted.    Pulmonary/Chest: Effort normal and breath sounds normal. No stridor. No respiratory distress. She has no wheezes. She has no rhonchi. She has no rales. She exhibits no mass, no tenderness, no bony tenderness, no edema and no swelling.   Abdominal: Soft. Normal appearance and bowel sounds are normal. She exhibits no distension, no fluid wave, no abdominal bruit, no pulsatile midline mass and no mass. There is abdominal tenderness in the epigastric area. There is no rebound, no guarding, no left CVA tenderness and no right CVA tenderness.      Comments: There is significant epigastric TTP on exam; otherwise abdomen soft and non TTP in RUQ, RLQ, LUQ, LLQ; no evidence of acute abdomen apprecaited; no pulsatile mass; epigastric TTP is reproducible with movement (ie. Twisting, bending and deep  breathing)   Musculoskeletal: Normal range of motion.      Right lower leg: No edema.      Left lower leg: No edema.   Lymphadenopathy:     She has no cervical adenopathy.   Neurological: She is alert and oriented to person, place, and time. She has normal strength. She displays no weakness.   Skin: Skin is warm, dry, intact, not diaphoretic and not pale. Psychiatric: Her speech is normal and behavior is normal. Mood, judgment and thought content normal.   Nursing note and vitals reviewed.        Xr Chest Pa And Lateral  Result Date: 9/18/2020  EXAMINATION: XR CHEST PA AND LATERAL CLINICAL HISTORY: Pain, unspecified TECHNIQUE: PA and lateral views of the chest were performed. COMPARISON: 09/20/2013. FINDINGS: The cardiac silhouette and superior mediastinal structures are unremarkable.  There is atherosclerotic calcification present within the aortic arch.  Pulmonary vasculature is within normal limits. The lungs are well aerated and free of focal consolidations. There is no evidence for pneumothorax or pleural effusions. Bony structures are grossly intact.   No acute chest disease identified.  No detrimental change noted when compared to 09/20/2013. Electronically signed by: Kirill Dalton MD Date:    09/18/2020 Time:    08:44    X-ray Abdomen Flat And Erect  Result Date: 9/18/2020  EXAMINATION: XR ABDOMEN FLAT AND ERECT CLINICAL HISTORY: flank pain;Epigastric pain TECHNIQUE: Flat and erect AP views of the abdomen were performed. COMPARISON: None FINDINGS: Nonspecific, nonobstructive bowel gas pattern.  No free intraperitoneal air on upright views.  No suspicious calcifications.  Surgical clips right upper quadrant.  Calcifications in the pelvis have the appearance of phleboliths. Lung bases are clear.   Nonobstructive bowel gas pattern. Electronically signed by: Sameera Le Date:    09/18/2020 Time:    09:31      Clinically well appearing, VSS. CXR WNLs. Gas noted to abdomen in flat and erect, non obstructive  pattern. EKG shows sinus vasyl, which is unchanged from previous EKGs, otherwise WNLs.   GI cocktail did not improve symptoms, no pulsatile mass noted on exam.   Discussed with patient unable to determine cause of epigastric pain with limited workup available in the urgent care setting. Discussed differential of gas, GERD, musculoskeletal cause, aneurism, pancreatitis, etc. Advised patient needs close follow up with PCP for further workup. Advised on STRICT ER precautions for new or worsening symptoms. Answered all patient questions. Patient verbalized understanding and voiced agreement with current treatment plan.      Assessment:       1. Epigastric pain    2. Gassiness        Plan:         Epigastric pain  -     XR CHEST PA AND LATERAL; Future; Expected date: 09/18/2020  -     EKG 12-lead  -     Cancel: X-Ray Abdomen AP 1 View; Future; Expected date: 09/18/2020  -     (pyxis) gi cocktail (mylanta 30 mL, lidocaine 2 % viscous 10 mL, dicyclomine 10 mL) 50 mL    Gassiness  -     simethicone (MYLICON) 125 mg Cap capsule; Take 1 capsule (125 mg total) by mouth 4 (four) times daily as needed for Flatulence.  Dispense:  ; Refill: 0      Patient Instructions     If your condition worsens or fails to improve we recommend that you receive another evaluation at the ER immediately or contact your PCP to discuss your concerns or return here.   You must understand that you've received an urgent care treatment only and that you may be released before all your medical problems are known or treated. You the patient will arrange for followup care as instructed.     Watch for any increase pain, fever, vomiting or diarrhea.     You can take prescribed simethicone to help with gas.    If symptoms worsen or fail to improve, you should go to the ER immediately for further evaluation and management as we have discussed thoroughly.               Epigastric Pain (Uncertain Cause)     Epigastric pain can be a sign of disease in the upper  abdomen. Common causes include:  · Acid reflux (stomach acid flowing up into the esophagus)  · Gastritis (irritation of the stomach lining)  · Peptic Ulcer Disease  · Inflammation of the pancreas  · Gallstone  · Infection in the gallbladder  Pain may be dull or burning. It may spread upward to the chest or to the back. There may be other symptoms such as belching, bloating, cramps or hunger pains. There may be weight loss or poor appetite, nausea or vomiting.  Since the diagnosis of your pain is not certain yet, further tests may sometimes be needed. Sometimes the doctor will treat you for the most likely condition to see if there is improvement before doing further tests.  Home care  Medicines  · Antacids help neutralize the normal acids in your stomach. Examples are Maalox, Mylanta, Rolaids, and Tums. If you dont like the liquid, you can also try a chewable one. You may find one works better than another for you. Overuse can cause diarrhea or constipation.  · Acid blockers (H2 blockers) decrease acid production. Examples are cimetidine (Tagamet), famotidine (Pepcid) and ranitidine (Zantac).  · Acid inhibitors (PPIs) decrease acid production in a different way than the blockers. You may find they work better, but can take a little longer to take effect.  Examples are omeprazole (Prilosec), lansoprazole (Prevacid), pantoprazole (Protonix), rabeprazole (Aciphex), and esomeprazole (Nexium).  · Take an antacid 30-60 minutes after eating and at bedtime, but not at the same time as an acid blocker.  · Try not to take NSAIDs. Aspirin may also cause problems, but if taking it for your heart or other medical reasons, talk to your doctor before stopping it; you do not want to cause a worse problem, like a heart attack or stroke.  Diet  · If certain foods seem to cause your spasm, try to avoid them.   · Eat slowly and chew food well before swallowing. Symptoms of gastritis can be worsened by certain foods. Limit or avoid  fatty, fried, and spicy foods, as well as coffee, chocolate, mint, and foods with high acid content such as tomatoes and citrus fruit and juices (orange, grapefruit, lemon).  · Avoid alcohol, caffeine, and tobacco, which can delay healing and worsen your problem.  · Try eating smaller meals with snacks in between  Follow-up care  Follow up with your healthcare provider or as advised.  When to seek medical advice  Call your healthcare provider right away if any of the following occur:  · Stomach pain worsens or moves to the right lower part of the abdomen  · Chest pain appears, or if it worsens or spreads to the chest, back, neck, shoulder, or arm  · Frequent vomiting (cant keep down liquids)  · Blood in the stool or vomit (red or black color)  · Feeling weak or dizzy, fainting, or having trouble breathing  · Fever of 100.4ºF (38ºC) or higher, or as directed by your healthcare provider  · Abdominal swelling  Date Last Reviewed: 9/25/2015  © 9831-1885 [employer]. [address] [place] [address], [place] [address]. All rights reserved. This information is not intended as a substitute for professional medical care. Always follow your healthcare professional's instructions.

## 2020-09-18 NOTE — PATIENT INSTRUCTIONS
If your condition worsens or fails to improve we recommend that you receive another evaluation at the ER immediately or contact your PCP to discuss your concerns or return here.   You must understand that you've received an urgent care treatment only and that you may be released before all your medical problems are known or treated. You the patient will arrange for followup care as instructed.     Watch for any increase pain, fever, vomiting or diarrhea.     You can take prescribed simethicone to help with gas.    If symptoms worsen or fail to improve, you should go to the ER immediately for further evaluation and management as we have discussed thoroughly.               Epigastric Pain (Uncertain Cause)     Epigastric pain can be a sign of disease in the upper abdomen. Common causes include:  · Acid reflux (stomach acid flowing up into the esophagus)  · Gastritis (irritation of the stomach lining)  · Peptic Ulcer Disease  · Inflammation of the pancreas  · Gallstone  · Infection in the gallbladder  Pain may be dull or burning. It may spread upward to the chest or to the back. There may be other symptoms such as belching, bloating, cramps or hunger pains. There may be weight loss or poor appetite, nausea or vomiting.  Since the diagnosis of your pain is not certain yet, further tests may sometimes be needed. Sometimes the doctor will treat you for the most likely condition to see if there is improvement before doing further tests.  Home care  Medicines  · Antacids help neutralize the normal acids in your stomach. Examples are Maalox, Mylanta, Rolaids, and Tums. If you dont like the liquid, you can also try a chewable one. You may find one works better than another for you. Overuse can cause diarrhea or constipation.  · Acid blockers (H2 blockers) decrease acid production. Examples are cimetidine (Tagamet), famotidine (Pepcid) and ranitidine (Zantac).  · Acid inhibitors (PPIs) decrease acid production in a  different way than the blockers. You may find they work better, but can take a little longer to take effect.  Examples are omeprazole (Prilosec), lansoprazole (Prevacid), pantoprazole (Protonix), rabeprazole (Aciphex), and esomeprazole (Nexium).  · Take an antacid 30-60 minutes after eating and at bedtime, but not at the same time as an acid blocker.  · Try not to take NSAIDs. Aspirin may also cause problems, but if taking it for your heart or other medical reasons, talk to your doctor before stopping it; you do not want to cause a worse problem, like a heart attack or stroke.  Diet  · If certain foods seem to cause your spasm, try to avoid them.   · Eat slowly and chew food well before swallowing. Symptoms of gastritis can be worsened by certain foods. Limit or avoid fatty, fried, and spicy foods, as well as coffee, chocolate, mint, and foods with high acid content such as tomatoes and citrus fruit and juices (orange, grapefruit, lemon).  · Avoid alcohol, caffeine, and tobacco, which can delay healing and worsen your problem.  · Try eating smaller meals with snacks in between  Follow-up care  Follow up with your healthcare provider or as advised.  When to seek medical advice  Call your healthcare provider right away if any of the following occur:  · Stomach pain worsens or moves to the right lower part of the abdomen  · Chest pain appears, or if it worsens or spreads to the chest, back, neck, shoulder, or arm  · Frequent vomiting (cant keep down liquids)  · Blood in the stool or vomit (red or black color)  · Feeling weak or dizzy, fainting, or having trouble breathing  · Fever of 100.4ºF (38ºC) or higher, or as directed by your healthcare provider  · Abdominal swelling  Date Last Reviewed: 9/25/2015  © 7841-3490 [employer]. [address] [place] [address], [place] [address]. All rights reserved. This information is not intended as a substitute for professional medical care. Always follow your healthcare  professional's instructions.

## 2020-09-22 ENCOUNTER — LAB VISIT (OUTPATIENT)
Dept: LAB | Facility: HOSPITAL | Age: 55
End: 2020-09-22
Attending: FAMILY MEDICINE
Payer: COMMERCIAL

## 2020-09-22 ENCOUNTER — OFFICE VISIT (OUTPATIENT)
Dept: FAMILY MEDICINE | Facility: CLINIC | Age: 55
End: 2020-09-22
Payer: COMMERCIAL

## 2020-09-22 VITALS
DIASTOLIC BLOOD PRESSURE: 84 MMHG | BODY MASS INDEX: 31.28 KG/M2 | SYSTOLIC BLOOD PRESSURE: 130 MMHG | HEIGHT: 71 IN | TEMPERATURE: 98 F | HEART RATE: 64 BPM | OXYGEN SATURATION: 98 % | WEIGHT: 223.44 LBS

## 2020-09-22 DIAGNOSIS — Z11.59 NEED FOR HEPATITIS C SCREENING TEST: ICD-10-CM

## 2020-09-22 DIAGNOSIS — R10.10 UPPER ABDOMINAL PAIN: Primary | ICD-10-CM

## 2020-09-22 DIAGNOSIS — R10.10 UPPER ABDOMINAL PAIN: ICD-10-CM

## 2020-09-22 LAB
ALBUMIN SERPL BCP-MCNC: 4 G/DL (ref 3.5–5.2)
ALP SERPL-CCNC: 121 U/L (ref 55–135)
ALT SERPL W/O P-5'-P-CCNC: 69 U/L (ref 10–44)
AMYLASE SERPL-CCNC: 65 U/L (ref 20–110)
ANION GAP SERPL CALC-SCNC: 7 MMOL/L (ref 8–16)
AST SERPL-CCNC: 62 U/L (ref 10–40)
BASOPHILS # BLD AUTO: 0.03 K/UL (ref 0–0.2)
BASOPHILS NFR BLD: 0.6 % (ref 0–1.9)
BILIRUB SERPL-MCNC: 1.4 MG/DL (ref 0.1–1)
BUN SERPL-MCNC: 9 MG/DL (ref 6–20)
CALCIUM SERPL-MCNC: 9.2 MG/DL (ref 8.7–10.5)
CHLORIDE SERPL-SCNC: 106 MMOL/L (ref 95–110)
CO2 SERPL-SCNC: 28 MMOL/L (ref 23–29)
CREAT SERPL-MCNC: 0.7 MG/DL (ref 0.5–1.4)
DIFFERENTIAL METHOD: ABNORMAL
EOSINOPHIL # BLD AUTO: 0.2 K/UL (ref 0–0.5)
EOSINOPHIL NFR BLD: 3 % (ref 0–8)
ERYTHROCYTE [DISTWIDTH] IN BLOOD BY AUTOMATED COUNT: 12.5 % (ref 11.5–14.5)
EST. GFR  (AFRICAN AMERICAN): >60 ML/MIN/1.73 M^2
EST. GFR  (NON AFRICAN AMERICAN): >60 ML/MIN/1.73 M^2
GLUCOSE SERPL-MCNC: 98 MG/DL (ref 70–110)
HCT VFR BLD AUTO: 42.6 % (ref 37–48.5)
HCV AB SERPL QL IA: NEGATIVE
HGB BLD-MCNC: 13.4 G/DL (ref 12–16)
IMM GRANULOCYTES # BLD AUTO: 0.01 K/UL (ref 0–0.04)
IMM GRANULOCYTES NFR BLD AUTO: 0.2 % (ref 0–0.5)
LIPASE SERPL-CCNC: 40 U/L (ref 4–60)
LYMPHOCYTES # BLD AUTO: 1.4 K/UL (ref 1–4.8)
LYMPHOCYTES NFR BLD: 27.4 % (ref 18–48)
MCH RBC QN AUTO: 31.5 PG (ref 27–31)
MCHC RBC AUTO-ENTMCNC: 31.5 G/DL (ref 32–36)
MCV RBC AUTO: 100 FL (ref 82–98)
MONOCYTES # BLD AUTO: 0.3 K/UL (ref 0.3–1)
MONOCYTES NFR BLD: 6.7 % (ref 4–15)
NEUTROPHILS # BLD AUTO: 3.1 K/UL (ref 1.8–7.7)
NEUTROPHILS NFR BLD: 62.1 % (ref 38–73)
NRBC BLD-RTO: 0 /100 WBC
PLATELET # BLD AUTO: 191 K/UL (ref 150–350)
PMV BLD AUTO: 11.5 FL (ref 9.2–12.9)
POTASSIUM SERPL-SCNC: 4.3 MMOL/L (ref 3.5–5.1)
PROT SERPL-MCNC: 7 G/DL (ref 6–8.4)
RBC # BLD AUTO: 4.26 M/UL (ref 4–5.4)
SODIUM SERPL-SCNC: 141 MMOL/L (ref 136–145)
WBC # BLD AUTO: 4.92 K/UL (ref 3.9–12.7)

## 2020-09-22 PROCEDURE — 99214 OFFICE O/P EST MOD 30 MIN: CPT | Mod: S$GLB,,, | Performed by: NURSE PRACTITIONER

## 2020-09-22 PROCEDURE — 82150 ASSAY OF AMYLASE: CPT

## 2020-09-22 PROCEDURE — 99999 PR PBB SHADOW E&M-EST. PATIENT-LVL V: CPT | Mod: PBBFAC,,, | Performed by: NURSE PRACTITIONER

## 2020-09-22 PROCEDURE — 36415 COLL VENOUS BLD VENIPUNCTURE: CPT | Mod: PO

## 2020-09-22 PROCEDURE — 80053 COMPREHEN METABOLIC PANEL: CPT

## 2020-09-22 PROCEDURE — 86803 HEPATITIS C AB TEST: CPT

## 2020-09-22 PROCEDURE — 83690 ASSAY OF LIPASE: CPT

## 2020-09-22 PROCEDURE — 99999 PR PBB SHADOW E&M-EST. PATIENT-LVL V: ICD-10-PCS | Mod: PBBFAC,,, | Performed by: NURSE PRACTITIONER

## 2020-09-22 PROCEDURE — 85025 COMPLETE CBC W/AUTO DIFF WBC: CPT

## 2020-09-22 PROCEDURE — 99214 PR OFFICE/OUTPT VISIT, EST, LEVL IV, 30-39 MIN: ICD-10-PCS | Mod: S$GLB,,, | Performed by: NURSE PRACTITIONER

## 2020-09-22 RX ORDER — OMEPRAZOLE 20 MG/1
40 CAPSULE, DELAYED RELEASE ORAL DAILY
Qty: 180 CAPSULE | Refills: 0 | Status: SHIPPED | OUTPATIENT
Start: 2020-09-22 | End: 2020-12-08

## 2020-09-22 NOTE — PATIENT INSTRUCTIONS
Start Prilosec 40 mg daily on empty stomach   Check labs  Check abdomen ultrasound   If no improvement will refer to GI   Will get Flu shot at work

## 2020-09-22 NOTE — PROGRESS NOTES
Subjective:       Patient ID: Estephania Rogers is a 54 y.o. female.    Chief Complaint: Abdominal Pain (Upper   2 weeks)    54-year-old female presents to the clinic today complaining of epigastric abdominal pain x2 weeks.  She was seen in urgent care on 09/18/2020.  She had a normal chest x-ray.  She had a normal abdominal x-ray.  Her EKG was sinus bradycardia.  She had a GI cocktail with no relief.  She states the pain seems to be worse after eating regular food.  She denies any abdominal pain at this time.  She denies any nausea, vomiting, diarrhea, or constipation.  She says last night she ate gumbo without the chicken or sausage and the rice and felt much better.  On Friday she started taking Gas-X with some relief.  She denies any fever or chills.  She denies any dysuria, hematuria, urinary frequency, or flank pain.  She denies any cardiac chest pain, heart palpitations, shortness of breath, or swelling to lower extremities.  She has been eating mostly liquids with a very bland diet.  She has not tried any omeprazole.    Abdominal Pain  This is a new problem. The current episode started 1 to 4 weeks ago. The onset quality is sudden. The problem occurs 2 to 4 times per day. The most recent episode lasted 1 hours. The pain is located in the epigastric region. The pain is at a severity of 7/10. The pain is mild. The quality of the pain is sharp. Associated symptoms include belching and flatus. Pertinent negatives include no anorexia, arthralgias, constipation, diarrhea, dysuria, fever, frequency, headaches, hematochezia, hematuria, melena, myalgias, nausea, vomiting or weight loss. The pain is aggravated by certain positions and movement. The pain is relieved by certain positions. She has tried antacids for the symptoms. The treatment provided mild relief. Her past medical history is significant for abdominal surgery. There is no history of colon cancer, Crohn's disease, gallstones, GERD, irritable bowel  syndrome, pancreatitis, PUD or ulcerative colitis. Patient's medical history includes kidney stones and UTI.     Past Medical History:   Diagnosis Date    Anemia     iron deficiency    Anxiety     Diabetes mellitus, type 2     Hypertension     Malabsorption     Migraine headache     Nephrolithiasis     Other specified intestinal malabsorption      Past Surgical History:   Procedure Laterality Date    ANAL FISTULOTOMY  2005    APPENDECTOMY  2005    AUGMENTATION OF BREAST  2014    BREAST SURGERY  2014    CHOLECYSTECTOMY  2005    GASTRIC BYPASS  2005    SINUS SURGERY      Dr. Oral Cobb    SPINE SURGERY      TONSILLECTOMY      TOTAL REDUCTION MAMMOPLASTY  2014      reports that she has never smoked. She has never used smokeless tobacco. She reports that she does not drink alcohol or use drugs.  Review of Systems   Constitutional: Negative for chills, fever and weight loss.   Respiratory: Negative for cough, shortness of breath and stridor.    Cardiovascular: Negative for palpitations and leg swelling.   Gastrointestinal: Positive for abdominal pain and flatus. Negative for anorexia, blood in stool, constipation, diarrhea, hematochezia, melena, nausea and vomiting.   Genitourinary: Negative for dysuria, frequency and hematuria.   Musculoskeletal: Negative for arthralgias and myalgias.   Neurological: Negative for dizziness, light-headedness and headaches.   Psychiatric/Behavioral: The patient is not nervous/anxious.        Objective:      Physical Exam  Constitutional:       General: She is not in acute distress.     Appearance: Normal appearance. She is not ill-appearing, toxic-appearing or diaphoretic.   Cardiovascular:      Rate and Rhythm: Normal rate and regular rhythm.      Heart sounds: Normal heart sounds. No murmur.   Pulmonary:      Effort: Pulmonary effort is normal.      Breath sounds: Normal breath sounds. No wheezing or rhonchi.   Abdominal:      General: Bowel sounds are normal.       Palpations: Abdomen is soft.      Tenderness: There is no abdominal tenderness. There is no guarding or rebound.   Musculoskeletal: Normal range of motion.         General: No swelling.   Skin:     General: Skin is warm and dry.   Neurological:      Mental Status: She is alert and oriented to person, place, and time.   Psychiatric:         Mood and Affect: Mood normal.         Behavior: Behavior normal.         Thought Content: Thought content normal.         Judgment: Judgment normal.         Assessment:       1. Upper abdominal pain        Plan:         Upper abdominal pain  -     CBC auto differential; Future; Expected date: 09/22/2020  -     Comprehensive metabolic panel; Future; Expected date: 09/22/2020  -     Amylase; Future; Expected date: 09/22/2020  -     Lipase; Future; Expected date: 09/22/2020  -     US Abdomen Complete; Future; Expected date: 09/22/2020  -     omeprazole (PRILOSEC) 20 MG capsule; Take 2 capsules (40 mg total) by mouth once daily.  Dispense: 180 capsule; Refill: 0      - If no improvement of above will refer to GI

## 2020-09-25 ENCOUNTER — HOSPITAL ENCOUNTER (OUTPATIENT)
Dept: RADIOLOGY | Facility: HOSPITAL | Age: 55
Discharge: HOME | End: 2020-09-25
Attending: NURSE PRACTITIONER
Payer: COMMERCIAL

## 2020-09-25 DIAGNOSIS — R10.10 UPPER ABDOMINAL PAIN: ICD-10-CM

## 2020-09-25 PROCEDURE — 76700 US EXAM ABDOM COMPLETE: CPT | Mod: TC

## 2020-09-25 PROCEDURE — 76700 US EXAM ABDOM COMPLETE: CPT | Mod: 26,,, | Performed by: RADIOLOGY

## 2020-09-25 PROCEDURE — 76700 US ABDOMEN COMPLETE: ICD-10-PCS | Mod: 26,,, | Performed by: RADIOLOGY

## 2020-09-28 ENCOUNTER — TELEPHONE (OUTPATIENT)
Dept: FAMILY MEDICINE | Facility: CLINIC | Age: 55
End: 2020-09-28

## 2020-09-28 DIAGNOSIS — R16.0 ENLARGED LIVER: Primary | ICD-10-CM

## 2020-09-28 DIAGNOSIS — R79.89 ELEVATED LFTS: ICD-10-CM

## 2020-09-28 NOTE — TELEPHONE ENCOUNTER
I left a message on the patient's voice mail that her liver was enlarged and showed fatty liver. You labs show that your liver functions were elevated some. I am going to refer your to see a liver specialist. If you have any questions you can return my call at the office.

## 2020-09-29 ENCOUNTER — TELEPHONE (OUTPATIENT)
Dept: FAMILY MEDICINE | Facility: CLINIC | Age: 55
End: 2020-09-29

## 2020-09-29 ENCOUNTER — TELEPHONE (OUTPATIENT)
Dept: TRANSPLANT | Facility: CLINIC | Age: 55
End: 2020-09-29

## 2020-09-29 ENCOUNTER — TELEPHONE (OUTPATIENT)
Dept: HEPATOLOGY | Facility: CLINIC | Age: 55
End: 2020-09-29

## 2020-09-29 ENCOUNTER — DOCUMENTATION ONLY (OUTPATIENT)
Dept: TRANSPLANT | Facility: CLINIC | Age: 55
End: 2020-09-29

## 2020-09-29 NOTE — TELEPHONE ENCOUNTER
I spoke with the patient and gave her the number of the hepatology department to call and schedule her appointment for the enlarged liver and fatty liver. Patient verbalized understanding of above.

## 2020-09-29 NOTE — NURSING
Pt records reviewed.   Pt will be referred to Hepatology.  Enlarged liver  Elevated LFTs  Initial referral received  from the workque.   Referring Provider/diagnosis  Diane Chaparro, EDWARDP-C      Referral letter sent to patient.

## 2020-09-29 NOTE — TELEPHONE ENCOUNTER
----- Message from Dixie Bowers sent at 9/29/2020  2:41 PM CDT -----    Pt chart sent to nurse for review.     .       ----- Message -----  From: Vidhya Lara  Sent: 9/29/2020   2:04 PM CDT  To: [facility] Liver Referral Pool    Pt has a hepatology referral for enlarged liver and elevated LFT's. Please call the patient to schedule. Thanks.

## 2020-09-29 NOTE — LETTER
September 29, 2020    Estephania Rogers  [address]  [place] [address]      Dear Estephania Rogers:    Your doctor has referred you to the [facility]. We are sending this letter to remind you to make an appointment with us to complete the referral process.     Please call us at [phone] to schedule an appointment. We look forward to seeing you soon.     If you received a call and have been scheduled, please disregard this letter.       Sincerely,        [facility]   [address] [place] [id number]  [phone]

## 2020-09-29 NOTE — TELEPHONE ENCOUNTER
I spoke with the patient and her insurance covered a OTC RX so she caught some. I told her to take only as needed. She has a appt next week with hepatology.

## 2020-09-29 NOTE — TELEPHONE ENCOUNTER
----- Message from Kaci Fry sent at 9/29/2020  3:00 PM CDT -----  Regarding: PT  Contact: PT  PT called to make an appointment, has a referral in her chart. I tried to schedule it bc I was told we could now but it denied me. Please call back     Callback: [phone]

## 2020-09-29 NOTE — TELEPHONE ENCOUNTER
Patient is requesting omeprazole 40 mg once daily instead of omeprazole 20 mg two tablet daily please advise.

## 2020-10-05 ENCOUNTER — PATIENT MESSAGE (OUTPATIENT)
Dept: FAMILY MEDICINE | Facility: CLINIC | Age: 55
End: 2020-10-05

## 2020-10-05 ENCOUNTER — TELEPHONE (OUTPATIENT)
Dept: FAMILY MEDICINE | Facility: CLINIC | Age: 55
End: 2020-10-05

## 2020-10-05 ENCOUNTER — PATIENT OUTREACH (OUTPATIENT)
Dept: ADMINISTRATIVE | Facility: OTHER | Age: 55
End: 2020-10-05

## 2020-10-05 DIAGNOSIS — Z12.31 ENCOUNTER FOR SCREENING MAMMOGRAM FOR MALIGNANT NEOPLASM OF BREAST: Primary | ICD-10-CM

## 2020-10-05 NOTE — PROGRESS NOTES
Care Everywhere:   Immunization: no profile in links   Health Maintenance: updated  Media Review:   Legacy Review:   Order placed: mammogram  Upcoming appts:  Mammogram scheduling ticket sent to patient's portal

## 2020-10-05 NOTE — TELEPHONE ENCOUNTER
I spoke with the patient and explained that I did not know how to resend her results. But, I gave them to her.

## 2020-10-06 ENCOUNTER — OFFICE VISIT (OUTPATIENT)
Dept: HEPATOLOGY | Facility: CLINIC | Age: 55
End: 2020-10-06
Payer: COMMERCIAL

## 2020-10-06 VITALS
HEART RATE: 52 BPM | BODY MASS INDEX: 31.14 KG/M2 | OXYGEN SATURATION: 99 % | DIASTOLIC BLOOD PRESSURE: 82 MMHG | SYSTOLIC BLOOD PRESSURE: 180 MMHG | WEIGHT: 222.44 LBS | HEIGHT: 71 IN

## 2020-10-06 DIAGNOSIS — K76.0 FATTY LIVER: ICD-10-CM

## 2020-10-06 DIAGNOSIS — R79.89 ELEVATED LFTS: Primary | ICD-10-CM

## 2020-10-06 DIAGNOSIS — R16.0 ENLARGED LIVER: ICD-10-CM

## 2020-10-06 DIAGNOSIS — R17 TOTAL BILIRUBIN, ELEVATED: ICD-10-CM

## 2020-10-06 PROCEDURE — 99205 OFFICE O/P NEW HI 60 MIN: CPT | Mod: S$GLB,,, | Performed by: NURSE PRACTITIONER

## 2020-10-06 PROCEDURE — 99999 PR PBB SHADOW E&M-EST. PATIENT-LVL IV: ICD-10-PCS | Mod: PBBFAC,,, | Performed by: NURSE PRACTITIONER

## 2020-10-06 PROCEDURE — 99999 PR PBB SHADOW E&M-EST. PATIENT-LVL IV: CPT | Mod: PBBFAC,,, | Performed by: NURSE PRACTITIONER

## 2020-10-06 PROCEDURE — 99205 PR OFFICE/OUTPT VISIT, NEW, LEVL V, 60-74 MIN: ICD-10-PCS | Mod: S$GLB,,, | Performed by: NURSE PRACTITIONER

## 2020-10-06 NOTE — PATIENT INSTRUCTIONS
1. Labs to rule out other causes of liver problems. Will check the testing to see if you have Gilbert's (benign condition that can cause elevated bilirubin)    2. Fibroscan to further assess fatty liver and look for any scar tissue/damage in the liver    3. Follow-up visit in 1-2 weeks to review results        Fatty liver    There is no FDA approved therapy for non-alcoholic fatty liver disease (NAFLD); therefore, lifestyle changes are important:  1. Weight loss goal of 15-20 lbs  2. Low carb/sugar, high protein diet.   3. Exercise, 5 days per week, 30 minutes per day, as tolerated  4. Recommend good control of cholesterol, blood pressure, blood sugar levels (as these are risk factors for fatty liver)     Let us know if you are interested in a referral to [facility].    In some people, fatty liver can progress to steatohepatitis (inflamatory fatty liver) and possibly to cirrhosis, putting one at increased risk for liver cancer and liver failure. Lifestyle changes can help to decrease this risk.     Ask about our fatty liver/KRAUS clinical trials if you have fibrosis / scar tissue related to fatty liver.    Resources:    Websites with information about fatty liver and inflammation related to fatty liver (KRAUS): www.nashtruth.[employer] and www.nashactually.[employer]     Facebook support group with tips and recipes:   Non-Alcoholic Fatty Liver Disease (NAFLD) Diet & Nutrition Support     [facility]: Nonalcoholic Fatty Liver Disease    Try to limit your carb intake to LESS than 30-45 grams of carbs with a meal or LESS than 5-10 grams with any snack (total of any snack foods eaten during that time). Can use MyFitness Pal anthony to add up your carbs throughout the day. Avoid beverages with calories or carbohydrates.     Try [email or web address] for recipes (moderate carb intake)

## 2020-10-06 NOTE — LETTER
October 6, 2020      Diane Chaparro, FNP-C  [address]  [place] [address]           [facility] 1st Fl  [address]  [facility] [address]  Phone: [phone]  Fax: [phone]          Patient: Estephania Rogers   MR Number: [id number]   YOB: 1965   Date of Visit: 10/6/2020       Dear Diane Chaparro:    Thank you for referring Estepahnia Rogers to me for evaluation. Attached you will find relevant portions of my assessment and plan of care.    If you have questions, please do not hesitate to call me. I look forward to following Estephania Rogers along with you.    Sincerely,    Keri Cornelius, ZOHRA    Enclosure  CC:  No Recipients    If you would like to receive this communication electronically, please contact [email or web address] or [phone] to request more information on [employer] access.    For providers and/or their staff who would like to refer a patient to [facility], please contact us through our one-stop-shop provider referral line, [facility], at [phone].    If you feel you have received this communication in error or would no longer like to receive these types of communications, please e-mail [email or web address]

## 2020-10-06 NOTE — PROGRESS NOTES
[facility] - New Patient    Referring Provider: Diane Chaparro    CHIEF COMPLAINT: Elevated liver enzymes    HPI: This is a 55 y.o. White female referred for evaluation of elevated liver enzymes.    Was told that she had a fatty liver ~25 years ago.    Review of labs:  - Transaminases intermittently elevated since 2005. More recently ALT>AST (40-60s)  - Alk phos normal  - Synthetic liver function: TBili mildly elevated since 2011 (1.3-1.8); otherwise normal  - Platelets normal    Workup thus far:   Negative for hepatitis C  Ferritin normal, iron sat low (h/o AMINTA)  Retic count normal    Abd US 9/2020 (done for epigastric pain) -- hepatomegaly, likely fatty liver, spleen UNL (12 cm), no biliary dilation, h/o cholecystectomy    She does have risk factors for fatty liver including:   · Obesity/overweight -- Body mass index is 31.02 kg/m². Had bariatric surgery in 2005. Lost 150 lb though has gained 15-20 lb.                 · Dyslipidemia -- well controlled                                 · Insulin resistance / diabetes -- well controlled           · Hypertension -- BP higher today  · Alcohol use -- higher more recently, 1-2 glasses wine about 3 days per week  *No longer needing most of her medications since weight loss surgery    Meds:  -Rx: none concerning  -OTC: none concerning  -Herbs/supplements: n/a  *No recent medication changes     No apparent risk factors for viral hepatitis.     Family history:  - Father had liver and pancreatic CA   - Sister had liver transplant at [facility] (in her 20s) for cryptogenic cirrhosis. Her sister did have DM. She passed away ~5 months ago; she had PVT, recurrent HE, osteomyelitis and ultimately sepsis.     She feels well overall though does report recent back pain and epigastric pain. Epigastric pain resolved without intervention. Also having more UTIs; previously followed by urology for kidney stones.     She denies symptoms of hepatic decompensation including  jaundice, ascites, cognitive problems to suggest hepatic encephalopathy, or GI bleeding.     Other noted history:  Vitiligo  Does have family h/o autoimmune disease         Review of patient's allergies indicates:  No Known Allergies     Current Outpatient Medications on File Prior to Visit   Medication Sig Dispense Refill    ALPRAZolam (XANAX) 0.25 MG tablet TAKE ONE Tablet BY MOUTH EVERY DAY AS NEEDED 30 tablet 0    ascorbic acid (VITAMIN C) 500 MG tablet Take 500 mg by mouth once daily.        AQUILES ASPIRIN ORAL Take 81 mg by mouth once daily.      butalbital-acetaminophen-caffeine -40 mg (FIORICET, ESGIC) -40 mg per tablet       cholecalciferol, vitamin D3, 50,000 unit Wafr Take 50,000 Units by mouth once daily.        FLUoxetine 20 MG capsule Take 1 capsule (20 mg total) by mouth once daily. 90 capsule 3    fluticasone (FLONASE) 50 mcg/actuation nasal spray 1 spray (50 mcg total) by Each Nare route once daily. (Patient not taking: Reported on 8/4/2020) 16 g 0    gabapentin (NEURONTIN) 300 MG capsule Take 1 capsule (300 mg total) by mouth every evening. 90 capsule 1    ibuprofen (ADVIL,MOTRIN) 800 MG tablet Take 1 tablet (800 mg total) by mouth 3 (three) times daily. (Patient not taking: Reported on 5/13/2020) 30 tablet 0    ibuprofen (ADVIL,MOTRIN) 800 MG tablet TAKE 1 TABLET BY MOUTH THREE TIMES A DAY 30 tablet 0    multivitamin (ONE DAILY MULTIVITAMIN) per tablet Take 1 tablet by mouth once daily.      omeprazole (PRILOSEC) 20 MG capsule Take 2 capsules (40 mg total) by mouth once daily. 180 capsule 0    phytonadione, vit K1, (VITAMIN K1 MISC) 1,000 mg by Misc.(Non-Drug; Combo Route) route once daily.      RESTASIS 0.05 % ophthalmic emulsion   3    simethicone (MYLICON) 125 mg Cap capsule Take 1 capsule (125 mg total) by mouth 4 (four) times daily as needed for Flatulence.  0    vitamin A 18639 UNIT capsule Take 25,000 Units by mouth once daily.        vitamin E acetate (VITAMIN E  ORAL) Take 1 tablet by mouth once daily.       No current facility-administered medications on file prior to visit.          PMHX:  has a past medical history of Anemia, Anxiety, Diabetes mellitus, type 2, Hypertension, Malabsorption, Migraine headache, Nephrolithiasis, and Other specified intestinal malabsorption.    PSHX:  has a past surgical history that includes Tonsillectomy; Spine surgery; Anal fistulotomy (2005); Gastric bypass (2005); Appendectomy (2005); Cholecystectomy (2005); Sinus surgery; Total Reduction Mammoplasty (2014); Breast surgery (2014); and Augmentation of breast (2014).    FAMILY HISTORY:   Family History   Problem Relation Age of Onset    Cancer Father         pancreatic    Diabetes Father     Diabetes Mother     Hypertension Mother     Miscarriages / Stillbirths Mother     Stroke Mother     Diabetes Sister     Cancer Maternal Aunt         breast    Arthritis Maternal Grandmother     Arthritis Paternal Grandmother     Diabetes Sister     Breast cancer Paternal Aunt        SOCIAL HISTORY:   Social History     Tobacco Use   Smoking Status Never Smoker   Smokeless Tobacco Never Used   Tobacco Comment    Clerical work       Social History     Substance and Sexual Activity   Alcohol Use No    Frequency: 2-3 times a week    Drinks per session: 1 or 2    Binge frequency: Never    Comment: socially       Social History     Substance and Sexual Activity   Drug Use No         Review of Systems   Constitutional: Negative for appetite change, chills, fatigue, fever and unexpected weight change.   Eyes: Negative for visual disturbance.   Respiratory: Negative for cough and shortness of breath.    Cardiovascular: Negative for chest pain, palpitations and leg swelling.   Gastrointestinal: Negative for abdominal distention, blood in stool, constipation, diarrhea, nausea and vomiting. No change in stool color. (+) intermittent epigastric pain (none currently)   Genitourinary: Negative for  "dysuria and hematuria. Denies dark urine.   Musculoskeletal: (+) arthralgias. Negative gait problem, joint swelling and myalgias.   Skin: Negative for color change, rash and wound. Denies itching.   Neurological: Negative for dizziness, tremors, speech difficulty, and headaches.   Hematological: Does not bruise/bleed easily.   Psychiatric/Behavioral: Negative for confusion, decreased concentration and sleep disturbance. Denies memory loss. Denies depression. The patient is not nervous/anxious.        Physical Exam   Constitutional: Well-nourished. No distress. Alert and oriented to person, place, and time.  Eyes: No scleral icterus.   Cardiovascular: Normal rate, regular rhythm and normal heart sounds. No murmur heard.  Pulmonary/Chest: Respiratory effort and breath sounds normal. No respiratory distress.   Abdominal: Soft, non-tender. No distension; no ascites appreciated. There is no palpable hepatosplenomegaly. No hernia or mass.   Musculoskeletal: No edema.   Neurological: No tremor. Coordination and gait normal. No asterixis.    Skin: Skin is warm and dry. No rash or erythema. No jaundice. No telangiectasias or palmar erythema noted.  Psychiatric: Normal mood and affect. Speech, behavior, and thought content normal. No depression or anxiety noted.       BP (!) 180/82 (BP Location: Right arm, Patient Position: Sitting)   Pulse (!) 52   Ht 5' 11" (1.803 m)   Wt 100.9 kg (222 lb 7.1 oz)   LMP 12/04/2014   SpO2 99%   BMI 31.02 kg/m²       LABS:  Lab Results   Component Value Date    WBC 4.92 09/22/2020    HGB 13.4 09/22/2020    HCT 42.6 09/22/2020     09/22/2020     09/22/2020    K 4.3 09/22/2020    CREATININE 0.7 09/22/2020    ALT 69 (H) 09/22/2020    AST 62 (H) 09/22/2020    ALKPHOS 121 09/22/2020    BILITOT 1.4 (H) 09/22/2020    ALBUMIN 4.0 09/22/2020    INR 1.0 04/04/2016    IGGSERUM 889 05/02/2012    FERRITIN 135 11/30/2017    FERRITIN 135 11/30/2017    FESATURATED 19 (L) 11/30/2017    " FESATURATED 19 (L) 11/30/2017    CHOL 150 02/27/2012    TRIG 50 02/27/2012    LDLCALC 74.0 02/27/2012       No results found for: HEPAIGG    No results found for: HEPBSAG  No results found for: HEPBCAB  No results found for: HEPBSAB  Hepatitis C Ab   Date Value Ref Range Status   09/22/2020 Negative Negative Final     Immunization History   Administered Date(s) Administered    Influenza 11/01/2018    Influenza - Quadrivalent - MDCK 10/31/2018          DIAGNOSTIC STUDIES:  ABD. US  Results for orders placed during the hospital encounter of 09/25/20   US Abdomen Complete    Narrative EXAMINATION:  US ABDOMEN COMPLETE    CLINICAL HISTORY:  elevated LFTs; Upper abdominal pain, unspecified    TECHNIQUE:  Complete abdominal ultrasound (including pancreas, aorta, liver, gallbladder, common bile duct, IVC, kidneys, and spleen) was performed.    COMPARISON:  None    FINDINGS:  Study is distorted by body habitus with poor visualization.  The liver is enlarged measuring approximately 17.4 cm at the midclavicular line.  There is diffuse increased echogenicity of liver concerning for fatty infiltration..  The spleen is normal in size measure approximately 12 cm in length. No evidence for intra or extra biliary ductal dilatation common duct measuring approximately 5 mm.  Gallbladder not seen compatible with history of prior cholecystectomy.  Only small portion the pancreas identified remainder is obscured by bowel gas visualized portions of the pancreas are within normal limits without evidence for veronica-pancreatic fluid collection. No evidence for bilateral hydronephrosis. No aneurysmal dilatation of the visualized abdominal aorta although majority is obscured by bowel gas.Intrahepatic IVC identified, remainder of ivc mostly obscured by bowel gas.      Impression Hepatomegaly with diffuse increased echogenicity of liver concerning for fatty infiltration.    Absent gallbladder compatible with prior cholecystectomy.    Otherwise  unremarkable limited abdominal ultrasound secondary to artifact from prominent bowel gas and body habitus.    Further evaluation as warranted clinically.      Electronically signed by: Parvez Meng DO  Date:    09/25/2020  Time:    08:17           ASSESSMENT / PLAN:  55 y.o. White female with:      1. Elevated liver enzymes  -- Likely due to fatty liver as weight has been trending up.  -- Will complete serologic workup to rule out causes of liver disease and elevated liver enzymes    2. Elevated total bilirubin  -- Suspect Gilbert's and TBili has been chronically elevated. Retic count normal.  -- Fractionate with next labs and check genetic testing for Gilbert's.    3. Fatty liver  -- Reviewed US; explained hepatomegaly likely due to fatty liver  -- Fibroscan today for fibrosis and steatosis staging  -- Discussed importance of lifestyle modifications including healthy diet and adequate physical activity to achieve and maintain ideal body weight.   -- Maintain control of blood pressure, cholesterol, and blood sugar as these are risk factors for fatty liver  -- If no advanced fibrosis, recommend limiting alcohol to max 1 standard drink/day (and not every day)  -- Will check immunity markers for hepatitis A/B and arrange for vaccination if needed    4. Body mass index is 31.02 kg/m²., HTN  5. S/p bariatric surgery         Follow-up in 1-2 weeks with Fibroscan and to review all results.         Orders Placed This Encounter   Procedures    FibroScan (Vibration Controlled Transient Elastography)    Alpha-1-Antitrypsin    TEJA Screen w/Reflex    Antimitochondrial Antibody    Anti-Smooth Muscle Antibody    Ceruloplasmin    IgG    Hepatitis B Surface Antigen    Hepatitis B Surface Ab, Qualitative    Hepatitis B Core Antibody, Total    Hepatitis A antibody, IgG    UGT1A1 Genotype    Hepatic Function Panel             Thank you for allowing me to participate in the care of Estephania CESAR  SRIDEVI Cornelius  Hepatology      Total duration of visit = 60 min, with > 50% spent reviewing results, future workup, and counseling on treatment for fatty liver.

## 2020-10-07 ENCOUNTER — PATIENT MESSAGE (OUTPATIENT)
Dept: HEMATOLOGY/ONCOLOGY | Facility: CLINIC | Age: 55
End: 2020-10-07

## 2020-10-08 ENCOUNTER — LAB VISIT (OUTPATIENT)
Dept: LAB | Facility: HOSPITAL | Age: 55
End: 2020-10-08
Attending: NURSE PRACTITIONER
Payer: COMMERCIAL

## 2020-10-08 DIAGNOSIS — R79.89 ELEVATED LFTS: ICD-10-CM

## 2020-10-08 DIAGNOSIS — R17 TOTAL BILIRUBIN, ELEVATED: ICD-10-CM

## 2020-10-08 LAB
ALBUMIN SERPL BCP-MCNC: 4.2 G/DL (ref 3.5–5.2)
ALP SERPL-CCNC: 116 U/L (ref 55–135)
ALT SERPL W/O P-5'-P-CCNC: 65 U/L (ref 10–44)
AST SERPL-CCNC: 71 U/L (ref 10–40)
BILIRUB DIRECT SERPL-MCNC: 0.4 MG/DL (ref 0.1–0.3)
BILIRUB SERPL-MCNC: 1.1 MG/DL (ref 0.1–1)
IGG SERPL-MCNC: 1033 MG/DL (ref 650–1600)
PROT SERPL-MCNC: 7.3 G/DL (ref 6–8.4)

## 2020-10-08 PROCEDURE — 86790 VIRUS ANTIBODY NOS: CPT

## 2020-10-08 PROCEDURE — 86704 HEP B CORE ANTIBODY TOTAL: CPT

## 2020-10-08 PROCEDURE — 87340 HEPATITIS B SURFACE AG IA: CPT

## 2020-10-08 PROCEDURE — 82784 ASSAY IGA/IGD/IGG/IGM EACH: CPT

## 2020-10-08 PROCEDURE — 82390 ASSAY OF CERULOPLASMIN: CPT

## 2020-10-08 PROCEDURE — 36415 COLL VENOUS BLD VENIPUNCTURE: CPT | Mod: PO

## 2020-10-08 PROCEDURE — 86235 NUCLEAR ANTIGEN ANTIBODY: CPT

## 2020-10-08 PROCEDURE — 81350 UGT1A1 GENE COMMON VARIANTS: CPT

## 2020-10-08 PROCEDURE — 86706 HEP B SURFACE ANTIBODY: CPT

## 2020-10-08 PROCEDURE — 86256 FLUORESCENT ANTIBODY TITER: CPT | Mod: 91

## 2020-10-08 PROCEDURE — 86038 ANTINUCLEAR ANTIBODIES: CPT

## 2020-10-08 PROCEDURE — 82103 ALPHA-1-ANTITRYPSIN TOTAL: CPT

## 2020-10-08 PROCEDURE — 80076 HEPATIC FUNCTION PANEL: CPT

## 2020-10-09 LAB
A1AT SERPL-MCNC: 91 MG/DL (ref 100–190)
ANA SER QL IF: NORMAL
CERULOPLASMIN SERPL-MCNC: 20 MG/DL (ref 15–45)
HBV CORE AB SERPL QL IA: NEGATIVE
HBV SURFACE AB SER-ACNC: NEGATIVE M[IU]/ML
HBV SURFACE AG SERPL QL IA: NEGATIVE
HEPATITIS A ANTIBODY, IGG: NEGATIVE
MITOCHONDRIA AB TITR SER IF: NORMAL {TITER}

## 2020-10-13 LAB — SMOOTH MUSCLE AB TITR SER IF: ABNORMAL {TITER}

## 2020-10-15 LAB
ANNOTATION COMMENT IMP: NORMAL
GENETICIST REVIEW: NORMAL
PROVIDER SIGNING NAME: NORMAL
REF LAB TEST METHOD: NORMAL
TEST PERFORMANCE INFO SPEC: NORMAL
UGT1A1 GENE PROD MET ACT IMP BLD/T-IMP: NORMAL
UGT1A1 GENOTYPE: NORMAL

## 2020-10-16 ENCOUNTER — PROCEDURE VISIT (OUTPATIENT)
Dept: HEPATOLOGY | Facility: CLINIC | Age: 55
End: 2020-10-16
Payer: COMMERCIAL

## 2020-10-16 ENCOUNTER — OFFICE VISIT (OUTPATIENT)
Dept: HEPATOLOGY | Facility: CLINIC | Age: 55
End: 2020-10-16
Payer: COMMERCIAL

## 2020-10-16 ENCOUNTER — TELEPHONE (OUTPATIENT)
Dept: HEPATOLOGY | Facility: CLINIC | Age: 55
End: 2020-10-16

## 2020-10-16 ENCOUNTER — OFFICE VISIT (OUTPATIENT)
Dept: HEMATOLOGY/ONCOLOGY | Facility: CLINIC | Age: 55
End: 2020-10-16
Payer: COMMERCIAL

## 2020-10-16 ENCOUNTER — LAB VISIT (OUTPATIENT)
Dept: LAB | Facility: HOSPITAL | Age: 55
End: 2020-10-16
Attending: NURSE PRACTITIONER
Payer: COMMERCIAL

## 2020-10-16 VITALS
SYSTOLIC BLOOD PRESSURE: 169 MMHG | HEART RATE: 56 BPM | RESPIRATION RATE: 20 BRPM | WEIGHT: 219.56 LBS | BODY MASS INDEX: 30.74 KG/M2 | DIASTOLIC BLOOD PRESSURE: 74 MMHG | OXYGEN SATURATION: 99 % | HEIGHT: 71 IN

## 2020-10-16 VITALS
OXYGEN SATURATION: 99 % | HEART RATE: 61 BPM | RESPIRATION RATE: 18 BRPM | DIASTOLIC BLOOD PRESSURE: 80 MMHG | SYSTOLIC BLOOD PRESSURE: 164 MMHG | TEMPERATURE: 98 F | BODY MASS INDEX: 30.8 KG/M2 | HEIGHT: 71 IN | WEIGHT: 220 LBS

## 2020-10-16 DIAGNOSIS — E88.01 LOW PLASMA ALPHA-1 ANTITRYPSIN: ICD-10-CM

## 2020-10-16 DIAGNOSIS — D50.8 OTHER IRON DEFICIENCY ANEMIA: ICD-10-CM

## 2020-10-16 DIAGNOSIS — K76.0 FATTY LIVER: Primary | ICD-10-CM

## 2020-10-16 DIAGNOSIS — Z23 NEED FOR HEPATITIS A AND B VACCINATION: ICD-10-CM

## 2020-10-16 DIAGNOSIS — K74.02 HEPATIC FIBROSIS, STAGE 3: ICD-10-CM

## 2020-10-16 DIAGNOSIS — D50.8 OTHER IRON DEFICIENCY ANEMIA: Primary | ICD-10-CM

## 2020-10-16 DIAGNOSIS — R79.89 ELEVATED LFTS: ICD-10-CM

## 2020-10-16 DIAGNOSIS — D51.8 OTHER VITAMIN B12 DEFICIENCY ANEMIA: ICD-10-CM

## 2020-10-16 LAB
ERYTHROCYTE [DISTWIDTH] IN BLOOD BY AUTOMATED COUNT: 12.1 % (ref 11.5–14.5)
FERRITIN SERPL-MCNC: 1072 NG/ML (ref 20–300)
HCT VFR BLD AUTO: 42.8 % (ref 37–48.5)
HGB BLD-MCNC: 13.5 G/DL (ref 12–16)
IMM GRANULOCYTES # BLD AUTO: 0.01 K/UL (ref 0–0.04)
IRON SERPL-MCNC: 131 UG/DL (ref 30–160)
MCH RBC QN AUTO: 31.3 PG (ref 27–31)
MCHC RBC AUTO-ENTMCNC: 31.5 G/DL (ref 32–36)
MCV RBC AUTO: 99 FL (ref 82–98)
NEUTROPHILS # BLD AUTO: 3.1 K/UL (ref 1.8–7.7)
PLATELET # BLD AUTO: 191 K/UL (ref 150–350)
PMV BLD AUTO: 11.4 FL (ref 9.2–12.9)
RBC # BLD AUTO: 4.32 M/UL (ref 4–5.4)
SATURATED IRON: 43 % (ref 20–50)
TOTAL IRON BINDING CAPACITY: 306 UG/DL (ref 250–450)
TRANSFERRIN SERPL-MCNC: 207 MG/DL (ref 200–375)
WBC # BLD AUTO: 4.8 K/UL (ref 3.9–12.7)

## 2020-10-16 PROCEDURE — 99999 PR PBB SHADOW E&M-EST. PATIENT-LVL V: ICD-10-PCS | Mod: PBBFAC,,, | Performed by: NURSE PRACTITIONER

## 2020-10-16 PROCEDURE — 91200 FIBROSCAN (VIBRATION CONTROLLED TRANSIENT ELASTOGRAPHY): ICD-10-PCS | Mod: S$GLB,,, | Performed by: NURSE PRACTITIONER

## 2020-10-16 PROCEDURE — 99214 PR OFFICE/OUTPT VISIT, EST, LEVL IV, 30-39 MIN: ICD-10-PCS | Mod: S$GLB,,, | Performed by: NURSE PRACTITIONER

## 2020-10-16 PROCEDURE — 99999 PR PBB SHADOW E&M-EST. PATIENT-LVL V: CPT | Mod: PBBFAC,,, | Performed by: NURSE PRACTITIONER

## 2020-10-16 PROCEDURE — 99999 PR PBB SHADOW E&M-EST. PATIENT-LVL IV: CPT | Mod: PBBFAC,,, | Performed by: NURSE PRACTITIONER

## 2020-10-16 PROCEDURE — 99204 OFFICE O/P NEW MOD 45 MIN: CPT | Mod: S$GLB,,, | Performed by: NURSE PRACTITIONER

## 2020-10-16 PROCEDURE — 85027 COMPLETE CBC AUTOMATED: CPT

## 2020-10-16 PROCEDURE — 91200 LIVER ELASTOGRAPHY: CPT | Mod: S$GLB,,, | Performed by: NURSE PRACTITIONER

## 2020-10-16 PROCEDURE — 83540 ASSAY OF IRON: CPT

## 2020-10-16 PROCEDURE — 36415 COLL VENOUS BLD VENIPUNCTURE: CPT

## 2020-10-16 PROCEDURE — 82728 ASSAY OF FERRITIN: CPT

## 2020-10-16 PROCEDURE — 99999 PR PBB SHADOW E&M-EST. PATIENT-LVL IV: ICD-10-PCS | Mod: PBBFAC,,, | Performed by: NURSE PRACTITIONER

## 2020-10-16 PROCEDURE — 99204 PR OFFICE/OUTPT VISIT, NEW, LEVL IV, 45-59 MIN: ICD-10-PCS | Mod: S$GLB,,, | Performed by: NURSE PRACTITIONER

## 2020-10-16 PROCEDURE — 99214 OFFICE O/P EST MOD 30 MIN: CPT | Mod: S$GLB,,, | Performed by: NURSE PRACTITIONER

## 2020-10-16 NOTE — PROGRESS NOTES
[facility] - Established Patient    Last Clinic Visit: 10/6/20    CHIEF COMPLAINT: Follow-up for fatty liver     HPI: This is a 55 y.o. White female here for follow-up for fatty liver.     She was initially told that she had fatty liver ~25 years ago.    Her transaminases have been intermittently elevated since 2005. More recently ALT>AST (40-60s). TBili also mildly elevated since 2011.     Serological workup has been negative for Gavin's, hemochromatosis, autoimmune etiology, and viral hepatitis. Weakly +ASMA 1:40 noted. A1AT level mildly low, 91. UGT1A1 testing confirms Gilbert's.     Imaging has shown hepatomegaly, fatty liver, spleen UNL (12 cm)    Fibroscan today-  KPa 13.1, F3  , S3 or >67% steatosis     Updates on risk factors for fatty liver:     · Weight -- Body mass index is 30.62 kg/m².  Had bariatric surgery in 2005. Lost ~150 lb though has gained some back.                      · Dyslipidemia -- well controlled                               · Insulin resistance / diabetes -- well controlled        · Hypertension -- higher recently  · Alcohol use -- previously reported 1-2 glasses of wine ~3 days/week  *Was able to stop most of her medications after weight loss surgery    She feels well, no complaints.   Denies symptoms of hepatic decompensation including jaundice, ascites, cognitive problems to suggest hepatic encephalopathy, or GI bleeding.     Father had pancreatic and liver cancer.  Sister had liver transplant at [facility] for cryptogenic cirrhosis (passed away earlier this year).        Review of patient's allergies indicates:  No Known Allergies     Current Outpatient Medications on File Prior to Visit   Medication Sig Dispense Refill    ALPRAZolam (XANAX) 0.25 MG tablet TAKE ONE Tablet BY MOUTH EVERY DAY AS NEEDED 30 tablet 0    ascorbic acid (VITAMIN C) 500 MG tablet Take 500 mg by mouth once daily.        AQUILES ASPIRIN ORAL Take 81 mg by mouth once daily.       butalbital-acetaminophen-caffeine -40 mg (FIORICET, ESGIC) -40 mg per tablet       cholecalciferol, vitamin D3, 50,000 unit Wafr Take 50,000 Units by mouth once daily.        FLUoxetine 20 MG capsule Take 1 capsule (20 mg total) by mouth once daily. 90 capsule 3    fluticasone (FLONASE) 50 mcg/actuation nasal spray 1 spray (50 mcg total) by Each Nare route once daily. (Patient not taking: Reported on 8/4/2020) 16 g 0    gabapentin (NEURONTIN) 300 MG capsule Take 1 capsule (300 mg total) by mouth every evening. 90 capsule 1    ibuprofen (ADVIL,MOTRIN) 800 MG tablet Take 1 tablet (800 mg total) by mouth 3 (three) times daily. (Patient not taking: Reported on 5/13/2020) 30 tablet 0    ibuprofen (ADVIL,MOTRIN) 800 MG tablet TAKE 1 TABLET BY MOUTH THREE TIMES A DAY 30 tablet 0    multivitamin (ONE DAILY MULTIVITAMIN) per tablet Take 1 tablet by mouth once daily.      omeprazole (PRILOSEC) 20 MG capsule Take 2 capsules (40 mg total) by mouth once daily. 180 capsule 0    phytonadione, vit K1, (VITAMIN K1 MISC) 1,000 mg by Misc.(Non-Drug; Combo Route) route once daily.      RESTASIS 0.05 % ophthalmic emulsion   3    simethicone (MYLICON) 125 mg Cap capsule Take 1 capsule (125 mg total) by mouth 4 (four) times daily as needed for Flatulence.  0    vitamin A 68089 UNIT capsule Take 25,000 Units by mouth once daily.        vitamin E acetate (VITAMIN E ORAL) Take 1 tablet by mouth once daily.       No current facility-administered medications on file prior to visit.          PMHX:  has a past medical history of Anemia, Anxiety, Diabetes mellitus, type 2, Hypertension, Malabsorption, Migraine headache, Nephrolithiasis, and Other specified intestinal malabsorption.    PSHX:  has a past surgical history that includes Tonsillectomy; Spine surgery; Anal fistulotomy (2005); Gastric bypass (2005); Appendectomy (2005); Cholecystectomy (2005); Sinus surgery; Total Reduction Mammoplasty (2014); Breast surgery  (2014); and Augmentation of breast (2014).    FAMILY HISTORY:   Family History   Problem Relation Age of Onset    Cancer Father         pancreatic    Diabetes Father     Diabetes Mother     Hypertension Mother     Miscarriages / Stillbirths Mother     Stroke Mother     Diabetes Sister     Cancer Maternal Aunt         breast    Arthritis Maternal Grandmother     Arthritis Paternal Grandmother     Diabetes Sister     Breast cancer Paternal Aunt        SOCIAL HISTORY:   Social History     Tobacco Use   Smoking Status Never Smoker   Smokeless Tobacco Never Used   Tobacco Comment    Clerical work       Social History     Substance and Sexual Activity   Alcohol Use No    Frequency: 2-3 times a week    Drinks per session: 1 or 2    Binge frequency: Never    Comment: socially       Social History     Substance and Sexual Activity   Drug Use No         Review of Systems   Constitutional: Negative for appetite change, chills, fatigue, fever and unexpected weight change.   Eyes: Negative for visual disturbance.   Respiratory: Negative for cough and shortness of breath.    Cardiovascular: Negative for chest pain, palpitations and leg swelling.   Gastrointestinal: Negative for abdominal distention, abdominal pain, blood in stool, constipation, diarrhea, nausea and vomiting. No change in stool color.  Genitourinary: Negative for dysuria and hematuria. Denies dark urine.   Musculoskeletal: Negative for arthralgias, gait problem, joint swelling and myalgias.   Skin: Negative for color change, rash and wound. Denies itching.   Neurological: Negative for dizziness, tremors, speech difficulty, and headaches.   Hematological: Does not bruise/bleed easily.   Psychiatric/Behavioral: Negative for confusion, decreased concentration and sleep disturbance. Denies memory loss. Denies depression. The patient is not nervous/anxious.        Physical Exam   Constitutional: Well-nourished. No distress. Alert and oriented to person,  "place, and time.  Eyes: No scleral icterus.   Cardiovascular: Normal rate, regular rhythm and normal heart sounds. No murmur heard.  Pulmonary/Chest: Respiratory effort and breath sounds normal. No respiratory distress.   Abdominal: Soft, non-tender. No distension; no ascites appreciated. There is no palpable hepatosplenomegaly. No hernia or mass.   Musculoskeletal: No edema.   Neurological: No tremor. Coordination and gait normal. No asterixis.    Skin: Skin is warm and dry. No rash or erythema. No jaundice. No telangiectasias or palmar erythema noted.  Psychiatric: Normal mood and affect. Speech, behavior, and thought content normal. No depression or anxiety noted.       BP (!) 169/74 (BP Location: Right arm, Patient Position: Sitting, BP Method: Medium (Automatic))   Pulse (!) 56   Resp 20   Ht 5' 11" (1.803 m)   Wt 99.6 kg (219 lb 9.3 oz)   LMP 12/04/2014   SpO2 99%   BMI 30.62 kg/m²         LABS:  Lab Results   Component Value Date    WBC 4.80 10/16/2020    HGB 13.5 10/16/2020    HCT 42.8 10/16/2020     10/16/2020     09/22/2020    K 4.3 09/22/2020    CREATININE 0.7 09/22/2020    ALT 65 (H) 10/08/2020    AST 71 (H) 10/08/2020    ALKPHOS 116 10/08/2020    BILITOT 1.1 (H) 10/08/2020    BILIDIR 0.4 (H) 10/08/2020    ALBUMIN 4.2 10/08/2020    INR 1.0 04/04/2016    SMOOTHMUSCAB Positive 1:40 (A) 10/08/2020    AMAIFA Negative 1:40 10/08/2020    IGGSERUM 1033 10/08/2020    ANASCREEN Negative <1:80 10/08/2020    FERRITIN 1,072 (H) 10/16/2020    FESATURATED 43 10/16/2020    CERULOPLSM 20.0 10/08/2020    CHOL 150 02/27/2012    TRIG 50 02/27/2012    LDLCALC 74.0 02/27/2012       Hepatitis A Antibody IgG   Date Value Ref Range Status   10/08/2020 Negative  Final       Hepatitis B Surface Ag   Date Value Ref Range Status   10/08/2020 Negative Negative Final     Hep B Core Total Ab   Date Value Ref Range Status   10/08/2020 Negative  Final     Hep B S Ab   Date Value Ref Range Status   10/08/2020 Negative "  Final     Hepatitis C Ab   Date Value Ref Range Status   09/22/2020 Negative Negative Final     Immunization History   Administered Date(s) Administered    Influenza 11/01/2018    Influenza - Quadrivalent - [facility] 10/31/2018          DIAGNOSTIC STUDIES:  ABD. US  Results for orders placed during the hospital encounter of 09/25/20   US Abdomen Complete    Narrative EXAMINATION:  US ABDOMEN COMPLETE    CLINICAL HISTORY:  elevated LFTs; Upper abdominal pain, unspecified    TECHNIQUE:  Complete abdominal ultrasound (including pancreas, aorta, liver, gallbladder, common bile duct, IVC, kidneys, and spleen) was performed.    COMPARISON:  None    FINDINGS:  Study is distorted by body habitus with poor visualization.  The liver is enlarged measuring approximately 17.4 cm at the midclavicular line.  There is diffuse increased echogenicity of liver concerning for fatty infiltration..  The spleen is normal in size measure approximately 12 cm in length. No evidence for intra or extra biliary ductal dilatation common duct measuring approximately 5 mm.  Gallbladder not seen compatible with history of prior cholecystectomy.  Only small portion the pancreas identified remainder is obscured by bowel gas visualized portions of the pancreas are within normal limits without evidence for veronica-pancreatic fluid collection. No evidence for bilateral hydronephrosis. No aneurysmal dilatation of the visualized abdominal aorta although majority is obscured by bowel gas.Intrahepatic IVC identified, remainder of ivc mostly obscured by bowel gas.      Impression Hepatomegaly with diffuse increased echogenicity of liver concerning for fatty infiltration.    Absent gallbladder compatible with prior cholecystectomy.    Otherwise unremarkable limited abdominal ultrasound secondary to artifact from prominent bowel gas and body habitus.    Further evaluation as warranted clinically.      Electronically signed by: Parvez Meng  DO  Date:    09/25/2020  Time:    08:17           ASSESSMENT / PLAN:  55 y.o. White female with:    1. Fatty liver  -- Labs and Fibroscan reviewed. Serologic workup unrevealing. Discussed that weakly positive ASMA is common in patients with fatty liver, low suspicion for AIH at this time.  -- Fibroscan suggests significant steatosis but also advanced fibrosis (F3). We discussed options to clarify fibrosis staging though ultimately decided to proceed with liver biopsy.   -- Start vaccines for hep A/B, ordered     2. Body mass index is 30.62 kg/m²., HTN  -- Discussed importance of lifestyle modifications including healthy diet and adequate physical activity to achieve and maintain ideal body weight.   -- Maintain control of blood pressure, cholesterol, and blood sugar as these are risk factors for fatty liver  -- If no advanced fibrosis, recommend limiting alcohol to max of 1 standard drink/day. Do not recommend daily alcohol use.    3. Elevated bilirubin - Gilbert's  -- Confirmed with UGT1A1 genotype testing    4. Low A1AT level  -- Can check phenotype with next labs though discussed that level is only mildly low. Do not suspect A1AT deficiency at this time.    5. S/p bariatric surgery           Discussed liver biopsy procedure and possible complications associated with liver biopsy including pain, bleeding, infection, and organ perforation. Reviewed the role of the procedure including confirming the diagnosis and staging of liver disease so patient can be appropriately followed from this point forward. Patient is agreeable to procedure. All questions answered.    Follow-up 1 week after liver biopsy to review results. Can do video visit.         Orders Placed This Encounter   Procedures    IR Biopsy Liver    Hepatitis A / Hepatitis B Combined Vaccine (IM)    Alpha 1 Antitrypsin Phenotype    Ambulatory referral/consult to Infectious Disease Injection Dept    Ambulatory referral/consult  to [facility] Interventional RAD          EDUCATION / DISCUSSION:   We discussed the manifestations of fatty liver. At this time there is no FDA approved therapy for fatty liver. The patient and I discussed the importance of lifestyle modifications such as diet, exercise, and weight loss for management of fatty liver. We reviewed modification of risk factors such as hypertension, hyperlipidemia, and diabetes mellitus / impaired fasting glucose. Discussed that excessive alcohol consumption can also cause fatty liver. We discussed a low carb, low sugar diet and a goal of 30 minutes of exercise 5 times per week. Patient is aware that fatty liver can progress to steatohepatitis and possibly to cirrhosis, putting one at increased risk for liver cancer or liver failure.     *If statins are being considered for HLD, statins are safe in patients with liver disease. Statins can be used to treat dyslipidemia in patients with both NAFLD and KRAUS.      Thank you for allowing me to participate in the care of Estephania Cornelius, FNP-C  Hepatology and Liver Transplant

## 2020-10-16 NOTE — Clinical Note
Please coordinate scheduling for US guided liver biopsy with IR. Will need follow-up visit about 1 week after biopsy to review results. Can do video visit if she prefers. Thanks!

## 2020-10-16 NOTE — PROCEDURES
FibroScan (Vibration Controlled Transient Elastography)    Date/Time: 10/16/2020 8:00 AM  Performed by: Keir Cornelius NP  Authorized by: Keri Cornelius NP     Diagnosis:  NAFLD    Probe:  XL    Universal Protocol: Patient's identity, procedure and site were verified, confirmatory pause was performed.  Discussed procedure including risks and potential complications.  Questions answered.  Patient verbalizes understanding and wishes to proceed with VCTE.     Procedure: After providing explanations of the procedure, patient was placed in the supine position with right arm in maximum abduction to allow optimal exposure of right lateral abdomen.  Patient was briefly assessed, Testing was performed in the mid-axillary location, 50Hz Shear Wave pulses were applied and the resulting Shear Wave and Propagation Speed detected with a 3.5 MHz ultrasonic signal, using the FibroScan probe, Skin to liver capsule distance and liver parenchyma were accessed during the entire examination with the FibroScan probe, Patient was instructed to breathe normally and to abstain from sudden movements during the procedure, allowing for random measurements of liver stiffness. At least 10 Shear Waves were produced, Individual measurements of each Shear Wave were calculated.  Patient tolerated the procedure well with no complications.  Meets discharge criteria as was dismissed.  Rates pain 0 out of 10.  Patient will follow up with ordering provider to review results.      Findings  Median liver stiffness score:  13.1  CAP Reading: dB/m:  350    IQR/med %:  15  Interpretation  Fibrosis interpretation is based on medial liver stiffness - Kilopascal (kPa).    Fibrosis Stage:  F3  Steatosis interpretation is based on controlled attenuation parameter - (dB/m).    Steatosis Grade:  S3

## 2020-10-16 NOTE — TELEPHONE ENCOUNTER
Appointment for liver bx scheduled for 11/12 at 09:00 AM arrival time.  Patient instructed to hold baby ASA on 11/6.  Patient verbalized understanding.        ----- Message from Keri Cornelius NP sent at 10/16/2020 12:22 PM CDT -----  Please coordinate scheduling for US guided liver biopsy with IR. Will need follow-up visit about 1 week after biopsy to review results. Can do video visit if she prefers. Thanks!

## 2020-10-16 NOTE — PATIENT INSTRUCTIONS
1. Vaccines ordered for hepatitis A and B. You can try getting these at your pharmacy first. Orders also placed to get these at any [facility] facility    2. Will plan for liver biopsy to further assess scar tissue in the liver. Our staff will contact you to arrange this.    3. Follow-up in 1 week after biopsy to review results

## 2020-10-16 NOTE — LETTER
October 16, 2020      Dulce Castillo MD  [address]  [place] [address]           [facility] - Hem Onc 3rd Fl  [address]  [facility] [address]  Phone: [phone]          Patient: Estephania Rogers   MR Number: [id number]   YOB: 1965   Date of Visit: 10/16/2020       Dear Dr. Dulce Castillo:    Thank you for referring Estephania Rogers to me for evaluation. Attached you will find relevant portions of my assessment and plan of care.    If you have questions, please do not hesitate to call me. I look forward to following Estephania Rogers along with you.    Sincerely,    Nova Capps, NP    Enclosure  CC:  No Recipients    If you would like to receive this communication electronically, please contact [email or web address] or [phone] to request more information on [employer] access.    For providers and/or their staff who would like to refer a patient to [facility], please contact us through our one-stop-shop provider referral line, [facility] , at [phone].    If you feel you have received this communication in error or would no longer like to receive these types of communications, please e-mail [email or web address]

## 2020-10-16 NOTE — PROGRESS NOTES
Subjective:       Patient ID: Estephania Rogers is a 55 y.o. female.    Chief Complaint: Iron Deficiecny Anemia    HPI Patient has not been seen in Hem/Onc clinic in 4 years. She has been receiving injectafer every 8 weeks at [facility]. No Iron labs have been drawn since November of 2017.  Patient presents for follow up for iron defieincy anemia.   Decreased energy. Multiple stressors in life (recently lost sister).  Denies PICA, bleeding and bruising and numbness and tingling.        Per Dr. Andres's last note: In regards to her iron deficiency:  This is a 50-year-old female with iron deficiency anemia secondary to bariatric surgery about 7 years ago. In April 2010, she was found to be anemic with a hemoglobin of 9.8. She was placed on iron therapy at that time and repeat blood work in September revealed a ferritin of 5. At that time, she was referred for further evaluation of her iron deficiency. We initiated Ferrlecit infusions in the fall for a total of 8 weeks and she had a good response. She is here today for a follow up. GI evaluation including EGD and colonoscopy was negative.   She has a history of prior bariatric surgery.    Review of Systems   Constitutional: Positive for fatigue. Negative for activity change, appetite change, chills, diaphoresis, fever and unexpected weight change.   HENT: Negative for nosebleeds.    Eyes: Positive for redness (infection, being treated). Negative for visual disturbance.   Respiratory: Negative for cough, chest tightness, shortness of breath and wheezing.    Cardiovascular: Negative for chest pain, palpitations and leg swelling.   Gastrointestinal: Negative for abdominal distention, abdominal pain, blood in stool, constipation, diarrhea, nausea and vomiting.        Chronic changes since bypass   Genitourinary: Negative for hematuria.   Skin: Negative for color change and rash.   Neurological: Negative for dizziness, weakness, light-headedness, numbness and  headaches.   Hematological: Negative for adenopathy. Does not bruise/bleed easily.   Psychiatric/Behavioral: Negative for confusion.        Tearful when talking about her sister       Objective:      Physical Exam  Vitals signs and nursing note reviewed.   Constitutional:       General: She is not in acute distress.     Appearance: Normal appearance. She is well-developed.      Comments: Presents alone   HENT:      Head: Normocephalic.      Mouth/Throat:      Pharynx: No oropharyngeal exudate.   Eyes:      Pupils: Pupils are equal, round, and reactive to light.   Neck:      Musculoskeletal: Normal range of motion.   Cardiovascular:      Rate and Rhythm: Normal rate and regular rhythm.      Heart sounds: Normal heart sounds.   Pulmonary:      Effort: Pulmonary effort is normal.      Breath sounds: Normal breath sounds.   Abdominal:      General: Bowel sounds are normal. There is no distension.      Palpations: Abdomen is soft.      Tenderness: There is no abdominal tenderness.   Lymphadenopathy:      Head:      Right side of head: No preauricular adenopathy.      Left side of head: Preauricular (eye infection) adenopathy present.      Cervical: No cervical adenopathy.      Upper Body:      Right upper body: No supraclavicular adenopathy.      Left upper body: No supraclavicular adenopathy.   Skin:     General: Skin is warm and dry.      Findings: No rash.   Neurological:      Mental Status: She is alert and oriented to person, place, and time.   Psychiatric:         Behavior: Behavior normal.       Labs- reviewed  Assessment:       1. Other iron deficiency anemia    2. Other vitamin B12 deficiency anemia        Plan:     1. Continue  q8 week venofer replacement  Opts for Thursday afternoons with treatment at [facility].    Return to clinic in 4 months with MD appointment and labs.     Patient is in agreement with the proposed treatment plan. All questions were answered to the patient's satisfaction. Patient knows to  call clinic for any new or worsening symptoms and if anything is needed before the next clinic visit.          Nova Capps, FNP-C  Hematology & Medical Oncology   [facility] [place] [address]  ph. [phone]  Fax. [phone]    Patient dicussed with collaborating physician, Dr. Crespo.

## 2020-10-19 ENCOUNTER — PATIENT MESSAGE (OUTPATIENT)
Dept: HEMATOLOGY/ONCOLOGY | Facility: CLINIC | Age: 55
End: 2020-10-19

## 2020-10-19 ENCOUNTER — PATIENT MESSAGE (OUTPATIENT)
Dept: FAMILY MEDICINE | Facility: CLINIC | Age: 55
End: 2020-10-19

## 2020-10-19 ENCOUNTER — PATIENT MESSAGE (OUTPATIENT)
Dept: HEPATOLOGY | Facility: CLINIC | Age: 55
End: 2020-10-19

## 2020-10-23 ENCOUNTER — PATIENT MESSAGE (OUTPATIENT)
Dept: FAMILY MEDICINE | Facility: CLINIC | Age: 55
End: 2020-10-23

## 2020-10-23 ENCOUNTER — TELEPHONE (OUTPATIENT)
Dept: FAMILY MEDICINE | Facility: CLINIC | Age: 55
End: 2020-10-23

## 2020-10-23 ENCOUNTER — PATIENT MESSAGE (OUTPATIENT)
Dept: HEPATOLOGY | Facility: CLINIC | Age: 55
End: 2020-10-23

## 2020-10-23 DIAGNOSIS — Z23 NEED FOR PROPHYLACTIC VACCINATION AGAINST HEPATITIS A: Primary | ICD-10-CM

## 2020-10-23 DIAGNOSIS — Z23 NEED FOR VACCINATION AGAINST HEPATITIS B VIRUS: ICD-10-CM

## 2020-10-23 NOTE — TELEPHONE ENCOUNTER
Patient requesting appointment for injections ordered by hepatology.  Informed that the orders need to be re-entered as the combination vaccine ordered is not available in this office.  Verbalized understanding, stating that she would like to receive the injections after her vacation next week.  Appointment scheduled for 11/6/2020 and patient informed that a message will be sent to Diane Chaparro to order the requested vaccines.  Verbalized understanding.

## 2020-10-27 ENCOUNTER — TELEPHONE (OUTPATIENT)
Dept: FAMILY MEDICINE | Facility: CLINIC | Age: 55
End: 2020-10-27

## 2020-10-27 DIAGNOSIS — Z23 NEED FOR VACCINATION AGAINST HEPATITIS B VIRUS: Primary | ICD-10-CM

## 2020-11-06 ENCOUNTER — CLINICAL SUPPORT (OUTPATIENT)
Dept: FAMILY MEDICINE | Facility: CLINIC | Age: 55
End: 2020-11-06
Payer: COMMERCIAL

## 2020-11-06 DIAGNOSIS — Z23 NEED FOR VACCINATION AGAINST HEPATITIS B VIRUS: Primary | ICD-10-CM

## 2020-11-06 DIAGNOSIS — Z23 NEED FOR PROPHYLACTIC VACCINATION AGAINST HEPATITIS A: ICD-10-CM

## 2020-11-06 PROCEDURE — 90472 HEPATITIS B (RECOMBINANT) ADJUVANTED, 2 DOSE: ICD-10-PCS | Mod: S$GLB,,, | Performed by: NURSE PRACTITIONER

## 2020-11-06 PROCEDURE — 90739 HEPB VACC 2/4 DOSE ADULT IM: CPT | Mod: S$GLB,,, | Performed by: NURSE PRACTITIONER

## 2020-11-06 PROCEDURE — 90471 IMMUNIZATION ADMIN: CPT | Mod: S$GLB,,, | Performed by: NURSE PRACTITIONER

## 2020-11-06 PROCEDURE — 90472 IMMUNIZATION ADMIN EACH ADD: CPT | Mod: S$GLB,,, | Performed by: NURSE PRACTITIONER

## 2020-11-06 PROCEDURE — 90739 HEPATITIS B (RECOMBINANT) ADJUVANTED, 2 DOSE: ICD-10-PCS | Mod: S$GLB,,, | Performed by: NURSE PRACTITIONER

## 2020-11-06 PROCEDURE — 90632 HEPA VACCINE ADULT IM: CPT | Mod: S$GLB,,, | Performed by: NURSE PRACTITIONER

## 2020-11-06 PROCEDURE — 90471 HEPATITIS A VACCINE ADULT IM: ICD-10-PCS | Mod: S$GLB,,, | Performed by: NURSE PRACTITIONER

## 2020-11-06 PROCEDURE — 90632 HEPATITIS A VACCINE ADULT IM: ICD-10-PCS | Mod: S$GLB,,, | Performed by: NURSE PRACTITIONER

## 2020-11-06 NOTE — PROGRESS NOTES
HEP-A Vaccine Lot# [id number], given in  LEFTdeltoid ,tolerated well. Pt. Instructed to wait 15 minutes. VIS form given                                                               Heplisav [id number],  given IM in right Deltoid. Pt. Instructed to wait 15 minutes, VIS form given.

## 2020-11-09 ENCOUNTER — PATIENT MESSAGE (OUTPATIENT)
Dept: HEMATOLOGY/ONCOLOGY | Facility: CLINIC | Age: 55
End: 2020-11-09

## 2020-11-09 ENCOUNTER — PATIENT MESSAGE (OUTPATIENT)
Dept: HEPATOLOGY | Facility: CLINIC | Age: 55
End: 2020-11-09

## 2020-11-09 NOTE — TELEPHONE ENCOUNTER
Call returned to the patient. Patient very anxious about the upcoming US Liver Biopsy.  Patient stated I have a very high anxiety level.  Allowed to verbalize concerns. Questions answered.    Patient explained Pre and Post Liver Procedure teaching in details.   Fasting do not eat or drink 8 hrs before the Biopsy.  Take B/P, heart meds, seizure meds, with sips of water before rising to take shower to come here.  I don't take any of those medicines.     Medications to Hold 5-7 days before and after the Biopsy.  Patient stated she has been off of the ASA for weeks.   Stopped the Vitamin E 2 weeks ago.  Took a Ibuprofen 800 mg on Sunday. Will not take anymore.    Wants to know from Keri can she still proceed with the Liver Biopsy after taking the Ibuprofen.  Also can I take a Xanax before the Biopsy or maybe at 12 midnight. I know how I can work myself up into frenzy worrying about things. Then my b/p will be through the roof.    Instructed where to report. You can ask questions of the Dr before the Procedure starts.  You can ask questions.  Someone my accompany to drive home. Expect to be here 4-6 hrs.  Instructed about the IV sedation and the local to right side biopsy site.  Post procedure recovery room what to expect.  Post procedure precautions to watch for.    We usually will get the Liver Biopsy results back in 7-14 days.  Keri will bring you back in or have a Video Visit to discuss the results. Yes, I already have that appt scheduled.    Patient stated you have been very calming answering my questions.    We want you to be comfortable asking any questions you may have to help you understand the procedure.  Patient stated this has helped.    I will send you additional information to review. It will be an reinforcement of what has been told to you.  After reviewing the information- Do not hesitate to contact us with any questions or concern.    Conversation length 52 minutes.

## 2020-11-11 ENCOUNTER — TELEPHONE (OUTPATIENT)
Dept: HEPATOLOGY | Facility: CLINIC | Age: 55
End: 2020-11-11

## 2020-11-11 RX ORDER — FENTANYL CITRATE 50 UG/ML
50 INJECTION, SOLUTION INTRAMUSCULAR; INTRAVENOUS
Status: CANCELLED | OUTPATIENT
Start: 2020-11-11

## 2020-11-11 RX ORDER — MIDAZOLAM HYDROCHLORIDE 1 MG/ML
1 INJECTION INTRAMUSCULAR; INTRAVENOUS
Status: CANCELLED | OUTPATIENT
Start: 2020-11-11

## 2020-11-11 NOTE — TELEPHONE ENCOUNTER
Patient called and message relayed from Keri Cornelius NP.  Patient stated that makes me more comfortable.  I don't have any questions about the test or instructions.

## 2020-11-12 ENCOUNTER — HOSPITAL ENCOUNTER (OUTPATIENT)
Dept: INTERVENTIONAL RADIOLOGY/VASCULAR | Facility: HOSPITAL | Age: 55
Discharge: HOME | End: 2020-11-12
Attending: NURSE PRACTITIONER
Payer: COMMERCIAL

## 2020-11-12 VITALS
WEIGHT: 206 LBS | BODY MASS INDEX: 28.84 KG/M2 | TEMPERATURE: 98 F | DIASTOLIC BLOOD PRESSURE: 71 MMHG | OXYGEN SATURATION: 98 % | RESPIRATION RATE: 12 BRPM | HEIGHT: 71 IN | SYSTOLIC BLOOD PRESSURE: 121 MMHG | HEART RATE: 51 BPM

## 2020-11-12 DIAGNOSIS — K76.0 FATTY LIVER: ICD-10-CM

## 2020-11-12 DIAGNOSIS — K74.02 HEPATIC FIBROSIS, STAGE 3: ICD-10-CM

## 2020-11-12 PROCEDURE — 88313 PR  SPECIAL STAINS,GROUP II: ICD-10-PCS | Mod: 26,,, | Performed by: PATHOLOGY

## 2020-11-12 PROCEDURE — 88307 TISSUE EXAM BY PATHOLOGIST: CPT | Performed by: PATHOLOGY

## 2020-11-12 PROCEDURE — 99152 MOD SED SAME PHYS/QHP 5/>YRS: CPT | Mod: ,,, | Performed by: RADIOLOGY

## 2020-11-12 PROCEDURE — 88342 IMHCHEM/IMCYTCHM 1ST ANTB: CPT | Mod: 26,,, | Performed by: PATHOLOGY

## 2020-11-12 PROCEDURE — 47000 NEEDLE BIOPSY OF LIVER PERQ: CPT | Mod: ,,, | Performed by: RADIOLOGY

## 2020-11-12 PROCEDURE — 88342 IMHCHEM/IMCYTCHM 1ST ANTB: CPT | Performed by: PATHOLOGY

## 2020-11-12 PROCEDURE — 88313 SPECIAL STAINS GROUP 2: CPT | Mod: 26,,, | Performed by: PATHOLOGY

## 2020-11-12 PROCEDURE — 47000 IR BIOPSY LIVER: ICD-10-PCS | Mod: ,,, | Performed by: RADIOLOGY

## 2020-11-12 PROCEDURE — 88307 TISSUE EXAM BY PATHOLOGIST: CPT | Mod: 26,,, | Performed by: PATHOLOGY

## 2020-11-12 PROCEDURE — 76942 ECHO GUIDE FOR BIOPSY: CPT | Mod: 26,,, | Performed by: RADIOLOGY

## 2020-11-12 PROCEDURE — 88342 CHG IMMUNOCYTOCHEMISTRY: ICD-10-PCS | Mod: 26,,, | Performed by: PATHOLOGY

## 2020-11-12 PROCEDURE — 47000 NEEDLE BIOPSY OF LIVER PERQ: CPT

## 2020-11-12 PROCEDURE — 88307 PR  SURG PATH,LEVEL V: ICD-10-PCS | Mod: 26,,, | Performed by: PATHOLOGY

## 2020-11-12 PROCEDURE — 76942 IR BIOPSY LIVER: ICD-10-PCS | Mod: 26,,, | Performed by: RADIOLOGY

## 2020-11-12 PROCEDURE — 99152 PR MOD CONSCIOUS SEDATION, SAME PHYS, 5+ YRS, FIRST 15 MIN: ICD-10-PCS | Mod: ,,, | Performed by: RADIOLOGY

## 2020-11-12 PROCEDURE — 63600175 PHARM REV CODE 636 W HCPCS: Performed by: RADIOLOGY

## 2020-11-12 PROCEDURE — 88313 SPECIAL STAINS GROUP 2: CPT | Mod: 59 | Performed by: PATHOLOGY

## 2020-11-12 PROCEDURE — 99152 MOD SED SAME PHYS/QHP 5/>YRS: CPT

## 2020-11-12 RX ORDER — MIDAZOLAM HYDROCHLORIDE 1 MG/ML
INJECTION INTRAMUSCULAR; INTRAVENOUS CODE/TRAUMA/SEDATION MEDICATION
Status: COMPLETED | OUTPATIENT
Start: 2020-11-12 | End: 2020-11-12

## 2020-11-12 RX ORDER — LIDOCAINE HYDROCHLORIDE 5 MG/ML
INJECTION, SOLUTION INFILTRATION; PERINEURAL CODE/TRAUMA/SEDATION MEDICATION
Status: COMPLETED | OUTPATIENT
Start: 2020-11-12 | End: 2020-11-12

## 2020-11-12 RX ORDER — FENTANYL CITRATE 50 UG/ML
INJECTION, SOLUTION INTRAMUSCULAR; INTRAVENOUS CODE/TRAUMA/SEDATION MEDICATION
Status: COMPLETED | OUTPATIENT
Start: 2020-11-12 | End: 2020-11-12

## 2020-11-12 RX ORDER — SODIUM CHLORIDE 9 MG/ML
500 INJECTION, SOLUTION INTRAVENOUS ONCE
Status: DISCONTINUED | OUTPATIENT
Start: 2020-11-12 | End: 2020-11-13 | Stop reason: HOSPADM

## 2020-11-12 RX ADMIN — MIDAZOLAM HYDROCHLORIDE 1 MG: 1 INJECTION, SOLUTION INTRAMUSCULAR; INTRAVENOUS at 08:11

## 2020-11-12 RX ADMIN — MIDAZOLAM HYDROCHLORIDE 1 MG: 1 INJECTION, SOLUTION INTRAMUSCULAR; INTRAVENOUS at 09:11

## 2020-11-12 RX ADMIN — FENTANYL CITRATE 50 MCG: 50 INJECTION, SOLUTION INTRAMUSCULAR; INTRAVENOUS at 08:11

## 2020-11-12 RX ADMIN — LIDOCAINE HYDROCHLORIDE 5 ML: 5 INJECTION, SOLUTION INFILTRATION; PERINEURAL at 09:11

## 2020-11-12 RX ADMIN — FENTANYL CITRATE 50 MCG: 50 INJECTION, SOLUTION INTRAMUSCULAR; INTRAVENOUS at 09:11

## 2020-11-12 NOTE — H&P
Radiology History & Physical      SUBJECTIVE:     Chief Complaint: elevated LFTs    History of Present Illness:  Estephania Rogers is a 55 y.o. female who presents for random liver biopsy.    Past Medical History:   Diagnosis Date    Anemia     iron deficiency    Anxiety     Diabetes mellitus, type 2     Hypertension     Malabsorption     Migraine headache     Nephrolithiasis     Other specified intestinal malabsorption      Past Surgical History:   Procedure Laterality Date    ANAL FISTULOTOMY  2005    APPENDECTOMY  2005    AUGMENTATION OF BREAST  2014    BREAST SURGERY  2014    CHOLECYSTECTOMY  2005    GASTRIC BYPASS  2005    SINUS SURGERY      Dr. Oral Cobb    SPINE SURGERY      TONSILLECTOMY      TOTAL REDUCTION MAMMOPLASTY  2014       Home Meds:   Prior to Admission medications    Medication Sig Start Date End Date Taking? Authorizing Provider   ALPRAZolam (XANAX) 0.25 MG tablet TAKE ONE Tablet BY MOUTH EVERY DAY AS NEEDED 11/13/18   Dulce Castillo MD   ascorbic acid (VITAMIN C) 500 MG tablet Take 500 mg by mouth once daily.   2/22/12   Historical Provider   AQUILES ASPIRIN ORAL Take 81 mg by mouth once daily. 8/20/19   Historical Provider   butalbital-acetaminophen-caffeine -40 mg (FIORICET, ESGIC) -40 mg per tablet  3/26/18   Historical Provider   cholecalciferol, vitamin D3, 50,000 unit Wafr Take 50,000 Units by mouth once daily.   9/21/10   Historical Provider   FLUoxetine 20 MG capsule Take 1 capsule (20 mg total) by mouth once daily. 12/5/19 12/4/20  Dulce Castillo MD   fluticasone (FLONASE) 50 mcg/actuation nasal spray 1 spray (50 mcg total) by Each Nare route once daily.  Patient not taking: Reported on 8/4/2020 3/11/19   Edward Romo NP   gabapentin (NEURONTIN) 300 MG capsule Take 1 capsule (300 mg total) by mouth every evening. 4/30/20 4/30/21  Dulce Castillo MD   ibuprofen (ADVIL,MOTRIN) 800 MG tablet Take 1 tablet (800 mg total) by mouth 3  (three) times daily.  Patient not taking: Reported on 5/13/2020 3/16/20   SRIDEVI Olvera   ibuprofen (ADVIL,MOTRIN) 800 MG tablet TAKE 1 TABLET BY MOUTH THREE TIMES A DAY 9/14/20   Dulce Castillo MD   multivitamin (ONE DAILY MULTIVITAMIN) per tablet Take 1 tablet by mouth once daily.    Historical Provider   omeprazole (PRILOSEC) 20 MG capsule Take 2 capsules (40 mg total) by mouth once daily. 9/22/20 9/22/21  SRIDEVI Olvera   phytonadione, vit K1, (VITAMIN K1 MISC) 1,000 mg by Misc.(Non-Drug; Combo Route) route once daily. 9/22/20   Historical Provider   RESTASIS 0.05 % ophthalmic emulsion  9/22/15   Historical Provider   simethicone (MYLICON) 125 mg Cap capsule Take 1 capsule (125 mg total) by mouth 4 (four) times daily as needed for Flatulence. 9/18/20   Mare Ruiz PA-C   vitamin A 03866 UNIT capsule Take 25,000 Units by mouth once daily.      Historical Provider   vitamin E acetate (VITAMIN E ORAL) Take 1 tablet by mouth once daily. 10/29/19   Historical Provider     Anticoagulants/Antiplatelets: no anticoagulation    Allergies: Review of patient's allergies indicates:  No Known Allergies  Sedation History:  no adverse reactions    Review of Systems:   Hematological: no known coagulopathies  Respiratory: no shortness of breath  Cardiovascular: no chest pain  Gastrointestinal: no abdominal pain  Genito-Urinary: no dysuria  Musculoskeletal: negative  Neurological: no TIA or stroke symptoms         OBJECTIVE:     Vital Signs (Most Recent)       Physical Exam:  ASA: 2  Mallampati: 2    General: no acute distress  Mental Status: alert and oriented to person, place and time  HEENT: normocephalic, atraumatic  Chest: unlabored breathing  Heart: regular heart rate  Abdomen: nondistended  Extremity: moves all extremities    Laboratory  Lab Results   Component Value Date    INR 1.0 11/12/2020       Lab Results   Component Value Date    WBC 4.80 10/16/2020    HGB 13.5 10/16/2020    HCT  42.8 10/16/2020    MCV 99 (H) 10/16/2020     10/16/2020      Lab Results   Component Value Date    GLU 98 09/22/2020     09/22/2020    K 4.3 09/22/2020     09/22/2020    CO2 28 09/22/2020    BUN 9 09/22/2020    CREATININE 0.7 09/22/2020    CALCIUM 9.2 09/22/2020    MG 2.2 02/27/2012    ALT 65 (H) 10/08/2020    AST 71 (H) 10/08/2020    ALBUMIN 4.2 10/08/2020    BILITOT 1.1 (H) 10/08/2020    BILIDIR 0.4 (H) 10/08/2020       ASSESSMENT/PLAN:     Sedation Plan: up to moderate    Patient will undergo random liver biopsy for elevated LFTs.    Claudy Bautista MD  PGY-2  RADIOLOGY

## 2020-11-12 NOTE — PLAN OF CARE
Procedure completed. Pt tolerated well. NAD noted or reported. Site dressed, CDI. Specimen brought to pathology. Report given to ARCADIO RN. Family notified of dc time.

## 2020-11-12 NOTE — DISCHARGE INSTRUCTIONS
For scheduling: Call Ailyn at [phone]    For questions or concerns call: Radiology resident on call [phone].    For immediate concerns that are not emergent, you may call our radiology clinic at: [phone]

## 2020-11-12 NOTE — DISCHARGE SUMMARY
Radiology Discharge Summary      Hospital Course: No complications    Admit Date: 11/12/2020  Discharge Date: 11/12/2020     Instructions Given to Patient: Yes  Diet: Resume prior diet  Activity: activity as tolerated    Description of Condition on Discharge: Stable  Vital Signs (Most Recent): Temp: 98.3 °F (36.8 °C) (11/12/20 0918)  Pulse: (!) 51 (11/12/20 0945)  Resp: 12 (11/12/20 0945)  BP: 121/71 (11/12/20 0945)  SpO2: 98 % (11/12/20 0945)    Discharge Disposition: Home    Discharge Diagnosis: Elevated LFTs s/p liver biopsy     Follow-up: ISRAEL Willis MD  Diagnostic and Interventional Radiologist  Department of Radiology  Pager: [phone]

## 2020-11-12 NOTE — PROCEDURES
Radiology Post-Procedure Note    Pre Op Diagnosis: Elevated LFTs  Post Op Diagnosis: Same    Procedure: US guided liver biopsy    Procedure performed by: Waqar Willis MD    Written Informed Consent Obtained: Yes  Specimen Removed: YES 3 18g core specimens  Estimated Blood Loss: Minimal    Findings:   US guided liver biopsy performed with 17/18g coaxial core needle.  3 18g core specimens obtained.  No complications.    Patient tolerated procedure well.    Waqar Willis MD  Diagnostic and Interventional Radiologist  Department of Radiology  Pager: [phone]

## 2020-11-12 NOTE — PLAN OF CARE
Pre op nursing care complete. Procedure consent witnessed. IR called.  at bedside. Patient belongings on stretcher.

## 2020-11-19 ENCOUNTER — PATIENT OUTREACH (OUTPATIENT)
Dept: ADMINISTRATIVE | Facility: OTHER | Age: 55
End: 2020-11-19

## 2020-11-19 NOTE — PROGRESS NOTES
Care Everywhere:   Immunization: updated, links delay   Health Maintenance: updated  Media Review:   Legacy Review:   Order placed:   Upcoming appts:mammogram 12/1

## 2020-11-20 ENCOUNTER — OFFICE VISIT (OUTPATIENT)
Dept: HEPATOLOGY | Facility: CLINIC | Age: 55
End: 2020-11-20
Payer: COMMERCIAL

## 2020-11-20 VITALS
HEIGHT: 71 IN | BODY MASS INDEX: 29.87 KG/M2 | OXYGEN SATURATION: 100 % | RESPIRATION RATE: 18 BRPM | TEMPERATURE: 97 F | DIASTOLIC BLOOD PRESSURE: 76 MMHG | HEART RATE: 56 BPM | SYSTOLIC BLOOD PRESSURE: 142 MMHG | WEIGHT: 213.38 LBS

## 2020-11-20 DIAGNOSIS — K76.0 FATTY LIVER: ICD-10-CM

## 2020-11-20 DIAGNOSIS — E80.4 GILBERT'S SYNDROME: ICD-10-CM

## 2020-11-20 DIAGNOSIS — K74.02 HEPATIC FIBROSIS, STAGE 3: Primary | ICD-10-CM

## 2020-11-20 PROCEDURE — 99215 PR OFFICE/OUTPT VISIT, EST, LEVL V, 40-54 MIN: ICD-10-PCS | Mod: S$GLB,,, | Performed by: NURSE PRACTITIONER

## 2020-11-20 PROCEDURE — 99999 PR PBB SHADOW E&M-EST. PATIENT-LVL V: CPT | Mod: PBBFAC,,, | Performed by: NURSE PRACTITIONER

## 2020-11-20 PROCEDURE — 99999 PR PBB SHADOW E&M-EST. PATIENT-LVL V: ICD-10-PCS | Mod: PBBFAC,,, | Performed by: NURSE PRACTITIONER

## 2020-11-20 PROCEDURE — 99215 OFFICE O/P EST HI 40 MIN: CPT | Mod: S$GLB,,, | Performed by: NURSE PRACTITIONER

## 2020-11-20 RX ORDER — INFLUENZA A VIRUS A/GUANGDONG-MAONAN/SWL1536/2019 CNIC-1909 (H1N1) ANTIGEN (FORMALDEHYDE INACTIVATED), INFLUENZA A VIRUS A/HONG KONG/2671/2019 (H3N2) ANTIGEN (FORMALDEHYDE INACTIVATED), INFLUENZA B VIRUS B/PHUKET/3073/2013 ANTIGEN (FORMALDEHYDE INACTIVATED), AND INFLUENZA B VIRUS B/WASHINGTON/02/2019 ANTIGEN (FORMALDEHYDE INACTIVATED) 15; 15; 15; 15 UG/.5ML; UG/.5ML; UG/.5ML; UG/.5ML
INJECTION, SUSPENSION INTRAMUSCULAR
COMMUNITY
Start: 2020-10-20 | End: 2020-12-08

## 2020-11-20 NOTE — PATIENT INSTRUCTIONS
1. Will submit your information for KRAUS clinical trials    2. Labs next month to recheck liver enzymes. Will also check lab for liver cancer screening     3. Follow-up in March with ultrasound before

## 2020-11-20 NOTE — PROGRESS NOTES
[facility] - Established Patient    Last Clinic Visit: 10/16/20    CHIEF COMPLAINT: Follow-up for fatty liver     HPI: This is a 55 y.o. White female here for follow-up for fatty liver. Here to review liver biopsy results.    She was initially told that she had fatty liver ~25 years ago.    Her transaminases have been intermittently elevated since 2005. More recently ALT>AST (40-60s). TBili also mildly elevated since 2011.     Serological workup has been negative for Gavin's, hemochromatosis, autoimmune etiology, and viral hepatitis. Weakly +ASMA 1:40 noted. A1AT level mildly low, 91. UGT1A1 testing confirms Gilbert's.     Imaging has shown hepatomegaly, fatty liver, spleen UNL (12 cm)    Her Fibroscan last month suggested advanced fibrosis (F3) and significant steatosis (S3). Liver biopsy recommended to clarify fibrosis staging:  FINAL PATHOLOGIC DIAGNOSIS  Mild macrovesicular steatosis with minimally active steatohepatitis and multifocal bridging fibrosis.    Interval history:  Here today with .   Doing well overall, continues to work on weight loss.     Updates on risk factors for fatty liver:     · Weight -- Body mass index is 29.76 kg/m².  Had bariatric surgery in 2005. Lost ~150 lb though has gained some back. Weight is down ~10 lb from 9/2020.                   · Dyslipidemia -- well controlled                               · Insulin resistance / diabetes -- well controlled        · Hypertension -- higher recently  · Alcohol use -- had 1 glass of wine since last visit  *Was able to stop most of her medications after weight loss surgery    Denies symptoms of hepatic decompensation including jaundice, ascites, cognitive problems to suggest hepatic encephalopathy, or GI bleeding.     Father had pancreatic and liver cancer.  Sister had liver transplant at [facility] for cryptogenic cirrhosis (passed away earlier this year).        Review of patient's allergies indicates:  No Known Allergies      Current Outpatient Medications on File Prior to Visit   Medication Sig Dispense Refill    ALPRAZolam (XANAX) 0.25 MG tablet TAKE ONE Tablet BY MOUTH EVERY DAY AS NEEDED 30 tablet 0    ascorbic acid (VITAMIN C) 500 MG tablet Take 500 mg by mouth once daily.        AQUILES ASPIRIN ORAL Take 81 mg by mouth once daily.      butalbital-acetaminophen-caffeine -40 mg (FIORICET, ESGIC) -40 mg per tablet       cholecalciferol, vitamin D3, 50,000 unit Wafr Take 50,000 Units by mouth once daily.        FLUoxetine 20 MG capsule Take 1 capsule (20 mg total) by mouth once daily. 90 capsule 3    FLUZONE QUAD 7214-2633 60 mcg (15 mcg x 4)/0.5 mL Susp INJECT 0.5ML INTO THE MUSCLE AS DIRECTED      gabapentin (NEURONTIN) 300 MG capsule Take 1 capsule (300 mg total) by mouth every evening. 90 capsule 1    ibuprofen (ADVIL,MOTRIN) 800 MG tablet TAKE 1 TABLET BY MOUTH THREE TIMES A DAY 30 tablet 0    multivitamin (ONE DAILY MULTIVITAMIN) per tablet Take 1 tablet by mouth once daily.      omeprazole (PRILOSEC) 20 MG capsule Take 2 capsules (40 mg total) by mouth once daily. 180 capsule 0    phytonadione, vit K1, (VITAMIN K1 MISC) 1,000 mg by Misc.(Non-Drug; Combo Route) route once daily.      RESTASIS 0.05 % ophthalmic emulsion   3    simethicone (MYLICON) 125 mg Cap capsule Take 1 capsule (125 mg total) by mouth 4 (four) times daily as needed for Flatulence.  0    vitamin A 83159 UNIT capsule Take 25,000 Units by mouth once daily.        vitamin E acetate (VITAMIN E ORAL) Take 1 tablet by mouth once daily.      fluticasone (FLONASE) 50 mcg/actuation nasal spray 1 spray (50 mcg total) by Each Nare route once daily. (Patient not taking: Reported on 8/4/2020) 16 g 0    ibuprofen (ADVIL,MOTRIN) 800 MG tablet Take 1 tablet (800 mg total) by mouth 3 (three) times daily. (Patient not taking: Reported on 5/13/2020) 30 tablet 0     No current facility-administered medications on file prior to visit.          PMHX:   has a past medical history of Anemia, Anxiety, Diabetes mellitus, type 2, Hypertension, Malabsorption, Migraine headache, Nephrolithiasis, and Other specified intestinal malabsorption.    PSHX:  has a past surgical history that includes Tonsillectomy; Spine surgery; Anal fistulotomy (2005); Gastric bypass (2005); Appendectomy (2005); Cholecystectomy (2005); Sinus surgery; Total Reduction Mammoplasty (2014); Breast surgery (2014); and Augmentation of breast (2014).    FAMILY HISTORY:   Family History   Problem Relation Age of Onset    Cancer Father         pancreatic    Diabetes Father     Diabetes Mother     Hypertension Mother     Miscarriages / Stillbirths Mother     Stroke Mother     Diabetes Sister     Cancer Maternal Aunt         breast    Arthritis Maternal Grandmother     Arthritis Paternal Grandmother     Diabetes Sister     Breast cancer Paternal Aunt        SOCIAL HISTORY:   Social History     Tobacco Use   Smoking Status Never Smoker   Smokeless Tobacco Never Used   Tobacco Comment    Clerical work       Social History     Substance and Sexual Activity   Alcohol Use No    Frequency: 2-3 times a week    Drinks per session: 1 or 2    Binge frequency: Never    Comment: socially       Social History     Substance and Sexual Activity   Drug Use No         Review of Systems   Constitutional: Negative for appetite change, chills, fatigue, fever and unexpected weight change.   Eyes: Negative for visual disturbance.   Respiratory: Negative for cough and shortness of breath.    Cardiovascular: Negative for chest pain, palpitations and leg swelling.   Gastrointestinal: Negative for abdominal distention, abdominal pain, blood in stool, constipation, diarrhea, nausea and vomiting. No change in stool color.  Genitourinary: Negative for dysuria and hematuria. Denies dark urine.   Musculoskeletal: Negative for arthralgias, gait problem, joint swelling and myalgias.   Skin: Negative for color change, rash  "and wound. Denies itching.   Neurological: Negative for dizziness, tremors, speech difficulty, and headaches.   Hematological: Does not bruise/bleed easily.   Psychiatric/Behavioral: Negative for confusion, decreased concentration and sleep disturbance. Denies memory loss. Denies depression. The patient is not nervous/anxious.        Physical Exam   Constitutional: Well-nourished. No distress. Alert and oriented to person, place, and time.  Eyes: No scleral icterus.   Cardiovascular: Normal rate, regular rhythm and normal heart sounds. No murmur heard.  Pulmonary/Chest: Respiratory effort and breath sounds normal. No respiratory distress.   Abdominal: Soft, non-tender. No distension; no ascites appreciated. There is no palpable hepatosplenomegaly. No hernia or mass.   Musculoskeletal: No edema.   Neurological: No tremor. Coordination and gait normal. No asterixis.    Skin: Skin is warm and dry. No rash or erythema. No jaundice. No telangiectasias or palmar erythema noted.  Psychiatric: Normal mood and affect. Speech, behavior, and thought content normal. No depression or anxiety noted.       BP (!) 142/76 (BP Location: Left arm, Patient Position: Sitting, BP Method: Medium (Automatic))   Pulse (!) 56   Temp 97.1 °F (36.2 °C) (Oral)   Resp 18   Ht 5' 11" (1.803 m)   Wt 96.8 kg (213 lb 6.5 oz)   LMP 12/04/2014   SpO2 100%   BMI 29.76 kg/m²       LABS:  Lab Results   Component Value Date    WBC 4.80 10/16/2020    HGB 13.5 10/16/2020    HCT 42.8 10/16/2020     10/16/2020     09/22/2020    K 4.3 09/22/2020    CREATININE 0.7 09/22/2020    ALT 65 (H) 10/08/2020    AST 71 (H) 10/08/2020    ALKPHOS 116 10/08/2020    BILITOT 1.1 (H) 10/08/2020    BILIDIR 0.4 (H) 10/08/2020    ALBUMIN 4.2 10/08/2020    INR 1.0 11/12/2020    SMOOTHMUSCAB Positive 1:40 (A) 10/08/2020    AMAIFA Negative 1:40 10/08/2020    IGGSERUM 1033 10/08/2020    ANASCREEN Negative <1:80 10/08/2020    FERRITIN 1,072 (H) 10/16/2020    " FESATURATED 43 10/16/2020    CERULOPLSM 20.0 10/08/2020    CHOL 150 02/27/2012    TRIG 50 02/27/2012    LDLCALC 74.0 02/27/2012       Hepatitis A Antibody IgG   Date Value Ref Range Status   10/08/2020 Negative  Final       Hepatitis B Surface Ag   Date Value Ref Range Status   10/08/2020 Negative Negative Final     Hep B Core Total Ab   Date Value Ref Range Status   10/08/2020 Negative  Final     Hep B S Ab   Date Value Ref Range Status   10/08/2020 Negative  Final     Hepatitis C Ab   Date Value Ref Range Status   09/22/2020 Negative Negative Final     Immunization History   Administered Date(s) Administered    Hepatitis A, Adult 11/06/2020    Hepatitis B (recombinant) Adjuvanted, 2 dose 11/06/2020    Influenza 11/01/2018    Influenza - Quadrivalent 10/20/2020    Influenza - Quadrivalent - MDCK 10/31/2018          DIAGNOSTIC STUDIES:  ABD. US  Results for orders placed during the hospital encounter of 09/25/20   US Abdomen Complete    Narrative EXAMINATION:  US ABDOMEN COMPLETE    CLINICAL HISTORY:  elevated LFTs; Upper abdominal pain, unspecified    TECHNIQUE:  Complete abdominal ultrasound (including pancreas, aorta, liver, gallbladder, common bile duct, IVC, kidneys, and spleen) was performed.    COMPARISON:  None    FINDINGS:  Study is distorted by body habitus with poor visualization.  The liver is enlarged measuring approximately 17.4 cm at the midclavicular line.  There is diffuse increased echogenicity of liver concerning for fatty infiltration..  The spleen is normal in size measure approximately 12 cm in length. No evidence for intra or extra biliary ductal dilatation common duct measuring approximately 5 mm.  Gallbladder not seen compatible with history of prior cholecystectomy.  Only small portion the pancreas identified remainder is obscured by bowel gas visualized portions of the pancreas are within normal limits without evidence for veronica-pancreatic fluid collection. No evidence for bilateral  hydronephrosis. No aneurysmal dilatation of the visualized abdominal aorta although majority is obscured by bowel gas.Intrahepatic IVC identified, remainder of ivc mostly obscured by bowel gas.      Impression Hepatomegaly with diffuse increased echogenicity of liver concerning for fatty infiltration.    Absent gallbladder compatible with prior cholecystectomy.    Otherwise unremarkable limited abdominal ultrasound secondary to artifact from prominent bowel gas and body habitus.    Further evaluation as warranted clinically.      Electronically signed by: Parvez Meng DO  Date:    09/25/2020  Time:    08:17           ASSESSMENT / PLAN:  55 y.o. White female with:    1. 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 Reassured, liver is working well at this time.   -- Continue to monitor MELD labs every 6 months, next in Dec.  -- Start HCC screening every 6 months with ultrasound and AFP, next due 3/2021  -- Defer EGD given F3, normal platelet count, no evidence of portal HTN    -- Complete vaccines for hepatitis B  -- Submit for KRAUS clinical trials     2. Body mass index is 29.76 kg/m²., HTN  -- Discussed importance of lifestyle modifications including healthy diet and adequate physical activity to achieve and maintain ideal body weight.   -- Maintain control of blood pressure, cholesterol, and blood sugar as these are risk factors for fatty liver    3. Elevated bilirubin - Gilbert's  -- Confirmed with UGT1A1 genotype testing    4. Low A1AT level  -- Mildly low, check phenotype with next labs         Labs in Dec.  RTC in March with US prior (if not enrolled in clinical trial).         Orders Placed This Encounter   Procedures    US Abdomen Limited    Comprehensive Metabolic Panel    Protime-INR    AFP Tumor Marker    Alpha 1 Antitrypsin Phenotype         EDUCATION / DISCUSSION:   We discussed the manifestations of fatty liver. At this time there is no FDA approved therapy for fatty liver. The  patient and I discussed the importance of lifestyle modifications such as diet, exercise, and weight loss for management of fatty liver. We reviewed modification of risk factors such as hypertension, hyperlipidemia, and diabetes mellitus / impaired fasting glucose. Discussed that excessive alcohol consumption can also cause fatty liver. We discussed a low carb, low sugar diet and a goal of 30 minutes of exercise 5 times per week. Patient is aware that fatty liver can progress to steatohepatitis and possibly to cirrhosis, putting one at increased risk for liver cancer or liver failure.     *If statins are being considered for HLD, statins are safe in patients with liver disease. Statins can be used to treat dyslipidemia in patients with both NAFLD and KRAUS.      Thank you for allowing me to participate in the care of Estephania Cornelius, NOEL-C  Hepatology         Total duration of visit = >45 min, with > 50% spent reviewing biopsy results, discussing HCM for advanced fibrosis, and reviewing treatment for fatty liver.

## 2020-11-30 ENCOUNTER — TELEPHONE (OUTPATIENT)
Dept: PODIATRY | Facility: CLINIC | Age: 55
End: 2020-11-30

## 2020-12-01 ENCOUNTER — HOSPITAL ENCOUNTER (OUTPATIENT)
Dept: RADIOLOGY | Facility: HOSPITAL | Age: 55
Discharge: HOME | End: 2020-12-01
Attending: FAMILY MEDICINE
Payer: COMMERCIAL

## 2020-12-01 DIAGNOSIS — Z12.31 ENCOUNTER FOR SCREENING MAMMOGRAM FOR MALIGNANT NEOPLASM OF BREAST: ICD-10-CM

## 2020-12-01 LAB
FINAL PATHOLOGIC DIAGNOSIS: NORMAL
GROSS: NORMAL
MICROSCOPIC EXAM: NORMAL
SUPPLEMENTAL DIAGNOSIS: NORMAL

## 2020-12-01 PROCEDURE — 77063 MAMMO DIGITAL SCREENING BILAT WITH TOMO: ICD-10-PCS | Mod: 26,,, | Performed by: RADIOLOGY

## 2020-12-01 PROCEDURE — 77067 MAMMO DIGITAL SCREENING BILAT WITH TOMO: ICD-10-PCS | Mod: 26,,, | Performed by: RADIOLOGY

## 2020-12-01 PROCEDURE — 77063 BREAST TOMOSYNTHESIS BI: CPT | Mod: 26,,, | Performed by: RADIOLOGY

## 2020-12-01 PROCEDURE — 77067 SCR MAMMO BI INCL CAD: CPT | Mod: TC,PO

## 2020-12-01 PROCEDURE — 77067 SCR MAMMO BI INCL CAD: CPT | Mod: 26,,, | Performed by: RADIOLOGY

## 2020-12-03 ENCOUNTER — PATIENT MESSAGE (OUTPATIENT)
Dept: HEPATOLOGY | Facility: CLINIC | Age: 55
End: 2020-12-03

## 2020-12-07 ENCOUNTER — OFFICE VISIT (OUTPATIENT)
Dept: PODIATRY | Facility: CLINIC | Age: 55
End: 2020-12-07
Payer: COMMERCIAL

## 2020-12-07 VITALS
DIASTOLIC BLOOD PRESSURE: 79 MMHG | BODY MASS INDEX: 29.87 KG/M2 | WEIGHT: 213.38 LBS | HEIGHT: 71 IN | HEART RATE: 57 BPM | SYSTOLIC BLOOD PRESSURE: 150 MMHG

## 2020-12-07 DIAGNOSIS — M20.42 HAMMER TOES OF BOTH FEET: ICD-10-CM

## 2020-12-07 DIAGNOSIS — M20.5X2 HALLUX LIMITUS, ACQUIRED, LEFT: ICD-10-CM

## 2020-12-07 DIAGNOSIS — M79.672 FOOT PAIN, LEFT: Primary | ICD-10-CM

## 2020-12-07 DIAGNOSIS — M21.6X1 ACQUIRED EQUINUS DEFORMITY OF BOTH FEET: ICD-10-CM

## 2020-12-07 DIAGNOSIS — M72.2 PLANTAR FASCIITIS: ICD-10-CM

## 2020-12-07 DIAGNOSIS — M21.6X2 ACQUIRED EQUINUS DEFORMITY OF BOTH FEET: ICD-10-CM

## 2020-12-07 DIAGNOSIS — M20.41 HAMMER TOES OF BOTH FEET: ICD-10-CM

## 2020-12-07 DIAGNOSIS — M20.5X1 HALLUX LIMITUS, ACQUIRED, RIGHT: ICD-10-CM

## 2020-12-07 PROCEDURE — 20550 NJX 1 TENDON SHEATH/LIGAMENT: CPT | Mod: LT,S$GLB,, | Performed by: PODIATRIST

## 2020-12-07 PROCEDURE — 20550 PR INJECT TENDON SHEATH/LIGAMENT: ICD-10-PCS | Mod: LT,S$GLB,, | Performed by: PODIATRIST

## 2020-12-07 PROCEDURE — 99214 OFFICE O/P EST MOD 30 MIN: CPT | Mod: 25,S$GLB,, | Performed by: PODIATRIST

## 2020-12-07 PROCEDURE — 99214 PR OFFICE/OUTPT VISIT, EST, LEVL IV, 30-39 MIN: ICD-10-PCS | Mod: 25,S$GLB,, | Performed by: PODIATRIST

## 2020-12-07 PROCEDURE — 99999 PR PBB SHADOW E&M-EST. PATIENT-LVL V: CPT | Mod: PBBFAC,,, | Performed by: PODIATRIST

## 2020-12-07 PROCEDURE — 99999 PR PBB SHADOW E&M-EST. PATIENT-LVL V: ICD-10-PCS | Mod: PBBFAC,,, | Performed by: PODIATRIST

## 2020-12-07 RX ORDER — LIDOCAINE HYDROCHLORIDE 20 MG/ML
1 INJECTION, SOLUTION EPIDURAL; INFILTRATION; INTRACAUDAL; PERINEURAL ONCE
Status: COMPLETED | OUTPATIENT
Start: 2020-12-07 | End: 2020-12-07

## 2020-12-07 RX ORDER — TRIAMCINOLONE ACETONIDE 40 MG/ML
20 INJECTION, SUSPENSION INTRA-ARTICULAR; INTRAMUSCULAR ONCE
Status: COMPLETED | OUTPATIENT
Start: 2020-12-07 | End: 2020-12-07

## 2020-12-07 RX ORDER — BUPIVACAINE HYDROCHLORIDE 5 MG/ML
1 INJECTION, SOLUTION EPIDURAL; INTRACAUDAL ONCE
Status: COMPLETED | OUTPATIENT
Start: 2020-12-07 | End: 2020-12-07

## 2020-12-07 RX ORDER — DEXAMETHASONE SODIUM PHOSPHATE 4 MG/ML
2 INJECTION, SOLUTION INTRA-ARTICULAR; INTRALESIONAL; INTRAMUSCULAR; INTRAVENOUS; SOFT TISSUE ONCE
Status: COMPLETED | OUTPATIENT
Start: 2020-12-07 | End: 2020-12-07

## 2020-12-07 RX ORDER — METHYLPREDNISOLONE 4 MG/1
TABLET ORAL
Qty: 21 TABLET | Refills: 0 | Status: SHIPPED | OUTPATIENT
Start: 2020-12-07 | End: 2021-02-17 | Stop reason: ALTCHOICE

## 2020-12-07 RX ADMIN — DEXAMETHASONE SODIUM PHOSPHATE 2 MG: 4 INJECTION, SOLUTION INTRA-ARTICULAR; INTRALESIONAL; INTRAMUSCULAR; INTRAVENOUS; SOFT TISSUE at 08:12

## 2020-12-07 RX ADMIN — TRIAMCINOLONE ACETONIDE 20 MG: 40 INJECTION, SUSPENSION INTRA-ARTICULAR; INTRAMUSCULAR at 08:12

## 2020-12-07 RX ADMIN — BUPIVACAINE HYDROCHLORIDE 5 MG: 5 INJECTION, SOLUTION EPIDURAL; INTRACAUDAL at 08:12

## 2020-12-07 RX ADMIN — LIDOCAINE HYDROCHLORIDE 20 MG: 20 INJECTION, SOLUTION EPIDURAL; INFILTRATION; INTRACAUDAL; PERINEURAL at 08:12

## 2020-12-07 NOTE — PROGRESS NOTES
Subjective:      Patient ID: Estephania Rogers is a 55 y.o. female.    Chief Complaint: Foot Pain (left foot/ fv 12/8/20 [facility] p[facility])    Estephania Rogers is a 55 y.o. female who presents to the clinic complaining of Left plantar medial heel pain in, especially with the first step in the morning. The pain is described as Aching, Burning, Throbbing and Sharp. The onset of the pain was gradual and has worsened over the past several week. Estephania Rogers rates the pain as almost unbearable. she denies a history of trauma. 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 Prior treatments include ice, occasional use of NSAIDs, began using CAM boot in the last 7 days.    Shoe gear: Vionic shoe right, CAM boot left  Hours on Feet: 8+      Patient Active Problem List   Diagnosis    Recurrent nephrolithiasis    Other specified intestinal malabsorption    Iron deficiency anemia    B12 deficiency anemia    Anxiety    Fatty liver    Hepatic fibrosis, stage 3    Gilbert's syndrome       Current Outpatient Medications on File Prior to Visit   Medication Sig Dispense Refill    ALPRAZolam (XANAX) 0.25 MG tablet TAKE ONE Tablet BY MOUTH EVERY DAY AS NEEDED 30 tablet 0    ascorbic acid (VITAMIN C) 500 MG tablet Take 500 mg by mouth once daily.        AQUILES ASPIRIN ORAL Take 81 mg by mouth once daily.      butalbital-acetaminophen-caffeine -40 mg (FIORICET, ESGIC) -40 mg per tablet       cholecalciferol, vitamin D3, 50,000 unit Wafr Take 50,000 Units by mouth once daily.        FLUoxetine 20 MG capsule Take 1 capsule (20 mg total) by mouth once daily. 90 capsule 3    fluticasone (FLONASE) 50 mcg/actuation nasal spray 1 spray (50 mcg total) by Each Nare route once daily. (Patient not taking: Reported on 8/4/2020) 16 g 0    FLUZONE QUAD [id number] 60 mcg (15 mcg x 4)/0.5 mL Susp INJECT 0.5ML INTO THE MUSCLE AS DIRECTED       gabapentin (NEURONTIN) 300 MG capsule Take 1 capsule (300 mg total) by mouth every evening. 90 capsule 1    ibuprofen (ADVIL,MOTRIN) 800 MG tablet Take 1 tablet (800 mg total) by mouth 3 (three) times daily. (Patient not taking: Reported on 5/13/2020) 30 tablet 0    ibuprofen (ADVIL,MOTRIN) 800 MG tablet TAKE 1 TABLET BY MOUTH THREE TIMES A DAY 30 tablet 0    multivitamin (ONE DAILY MULTIVITAMIN) per tablet Take 1 tablet by mouth once daily.      omeprazole (PRILOSEC) 20 MG capsule Take 2 capsules (40 mg total) by mouth once daily. 180 capsule 0    phytonadione, vit K1, (VITAMIN K1 MISC) 1,000 mg by Misc.(Non-Drug; Combo Route) route once daily.      RESTASIS 0.05 % ophthalmic emulsion   3    simethicone (MYLICON) 125 mg Cap capsule Take 1 capsule (125 mg total) by mouth 4 (four) times daily as needed for Flatulence.  0    vitamin A 13548 UNIT capsule Take 25,000 Units by mouth once daily.        vitamin E acetate (VITAMIN E ORAL) Take 1 tablet by mouth once daily.       No current facility-administered medications on file prior to visit.        Review of patient's allergies indicates:  No Known Allergies    Past Surgical History:   Procedure Laterality Date    ANAL FISTULOTOMY  2005    APPENDECTOMY  2005    AUGMENTATION OF BREAST  2014    BREAST SURGERY  2014    CHOLECYSTECTOMY  2005    GASTRIC BYPASS  2005    SINUS SURGERY      Dr. Oral Cobb    SPINE SURGERY      TONSILLECTOMY      TOTAL REDUCTION MAMMOPLASTY  2014       Family History   Problem Relation Age of Onset    Cancer Father         pancreatic    Diabetes Father     Diabetes Mother     Hypertension Mother     Miscarriages / Stillbirths Mother     Stroke Mother     Diabetes Sister     Cancer Maternal Aunt         breast    Arthritis Maternal Grandmother     Arthritis Paternal Grandmother     Diabetes Sister     Breast cancer Paternal Aunt        Social History     Socioeconomic History    Marital status:      Spouse  name: Not on file    Number of children: Not on file    Years of education: Not on file    Highest education level: Not on file   Occupational History    Not on file   Social Needs    Financial resource strain: Not very hard    Food insecurity     Worry: Never true     Inability: Never true    Transportation needs     Medical: No     Non-medical: No   Tobacco Use    Smoking status: Never Smoker    Smokeless tobacco: Never Used    Tobacco comment: Clerical work   Substance and Sexual Activity    Alcohol use: No     Frequency: 2-3 times a week     Drinks per session: 1 or 2     Binge frequency: Never     Comment: socially    Drug use: No    Sexual activity: Yes     Partners: Male     Birth control/protection: None     Comment: :  Gianni   Lifestyle    Physical activity     Days per week: 1 day     Minutes per session: 30 min    Stress: To some extent   Relationships    Social connections     Talks on phone: More than three times a week     Gets together: Once a week     Attends Gnosticist service: Not on file     Active member of club or organization: No     Attends meetings of clubs or organizations: Never     Relationship status:    Other Topics Concern    Not on file   Social History Narrative    Not on file       Review of Systems   Constitution: Negative for chills and fever.   Cardiovascular: Negative for claudication and leg swelling.   Respiratory: Negative for cough and shortness of breath.    Skin: Positive for dry skin. Negative for itching, nail changes and rash.   Musculoskeletal: Positive for arthritis, joint pain and myalgias. Negative for falls, joint swelling and muscle weakness.   Gastrointestinal: Negative for diarrhea, nausea and vomiting.   Neurological: Positive for paresthesias. Negative for numbness, tremors and weakness.   Psychiatric/Behavioral: Negative for altered mental status and hallucinations. The patient is nervous/anxious.            Objective:      "  Vitals:    12/07/20 0732   BP: (!) 150/79   Pulse: (!) 57   Weight: 96.8 kg (213 lb 6.5 oz)   Height: 5' 11" (1.803 m)   PainSc:   4       Physical Exam  Nursing note reviewed.   Constitutional:       General: She is not in acute distress.     Appearance: She is not toxic-appearing or diaphoretic.   Cardiovascular:      Pulses:           Dorsalis pedis pulses are 2+ on the right side and 2+ on the left side.        Posterior tibial pulses are 2+ on the right side and 2+ on the left side.   Pulmonary:      Effort: No respiratory distress.   Musculoskeletal:      Right ankle: She exhibits decreased range of motion. She exhibits no swelling. No tenderness. No lateral malleolus, no medial malleolus, no AITFL, no CF ligament and no posterior TFL tenderness found. Achilles tendon exhibits no pain, no defect and normal Villegas's test results.      Left ankle: She exhibits decreased range of motion. She exhibits no swelling. No tenderness. No lateral malleolus, no medial malleolus, no AITFL, no CF ligament and no posterior TFL tenderness found. Achilles tendon exhibits no pain, no defect and normal Villegas's test results.      Right foot: No tenderness or bony tenderness.      Left foot: Tenderness and crepitus (midfoot) present. No bony tenderness.      Comments: Biomechanical exam: Pain on palpation left medial calcaneal tubercle at origin of plantar fascia. There is equinus deformity bilateral with decreased dorsiflexion at the ankle joint bilateral. No tenderness with compression of heel. Negative tinels sign. Gait analysis reveals excessive pronation through midstance and propulsion with early heel off. Shoes reveals lateral heel counter wear bilateral     Decreased first MPJ range of motion both weightbearing and nonweightbearing, no crepitus observed the first MP joint, + dorsal flag sign. Mild  bunion deformity is observed .    Patient has hammertoes of digits 2-5 bilateral partially reducible        Skin:     " General: Skin is warm and dry.      Coloration: Skin is not pale.      Findings: No bruising, burn, laceration, lesion or rash.      Nails: There is no clubbing.     Neurological:      Sensory: No sensory deficit.      Motor: No tremor, atrophy or abnormal muscle tone.      Deep Tendon Reflexes: Reflexes are normal and symmetric.   Psychiatric:         Attention and Perception: She is attentive.         Mood and Affect: Mood is not anxious. Affect is not inappropriate.         Speech: She is communicative. Speech is not slurred.         Behavior: Behavior is not combative.               Assessment:       Encounter Diagnoses   Name Primary?    Foot pain, left Yes    Plantar fasciitis     Acquired equinus deformity of both feet     Hallux limitus, acquired, left     Hallux limitus, acquired, right     Hammer toes of both feet          Plan:       Estephania was seen today for foot pain.    Diagnoses and all orders for this visit:    Foot pain, left    Plantar fasciitis    Acquired equinus deformity of both feet    Hallux limitus, acquired, left    Hallux limitus, acquired, right    Hammer toes of both feet    Other orders  -     bupivacaine (PF) 0.5% (5 mg/mL) injection 5 mg  -     dexamethasone injection 2 mg  -     lidocaine (PF) 20 mg/ml (2%) injection 20 mg  -     triamcinolone acetonide injection 20 mg  -     methylPREDNISolone (MEDROL DOSEPACK) 4 mg tablet; follow package directions      I counseled the patient on her conditions, their implications and medical management.  Discussed different treatment options for heel pain. I gave written and verbal instructions on stretching exercises. Patient expressed understanding. Discussed icing the affected area as needed and also wearing appropriate shoe gear and avoiding flats, slippers, sandals, and going barefoot. My recommendation for OTC supports is [facility]. Patient instructed on adequate icing techniques. Patient should ice the affected area at  least once per day x 10 minutes for 10 days . I advised the patient that extra icing would also be beneficial to ensure adequate anti inflammatory effect. We also discussed cortisone injections and NSAID therapy. Patient relates that she is unable to take NSAIDs.  Medrol prescribed.    Patient would like injection today. Skin was prepped with alcohol and anesthetized with ethyl chloride.  The following mixture was injected into left plantar medial heel: 3ccs of mixture of (1cc 1% plain Lidocaine : 1cc 0.5% Marcaine plain:  0.5cc kenalog-40 : 0.5cc dexamethasone) directly into problematic areas.  Patient  tolerated the injection well. Related nearly 100% relief following injection.     RTC in 9-12 weeks if no improvement, at this time she will receive referral to PT and advanced imaging. Patient is amenable to plan.

## 2020-12-08 ENCOUNTER — OFFICE VISIT (OUTPATIENT)
Dept: FAMILY MEDICINE | Facility: CLINIC | Age: 55
End: 2020-12-08
Payer: COMMERCIAL

## 2020-12-08 VITALS
SYSTOLIC BLOOD PRESSURE: 122 MMHG | HEIGHT: 71 IN | TEMPERATURE: 99 F | WEIGHT: 209 LBS | OXYGEN SATURATION: 99 % | BODY MASS INDEX: 29.26 KG/M2 | HEART RATE: 62 BPM | DIASTOLIC BLOOD PRESSURE: 66 MMHG

## 2020-12-08 DIAGNOSIS — Z92.29 HAS RECEIVED FIRST DOSE OF HEPATITIS B VACCINE: ICD-10-CM

## 2020-12-08 DIAGNOSIS — K74.02 HEPATIC FIBROSIS, STAGE 3: ICD-10-CM

## 2020-12-08 DIAGNOSIS — F43.9 SITUATIONAL STRESS: ICD-10-CM

## 2020-12-08 DIAGNOSIS — Z00.00 ANNUAL PHYSICAL EXAM: Primary | ICD-10-CM

## 2020-12-08 DIAGNOSIS — E80.4 GILBERT'S SYNDROME: ICD-10-CM

## 2020-12-08 PROCEDURE — 99999 PR PBB SHADOW E&M-EST. PATIENT-LVL IV: ICD-10-PCS | Mod: PBBFAC,,, | Performed by: FAMILY MEDICINE

## 2020-12-08 PROCEDURE — 99999 PR PBB SHADOW E&M-EST. PATIENT-LVL IV: CPT | Mod: PBBFAC,,, | Performed by: FAMILY MEDICINE

## 2020-12-08 PROCEDURE — 90471 IMMUNIZATION ADMIN: CPT | Mod: S$GLB,,, | Performed by: FAMILY MEDICINE

## 2020-12-08 PROCEDURE — 99396 PR PREVENTIVE VISIT,EST,40-64: ICD-10-PCS | Mod: 25,S$GLB,, | Performed by: FAMILY MEDICINE

## 2020-12-08 PROCEDURE — 90739 HEPB VACC 2/4 DOSE ADULT IM: CPT | Mod: S$GLB,,, | Performed by: FAMILY MEDICINE

## 2020-12-08 PROCEDURE — 90739 HEPATITIS B (RECOMBINANT) ADJUVANTED, 2 DOSE: ICD-10-PCS | Mod: S$GLB,,, | Performed by: FAMILY MEDICINE

## 2020-12-08 PROCEDURE — 99396 PREV VISIT EST AGE 40-64: CPT | Mod: 25,S$GLB,, | Performed by: FAMILY MEDICINE

## 2020-12-08 PROCEDURE — 90471 HEPATITIS B (RECOMBINANT) ADJUVANTED, 2 DOSE: ICD-10-PCS | Mod: S$GLB,,, | Performed by: FAMILY MEDICINE

## 2020-12-08 RX ORDER — FLUOXETINE HYDROCHLORIDE 20 MG/1
20 CAPSULE ORAL DAILY
Qty: 90 CAPSULE | Refills: 3 | Status: SHIPPED | OUTPATIENT
Start: 2020-12-08 | End: 2022-03-02

## 2020-12-16 ENCOUNTER — INFUSION (OUTPATIENT)
Dept: INFUSION THERAPY | Facility: HOSPITAL | Age: 55
End: 2020-12-16
Attending: INTERNAL MEDICINE
Payer: COMMERCIAL

## 2020-12-16 DIAGNOSIS — D50.0 IRON DEFICIENCY ANEMIA DUE TO CHRONIC BLOOD LOSS: Primary | ICD-10-CM

## 2020-12-16 PROCEDURE — 96365 THER/PROPH/DIAG IV INF INIT: CPT

## 2020-12-16 PROCEDURE — 63600175 PHARM REV CODE 636 W HCPCS: Performed by: INTERNAL MEDICINE

## 2020-12-16 PROCEDURE — 25000003 PHARM REV CODE 250: Performed by: INTERNAL MEDICINE

## 2020-12-16 RX ORDER — HEPARIN 100 UNIT/ML
500 SYRINGE INTRAVENOUS
Status: CANCELLED | OUTPATIENT
Start: 2020-12-23

## 2020-12-16 RX ADMIN — IRON SUCROSE 200 MG: 20 INJECTION, SOLUTION INTRAVENOUS at 03:12

## 2020-12-16 NOTE — PLAN OF CARE
Pt arrived to unit. Tolerated Venofer. No reactions noted. Pt has next appt. Pt ambulated off unit. No distress noted.

## 2021-02-03 ENCOUNTER — PATIENT MESSAGE (OUTPATIENT)
Dept: PODIATRY | Facility: CLINIC | Age: 56
End: 2021-02-03

## 2021-02-04 ENCOUNTER — OFFICE VISIT (OUTPATIENT)
Dept: PODIATRY | Facility: CLINIC | Age: 56
End: 2021-02-04
Payer: COMMERCIAL

## 2021-02-04 VITALS — BODY MASS INDEX: 29.12 KG/M2 | WEIGHT: 208 LBS | HEIGHT: 71 IN

## 2021-02-04 DIAGNOSIS — B35.3 TINEA PEDIS OF LEFT FOOT: Primary | ICD-10-CM

## 2021-02-04 PROCEDURE — 99213 OFFICE O/P EST LOW 20 MIN: CPT | Mod: S$GLB,,, | Performed by: PODIATRIST

## 2021-02-04 PROCEDURE — 99999 PR PBB SHADOW E&M-EST. PATIENT-LVL IV: CPT | Mod: PBBFAC,,, | Performed by: PODIATRIST

## 2021-02-04 PROCEDURE — 99213 PR OFFICE/OUTPT VISIT, EST, LEVL III, 20-29 MIN: ICD-10-PCS | Mod: S$GLB,,, | Performed by: PODIATRIST

## 2021-02-04 PROCEDURE — 99999 PR PBB SHADOW E&M-EST. PATIENT-LVL IV: ICD-10-PCS | Mod: PBBFAC,,, | Performed by: PODIATRIST

## 2021-02-10 ENCOUNTER — INFUSION (OUTPATIENT)
Dept: INFUSION THERAPY | Facility: HOSPITAL | Age: 56
End: 2021-02-10
Attending: INTERNAL MEDICINE
Payer: COMMERCIAL

## 2021-02-10 VITALS
HEART RATE: 62 BPM | OXYGEN SATURATION: 99 % | RESPIRATION RATE: 18 BRPM | SYSTOLIC BLOOD PRESSURE: 141 MMHG | DIASTOLIC BLOOD PRESSURE: 70 MMHG

## 2021-02-10 DIAGNOSIS — D50.0 IRON DEFICIENCY ANEMIA DUE TO CHRONIC BLOOD LOSS: Primary | ICD-10-CM

## 2021-02-10 PROCEDURE — 63600175 PHARM REV CODE 636 W HCPCS: Performed by: INTERNAL MEDICINE

## 2021-02-10 PROCEDURE — 96365 THER/PROPH/DIAG IV INF INIT: CPT

## 2021-02-10 PROCEDURE — 25000003 PHARM REV CODE 250: Performed by: INTERNAL MEDICINE

## 2021-02-10 RX ADMIN — IRON SUCROSE 200 MG: 20 INJECTION, SOLUTION INTRAVENOUS at 03:02

## 2021-02-12 ENCOUNTER — PATIENT MESSAGE (OUTPATIENT)
Dept: PODIATRY | Facility: CLINIC | Age: 56
End: 2021-02-12

## 2021-02-17 ENCOUNTER — PATIENT MESSAGE (OUTPATIENT)
Dept: PODIATRY | Facility: CLINIC | Age: 56
End: 2021-02-17

## 2021-02-17 ENCOUNTER — PATIENT MESSAGE (OUTPATIENT)
Dept: FAMILY MEDICINE | Facility: CLINIC | Age: 56
End: 2021-02-17

## 2021-02-17 DIAGNOSIS — N30.00 ACUTE CYSTITIS WITHOUT HEMATURIA: Primary | ICD-10-CM

## 2021-02-17 DIAGNOSIS — M79.672 FOOT PAIN, LEFT: ICD-10-CM

## 2021-02-17 DIAGNOSIS — M72.2 PLANTAR FASCIITIS: Primary | ICD-10-CM

## 2021-02-17 RX ORDER — METHYLPREDNISOLONE 4 MG/1
TABLET ORAL
Qty: 21 TABLET | Refills: 0 | Status: SHIPPED | OUTPATIENT
Start: 2021-02-17 | End: 2021-03-17

## 2021-02-17 RX ORDER — AMOXICILLIN 500 MG/1
500 TABLET, FILM COATED ORAL EVERY 12 HOURS
Qty: 10 TABLET | Refills: 0 | Status: SHIPPED | OUTPATIENT
Start: 2021-02-17 | End: 2021-02-22

## 2021-03-03 ENCOUNTER — OFFICE VISIT (OUTPATIENT)
Dept: FAMILY MEDICINE | Facility: CLINIC | Age: 56
End: 2021-03-03
Payer: COMMERCIAL

## 2021-03-03 VITALS
SYSTOLIC BLOOD PRESSURE: 120 MMHG | HEART RATE: 62 BPM | TEMPERATURE: 98 F | HEIGHT: 71 IN | OXYGEN SATURATION: 97 % | WEIGHT: 201.38 LBS | DIASTOLIC BLOOD PRESSURE: 72 MMHG | BODY MASS INDEX: 28.19 KG/M2

## 2021-03-03 DIAGNOSIS — G43.819 OTHER MIGRAINE WITHOUT STATUS MIGRAINOSUS, INTRACTABLE: Primary | ICD-10-CM

## 2021-03-03 PROCEDURE — 99214 OFFICE O/P EST MOD 30 MIN: CPT | Mod: 25,S$GLB,, | Performed by: NURSE PRACTITIONER

## 2021-03-03 PROCEDURE — 96372 THER/PROPH/DIAG INJ SC/IM: CPT | Mod: S$GLB,,, | Performed by: NURSE PRACTITIONER

## 2021-03-03 PROCEDURE — 99214 PR OFFICE/OUTPT VISIT, EST, LEVL IV, 30-39 MIN: ICD-10-PCS | Mod: 25,S$GLB,, | Performed by: NURSE PRACTITIONER

## 2021-03-03 PROCEDURE — 99999 PR PBB SHADOW E&M-EST. PATIENT-LVL V: ICD-10-PCS | Mod: PBBFAC,,, | Performed by: NURSE PRACTITIONER

## 2021-03-03 PROCEDURE — 99999 PR PBB SHADOW E&M-EST. PATIENT-LVL V: CPT | Mod: PBBFAC,,, | Performed by: NURSE PRACTITIONER

## 2021-03-03 PROCEDURE — 96372 PR INJECTION,THERAP/PROPH/DIAG2ST, IM OR SUBCUT: ICD-10-PCS | Mod: S$GLB,,, | Performed by: NURSE PRACTITIONER

## 2021-03-03 RX ORDER — BUTALBITAL, ACETAMINOPHEN AND CAFFEINE 50; 325; 40 MG/1; MG/1; MG/1
1 TABLET ORAL EVERY 6 HOURS PRN
Qty: 30 TABLET | Refills: 0 | Status: SHIPPED | OUTPATIENT
Start: 2021-03-03 | End: 2021-04-02

## 2021-03-03 RX ORDER — KETOROLAC TROMETHAMINE 30 MG/ML
60 INJECTION, SOLUTION INTRAMUSCULAR; INTRAVENOUS
Status: COMPLETED | OUTPATIENT
Start: 2021-03-03 | End: 2021-03-03

## 2021-03-03 RX ADMIN — KETOROLAC TROMETHAMINE 60 MG: 30 INJECTION, SOLUTION INTRAMUSCULAR; INTRAVENOUS at 07:03

## 2021-03-15 ENCOUNTER — HOSPITAL ENCOUNTER (OUTPATIENT)
Dept: RADIOLOGY | Facility: HOSPITAL | Age: 56
Discharge: HOME | End: 2021-03-15
Attending: NURSE PRACTITIONER
Payer: COMMERCIAL

## 2021-03-15 DIAGNOSIS — K74.02 HEPATIC FIBROSIS, STAGE 3: ICD-10-CM

## 2021-03-15 DIAGNOSIS — K76.0 FATTY LIVER: ICD-10-CM

## 2021-03-15 PROCEDURE — 76705 ECHO EXAM OF ABDOMEN: CPT | Mod: 26,,, | Performed by: RADIOLOGY

## 2021-03-15 PROCEDURE — 76705 US ABDOMEN LIMITED: ICD-10-PCS | Mod: 26,,, | Performed by: RADIOLOGY

## 2021-03-15 PROCEDURE — 76705 ECHO EXAM OF ABDOMEN: CPT | Mod: TC

## 2021-03-17 ENCOUNTER — OFFICE VISIT (OUTPATIENT)
Dept: HEPATOLOGY | Facility: CLINIC | Age: 56
End: 2021-03-17
Payer: COMMERCIAL

## 2021-03-17 VITALS
OXYGEN SATURATION: 99 % | DIASTOLIC BLOOD PRESSURE: 71 MMHG | HEART RATE: 58 BPM | WEIGHT: 201.94 LBS | HEIGHT: 71 IN | RESPIRATION RATE: 18 BRPM | SYSTOLIC BLOOD PRESSURE: 140 MMHG | BODY MASS INDEX: 28.27 KG/M2

## 2021-03-17 DIAGNOSIS — E80.4 GILBERT'S SYNDROME: ICD-10-CM

## 2021-03-17 DIAGNOSIS — K74.02 HEPATIC FIBROSIS, STAGE 3: ICD-10-CM

## 2021-03-17 DIAGNOSIS — K75.81 NASH (NONALCOHOLIC STEATOHEPATITIS): Primary | ICD-10-CM

## 2021-03-17 PROCEDURE — 99214 OFFICE O/P EST MOD 30 MIN: CPT | Mod: S$GLB,,, | Performed by: NURSE PRACTITIONER

## 2021-03-17 PROCEDURE — 99999 PR PBB SHADOW E&M-EST. PATIENT-LVL V: ICD-10-PCS | Mod: PBBFAC,,, | Performed by: NURSE PRACTITIONER

## 2021-03-17 PROCEDURE — 99999 PR PBB SHADOW E&M-EST. PATIENT-LVL V: CPT | Mod: PBBFAC,,, | Performed by: NURSE PRACTITIONER

## 2021-03-17 PROCEDURE — 99214 PR OFFICE/OUTPT VISIT, EST, LEVL IV, 30-39 MIN: ICD-10-PCS | Mod: S$GLB,,, | Performed by: NURSE PRACTITIONER

## 2021-03-19 ENCOUNTER — TELEPHONE (OUTPATIENT)
Dept: FAMILY MEDICINE | Facility: CLINIC | Age: 56
End: 2021-03-19

## 2021-03-19 ENCOUNTER — PATIENT MESSAGE (OUTPATIENT)
Dept: FAMILY MEDICINE | Facility: CLINIC | Age: 56
End: 2021-03-19

## 2021-03-19 DIAGNOSIS — J32.9 SINUSITIS, UNSPECIFIED CHRONICITY, UNSPECIFIED LOCATION: Primary | ICD-10-CM

## 2021-03-19 RX ORDER — AMOXICILLIN AND CLAVULANATE POTASSIUM 875; 125 MG/1; MG/1
1 TABLET, FILM COATED ORAL 2 TIMES DAILY
Qty: 20 TABLET | Refills: 0 | Status: SHIPPED | OUTPATIENT
Start: 2021-03-19 | End: 2021-03-29

## 2021-03-22 ENCOUNTER — PATIENT MESSAGE (OUTPATIENT)
Dept: FAMILY MEDICINE | Facility: CLINIC | Age: 56
End: 2021-03-22

## 2021-03-30 ENCOUNTER — PATIENT MESSAGE (OUTPATIENT)
Dept: PODIATRY | Facility: CLINIC | Age: 56
End: 2021-03-30

## 2021-03-30 DIAGNOSIS — M72.2 PLANTAR FASCIITIS: ICD-10-CM

## 2021-03-30 DIAGNOSIS — M79.672 FOOT PAIN, LEFT: Primary | ICD-10-CM

## 2021-03-30 RX ORDER — METHYLPREDNISOLONE 4 MG/1
TABLET ORAL
Qty: 21 TABLET | Refills: 0 | Status: SHIPPED | OUTPATIENT
Start: 2021-03-30 | End: 2021-06-09 | Stop reason: ALTCHOICE

## 2021-04-07 ENCOUNTER — INFUSION (OUTPATIENT)
Dept: INFUSION THERAPY | Facility: HOSPITAL | Age: 56
End: 2021-04-07
Attending: INTERNAL MEDICINE
Payer: COMMERCIAL

## 2021-04-07 VITALS
DIASTOLIC BLOOD PRESSURE: 65 MMHG | TEMPERATURE: 97 F | RESPIRATION RATE: 16 BRPM | OXYGEN SATURATION: 98 % | HEART RATE: 62 BPM | SYSTOLIC BLOOD PRESSURE: 132 MMHG

## 2021-04-07 DIAGNOSIS — D50.0 IRON DEFICIENCY ANEMIA DUE TO CHRONIC BLOOD LOSS: Primary | ICD-10-CM

## 2021-04-07 PROCEDURE — 96365 THER/PROPH/DIAG IV INF INIT: CPT

## 2021-04-07 PROCEDURE — 63600175 PHARM REV CODE 636 W HCPCS: Performed by: INTERNAL MEDICINE

## 2021-04-07 PROCEDURE — 25000003 PHARM REV CODE 250: Performed by: INTERNAL MEDICINE

## 2021-04-07 RX ADMIN — IRON SUCROSE 200 MG: 20 INJECTION, SOLUTION INTRAVENOUS at 03:04

## 2021-05-17 ENCOUNTER — PATIENT MESSAGE (OUTPATIENT)
Dept: HEMATOLOGY/ONCOLOGY | Facility: CLINIC | Age: 56
End: 2021-05-17

## 2021-05-17 ENCOUNTER — TELEPHONE (OUTPATIENT)
Dept: HEMATOLOGY/ONCOLOGY | Facility: CLINIC | Age: 56
End: 2021-05-17

## 2021-05-19 ENCOUNTER — PATIENT MESSAGE (OUTPATIENT)
Dept: HEMATOLOGY/ONCOLOGY | Facility: CLINIC | Age: 56
End: 2021-05-19

## 2021-05-21 ENCOUNTER — PATIENT MESSAGE (OUTPATIENT)
Dept: HEPATOLOGY | Facility: CLINIC | Age: 56
End: 2021-05-21

## 2021-05-21 ENCOUNTER — PATIENT MESSAGE (OUTPATIENT)
Dept: HEMATOLOGY/ONCOLOGY | Facility: CLINIC | Age: 56
End: 2021-05-21

## 2021-06-09 ENCOUNTER — OFFICE VISIT (OUTPATIENT)
Dept: FAMILY MEDICINE | Facility: CLINIC | Age: 56
End: 2021-06-09
Payer: COMMERCIAL

## 2021-06-09 ENCOUNTER — INFUSION (OUTPATIENT)
Dept: INFUSION THERAPY | Facility: HOSPITAL | Age: 56
End: 2021-06-09
Attending: INTERNAL MEDICINE
Payer: COMMERCIAL

## 2021-06-09 VITALS
OXYGEN SATURATION: 96 % | BODY MASS INDEX: 28.7 KG/M2 | TEMPERATURE: 98 F | DIASTOLIC BLOOD PRESSURE: 70 MMHG | HEIGHT: 71 IN | HEART RATE: 66 BPM | SYSTOLIC BLOOD PRESSURE: 128 MMHG | WEIGHT: 205 LBS

## 2021-06-09 DIAGNOSIS — N20.0 RECURRENT NEPHROLITHIASIS: ICD-10-CM

## 2021-06-09 DIAGNOSIS — D50.0 IRON DEFICIENCY ANEMIA DUE TO CHRONIC BLOOD LOSS: Primary | ICD-10-CM

## 2021-06-09 DIAGNOSIS — R39.89 SUSPECTED UTI: Primary | ICD-10-CM

## 2021-06-09 LAB
BACTERIA #/AREA URNS AUTO: ABNORMAL /HPF
BILIRUB UR QL STRIP: NEGATIVE
CLARITY UR REFRACT.AUTO: CLEAR
COLOR UR AUTO: YELLOW
GLUCOSE UR QL STRIP: NEGATIVE
HGB UR QL STRIP: NEGATIVE
KETONES UR QL STRIP: NEGATIVE
LEUKOCYTE ESTERASE UR QL STRIP: ABNORMAL
MICROSCOPIC COMMENT: ABNORMAL
NITRITE UR QL STRIP: NEGATIVE
PH UR STRIP: 5 [PH] (ref 5–8)
PROT UR QL STRIP: NEGATIVE
RBC #/AREA URNS AUTO: 0 /HPF (ref 0–4)
SP GR UR STRIP: 1 (ref 1–1.03)
SQUAMOUS #/AREA URNS AUTO: 1 /HPF
URN SPEC COLLECT METH UR: ABNORMAL
WBC #/AREA URNS AUTO: 13 /HPF (ref 0–5)

## 2021-06-09 PROCEDURE — 99999 PR PBB SHADOW E&M-EST. PATIENT-LVL IV: ICD-10-PCS | Mod: PBBFAC,,, | Performed by: INTERNAL MEDICINE

## 2021-06-09 PROCEDURE — 99999 PR PBB SHADOW E&M-EST. PATIENT-LVL IV: CPT | Mod: PBBFAC,,, | Performed by: INTERNAL MEDICINE

## 2021-06-09 PROCEDURE — 81001 URINALYSIS AUTO W/SCOPE: CPT | Performed by: INTERNAL MEDICINE

## 2021-06-09 PROCEDURE — 99214 OFFICE O/P EST MOD 30 MIN: CPT | Mod: S$GLB,,, | Performed by: INTERNAL MEDICINE

## 2021-06-09 PROCEDURE — 25000003 PHARM REV CODE 250: Performed by: INTERNAL MEDICINE

## 2021-06-09 PROCEDURE — 87086 URINE CULTURE/COLONY COUNT: CPT | Performed by: INTERNAL MEDICINE

## 2021-06-09 PROCEDURE — 99214 PR OFFICE/OUTPT VISIT, EST, LEVL IV, 30-39 MIN: ICD-10-PCS | Mod: S$GLB,,, | Performed by: INTERNAL MEDICINE

## 2021-06-09 PROCEDURE — 63600175 PHARM REV CODE 636 W HCPCS: Performed by: INTERNAL MEDICINE

## 2021-06-09 PROCEDURE — 96365 THER/PROPH/DIAG IV INF INIT: CPT

## 2021-06-09 RX ORDER — NITROFURANTOIN 25; 75 MG/1; MG/1
100 CAPSULE ORAL 2 TIMES DAILY
Qty: 10 CAPSULE | Refills: 0 | Status: SHIPPED | OUTPATIENT
Start: 2021-06-09 | End: 2021-06-14

## 2021-06-09 RX ADMIN — IRON SUCROSE 200 MG: 20 INJECTION, SOLUTION INTRAVENOUS at 03:06

## 2021-06-10 LAB — BACTERIA UR CULT: NO GROWTH

## 2021-06-11 ENCOUNTER — PATIENT MESSAGE (OUTPATIENT)
Dept: FAMILY MEDICINE | Facility: CLINIC | Age: 56
End: 2021-06-11

## 2021-07-19 ENCOUNTER — LAB VISIT (OUTPATIENT)
Dept: LAB | Facility: HOSPITAL | Age: 56
End: 2021-07-19
Payer: COMMERCIAL

## 2021-07-19 DIAGNOSIS — D50.8 OTHER IRON DEFICIENCY ANEMIA: ICD-10-CM

## 2021-07-19 LAB
ERYTHROCYTE [DISTWIDTH] IN BLOOD BY AUTOMATED COUNT: 11.9 % (ref 11.5–14.5)
FERRITIN SERPL-MCNC: 601 NG/ML (ref 20–300)
HCT VFR BLD AUTO: 40.4 % (ref 37–48.5)
HGB BLD-MCNC: 13.1 G/DL (ref 12–16)
IMM GRANULOCYTES # BLD AUTO: 0.01 K/UL (ref 0–0.04)
IRON SERPL-MCNC: 136 UG/DL (ref 30–160)
MCH RBC QN AUTO: 31.9 PG (ref 27–31)
MCHC RBC AUTO-ENTMCNC: 32.4 G/DL (ref 32–36)
MCV RBC AUTO: 98 FL (ref 82–98)
NEUTROPHILS # BLD AUTO: 2.5 K/UL (ref 1.8–7.7)
PLATELET # BLD AUTO: 182 K/UL (ref 150–450)
PMV BLD AUTO: 11.4 FL (ref 9.2–12.9)
RBC # BLD AUTO: 4.11 M/UL (ref 4–5.4)
SATURATED IRON: 45 % (ref 20–50)
TOTAL IRON BINDING CAPACITY: 302 UG/DL (ref 250–450)
TRANSFERRIN SERPL-MCNC: 204 MG/DL (ref 200–375)
WBC # BLD AUTO: 4.12 K/UL (ref 3.9–12.7)

## 2021-07-19 PROCEDURE — 83540 ASSAY OF IRON: CPT | Performed by: NURSE PRACTITIONER

## 2021-07-19 PROCEDURE — 85027 COMPLETE CBC AUTOMATED: CPT | Performed by: NURSE PRACTITIONER

## 2021-07-19 PROCEDURE — 82728 ASSAY OF FERRITIN: CPT | Performed by: NURSE PRACTITIONER

## 2021-07-19 PROCEDURE — 36415 COLL VENOUS BLD VENIPUNCTURE: CPT | Mod: PO | Performed by: NURSE PRACTITIONER

## 2021-07-20 ENCOUNTER — OFFICE VISIT (OUTPATIENT)
Dept: HEMATOLOGY/ONCOLOGY | Facility: CLINIC | Age: 56
End: 2021-07-20
Payer: COMMERCIAL

## 2021-07-20 VITALS
SYSTOLIC BLOOD PRESSURE: 162 MMHG | OXYGEN SATURATION: 97 % | BODY MASS INDEX: 28.98 KG/M2 | TEMPERATURE: 98 F | HEIGHT: 71 IN | HEART RATE: 53 BPM | WEIGHT: 207 LBS | RESPIRATION RATE: 18 BRPM | DIASTOLIC BLOOD PRESSURE: 75 MMHG

## 2021-07-20 DIAGNOSIS — D50.8 OTHER IRON DEFICIENCY ANEMIA: Primary | ICD-10-CM

## 2021-07-20 DIAGNOSIS — D51.8 OTHER VITAMIN B12 DEFICIENCY ANEMIA: ICD-10-CM

## 2021-07-20 PROCEDURE — 99214 PR OFFICE/OUTPT VISIT, EST, LEVL IV, 30-39 MIN: ICD-10-PCS | Mod: S$GLB,,, | Performed by: NURSE PRACTITIONER

## 2021-07-20 PROCEDURE — 99999 PR PBB SHADOW E&M-EST. PATIENT-LVL IV: ICD-10-PCS | Mod: PBBFAC,,, | Performed by: NURSE PRACTITIONER

## 2021-07-20 PROCEDURE — 99214 OFFICE O/P EST MOD 30 MIN: CPT | Mod: S$GLB,,, | Performed by: NURSE PRACTITIONER

## 2021-07-20 PROCEDURE — 99999 PR PBB SHADOW E&M-EST. PATIENT-LVL IV: CPT | Mod: PBBFAC,,, | Performed by: NURSE PRACTITIONER

## 2021-08-02 ENCOUNTER — PATIENT MESSAGE (OUTPATIENT)
Dept: PODIATRY | Facility: CLINIC | Age: 56
End: 2021-08-02

## 2021-08-04 ENCOUNTER — INFUSION (OUTPATIENT)
Dept: INFUSION THERAPY | Facility: HOSPITAL | Age: 56
End: 2021-08-04
Attending: INTERNAL MEDICINE
Payer: COMMERCIAL

## 2021-08-04 VITALS
DIASTOLIC BLOOD PRESSURE: 63 MMHG | TEMPERATURE: 98 F | HEART RATE: 53 BPM | OXYGEN SATURATION: 99 % | RESPIRATION RATE: 16 BRPM | SYSTOLIC BLOOD PRESSURE: 129 MMHG

## 2021-08-04 DIAGNOSIS — D50.0 IRON DEFICIENCY ANEMIA DUE TO CHRONIC BLOOD LOSS: Primary | ICD-10-CM

## 2021-08-04 PROCEDURE — 25000003 PHARM REV CODE 250: Performed by: INTERNAL MEDICINE

## 2021-08-04 PROCEDURE — 96365 THER/PROPH/DIAG IV INF INIT: CPT

## 2021-08-04 PROCEDURE — 63600175 PHARM REV CODE 636 W HCPCS: Performed by: INTERNAL MEDICINE

## 2021-08-04 RX ADMIN — IRON SUCROSE 200 MG: 20 INJECTION, SOLUTION INTRAVENOUS at 03:08

## 2021-08-10 ENCOUNTER — PATIENT OUTREACH (OUTPATIENT)
Dept: ADMINISTRATIVE | Facility: OTHER | Age: 56
End: 2021-08-10

## 2021-08-12 ENCOUNTER — OFFICE VISIT (OUTPATIENT)
Dept: PODIATRY | Facility: CLINIC | Age: 56
End: 2021-08-12
Payer: COMMERCIAL

## 2021-08-12 VITALS — HEIGHT: 71 IN | WEIGHT: 207 LBS | BODY MASS INDEX: 28.98 KG/M2

## 2021-08-12 DIAGNOSIS — M67.471 GANGLION CYST OF RIGHT FOOT: ICD-10-CM

## 2021-08-12 DIAGNOSIS — M79.671 RIGHT FOOT PAIN: Primary | ICD-10-CM

## 2021-08-12 PROCEDURE — 99213 PR OFFICE/OUTPT VISIT, EST, LEVL III, 20-29 MIN: ICD-10-PCS | Mod: S$GLB,,, | Performed by: PODIATRIST

## 2021-08-12 PROCEDURE — 99213 OFFICE O/P EST LOW 20 MIN: CPT | Mod: S$GLB,,, | Performed by: PODIATRIST

## 2021-08-12 PROCEDURE — 99999 PR PBB SHADOW E&M-EST. PATIENT-LVL III: ICD-10-PCS | Mod: PBBFAC,,, | Performed by: PODIATRIST

## 2021-08-12 PROCEDURE — 99999 PR PBB SHADOW E&M-EST. PATIENT-LVL III: CPT | Mod: PBBFAC,,, | Performed by: PODIATRIST

## 2021-08-23 ENCOUNTER — TELEPHONE (OUTPATIENT)
Dept: FAMILY MEDICINE | Facility: CLINIC | Age: 56
End: 2021-08-23

## 2021-08-23 ENCOUNTER — OFFICE VISIT (OUTPATIENT)
Dept: FAMILY MEDICINE | Facility: CLINIC | Age: 56
End: 2021-08-23
Payer: COMMERCIAL

## 2021-08-23 VITALS
DIASTOLIC BLOOD PRESSURE: 80 MMHG | WEIGHT: 207.88 LBS | BODY MASS INDEX: 29.1 KG/M2 | TEMPERATURE: 99 F | HEIGHT: 71 IN | HEART RATE: 67 BPM | SYSTOLIC BLOOD PRESSURE: 120 MMHG | OXYGEN SATURATION: 97 %

## 2021-08-23 DIAGNOSIS — M62.838 NECK MUSCLE SPASM: Primary | ICD-10-CM

## 2021-08-23 PROCEDURE — 99213 PR OFFICE/OUTPT VISIT, EST, LEVL III, 20-29 MIN: ICD-10-PCS | Mod: 25,S$GLB,, | Performed by: FAMILY MEDICINE

## 2021-08-23 PROCEDURE — 96372 THER/PROPH/DIAG INJ SC/IM: CPT | Mod: S$GLB,,, | Performed by: FAMILY MEDICINE

## 2021-08-23 PROCEDURE — 96372 PR INJECTION,THERAP/PROPH/DIAG2ST, IM OR SUBCUT: ICD-10-PCS | Mod: S$GLB,,, | Performed by: FAMILY MEDICINE

## 2021-08-23 PROCEDURE — 99999 PR PBB SHADOW E&M-EST. PATIENT-LVL IV: ICD-10-PCS | Mod: PBBFAC,,, | Performed by: FAMILY MEDICINE

## 2021-08-23 PROCEDURE — 99999 PR PBB SHADOW E&M-EST. PATIENT-LVL IV: CPT | Mod: PBBFAC,,, | Performed by: FAMILY MEDICINE

## 2021-08-23 PROCEDURE — 99213 OFFICE O/P EST LOW 20 MIN: CPT | Mod: 25,S$GLB,, | Performed by: FAMILY MEDICINE

## 2021-08-23 RX ORDER — GABAPENTIN 300 MG/1
300 CAPSULE ORAL NIGHTLY
Qty: 90 CAPSULE | Refills: 1 | Status: SHIPPED | OUTPATIENT
Start: 2021-08-23 | End: 2022-08-02 | Stop reason: SDUPTHER

## 2021-08-23 RX ORDER — ALPRAZOLAM 0.25 MG/1
0.25 TABLET ORAL DAILY PRN
Qty: 30 TABLET | Refills: 0 | Status: CANCELLED | OUTPATIENT
Start: 2021-08-23

## 2021-08-23 RX ORDER — PREDNISONE 20 MG/1
60 TABLET ORAL DAILY
Qty: 21 TABLET | Refills: 0 | Status: SHIPPED | OUTPATIENT
Start: 2021-08-23 | End: 2021-09-01 | Stop reason: SDUPTHER

## 2021-08-23 RX ORDER — METHOCARBAMOL 500 MG/1
500 TABLET, FILM COATED ORAL 4 TIMES DAILY
Qty: 40 TABLET | Refills: 0 | Status: SHIPPED | OUTPATIENT
Start: 2021-08-23 | End: 2021-09-02

## 2021-08-23 RX ORDER — DEXAMETHASONE SODIUM PHOSPHATE 4 MG/ML
12 INJECTION, SOLUTION INTRA-ARTICULAR; INTRALESIONAL; INTRAMUSCULAR; INTRAVENOUS; SOFT TISSUE ONCE
Status: COMPLETED | OUTPATIENT
Start: 2021-08-23 | End: 2021-08-23

## 2021-08-23 RX ADMIN — DEXAMETHASONE SODIUM PHOSPHATE 12 MG: 4 INJECTION, SOLUTION INTRA-ARTICULAR; INTRALESIONAL; INTRAMUSCULAR; INTRAVENOUS; SOFT TISSUE at 08:08

## 2021-08-24 ENCOUNTER — PATIENT MESSAGE (OUTPATIENT)
Dept: FAMILY MEDICINE | Facility: CLINIC | Age: 56
End: 2021-08-24

## 2021-08-24 DIAGNOSIS — M62.838 NECK MUSCLE SPASM: Primary | ICD-10-CM

## 2021-08-26 ENCOUNTER — PATIENT MESSAGE (OUTPATIENT)
Dept: FAMILY MEDICINE | Facility: CLINIC | Age: 56
End: 2021-08-26

## 2021-08-26 RX ORDER — TIZANIDINE 4 MG/1
4 TABLET ORAL EVERY 6 HOURS PRN
Qty: 30 TABLET | Refills: 0 | Status: SHIPPED | OUTPATIENT
Start: 2021-08-26 | End: 2021-09-05

## 2021-08-28 ENCOUNTER — PATIENT MESSAGE (OUTPATIENT)
Dept: FAMILY MEDICINE | Facility: CLINIC | Age: 56
End: 2021-08-28

## 2021-08-28 DIAGNOSIS — M62.838 NECK MUSCLE SPASM: ICD-10-CM

## 2021-09-01 ENCOUNTER — PATIENT MESSAGE (OUTPATIENT)
Dept: FAMILY MEDICINE | Facility: CLINIC | Age: 56
End: 2021-09-01

## 2021-09-01 RX ORDER — PREDNISONE 20 MG/1
60 TABLET ORAL DAILY
Qty: 21 TABLET | Refills: 0 | Status: SHIPPED | OUTPATIENT
Start: 2021-09-01 | End: 2021-09-08

## 2021-09-05 ENCOUNTER — PATIENT MESSAGE (OUTPATIENT)
Dept: ADMINISTRATIVE | Facility: HOSPITAL | Age: 56
End: 2021-09-05

## 2021-09-05 ENCOUNTER — PATIENT MESSAGE (OUTPATIENT)
Dept: FAMILY MEDICINE | Facility: CLINIC | Age: 56
End: 2021-09-05

## 2021-09-05 RX ORDER — NITROFURANTOIN 25; 75 MG/1; MG/1
100 CAPSULE ORAL 2 TIMES DAILY
Qty: 10 CAPSULE | Refills: 0 | Status: SHIPPED | OUTPATIENT
Start: 2021-09-05 | End: 2021-09-10

## 2021-09-10 ENCOUNTER — HOSPITAL ENCOUNTER (OUTPATIENT)
Dept: RADIOLOGY | Facility: HOSPITAL | Age: 56
Discharge: HOME | End: 2021-09-10
Attending: NURSE PRACTITIONER
Payer: COMMERCIAL

## 2021-09-10 DIAGNOSIS — K74.02 HEPATIC FIBROSIS, STAGE 3: ICD-10-CM

## 2021-09-10 DIAGNOSIS — K75.81 NASH (NONALCOHOLIC STEATOHEPATITIS): ICD-10-CM

## 2021-09-10 PROCEDURE — 76705 ECHO EXAM OF ABDOMEN: CPT | Mod: 26,,, | Performed by: RADIOLOGY

## 2021-09-10 PROCEDURE — 76705 ECHO EXAM OF ABDOMEN: CPT | Mod: TC

## 2021-09-10 PROCEDURE — 76705 US ABDOMEN LIMITED: ICD-10-PCS | Mod: 26,,, | Performed by: RADIOLOGY

## 2021-09-17 ENCOUNTER — TELEPHONE (OUTPATIENT)
Dept: HEPATOLOGY | Facility: CLINIC | Age: 56
End: 2021-09-17

## 2021-09-17 ENCOUNTER — OFFICE VISIT (OUTPATIENT)
Dept: HEPATOLOGY | Facility: CLINIC | Age: 56
End: 2021-09-17
Payer: COMMERCIAL

## 2021-09-17 DIAGNOSIS — K76.0 FATTY LIVER: ICD-10-CM

## 2021-09-17 DIAGNOSIS — K75.81 NASH (NONALCOHOLIC STEATOHEPATITIS): Primary | ICD-10-CM

## 2021-09-17 DIAGNOSIS — K74.02 HEPATIC FIBROSIS, STAGE 3: ICD-10-CM

## 2021-09-17 PROCEDURE — 99214 OFFICE O/P EST MOD 30 MIN: CPT | Mod: 95,,, | Performed by: NURSE PRACTITIONER

## 2021-09-17 PROCEDURE — 99214 PR OFFICE/OUTPT VISIT, EST, LEVL IV, 30-39 MIN: ICD-10-PCS | Mod: 95,,, | Performed by: NURSE PRACTITIONER

## 2021-09-20 ENCOUNTER — TELEPHONE (OUTPATIENT)
Dept: HEPATOLOGY | Facility: CLINIC | Age: 56
End: 2021-09-20

## 2021-09-21 ENCOUNTER — PATIENT MESSAGE (OUTPATIENT)
Dept: HEPATOLOGY | Facility: CLINIC | Age: 56
End: 2021-09-21

## 2021-09-28 ENCOUNTER — TELEPHONE (OUTPATIENT)
Dept: HEPATOLOGY | Facility: CLINIC | Age: 56
End: 2021-09-28

## 2021-09-28 DIAGNOSIS — Z23 NEED FOR HEPATITIS A AND B VACCINATION: Primary | ICD-10-CM

## 2021-09-28 DIAGNOSIS — K75.81 NASH (NONALCOHOLIC STEATOHEPATITIS): ICD-10-CM

## 2021-10-05 ENCOUNTER — PATIENT MESSAGE (OUTPATIENT)
Dept: HEPATOLOGY | Facility: CLINIC | Age: 56
End: 2021-10-05

## 2021-10-06 ENCOUNTER — HOSPITAL ENCOUNTER (EMERGENCY)
Facility: HOSPITAL | Age: 56
Discharge: HOME | End: 2021-10-06
Attending: INTERNAL MEDICINE
Payer: COMMERCIAL

## 2021-10-06 VITALS
TEMPERATURE: 99 F | OXYGEN SATURATION: 99 % | SYSTOLIC BLOOD PRESSURE: 135 MMHG | HEIGHT: 71 IN | HEART RATE: 70 BPM | DIASTOLIC BLOOD PRESSURE: 68 MMHG | BODY MASS INDEX: 28 KG/M2 | WEIGHT: 200 LBS | RESPIRATION RATE: 18 BRPM

## 2021-10-06 DIAGNOSIS — M25.571 RIGHT ANKLE PAIN: ICD-10-CM

## 2021-10-06 LAB — URATE SERPL-MCNC: 3.7 MG/DL (ref 2.4–5.7)

## 2021-10-06 PROCEDURE — 63600175 PHARM REV CODE 636 W HCPCS: Mod: ER | Performed by: INTERNAL MEDICINE

## 2021-10-06 PROCEDURE — 96372 THER/PROPH/DIAG INJ SC/IM: CPT | Mod: ER

## 2021-10-06 PROCEDURE — 84550 ASSAY OF BLOOD/URIC ACID: CPT | Performed by: INTERNAL MEDICINE

## 2021-10-06 PROCEDURE — 99284 EMERGENCY DEPT VISIT MOD MDM: CPT | Mod: 25,ER

## 2021-10-06 RX ORDER — PREDNISONE 20 MG/1
60 TABLET ORAL
Status: COMPLETED | OUTPATIENT
Start: 2021-10-06 | End: 2021-10-06

## 2021-10-06 RX ORDER — KETOROLAC TROMETHAMINE 30 MG/ML
60 INJECTION, SOLUTION INTRAMUSCULAR; INTRAVENOUS
Status: COMPLETED | OUTPATIENT
Start: 2021-10-06 | End: 2021-10-06

## 2021-10-06 RX ORDER — IBUPROFEN 800 MG/1
800 TABLET ORAL EVERY 8 HOURS PRN
Qty: 30 TABLET | Refills: 0 | Status: SHIPPED | OUTPATIENT
Start: 2021-10-06 | End: 2021-10-06 | Stop reason: SDUPTHER

## 2021-10-06 RX ADMIN — KETOROLAC TROMETHAMINE 60 MG: 30 INJECTION, SOLUTION INTRAMUSCULAR at 07:10

## 2021-10-06 RX ADMIN — PREDNISONE 60 MG: 20 TABLET ORAL at 07:10

## 2021-10-14 ENCOUNTER — LAB VISIT (OUTPATIENT)
Dept: LAB | Facility: HOSPITAL | Age: 56
End: 2021-10-14
Attending: FAMILY MEDICINE
Payer: COMMERCIAL

## 2021-10-14 ENCOUNTER — CLINICAL SUPPORT (OUTPATIENT)
Dept: FAMILY MEDICINE | Facility: CLINIC | Age: 56
End: 2021-10-14
Payer: COMMERCIAL

## 2021-10-14 DIAGNOSIS — Z23 NEED FOR PROPHYLACTIC VACCINATION AGAINST HEPATITIS A: Primary | ICD-10-CM

## 2021-10-14 DIAGNOSIS — K74.02 HEPATIC FIBROSIS, STAGE 3: ICD-10-CM

## 2021-10-14 DIAGNOSIS — K75.81 NASH (NONALCOHOLIC STEATOHEPATITIS): ICD-10-CM

## 2021-10-14 LAB
AFP SERPL-MCNC: <2 NG/ML (ref 0–8.4)
ALBUMIN SERPL BCP-MCNC: 4 G/DL (ref 3.5–5.2)
ALP SERPL-CCNC: 129 U/L (ref 55–135)
ALT SERPL W/O P-5'-P-CCNC: 53 U/L (ref 10–44)
ANION GAP SERPL CALC-SCNC: 8 MMOL/L (ref 8–16)
AST SERPL-CCNC: 47 U/L (ref 10–40)
BILIRUB SERPL-MCNC: 1.4 MG/DL (ref 0.1–1)
BUN SERPL-MCNC: 7 MG/DL (ref 6–20)
CALCIUM SERPL-MCNC: 9.6 MG/DL (ref 8.7–10.5)
CHLORIDE SERPL-SCNC: 108 MMOL/L (ref 95–110)
CHOLEST SERPL-MCNC: 134 MG/DL (ref 120–199)
CHOLEST/HDLC SERPL: 2.2 {RATIO} (ref 2–5)
CO2 SERPL-SCNC: 25 MMOL/L (ref 23–29)
CREAT SERPL-MCNC: 0.7 MG/DL (ref 0.5–1.4)
ERYTHROCYTE [DISTWIDTH] IN BLOOD BY AUTOMATED COUNT: 12.3 % (ref 11.5–14.5)
EST. GFR  (AFRICAN AMERICAN): >60 ML/MIN/1.73 M^2
EST. GFR  (NON AFRICAN AMERICAN): >60 ML/MIN/1.73 M^2
ESTIMATED AVG GLUCOSE: 97 MG/DL (ref 68–131)
GLUCOSE SERPL-MCNC: 83 MG/DL (ref 70–110)
HBA1C MFR BLD: 5 % (ref 4–5.6)
HCT VFR BLD AUTO: 42.3 % (ref 37–48.5)
HDLC SERPL-MCNC: 60 MG/DL (ref 40–75)
HDLC SERPL: 44.8 % (ref 20–50)
HGB BLD-MCNC: 13.6 G/DL (ref 12–16)
INR PPP: 1 (ref 0.8–1.2)
LDLC SERPL CALC-MCNC: 63.2 MG/DL (ref 63–159)
MCH RBC QN AUTO: 32 PG (ref 27–31)
MCHC RBC AUTO-ENTMCNC: 32.2 G/DL (ref 32–36)
MCV RBC AUTO: 100 FL (ref 82–98)
NONHDLC SERPL-MCNC: 74 MG/DL
PLATELET # BLD AUTO: 196 K/UL (ref 150–450)
PMV BLD AUTO: 11.4 FL (ref 9.2–12.9)
POTASSIUM SERPL-SCNC: 3.6 MMOL/L (ref 3.5–5.1)
PROT SERPL-MCNC: 7.1 G/DL (ref 6–8.4)
PROTHROMBIN TIME: 10.9 SEC (ref 9–12.5)
RBC # BLD AUTO: 4.25 M/UL (ref 4–5.4)
SODIUM SERPL-SCNC: 141 MMOL/L (ref 136–145)
TRIGL SERPL-MCNC: 54 MG/DL (ref 30–150)
WBC # BLD AUTO: 4.64 K/UL (ref 3.9–12.7)

## 2021-10-14 PROCEDURE — 99499 UNLISTED E&M SERVICE: CPT | Mod: S$GLB,,, | Performed by: FAMILY MEDICINE

## 2021-10-14 PROCEDURE — 85610 PROTHROMBIN TIME: CPT | Performed by: NURSE PRACTITIONER

## 2021-10-14 PROCEDURE — 90632 HEPATITIS A VACCINE ADULT IM: ICD-10-PCS | Mod: S$GLB,,, | Performed by: FAMILY MEDICINE

## 2021-10-14 PROCEDURE — 80053 COMPREHEN METABOLIC PANEL: CPT | Performed by: NURSE PRACTITIONER

## 2021-10-14 PROCEDURE — 36415 COLL VENOUS BLD VENIPUNCTURE: CPT | Mod: PO | Performed by: NURSE PRACTITIONER

## 2021-10-14 PROCEDURE — 82105 ALPHA-FETOPROTEIN SERUM: CPT | Performed by: NURSE PRACTITIONER

## 2021-10-14 PROCEDURE — 90471 HEPATITIS A VACCINE ADULT IM: ICD-10-PCS | Mod: S$GLB,,, | Performed by: FAMILY MEDICINE

## 2021-10-14 PROCEDURE — 90632 HEPA VACCINE ADULT IM: CPT | Mod: S$GLB,,, | Performed by: FAMILY MEDICINE

## 2021-10-14 PROCEDURE — 85027 COMPLETE CBC AUTOMATED: CPT | Performed by: NURSE PRACTITIONER

## 2021-10-14 PROCEDURE — 99499 NO LOS: ICD-10-PCS | Mod: S$GLB,,, | Performed by: FAMILY MEDICINE

## 2021-10-14 PROCEDURE — 83036 HEMOGLOBIN GLYCOSYLATED A1C: CPT | Performed by: NURSE PRACTITIONER

## 2021-10-14 PROCEDURE — 80061 LIPID PANEL: CPT | Performed by: NURSE PRACTITIONER

## 2021-10-14 PROCEDURE — 90471 IMMUNIZATION ADMIN: CPT | Mod: S$GLB,,, | Performed by: FAMILY MEDICINE

## 2021-10-19 ENCOUNTER — PATIENT MESSAGE (OUTPATIENT)
Dept: PODIATRY | Facility: CLINIC | Age: 56
End: 2021-10-19

## 2021-10-19 ENCOUNTER — TELEPHONE (OUTPATIENT)
Dept: HEPATOLOGY | Facility: CLINIC | Age: 56
End: 2021-10-19

## 2021-10-19 DIAGNOSIS — K75.81 NASH (NONALCOHOLIC STEATOHEPATITIS): Primary | ICD-10-CM

## 2021-10-19 DIAGNOSIS — K74.02 HEPATIC FIBROSIS, STAGE 3: ICD-10-CM

## 2021-10-19 DIAGNOSIS — M79.671 RIGHT FOOT PAIN: Primary | ICD-10-CM

## 2021-10-19 DIAGNOSIS — M72.2 PLANTAR FASCIITIS: ICD-10-CM

## 2021-10-19 RX ORDER — METHYLPREDNISOLONE 4 MG/1
TABLET ORAL
Qty: 21 TABLET | Refills: 0 | Status: SHIPPED | OUTPATIENT
Start: 2021-10-19 | End: 2021-12-02

## 2021-10-20 ENCOUNTER — INFUSION (OUTPATIENT)
Dept: INFUSION THERAPY | Facility: HOSPITAL | Age: 56
End: 2021-10-20
Attending: INTERNAL MEDICINE
Payer: COMMERCIAL

## 2021-10-20 ENCOUNTER — PATIENT MESSAGE (OUTPATIENT)
Dept: HEPATOLOGY | Facility: CLINIC | Age: 56
End: 2021-10-20
Payer: COMMERCIAL

## 2021-10-20 VITALS
HEART RATE: 55 BPM | OXYGEN SATURATION: 95 % | RESPIRATION RATE: 16 BRPM | TEMPERATURE: 99 F | DIASTOLIC BLOOD PRESSURE: 71 MMHG | SYSTOLIC BLOOD PRESSURE: 131 MMHG

## 2021-10-20 DIAGNOSIS — D50.9 IRON DEFICIENCY ANEMIA: Primary | ICD-10-CM

## 2021-10-20 PROCEDURE — 25000003 PHARM REV CODE 250: Performed by: INTERNAL MEDICINE

## 2021-10-20 PROCEDURE — 63600175 PHARM REV CODE 636 W HCPCS: Performed by: INTERNAL MEDICINE

## 2021-10-20 PROCEDURE — 96365 THER/PROPH/DIAG IV INF INIT: CPT

## 2021-10-20 RX ORDER — HEPARIN 100 UNIT/ML
500 SYRINGE INTRAVENOUS
Status: CANCELLED | OUTPATIENT
Start: 2021-10-20

## 2021-10-20 RX ADMIN — IRON SUCROSE 200 MG: 20 INJECTION, SOLUTION INTRAVENOUS at 03:10

## 2021-12-08 DIAGNOSIS — Z12.11 COLON CANCER SCREENING: ICD-10-CM

## 2021-12-09 DIAGNOSIS — Z12.31 BREAST CANCER SCREENING BY MAMMOGRAM: Primary | ICD-10-CM

## 2021-12-13 ENCOUNTER — PATIENT MESSAGE (OUTPATIENT)
Dept: FAMILY MEDICINE | Facility: CLINIC | Age: 56
End: 2021-12-13
Payer: COMMERCIAL

## 2021-12-13 ENCOUNTER — TELEPHONE (OUTPATIENT)
Dept: FAMILY MEDICINE | Facility: CLINIC | Age: 56
End: 2021-12-13
Payer: COMMERCIAL

## 2021-12-13 DIAGNOSIS — J32.9 SINUSITIS, UNSPECIFIED CHRONICITY, UNSPECIFIED LOCATION: Primary | ICD-10-CM

## 2021-12-13 RX ORDER — AMOXICILLIN AND CLAVULANATE POTASSIUM 875; 125 MG/1; MG/1
1 TABLET, FILM COATED ORAL 2 TIMES DAILY
Qty: 20 TABLET | Refills: 0 | Status: SHIPPED | OUTPATIENT
Start: 2021-12-13 | End: 2021-12-23

## 2022-01-12 ENCOUNTER — INFUSION (OUTPATIENT)
Dept: INFUSION THERAPY | Facility: HOSPITAL | Age: 57
End: 2022-01-12
Attending: INTERNAL MEDICINE
Payer: COMMERCIAL

## 2022-01-12 VITALS
RESPIRATION RATE: 16 BRPM | DIASTOLIC BLOOD PRESSURE: 72 MMHG | OXYGEN SATURATION: 99 % | HEART RATE: 51 BPM | SYSTOLIC BLOOD PRESSURE: 157 MMHG | TEMPERATURE: 98 F

## 2022-01-12 DIAGNOSIS — D50.9 IRON DEFICIENCY ANEMIA: Primary | ICD-10-CM

## 2022-01-12 PROCEDURE — 25000003 PHARM REV CODE 250: Performed by: INTERNAL MEDICINE

## 2022-01-12 PROCEDURE — 63600175 PHARM REV CODE 636 W HCPCS: Performed by: INTERNAL MEDICINE

## 2022-01-12 PROCEDURE — 96365 THER/PROPH/DIAG IV INF INIT: CPT

## 2022-01-12 RX ORDER — HEPARIN 100 UNIT/ML
500 SYRINGE INTRAVENOUS
Status: CANCELLED | OUTPATIENT
Start: 2022-01-12

## 2022-01-12 RX ADMIN — IRON SUCROSE 200 MG: 20 INJECTION, SOLUTION INTRAVENOUS at 03:01

## 2022-01-12 NOTE — PLAN OF CARE
Pt presents to unit for q12w Venofer IVPB. Endorses generalized fatigue today. Reports she has been feeling more tired overall lately. Otherwise, doing well overall. VSS. Tolerated Venofer well. Has next appointments. Left unit in NAD at time of discharge

## 2022-01-14 ENCOUNTER — LAB VISIT (OUTPATIENT)
Dept: LAB | Facility: HOSPITAL | Age: 57
End: 2022-01-14
Attending: FAMILY MEDICINE
Payer: COMMERCIAL

## 2022-01-14 DIAGNOSIS — Z12.11 COLON CANCER SCREENING: ICD-10-CM

## 2022-01-14 PROCEDURE — 82274 ASSAY TEST FOR BLOOD FECAL: CPT | Performed by: FAMILY MEDICINE

## 2022-01-18 RX ORDER — METHYLPREDNISOLONE 4 MG/1
TABLET ORAL
Qty: 21 EACH | Refills: 0 | Status: SHIPPED | OUTPATIENT
Start: 2022-01-18 | End: 2022-02-23 | Stop reason: SDUPTHER

## 2022-01-21 LAB — HEMOCCULT STL QL IA: NEGATIVE

## 2022-02-01 ENCOUNTER — PATIENT OUTREACH (OUTPATIENT)
Dept: ADMINISTRATIVE | Facility: HOSPITAL | Age: 57
End: 2022-02-01
Payer: COMMERCIAL

## 2022-02-10 ENCOUNTER — HOSPITAL ENCOUNTER (OUTPATIENT)
Dept: RADIOLOGY | Facility: HOSPITAL | Age: 57
Discharge: HOME | End: 2022-02-10
Attending: FAMILY MEDICINE
Payer: COMMERCIAL

## 2022-02-10 DIAGNOSIS — Z12.31 BREAST CANCER SCREENING BY MAMMOGRAM: ICD-10-CM

## 2022-02-10 PROCEDURE — 77067 MAMMO DIGITAL SCREENING BILAT WITH TOMO: ICD-10-PCS | Mod: 26,,, | Performed by: RADIOLOGY

## 2022-02-10 PROCEDURE — 77067 SCR MAMMO BI INCL CAD: CPT | Mod: 26,,, | Performed by: RADIOLOGY

## 2022-02-10 PROCEDURE — 77063 BREAST TOMOSYNTHESIS BI: CPT | Mod: 26,,, | Performed by: RADIOLOGY

## 2022-02-10 PROCEDURE — 77063 BREAST TOMOSYNTHESIS BI: CPT | Mod: TC,PO

## 2022-02-10 PROCEDURE — 77063 MAMMO DIGITAL SCREENING BILAT WITH TOMO: ICD-10-PCS | Mod: 26,,, | Performed by: RADIOLOGY

## 2022-02-10 PROCEDURE — 77067 SCR MAMMO BI INCL CAD: CPT | Mod: TC,PO

## 2022-02-23 ENCOUNTER — OFFICE VISIT (OUTPATIENT)
Dept: FAMILY MEDICINE | Facility: CLINIC | Age: 57
End: 2022-02-23
Payer: COMMERCIAL

## 2022-02-23 VITALS
WEIGHT: 214.75 LBS | BODY MASS INDEX: 29.95 KG/M2 | DIASTOLIC BLOOD PRESSURE: 78 MMHG | HEART RATE: 61 BPM | SYSTOLIC BLOOD PRESSURE: 130 MMHG | TEMPERATURE: 98 F | OXYGEN SATURATION: 98 %

## 2022-02-23 DIAGNOSIS — F41.9 ANXIETY: ICD-10-CM

## 2022-02-23 DIAGNOSIS — Z78.0 ASYMPTOMATIC POSTMENOPAUSAL STATE: ICD-10-CM

## 2022-02-23 DIAGNOSIS — M79.671 RIGHT FOOT PAIN: Primary | ICD-10-CM

## 2022-02-23 DIAGNOSIS — K75.81 NASH (NONALCOHOLIC STEATOHEPATITIS): ICD-10-CM

## 2022-02-23 PROCEDURE — 99999 PR PBB SHADOW E&M-EST. PATIENT-LVL IV: CPT | Mod: PBBFAC,,, | Performed by: NURSE PRACTITIONER

## 2022-02-23 PROCEDURE — 99214 PR OFFICE/OUTPT VISIT, EST, LEVL IV, 30-39 MIN: ICD-10-PCS | Mod: 25,S$GLB,, | Performed by: NURSE PRACTITIONER

## 2022-02-23 PROCEDURE — 96372 THER/PROPH/DIAG INJ SC/IM: CPT | Mod: S$GLB,,, | Performed by: NURSE PRACTITIONER

## 2022-02-23 PROCEDURE — 99999 PR PBB SHADOW E&M-EST. PATIENT-LVL IV: ICD-10-PCS | Mod: PBBFAC,,, | Performed by: NURSE PRACTITIONER

## 2022-02-23 PROCEDURE — 99214 OFFICE O/P EST MOD 30 MIN: CPT | Mod: 25,S$GLB,, | Performed by: NURSE PRACTITIONER

## 2022-02-23 PROCEDURE — 96372 PR INJECTION,THERAP/PROPH/DIAG2ST, IM OR SUBCUT: ICD-10-PCS | Mod: S$GLB,,, | Performed by: NURSE PRACTITIONER

## 2022-02-23 RX ORDER — KETOROLAC TROMETHAMINE 30 MG/ML
30 INJECTION, SOLUTION INTRAMUSCULAR; INTRAVENOUS
Status: COMPLETED | OUTPATIENT
Start: 2022-02-23 | End: 2022-02-23

## 2022-02-23 RX ORDER — METHYLPREDNISOLONE 4 MG/1
TABLET ORAL
Qty: 21 EACH | Refills: 0 | Status: SHIPPED | OUTPATIENT
Start: 2022-02-23 | End: 2022-05-25

## 2022-02-23 RX ORDER — KETOROLAC TROMETHAMINE 15 MG/ML
30 INJECTION, SOLUTION INTRAMUSCULAR; INTRAVENOUS ONCE
Status: DISCONTINUED | OUTPATIENT
Start: 2022-02-23 | End: 2022-02-23

## 2022-02-23 RX ADMIN — KETOROLAC TROMETHAMINE 30 MG: 30 INJECTION, SOLUTION INTRAMUSCULAR; INTRAVENOUS at 03:02

## 2022-02-23 NOTE — PROGRESS NOTES
HPI     Chief Complaint:  Chief Complaint   Patient presents with    Foot Swelling    Pain     Right foot       Estephania Rogers is a 56 y.o. female with multiple medical diagnoses as listed in the medical history and problem list that presents for right foot pain.  Pt is new to me but is known to this clinic with her last appointment being 10/14/2021.      Foot Injury   There was no injury mechanism (Pain began on 2/22). The pain is present in the right foot. Pain scale: 4/10 at rest, 6/10 when weight bearing. Pertinent negatives include no inability to bear weight, loss of motion, loss of sensation, muscle weakness, numbness or tingling. She reports no foreign bodies present. The symptoms are aggravated by movement, weight bearing and palpation. She has tried rest, ice and immobilization (boot to right foot) for the symptoms. The treatment provided mild relief.     Denies injury or overuse.     Pt has a Hx of ganglion cyst to right foot as well as plantar fascitis. Previously treated with NSAIDs and oral steroids. Followed by Podiatry.     Patient is doing well and has no other major complaints today.  she is compliant with medications daily without any adverse side effects.    History     Past Medical History:  Past Medical History:   Diagnosis Date    Anemia     iron deficiency    Anxiety     Diabetes mellitus, type 2     Hypertension     Malabsorption     Migraine headache     Nephrolithiasis     Other specified intestinal malabsorption        Past Surgical History:  Past Surgical History:   Procedure Laterality Date    ANAL FISTULOTOMY  2005    APPENDECTOMY  2005    AUGMENTATION OF BREAST  2014    BREAST SURGERY  2014    CHOLECYSTECTOMY  2005    GASTRIC BYPASS  2005    SINUS SURGERY      Dr. Oral Cobb    SPINE SURGERY      TONSILLECTOMY      TOTAL REDUCTION MAMMOPLASTY  2014       Social History:  Social History     Socioeconomic History    Marital status:    Tobacco Use     Smoking status: Never Smoker    Smokeless tobacco: Never Used    Tobacco comment: Clerical work   Substance and Sexual Activity    Alcohol use: Yes    Drug use: No    Sexual activity: Yes     Partners: Male     Birth control/protection: None     Comment: :  Gianni       Family History:  Family History   Problem Relation Age of Onset    Cancer Father         pancreatic    Diabetes Father     Diabetes Mother     Hypertension Mother     Miscarriages / Stillbirths Mother     Stroke Mother     Diabetes Sister     Cancer Maternal Aunt         breast    Arthritis Maternal Grandmother     Arthritis Paternal Grandmother     Diabetes Sister     Breast cancer Paternal Aunt        Allergies and Medications: (updated and reviewed)  Review of patient's allergies indicates:  No Known Allergies  Current Outpatient Medications   Medication Sig Dispense Refill    ascorbic acid, vitamin C, (VITAMIN C) 500 MG tablet Take 500 mg by mouth once daily.      AQUILES ASPIRIN ORAL Take 81 mg by mouth once daily.      cholecalciferol, vitamin D3, 50,000 unit Wafr Take 50,000 Units by mouth once daily.      gabapentin (NEURONTIN) 300 MG capsule Take 1 capsule (300 mg total) by mouth every evening. 90 capsule 1    multivitamin (THERAGRAN) per tablet Take 1 tablet by mouth once daily.      phytonadione, vit K1, (VITAMIN K1 MISC) 1,000 mg by Misc.(Non-Drug; Combo Route) route once daily.      RESTASIS 0.05 % ophthalmic emulsion   3    vitamin A 77207 UNIT capsule Take 25,000 Units by mouth once daily.      vitamin E acetate (VITAMIN E ORAL) Take 1 tablet by mouth once daily.      FLUoxetine 20 MG capsule Take 1 capsule (20 mg total) by mouth once daily. 90 capsule 3    methylPREDNISolone (MEDROL DOSEPACK) 4 mg tablet TAKE 6 TABLETS ON DAY 1 AS DIRECTED ON PACKAGE AND DECREASE BY 1 TAB EACH DAY FOR A TOTAL OF 6 DAYS 21 each 0     Current Facility-Administered Medications   Medication Dose Route Frequency  Provider Last Rate Last Admin    ketorolac injection 30 mg  30 mg Intramuscular 1 time in Clinic/HOD Azael Alexander NP           Exam     Review of Systems:  (as noted above)  Review of Systems   Constitutional: Negative for chills, fever and unexpected weight change.   HENT: Negative for congestion, ear pain, hearing loss, rhinorrhea, sore throat and trouble swallowing.    Eyes: Negative for discharge, redness and visual disturbance.   Respiratory: Negative for cough, chest tightness, shortness of breath and wheezing.    Cardiovascular: Negative for chest pain, palpitations and leg swelling.   Gastrointestinal: Negative for abdominal pain, constipation, diarrhea, nausea and vomiting.   Endocrine: Negative for polydipsia, polyphagia and polyuria.   Genitourinary: Negative for decreased urine volume, dysuria and hematuria.   Musculoskeletal: Positive for arthralgias, gait problem and myalgias. Negative for joint swelling.   Skin: Negative for color change and rash.   Neurological: Negative for dizziness, tingling, weakness, light-headedness, numbness and headaches.   Psychiatric/Behavioral: Negative for decreased concentration, dysphoric mood, sleep disturbance and suicidal ideas.       Physical Exam:   Physical Exam  Constitutional:       General: She is not in acute distress.  Eyes:      General: No scleral icterus.  Neck:      Vascular: No carotid bruit.   Cardiovascular:      Rate and Rhythm: Normal rate and regular rhythm.      Pulses: Normal pulses.      Heart sounds: No murmur heard.    No friction rub. No gallop.   Pulmonary:      Effort: No respiratory distress.      Breath sounds: No wheezing.   Chest:      Chest wall: No tenderness.   Musculoskeletal:         General: Tenderness present. No swelling.      Cervical back: No rigidity or tenderness.      Right lower leg: No edema.      Left lower leg: No edema.   Lymphadenopathy:      Cervical: No cervical adenopathy.   Skin:     Capillary Refill:  Capillary refill takes 2 to 3 seconds.      Findings: No rash.   Neurological:      Mental Status: She is alert.      Cranial Nerves: No cranial nerve deficit.      Sensory: No sensory deficit.      Motor: No weakness.   Psychiatric:         Mood and Affect: Mood normal.       Vitals:    02/23/22 1507   BP: 130/78   Pulse: 61   Temp: 98.3 °F (36.8 °C)   TempSrc: Oral   SpO2: 98%   Weight: 97.4 kg (214 lb 11.7 oz)      Body mass index is 29.95 kg/m².    Assessment & Plan   (all problems are new to me)      Problem List Items Addressed This Visit        Psychiatric    Anxiety    Current Assessment & Plan     Reports anxiety level is unchanged. Denies thoughts of self harm.     Continue fluoxetine                GI    KRAUS (nonalcoholic steatohepatitis)    Current Assessment & Plan     Followed by hepatology              Orthopedic    Right foot pain - Primary    Current Assessment & Plan     Hx of Ganglion cyst of right foot. Followed by Dr. Vasquez. Last seen in August 2021. Treated with NSAIDs. Pt was notified she may require surgery in the future. Pt reports she experienced right foot/ankle pain again in October 2021 for which she went to the ED. Record from 10/6/21 reviewed, pt was treated with ketorolac IM and prednisone po. Pt reports combination of NSAID and steroid has been effective in relieving pain/symptoms in the past. Full ROM on exam. Pt is able to bear weight. Moderate tenderness to palpation to ventral/lateral area of right foot.    Ketorolac injection today    Medrol dose pack    Advised pt to continue wearing boot. Ice foot. Wear appropriate shoes.     Follow up with podiatry.     ED precautions given.   Notify provider if symptoms do not resolve or increase in severity.   Patient verbalizes understanding and agrees with plan of care.               Relevant Medications    methylPREDNISolone (MEDROL DOSEPACK) 4 mg tablet    ketorolac injection 30 mg (Start on 2/23/2022  3:45 PM)      Other Visit  Diagnoses     Asymptomatic postmenopausal state        Relevant Orders    DXA Bone Density Spine And Hip          --------------------------------------------    Health Maintenance:  Health Maintenance       Date Due Completion Date    Pneumococcal Vaccines (Age 0-64) (1 of 2 - PPSV23) Never done ---    HIV Screening Never done ---    TETANUS VACCINE Never done ---    Shingles Vaccine (1 of 2) Never done ---    DEXA Scan 03/31/2018 3/31/2016    Colorectal Cancer Screening 01/20/2023 1/20/2022    Mammogram 02/10/2023 2/10/2022    Cervical Cancer Screening 10/07/2024 10/7/2019    Lipid Panel 10/14/2026 10/14/2021          Health maintenance reviewed. Vaccines offered patient declined at this time.     Follow Up:  Follow up in about 3 months (around 5/23/2022), or if symptoms worsen or fail to improve.      The patient expressed understanding and no barriers to adherence were identified.      1. The patient indicates understanding of these issues and agrees with the plan. Brief care plan is updated and reviewed with the patient as applicable.      2. The patient is given an After Visit Summary that lists all medications with directions, allergies, education, orders placed during this encounter and follow-up instructions.      3. I have reviewed the patient's medical information including past medical, family, and social history sections including the medications and allergies.      4. We discussed the patient's current medications.     This note was created by combination of typed  and MModal dictation.  Transcription errors may be present.  If there are any questions, please contact me.       Azeal Alexander NP

## 2022-02-23 NOTE — ASSESSMENT & PLAN NOTE
Hx of Ganglion cyst of right foot. Followed by Dr. Vasquez. Last seen in August 2021. Treated with NSAIDs. Pt was notified she may require surgery in the future. Pt reports she experienced right foot/ankle pain again in October 2021 for which she went to the ED. Record from 10/6/21 reviewed, pt was treated with ketorolac IM and prednisone po. Pt reports combination of NSAID and steroid has been effective in relieving pain/symptoms in the past. Full ROM on exam. Pt is able to bear weight. Moderate tenderness to palpation to ventral/lateral area of right foot.    Ketorolac injection today    Medrol dose pack    Advised pt to continue wearing boot. Ice foot. Wear appropriate shoes.     Follow up with podiatry.     ED precautions given.   Notify provider if symptoms do not resolve or increase in severity.   Patient verbalizes understanding and agrees with plan of care.

## 2022-02-27 DIAGNOSIS — F43.9 SITUATIONAL STRESS: ICD-10-CM

## 2022-02-27 NOTE — TELEPHONE ENCOUNTER
No new care gaps identified.  Powered by [employer]. Reference number: [id number].   2/27/2022 7:32:26 AM CST

## 2022-03-02 RX ORDER — FLUOXETINE HYDROCHLORIDE 20 MG/1
CAPSULE ORAL
Qty: 90 CAPSULE | Refills: 3 | Status: SHIPPED | OUTPATIENT
Start: 2022-03-02

## 2022-03-31 ENCOUNTER — PATIENT MESSAGE (OUTPATIENT)
Dept: HEMATOLOGY/ONCOLOGY | Facility: CLINIC | Age: 57
End: 2022-03-31
Payer: COMMERCIAL

## 2022-03-31 ENCOUNTER — PATIENT MESSAGE (OUTPATIENT)
Dept: HEPATOLOGY | Facility: CLINIC | Age: 57
End: 2022-03-31
Payer: COMMERCIAL

## 2022-04-01 ENCOUNTER — PATIENT MESSAGE (OUTPATIENT)
Dept: HEMATOLOGY/ONCOLOGY | Facility: CLINIC | Age: 57
End: 2022-04-01
Payer: COMMERCIAL

## 2022-04-06 ENCOUNTER — INFUSION (OUTPATIENT)
Dept: INFUSION THERAPY | Facility: HOSPITAL | Age: 57
End: 2022-04-06
Attending: INTERNAL MEDICINE
Payer: COMMERCIAL

## 2022-04-06 VITALS
HEART RATE: 55 BPM | DIASTOLIC BLOOD PRESSURE: 71 MMHG | RESPIRATION RATE: 16 BRPM | SYSTOLIC BLOOD PRESSURE: 148 MMHG | OXYGEN SATURATION: 98 % | TEMPERATURE: 98 F

## 2022-04-06 DIAGNOSIS — D50.9 IRON DEFICIENCY ANEMIA: Primary | ICD-10-CM

## 2022-04-06 PROCEDURE — 63600175 PHARM REV CODE 636 W HCPCS: Performed by: INTERNAL MEDICINE

## 2022-04-06 PROCEDURE — 96365 THER/PROPH/DIAG IV INF INIT: CPT

## 2022-04-06 PROCEDURE — 25000003 PHARM REV CODE 250: Performed by: INTERNAL MEDICINE

## 2022-04-06 RX ORDER — HEPARIN 100 UNIT/ML
500 SYRINGE INTRAVENOUS
Status: CANCELLED | OUTPATIENT
Start: 2022-04-06

## 2022-04-06 RX ADMIN — IRON SUCROSE 200 MG: 20 INJECTION, SOLUTION INTRAVENOUS at 03:04

## 2022-04-19 ENCOUNTER — HOSPITAL ENCOUNTER (OUTPATIENT)
Dept: RADIOLOGY | Facility: HOSPITAL | Age: 57
Discharge: HOME | End: 2022-04-19
Attending: NURSE PRACTITIONER
Payer: COMMERCIAL

## 2022-04-19 DIAGNOSIS — K74.02 HEPATIC FIBROSIS, STAGE 3: ICD-10-CM

## 2022-04-19 DIAGNOSIS — K75.81 NASH (NONALCOHOLIC STEATOHEPATITIS): ICD-10-CM

## 2022-04-19 PROCEDURE — 76705 US ABDOMEN LIMITED: ICD-10-PCS | Mod: 26,,, | Performed by: RADIOLOGY

## 2022-04-19 PROCEDURE — 76705 ECHO EXAM OF ABDOMEN: CPT | Mod: TC

## 2022-04-19 PROCEDURE — 76705 ECHO EXAM OF ABDOMEN: CPT | Mod: 26,,, | Performed by: RADIOLOGY

## 2022-04-20 ENCOUNTER — TELEPHONE (OUTPATIENT)
Dept: HEPATOLOGY | Facility: CLINIC | Age: 57
End: 2022-04-20
Payer: COMMERCIAL

## 2022-04-20 NOTE — TELEPHONE ENCOUNTER
----- Message from Keri Cornelius NP sent at 4/20/2022  9:18 AM CDT -----  Please schedule labs (CBC, CMP, INR, AFP), US, Fibroscan, and f/u visit in 6 months. Thanks!

## 2022-04-21 ENCOUNTER — TELEPHONE (OUTPATIENT)
Dept: HEPATOLOGY | Facility: CLINIC | Age: 57
End: 2022-04-21
Payer: COMMERCIAL

## 2022-05-25 ENCOUNTER — OFFICE VISIT (OUTPATIENT)
Dept: FAMILY MEDICINE | Facility: CLINIC | Age: 57
End: 2022-05-25
Payer: COMMERCIAL

## 2022-05-25 VITALS
HEART RATE: 57 BPM | DIASTOLIC BLOOD PRESSURE: 72 MMHG | OXYGEN SATURATION: 97 % | TEMPERATURE: 99 F | WEIGHT: 213.75 LBS | BODY MASS INDEX: 29.93 KG/M2 | HEIGHT: 71 IN | SYSTOLIC BLOOD PRESSURE: 132 MMHG

## 2022-05-25 DIAGNOSIS — E80.4 GILBERT'S SYNDROME: ICD-10-CM

## 2022-05-25 DIAGNOSIS — D50.8 OTHER IRON DEFICIENCY ANEMIA: ICD-10-CM

## 2022-05-25 DIAGNOSIS — F41.9 ANXIETY: ICD-10-CM

## 2022-05-25 DIAGNOSIS — G43.709 CHRONIC MIGRAINE WITHOUT AURA WITHOUT STATUS MIGRAINOSUS, NOT INTRACTABLE: Primary | ICD-10-CM

## 2022-05-25 PROCEDURE — 99999 PR PBB SHADOW E&M-EST. PATIENT-LVL IV: ICD-10-PCS | Mod: PBBFAC,,, | Performed by: NURSE PRACTITIONER

## 2022-05-25 PROCEDURE — 99214 PR OFFICE/OUTPT VISIT, EST, LEVL IV, 30-39 MIN: ICD-10-PCS | Mod: 25,S$GLB,, | Performed by: NURSE PRACTITIONER

## 2022-05-25 PROCEDURE — 96372 THER/PROPH/DIAG INJ SC/IM: CPT | Mod: S$GLB,,, | Performed by: NURSE PRACTITIONER

## 2022-05-25 PROCEDURE — 99214 OFFICE O/P EST MOD 30 MIN: CPT | Mod: 25,S$GLB,, | Performed by: NURSE PRACTITIONER

## 2022-05-25 PROCEDURE — 96372 PR INJECTION,THERAP/PROPH/DIAG2ST, IM OR SUBCUT: ICD-10-PCS | Mod: S$GLB,,, | Performed by: NURSE PRACTITIONER

## 2022-05-25 PROCEDURE — 99999 PR PBB SHADOW E&M-EST. PATIENT-LVL IV: CPT | Mod: PBBFAC,,, | Performed by: NURSE PRACTITIONER

## 2022-05-25 RX ORDER — KETOROLAC TROMETHAMINE 30 MG/ML
60 INJECTION, SOLUTION INTRAMUSCULAR; INTRAVENOUS
Status: COMPLETED | OUTPATIENT
Start: 2022-05-25 | End: 2022-05-25

## 2022-05-25 RX ADMIN — KETOROLAC TROMETHAMINE 60 MG: 30 INJECTION, SOLUTION INTRAMUSCULAR; INTRAVENOUS at 11:05

## 2022-05-25 NOTE — PROGRESS NOTES
Chief Complaint  Chief Complaint   Patient presents with    Migraine     X 3 days       HPI  Estephania Rogers is a 56 y.o. female with medical diagnoses as listed in the medical history and problem list that presents for Migraines x 3 days.  This patient is new to me.     1. Migraines x 3 days: Reports she's suffered from migraines from many years. Every now and again the migraines come back. She reports using Fioricet  In the past that takes the edge off but does not completely eradicate migraines. She also uses ice packs and lies in a dark room. Reports blood pressures are stable at time of headaches. Blood pressures are usually 130s/80s.       PAST MEDICAL HISTORY:  Past Medical History:   Diagnosis Date    Anemia     iron deficiency    Anxiety     Diabetes mellitus, type 2     Hypertension     Malabsorption     Migraine headache     Nephrolithiasis     Other specified intestinal malabsorption        PAST SURGICAL HISTORY:  Past Surgical History:   Procedure Laterality Date    ANAL FISTULOTOMY  2005    APPENDECTOMY  2005    AUGMENTATION OF BREAST  2014    BREAST SURGERY  2014    CHOLECYSTECTOMY  2005    GASTRIC BYPASS  2005    SINUS SURGERY      Dr. Oral Cobb    SPINE SURGERY      TONSILLECTOMY      TOTAL REDUCTION MAMMOPLASTY  2014       SOCIAL HISTORY:  Social History     Socioeconomic History    Marital status:    Tobacco Use    Smoking status: Never Smoker    Smokeless tobacco: Never Used    Tobacco comment: Clerical work   Substance and Sexual Activity    Alcohol use: Yes    Drug use: No    Sexual activity: Yes     Partners: Male     Birth control/protection: None     Comment: :  Gianni       FAMILY HISTORY:  Family History   Problem Relation Age of Onset    Cancer Father         pancreatic    Diabetes Father     Diabetes Mother     Hypertension Mother     Miscarriages / Stillbirths Mother     Stroke Mother     Diabetes Sister     Cancer Maternal  Aunt         breast    Arthritis Maternal Grandmother     Arthritis Paternal Grandmother     Diabetes Sister     Breast cancer Paternal Aunt        ALLERGIES AND MEDICATIONS: updated and reviewed.  Review of patient's allergies indicates:  No Known Allergies  Current Outpatient Medications   Medication Sig Dispense Refill    ascorbic acid, vitamin C, (VITAMIN C) 500 MG tablet Take 500 mg by mouth once daily.      AQUILES ASPIRIN ORAL Take 81 mg by mouth once daily.      cholecalciferol, vitamin D3, 50,000 unit Wafr Take 50,000 Units by mouth once daily.      FLUoxetine 20 MG capsule TAKE 1 CAPSULE BY MOUTH EVERY DAY 90 capsule 3    gabapentin (NEURONTIN) 300 MG capsule Take 1 capsule (300 mg total) by mouth every evening. 90 capsule 1    multivitamin (THERAGRAN) per tablet Take 1 tablet by mouth once daily.      phytonadione, vit K1, (VITAMIN K1 MISC) 1,000 mg by Misc.(Non-Drug; Combo Route) route once daily.      RESTASIS 0.05 % ophthalmic emulsion   3    vitamin A 11530 UNIT capsule Take 25,000 Units by mouth once daily.      vitamin E acetate (VITAMIN E ORAL) Take 1 tablet by mouth once daily.       No current facility-administered medications for this visit.         ROS  Review of Systems   Constitutional: Negative for appetite change, chills, fatigue and fever.   HENT: Negative for congestion, ear pain, postnasal drip, rhinorrhea, sinus pressure, sneezing and sore throat.    Respiratory: Negative for shortness of breath.    Cardiovascular: Negative for chest pain and palpitations.   Gastrointestinal: Negative for abdominal pain, constipation, diarrhea, nausea and vomiting.   Genitourinary: Negative for dysuria, frequency, hematuria and urgency.   Musculoskeletal: Negative for arthralgias.   Neurological: Positive for headaches.   Psychiatric/Behavioral: Negative for sleep disturbance.           Physical Exam  Vitals:    05/25/22 1042   BP: 132/72   Pulse: (!) 57   Temp: 98.8 °F (37.1 °C)   TempSrc:  "Oral   SpO2: 97%   Weight: 97 kg (213 lb 11.8 oz)   Height: 5' 11" (1.803 m)    Body mass index is 29.81 kg/m².  Weight: 97 kg (213 lb 11.8 oz)   Height: 5' 11" (180.3 cm)   Physical Exam  Constitutional:       General: She is not in acute distress.     Appearance: Normal appearance. She is obese.   HENT:      Head: Normocephalic and atraumatic.      Right Ear: External ear normal.      Left Ear: External ear normal.      Nose: Nose normal.   Eyes:      Pupils: Pupils are equal, round, and reactive to light.   Cardiovascular:      Rate and Rhythm: Normal rate and regular rhythm.      Pulses: Normal pulses.   Pulmonary:      Effort: Pulmonary effort is normal. No respiratory distress.      Breath sounds: Normal breath sounds. No wheezing.   Abdominal:      General: Bowel sounds are normal.      Palpations: Abdomen is soft.   Musculoskeletal:      Cervical back: Neck supple.   Lymphadenopathy:      Cervical: No cervical adenopathy.   Skin:     General: Skin is warm and dry.      Capillary Refill: Capillary refill takes less than 2 seconds.   Neurological:      General: No focal deficit present.      Mental Status: She is alert and oriented to person, place, and time. Mental status is at baseline.   Psychiatric:         Mood and Affect: Mood normal.             Health Maintenance       Date Due Completion Date    HIV Screening Never done ---    TETANUS VACCINE Never done ---    Shingles Vaccine (1 of 2) Never done ---    DEXA Scan 03/31/2018 3/31/2016    COVID-19 Vaccine (4 - Booster for Moderna series) 03/08/2022 11/8/2021    Colorectal Cancer Screening 01/20/2023 1/20/2022    Mammogram 02/10/2023 2/10/2022    Cervical Cancer Screening 10/07/2024 10/7/2019    Lipid Panel 10/14/2026 10/14/2021            Assessment & Plan  Problem List Items Addressed This Visit        Psychiatric    Anxiety  -Stable; Monitor       Oncology    Iron deficiency anemia  -Stable; Monitor       GI    Gilbert's syndrome  -Stable; Monitor    " Overview     See UGT1A1 genotype             Other Visit Diagnoses     Chronic migraine without aura without status migrainosus, not intractable    -  Primary  - Patient tolerated injection well  - Patient advised to continue Fioricet for management of Migraines   - Patient benefit from Triptan therapy for breakthrough due to Migraines    Relevant Medications    ketorolac injection 60 mg (Completed)            Health Maintenance reviewed: Denied  at this time    Follow-up: 3 months or sooner if needed

## 2022-05-25 NOTE — PROGRESS NOTES
Patient tolerated  Toradol 60 mg injection well, instructed to remain in clinic for 15mins.to monitor for allergic reaction. Verbalized understanding.

## 2022-05-26 ENCOUNTER — PATIENT MESSAGE (OUTPATIENT)
Dept: FAMILY MEDICINE | Facility: CLINIC | Age: 57
End: 2022-05-26
Payer: COMMERCIAL

## 2022-05-26 DIAGNOSIS — G43.709 CHRONIC MIGRAINE WITHOUT AURA WITHOUT STATUS MIGRAINOSUS, NOT INTRACTABLE: Primary | ICD-10-CM

## 2022-05-26 RX ORDER — RIZATRIPTAN BENZOATE 5 MG/1
5 TABLET ORAL
Qty: 20 TABLET | Refills: 0 | Status: SHIPPED | OUTPATIENT
Start: 2022-05-26 | End: 2022-06-08

## 2022-05-30 ENCOUNTER — TELEPHONE (OUTPATIENT)
Dept: FAMILY MEDICINE | Facility: CLINIC | Age: 57
End: 2022-05-30
Payer: COMMERCIAL

## 2022-05-30 NOTE — TELEPHONE ENCOUNTER
Spoke to patient regarding a referral to Neurology, patient states she will call Dr Ruiz's office to see if they have anything sooner and she will give me a call back

## 2022-06-10 ENCOUNTER — LAB VISIT (OUTPATIENT)
Dept: LAB | Facility: HOSPITAL | Age: 57
End: 2022-06-10
Attending: PSYCHIATRY & NEUROLOGY
Payer: COMMERCIAL

## 2022-06-10 DIAGNOSIS — Z87.442 PERSONAL HISTORY OF URINARY CALCULI: ICD-10-CM

## 2022-06-10 DIAGNOSIS — Z98.890 PERSONAL HISTORY OF SURGERY TO HEART AND GREAT VESSELS, PRESENTING HAZARDS TO HEALTH: ICD-10-CM

## 2022-06-10 DIAGNOSIS — G43.901 STATUS MIGRAINOSUS: Primary | ICD-10-CM

## 2022-06-10 DIAGNOSIS — R20.3 HYPERESTHESIA: ICD-10-CM

## 2022-06-10 DIAGNOSIS — Z98.84 BARIATRIC SURGERY STATUS: ICD-10-CM

## 2022-06-10 DIAGNOSIS — D64.9 ANEMIA, UNSPECIFIED: ICD-10-CM

## 2022-06-10 DIAGNOSIS — G43.919 INTRACTABLE MIGRAINE: ICD-10-CM

## 2022-06-10 DIAGNOSIS — M54.2 CERVICALGIA: ICD-10-CM

## 2022-06-10 DIAGNOSIS — E53.9 VITAMIN B-COMPLEX DEFICIENCY: ICD-10-CM

## 2022-06-10 DIAGNOSIS — Z86.39 PERSONAL HISTORY OF NUTRITIONAL DEFICIENCY: ICD-10-CM

## 2022-06-10 DIAGNOSIS — M62.838 SPASM OF MUSCLE: ICD-10-CM

## 2022-06-10 DIAGNOSIS — E53.8 BIOTIN-(PROPIONYL-COA-CARBOXYLASE) LIGASE DEFICIENCY: ICD-10-CM

## 2022-06-10 DIAGNOSIS — R51.9 FACIAL PAIN: ICD-10-CM

## 2022-06-10 DIAGNOSIS — R40.4 TRANSIENT ALTERATION OF AWARENESS: ICD-10-CM

## 2022-06-10 LAB
CRP SERPL-MCNC: 0.9 MG/L (ref 0–8.2)
ERYTHROCYTE [SEDIMENTATION RATE] IN BLOOD BY WESTERGREN METHOD: 8 MM/HR (ref 0–20)
FERRITIN SERPL-MCNC: 798 NG/ML (ref 20–300)
FOLATE SERPL-MCNC: 7.4 NG/ML (ref 4–24)
IRON SERPL-MCNC: 136 UG/DL (ref 30–160)
PROLACTIN SERPL IA-MCNC: 5.3 NG/ML (ref 5.2–26.5)
SATURATED IRON: 43 % (ref 20–50)
T4 FREE SERPL-MCNC: 0.76 NG/DL (ref 0.71–1.51)
TOTAL IRON BINDING CAPACITY: 320 UG/DL (ref 250–450)
TRANSFERRIN SERPL-MCNC: 216 MG/DL (ref 200–375)
TSH SERPL DL<=0.005 MIU/L-ACNC: 1.27 UIU/ML (ref 0.4–4)
VIT B12 SERPL-MCNC: 607 PG/ML (ref 210–950)

## 2022-06-10 PROCEDURE — 84446 ASSAY OF VITAMIN E: CPT | Performed by: PSYCHIATRY & NEUROLOGY

## 2022-06-10 PROCEDURE — 84425 ASSAY OF VITAMIN B-1: CPT | Performed by: PSYCHIATRY & NEUROLOGY

## 2022-06-10 PROCEDURE — 82728 ASSAY OF FERRITIN: CPT | Performed by: PSYCHIATRY & NEUROLOGY

## 2022-06-10 PROCEDURE — 86140 C-REACTIVE PROTEIN: CPT | Performed by: PSYCHIATRY & NEUROLOGY

## 2022-06-10 PROCEDURE — 82746 ASSAY OF FOLIC ACID SERUM: CPT | Performed by: PSYCHIATRY & NEUROLOGY

## 2022-06-10 PROCEDURE — 82390 ASSAY OF CERULOPLASMIN: CPT | Performed by: PSYCHIATRY & NEUROLOGY

## 2022-06-10 PROCEDURE — 82525 ASSAY OF COPPER: CPT | Performed by: PSYCHIATRY & NEUROLOGY

## 2022-06-10 PROCEDURE — 84590 ASSAY OF VITAMIN A: CPT | Performed by: PSYCHIATRY & NEUROLOGY

## 2022-06-10 PROCEDURE — 84439 ASSAY OF FREE THYROXINE: CPT | Performed by: PSYCHIATRY & NEUROLOGY

## 2022-06-10 PROCEDURE — 84466 ASSAY OF TRANSFERRIN: CPT | Performed by: PSYCHIATRY & NEUROLOGY

## 2022-06-10 PROCEDURE — 86038 ANTINUCLEAR ANTIBODIES: CPT | Performed by: PSYCHIATRY & NEUROLOGY

## 2022-06-10 PROCEDURE — 85652 RBC SED RATE AUTOMATED: CPT | Performed by: PSYCHIATRY & NEUROLOGY

## 2022-06-10 PROCEDURE — 84630 ASSAY OF ZINC: CPT | Performed by: PSYCHIATRY & NEUROLOGY

## 2022-06-10 PROCEDURE — 84207 ASSAY OF VITAMIN B-6: CPT | Performed by: PSYCHIATRY & NEUROLOGY

## 2022-06-10 PROCEDURE — 82607 VITAMIN B-12: CPT | Performed by: PSYCHIATRY & NEUROLOGY

## 2022-06-10 PROCEDURE — 84146 ASSAY OF PROLACTIN: CPT | Performed by: PSYCHIATRY & NEUROLOGY

## 2022-06-10 PROCEDURE — 84443 ASSAY THYROID STIM HORMONE: CPT | Performed by: PSYCHIATRY & NEUROLOGY

## 2022-06-11 LAB — CERULOPLASMIN SERPL-MCNC: 26 MG/DL (ref 15–45)

## 2022-06-12 ENCOUNTER — PATIENT MESSAGE (OUTPATIENT)
Dept: HEMATOLOGY/ONCOLOGY | Facility: CLINIC | Age: 57
End: 2022-06-12
Payer: COMMERCIAL

## 2022-06-13 LAB — ANA SER QL IF: NORMAL

## 2022-06-15 LAB
COPPER SERPL-MCNC: 969 UG/L (ref 810–1990)
ZINC SERPL-MCNC: 93 UG/DL (ref 60–130)

## 2022-06-16 LAB
A-TOCOPHEROL VIT E SERPL-MCNC: 697 UG/DL (ref 500–1800)
PYRIDOXAL SERPL-MCNC: 15 UG/L (ref 5–50)
VIT A SERPL-MCNC: 40 UG/DL (ref 38–106)
VIT B1 BLD-MCNC: 90 UG/L (ref 38–122)

## 2022-06-21 ENCOUNTER — HOSPITAL ENCOUNTER (OUTPATIENT)
Dept: RADIOLOGY | Facility: HOSPITAL | Age: 57
Discharge: HOME | End: 2022-06-21
Attending: PSYCHIATRY & NEUROLOGY
Payer: COMMERCIAL

## 2022-06-21 DIAGNOSIS — R40.4 TRANSIENT ALTERATION OF AWARENESS: ICD-10-CM

## 2022-06-21 DIAGNOSIS — R51.9 FACIAL PAIN: ICD-10-CM

## 2022-06-21 DIAGNOSIS — G43.901 MIGRAINE, UNSPECIFIED, NOT INTRACTABLE, WITH STATUS MIGRAINOSUS: ICD-10-CM

## 2022-06-21 DIAGNOSIS — G43.919 INTRACTABLE MIGRAINE: ICD-10-CM

## 2022-06-21 DIAGNOSIS — R20.3 HYPERESTHESIA: ICD-10-CM

## 2022-06-21 PROCEDURE — 70553 MRI BRAIN PITUITARY W W/O CONTRAST: ICD-10-PCS | Mod: 26,,, | Performed by: RADIOLOGY

## 2022-06-21 PROCEDURE — 70553 MRI BRAIN STEM W/O & W/DYE: CPT | Mod: 26,,, | Performed by: RADIOLOGY

## 2022-06-21 PROCEDURE — 70553 MRI BRAIN STEM W/O & W/DYE: CPT | Mod: TC

## 2022-06-21 PROCEDURE — 25500020 PHARM REV CODE 255: Performed by: PSYCHIATRY & NEUROLOGY

## 2022-06-21 PROCEDURE — A9585 GADOBUTROL INJECTION: HCPCS | Performed by: PSYCHIATRY & NEUROLOGY

## 2022-06-21 RX ORDER — GADOBUTROL 604.72 MG/ML
5 INJECTION INTRAVENOUS
Status: COMPLETED | OUTPATIENT
Start: 2022-06-21 | End: 2022-06-21

## 2022-06-21 RX ADMIN — GADOBUTROL 5 ML: 604.72 INJECTION INTRAVENOUS at 05:06

## 2022-07-13 ENCOUNTER — INFUSION (OUTPATIENT)
Dept: INFUSION THERAPY | Facility: HOSPITAL | Age: 57
End: 2022-07-13
Attending: INTERNAL MEDICINE
Payer: COMMERCIAL

## 2022-07-13 VITALS
SYSTOLIC BLOOD PRESSURE: 143 MMHG | RESPIRATION RATE: 16 BRPM | OXYGEN SATURATION: 99 % | TEMPERATURE: 99 F | DIASTOLIC BLOOD PRESSURE: 75 MMHG | HEART RATE: 53 BPM

## 2022-07-13 DIAGNOSIS — D50.9 IRON DEFICIENCY ANEMIA: Primary | ICD-10-CM

## 2022-07-13 PROCEDURE — 96365 THER/PROPH/DIAG IV INF INIT: CPT

## 2022-07-13 PROCEDURE — 63600175 PHARM REV CODE 636 W HCPCS: Performed by: INTERNAL MEDICINE

## 2022-07-13 PROCEDURE — 25000003 PHARM REV CODE 250: Performed by: INTERNAL MEDICINE

## 2022-07-13 RX ORDER — HEPARIN 100 UNIT/ML
500 SYRINGE INTRAVENOUS
Status: CANCELLED | OUTPATIENT
Start: 2022-07-13

## 2022-07-13 RX ADMIN — IRON SUCROSE 200 MG: 20 INJECTION, SOLUTION INTRAVENOUS at 03:07

## 2022-07-13 NOTE — PLAN OF CARE
Patient arrived to unit for q12 week Venofer infusion. Pt reports Migraine from May/[place]ul[place] resolved. Pt continues with fatigue. Labs reviewed. Plan of care reviewed, patient agreeable to plan. Patient tolerated infusion well.VSS. Discharge instructions reviewed, patient instructed to return 10/5. Patient ambulated off unit unassisted by self. Patient in NAD at time of discharge.

## 2022-08-02 ENCOUNTER — OFFICE VISIT (OUTPATIENT)
Dept: FAMILY MEDICINE | Facility: CLINIC | Age: 57
End: 2022-08-02
Payer: COMMERCIAL

## 2022-08-02 VITALS
DIASTOLIC BLOOD PRESSURE: 64 MMHG | TEMPERATURE: 99 F | HEIGHT: 71 IN | BODY MASS INDEX: 30.03 KG/M2 | HEART RATE: 74 BPM | SYSTOLIC BLOOD PRESSURE: 128 MMHG | OXYGEN SATURATION: 98 % | WEIGHT: 214.5 LBS

## 2022-08-02 DIAGNOSIS — Z78.0 ASYMPTOMATIC MENOPAUSAL STATE: Primary | ICD-10-CM

## 2022-08-02 DIAGNOSIS — Z23 NEED FOR 23-POLYVALENT PNEUMOCOCCAL POLYSACCHARIDE VACCINE: ICD-10-CM

## 2022-08-02 DIAGNOSIS — F41.9 ANXIETY: ICD-10-CM

## 2022-08-02 DIAGNOSIS — D51.8 OTHER VITAMIN B12 DEFICIENCY ANEMIA: ICD-10-CM

## 2022-08-02 DIAGNOSIS — D50.8 OTHER IRON DEFICIENCY ANEMIA: ICD-10-CM

## 2022-08-02 DIAGNOSIS — E80.4 GILBERT'S SYNDROME: ICD-10-CM

## 2022-08-02 PROCEDURE — 99999 PR PBB SHADOW E&M-EST. PATIENT-LVL IV: ICD-10-PCS | Mod: PBBFAC,,, | Performed by: FAMILY MEDICINE

## 2022-08-02 PROCEDURE — 99999 PR PBB SHADOW E&M-EST. PATIENT-LVL IV: CPT | Mod: PBBFAC,,, | Performed by: FAMILY MEDICINE

## 2022-08-02 PROCEDURE — 90732 PNEUMOCOCCAL POLYSACCHARIDE VACCINE 23-VALENT =>2YO SQ IM: ICD-10-PCS | Mod: S$GLB,,, | Performed by: FAMILY MEDICINE

## 2022-08-02 PROCEDURE — 90471 IMMUNIZATION ADMIN: CPT | Mod: S$GLB,,, | Performed by: FAMILY MEDICINE

## 2022-08-02 PROCEDURE — 90471 PNEUMOCOCCAL POLYSACCHARIDE VACCINE 23-VALENT =>2YO SQ IM: ICD-10-PCS | Mod: S$GLB,,, | Performed by: FAMILY MEDICINE

## 2022-08-02 PROCEDURE — 90732 PPSV23 VACC 2 YRS+ SUBQ/IM: CPT | Mod: S$GLB,,, | Performed by: FAMILY MEDICINE

## 2022-08-02 PROCEDURE — 99214 PR OFFICE/OUTPT VISIT, EST, LEVL IV, 30-39 MIN: ICD-10-PCS | Mod: 25,S$GLB,, | Performed by: FAMILY MEDICINE

## 2022-08-02 PROCEDURE — 99214 OFFICE O/P EST MOD 30 MIN: CPT | Mod: 25,S$GLB,, | Performed by: FAMILY MEDICINE

## 2022-08-02 RX ORDER — GABAPENTIN 300 MG/1
300 CAPSULE ORAL NIGHTLY
Qty: 90 CAPSULE | Refills: 1 | Status: SHIPPED | OUTPATIENT
Start: 2022-08-02 | End: 2024-01-23 | Stop reason: SDUPTHER

## 2022-08-02 RX ORDER — ALPRAZOLAM 0.25 MG/1
0.25 TABLET ORAL DAILY PRN
Qty: 20 TABLET | Refills: 0 | Status: SHIPPED | OUTPATIENT
Start: 2022-08-02 | End: 2023-05-31

## 2022-08-02 NOTE — PROGRESS NOTES
Assessment & Plan  Problem List Items Addressed This Visit        Psychiatric    Anxiety    Relevant Medications    ALPRAZolam (XANAX) 0.25 MG tablet       Oncology    Iron deficiency anemia    B12 deficiency anemia       GI    Gilbert's syndrome    Overview     See UGT1A1 genotype             Other Visit Diagnoses     Asymptomatic menopausal state    -  Primary    Relevant Orders    DXA Bone Density Spine And Hip    Need for 23-polyvalent pneumococcal polysaccharide vaccine        Relevant Orders    (In Office Administered) Pneumococcal Polysaccharide Vaccine (23 Valent) (SQ/IM) (Completed)            Health Maintenance reviewed.    Follow-up: No follow-ups on file.    ______________________________________________________________________    Chief Complaint  No chief complaint on file.      HPI  Estephania Rogers is a 56 y.o. female with multiple medical diagnoses as listed in the medical history and problem list that presents for .  Pt is known to me with last appointment 8/23/2021.    Patient denies any new symptoms including chest pain, SOB, blurry vision, N/V, diarrhea.  3 week migraine:  Patient reports that they had excruciating migraine that lasted for 3 weeks.  She was evaluated by Neurology.  She went through several test as well as medication to assist with her discomfort.      PAST MEDICAL HISTORY:  Past Medical History:   Diagnosis Date    Anemia     iron deficiency    Anxiety     Diabetes mellitus, type 2     Hypertension     Malabsorption     Migraine headache     Nephrolithiasis     Other specified intestinal malabsorption        PAST SURGICAL HISTORY:  Past Surgical History:   Procedure Laterality Date    ANAL FISTULOTOMY  2005    APPENDECTOMY  2005    AUGMENTATION OF BREAST  2014    BREAST SURGERY  2014    CHOLECYSTECTOMY  2005    GASTRIC BYPASS  2005    SINUS SURGERY      Dr. Oral Cobb    SPINE SURGERY      TONSILLECTOMY      TOTAL REDUCTION MAMMOPLASTY  2014        SOCIAL HISTORY:  Social History     Socioeconomic History    Marital status:    Tobacco Use    Smoking status: Never Smoker    Smokeless tobacco: Never Used    Tobacco comment: Clerical work   Substance and Sexual Activity    Alcohol use: Yes    Drug use: No    Sexual activity: Yes     Partners: Male     Birth control/protection: None     Comment: :  Gianni     Social Determinants of Health     Financial Resource Strain: Unknown    Difficulty of Paying Living Expenses: Patient refused   Food Insecurity: Unknown    Worried About Running Out of Food in the Last Year: Patient refused    Ran Out of Food in the Last Year: Patient refused   Transportation Needs: Unknown    Lack of Transportation (Medical): Patient refused    Lack of Transportation (Non-Medical): Patient refused   Physical Activity: Unknown    Days of Exercise per Week: 0 days    Minutes of Exercise per Session: Patient refused   Stress: No Stress Concern Present    Feeling of Stress : Only a little   Social Connections: Unknown    Frequency of Communication with Friends and Family: Patient refused    Frequency of Social Gatherings with Friends and Family: Patient refused    Active Member of Clubs or Organizations: Patient refused    Attends Club or Organization Meetings: Patient refused    Marital Status: Patient refused   Housing Stability: Unknown    Unable to Pay for Housing in the Last Year: Patient refused    Unstable Housing in the Last Year: Patient refused       FAMILY HISTORY:  Family History   Problem Relation Age of Onset    Cancer Father         pancreatic    Diabetes Father     Diabetes Mother     Hypertension Mother     Miscarriages / Stillbirths Mother     Stroke Mother     Diabetes Sister     Cancer Maternal Aunt         breast    Arthritis Maternal Grandmother     Arthritis Paternal Grandmother     Diabetes Sister     Breast cancer Paternal Aunt        ALLERGIES AND MEDICATIONS:  updated and reviewed.  Review of patient's allergies indicates:  No Known Allergies  Current Outpatient Medications   Medication Sig Dispense Refill    ascorbic acid, vitamin C, (VITAMIN C) 500 MG tablet Take 500 mg by mouth once daily.      AQUILES ASPIRIN ORAL Take 81 mg by mouth once daily.      cholecalciferol, vitamin D3, 50,000 unit Wafr Take 50,000 Units by mouth once daily.      FLUoxetine 20 MG capsule TAKE 1 CAPSULE BY MOUTH EVERY DAY 90 capsule 3    multivitamin (THERAGRAN) per tablet Take 1 tablet by mouth once daily.      phytonadione, vit K1, (VITAMIN K1 MISC) 1,000 mg by Misc.(Non-Drug; Combo Route) route once daily.      RESTASIS 0.05 % ophthalmic emulsion   3    vitamin A 08043 UNIT capsule Take 25,000 Units by mouth once daily.      vitamin E acetate (VITAMIN E ORAL) Take 1 tablet by mouth once daily.      ALPRAZolam (XANAX) 0.25 MG tablet Take 1 tablet (0.25 mg total) by mouth daily as needed for Anxiety. 20 tablet 0    gabapentin (NEURONTIN) 300 MG capsule Take 1 capsule (300 mg total) by mouth every evening. 90 capsule 1     No current facility-administered medications for this visit.         ROS  Review of Systems   Constitutional: Negative for activity change, appetite change, fatigue, fever and unexpected weight change.   HENT: Negative.  Negative for ear discharge, ear pain, rhinorrhea and sore throat.    Eyes: Negative.    Respiratory: Negative for apnea, cough, chest tightness, shortness of breath and wheezing.    Cardiovascular: Negative for chest pain, palpitations and leg swelling.   Gastrointestinal: Negative for abdominal distention, abdominal pain, constipation, diarrhea and vomiting.   Endocrine: Negative for cold intolerance, heat intolerance, polydipsia and polyuria.   Genitourinary: Negative for decreased urine volume and urgency.   Musculoskeletal: Negative.    Skin: Negative for rash.   Neurological: Positive for headaches (3 week migraine). Negative for dizziness.  "  Hematological: Does not bruise/bleed easily.   Psychiatric/Behavioral: Negative for agitation, sleep disturbance and suicidal ideas.           Physical Exam  Vitals:    08/02/22 0733   BP: 128/64   Pulse: 74   Temp: 98.7 °F (37.1 °C)   TempSrc: Oral   SpO2: 98%   Weight: 97.3 kg (214 lb 8.1 oz)   Height: 5' 11" (1.803 m)    Body mass index is 29.92 kg/m².  Weight: 97.3 kg (214 lb 8.1 oz)   Height: 5' 11" (180.3 cm)   Physical Exam  Vitals reviewed.   Constitutional:       Appearance: She is well-developed.   HENT:      Head: Normocephalic and atraumatic.      Right Ear: External ear normal.      Left Ear: External ear normal.      Nose: Nose normal.   Eyes:      Conjunctiva/sclera: Conjunctivae normal.      Pupils: Pupils are equal, round, and reactive to light.   Cardiovascular:      Rate and Rhythm: Normal rate and regular rhythm.      Heart sounds: Normal heart sounds.   Pulmonary:      Effort: Pulmonary effort is normal.      Breath sounds: Normal breath sounds.   Skin:     General: Skin is warm and dry.   Neurological:      Mental Status: She is alert and oriented to person, place, and time.           Health Maintenance       Date Due Completion Date    Pneumococcal Vaccines (Age 0-64) (1 - PCV) Never done ---    HIV Screening Never done ---    TETANUS VACCINE Never done ---    Shingles Vaccine (1 of 2) Never done ---    DEXA Scan 03/31/2018 3/31/2016    COVID-19 Vaccine (4 - Booster for Moderna series) 03/08/2022 11/8/2021    Influenza Vaccine (1) 09/01/2022 11/5/2021    Colorectal Cancer Screening 01/20/2023 1/20/2022    Mammogram 02/10/2023 2/10/2022    Cervical Cancer Screening 10/07/2024 10/7/2019    Lipid Panel 10/14/2026 10/14/2021              Patient note was created using [employer].  Any errors in syntax or even information may not have been identified and edited on initial review prior to signing this note.  "

## 2022-09-09 ENCOUNTER — PATIENT MESSAGE (OUTPATIENT)
Dept: PODIATRY | Facility: CLINIC | Age: 57
End: 2022-09-09
Payer: COMMERCIAL

## 2022-09-13 ENCOUNTER — PATIENT MESSAGE (OUTPATIENT)
Dept: FAMILY MEDICINE | Facility: CLINIC | Age: 57
End: 2022-09-13
Payer: COMMERCIAL

## 2022-09-13 DIAGNOSIS — M79.673 PAIN OF FOOT, UNSPECIFIED LATERALITY: Primary | ICD-10-CM

## 2022-09-13 RX ORDER — METHYLPREDNISOLONE 4 MG/1
TABLET ORAL
Qty: 21 EACH | Refills: 0 | Status: SHIPPED | OUTPATIENT
Start: 2022-09-13 | End: 2022-10-04

## 2022-09-28 ENCOUNTER — OFFICE VISIT (OUTPATIENT)
Dept: PODIATRY | Facility: CLINIC | Age: 57
End: 2022-09-28
Payer: COMMERCIAL

## 2022-09-28 VITALS — BODY MASS INDEX: 29.96 KG/M2 | HEIGHT: 71 IN | WEIGHT: 214 LBS

## 2022-09-28 DIAGNOSIS — M67.471 GANGLION CYST OF RIGHT FOOT: ICD-10-CM

## 2022-09-28 DIAGNOSIS — M72.2 PLANTAR FASCIITIS: ICD-10-CM

## 2022-09-28 DIAGNOSIS — M79.671 RIGHT FOOT PAIN: Primary | ICD-10-CM

## 2022-09-28 PROCEDURE — 99213 PR OFFICE/OUTPT VISIT, EST, LEVL III, 20-29 MIN: ICD-10-PCS | Mod: S$GLB,,, | Performed by: PODIATRIST

## 2022-09-28 PROCEDURE — 99213 OFFICE O/P EST LOW 20 MIN: CPT | Mod: S$GLB,,, | Performed by: PODIATRIST

## 2022-09-28 PROCEDURE — 99999 PR PBB SHADOW E&M-EST. PATIENT-LVL IV: CPT | Mod: PBBFAC,,, | Performed by: PODIATRIST

## 2022-09-28 PROCEDURE — 99999 PR PBB SHADOW E&M-EST. PATIENT-LVL IV: ICD-10-PCS | Mod: PBBFAC,,, | Performed by: PODIATRIST

## 2022-10-05 ENCOUNTER — INFUSION (OUTPATIENT)
Dept: INFUSION THERAPY | Facility: HOSPITAL | Age: 57
End: 2022-10-05
Attending: INTERNAL MEDICINE
Payer: COMMERCIAL

## 2022-10-05 VITALS
RESPIRATION RATE: 17 BRPM | DIASTOLIC BLOOD PRESSURE: 63 MMHG | HEART RATE: 66 BPM | OXYGEN SATURATION: 98 % | TEMPERATURE: 97 F | SYSTOLIC BLOOD PRESSURE: 123 MMHG

## 2022-10-05 DIAGNOSIS — D50.9 IRON DEFICIENCY ANEMIA: Primary | ICD-10-CM

## 2022-10-05 PROCEDURE — 63600175 PHARM REV CODE 636 W HCPCS: Performed by: INTERNAL MEDICINE

## 2022-10-05 PROCEDURE — 25000003 PHARM REV CODE 250: Performed by: INTERNAL MEDICINE

## 2022-10-05 PROCEDURE — 96365 THER/PROPH/DIAG IV INF INIT: CPT

## 2022-10-05 RX ORDER — HEPARIN 100 UNIT/ML
500 SYRINGE INTRAVENOUS
Status: CANCELLED | OUTPATIENT
Start: 2022-10-05

## 2022-10-05 RX ADMIN — IRON SUCROSE 200 MG: 20 INJECTION, SOLUTION INTRAVENOUS at 03:10

## 2022-10-05 NOTE — PLAN OF CARE
Pt received q12w Venofer IVPB. Denies any new or worsening complaints since her last visit. VSS. Tolerated Venofer well. Receives appointments through [facility]. Discharged from unit in [facility].

## 2022-10-19 NOTE — PROGRESS NOTES
Subjective:      Patient ID: Estephania Rogers is a 57 y.o. female.    Chief Complaint: Foot Problem, Foot Pain, and Follow-up      Estephania Rogers is a 57 y.o. female who presents to the podiatry clinic  with complaint of painful lump to the plantar lateral right foot pain, especially with palpation and ambulation in certain shoe gear . Description: mild and moderate Nature: aching and sharp Onset of the symptoms was  approx 2 weeks ago . Precipitating event:  use of a worn sandal .  History of injury: no Current symptoms include: swelling. Alleviating factors: icing and change in shoes with improvement Patient has had prior foot problems. Evaluation to date: none. . Patients rates pain 3/10 on pain scale.    09/28/2022 returns to clinic for a flare of plantar fascial pain.      Patient Active Problem List   Diagnosis    Recurrent nephrolithiasis    Other specified intestinal malabsorption    Iron deficiency anemia    B12 deficiency anemia    Anxiety    KRAUS (nonalcoholic steatohepatitis)    Hepatic fibrosis, stage 3    Gilbert's syndrome    Fatty liver    Right ankle pain    Right foot pain       Current Outpatient Medications on File Prior to Visit   Medication Sig Dispense Refill    ascorbic acid, vitamin C, (VITAMIN C) 500 MG tablet Take 500 mg by mouth once daily.      AQUILES ASPIRIN ORAL Take 81 mg by mouth once daily.      cholecalciferol, vitamin D3, 50,000 unit Wafr Take 50,000 Units by mouth once daily.      FLUoxetine 20 MG capsule TAKE 1 CAPSULE BY MOUTH EVERY DAY 90 capsule 3    gabapentin (NEURONTIN) 300 MG capsule Take 1 capsule (300 mg total) by mouth every evening. 90 capsule 1    multivitamin (THERAGRAN) per tablet Take 1 tablet by mouth once daily.      phytonadione, vit K1, (VITAMIN K1 MISC) 1,000 mg by Misc.(Non-Drug; Combo Route) route once daily.      RESTASIS 0.05 % ophthalmic emulsion   3    vitamin A 88976 UNIT capsule Take 25,000 Units by mouth once daily.      vitamin E  acetate (VITAMIN E ORAL) Take 1 tablet by mouth once daily.      ALPRAZolam (XANAX) 0.25 MG tablet Take 1 tablet (0.25 mg total) by mouth daily as needed for Anxiety. 20 tablet 0     No current facility-administered medications on file prior to visit.       Review of patient's allergies indicates:  No Known Allergies    Past Surgical History:   Procedure Laterality Date    ANAL FISTULOTOMY  2005    APPENDECTOMY  2005    AUGMENTATION OF BREAST  2014    BREAST SURGERY  2014    CHOLECYSTECTOMY  2005    GASTRIC BYPASS  2005    SINUS SURGERY      Dr. Oral Cobb    SPINE SURGERY      TONSILLECTOMY      TOTAL REDUCTION MAMMOPLASTY  2014       Family History   Problem Relation Age of Onset    Cancer Father         pancreatic    Diabetes Father     Diabetes Mother     Hypertension Mother     Miscarriages / Stillbirths Mother     Stroke Mother     Diabetes Sister     Cancer Maternal Aunt         breast    Arthritis Maternal Grandmother     Arthritis Paternal Grandmother     Diabetes Sister     Breast cancer Paternal Aunt        Social History     Socioeconomic History    Marital status:    Tobacco Use    Smoking status: Never    Smokeless tobacco: Never    Tobacco comments:     Clerical work   Substance and Sexual Activity    Alcohol use: Yes    Drug use: No    Sexual activity: Yes     Partners: Male     Birth control/protection: None     Comment: :  Gianni     Social Determinants of Health     Financial Resource Strain: Unknown    Difficulty of Paying Living Expenses: Patient refused   Food Insecurity: Unknown    Worried About Running Out of Food in the Last Year: Patient refused    Ran Out of Food in the Last Year: Patient refused   Transportation Needs: Unknown    Lack of Transportation (Medical): Patient refused    Lack of Transportation (Non-Medical): Patient refused   Physical Activity: Unknown    Days of Exercise per Week: 0 days    Minutes of Exercise per Session: Patient refused   Stress: No Stress  "Concern Present    Feeling of Stress : Only a little   Social Connections: Unknown    Frequency of Communication with Friends and Family: Patient refused    Frequency of Social Gatherings with Friends and Family: Patient refused    Active Member of Clubs or Organizations: Patient refused    Attends Club or Organization Meetings: Patient refused    Marital Status: Patient refused   Housing Stability: Unknown    Unable to Pay for Housing in the Last Year: Patient refused    Unstable Housing in the Last Year: Patient refused       Review of Systems   Constitutional: Negative for chills and fever.   Cardiovascular:  Negative for claudication and leg swelling.   Respiratory:  Negative for cough and shortness of breath.    Skin:  Positive for dry skin and suspicious lesions. Negative for itching, nail changes and rash.   Musculoskeletal:  Positive for arthritis, joint pain and myalgias. Negative for falls, joint swelling and muscle weakness.   Gastrointestinal:  Negative for diarrhea, nausea and vomiting.   Neurological:  Positive for paresthesias. Negative for numbness, tremors and weakness.   Psychiatric/Behavioral:  Negative for altered mental status and hallucinations. The patient is nervous/anxious.          Objective:      Vitals:    09/28/22 1137   Weight: 97.1 kg (214 lb)   Height: 5' 11" (1.803 m)   PainSc: 0-No pain       Physical Exam  Nursing note reviewed.   Constitutional:       General: She is not in acute distress.     Appearance: She is not toxic-appearing or diaphoretic.   Cardiovascular:      Pulses:           Dorsalis pedis pulses are 2+ on the right side and 2+ on the left side.        Posterior tibial pulses are 2+ on the right side and 2+ on the left side.   Pulmonary:      Effort: No respiratory distress.   Musculoskeletal:      Right ankle: No swelling. No tenderness. No lateral malleolus, medial malleolus, AITF ligament, CF ligament or posterior TF ligament tenderness. Decreased range of motion. "      Right Achilles Tendon: No defects. Villegas's test negative.      Left ankle: No swelling. No tenderness. No lateral malleolus, medial malleolus, AITF ligament, CF ligament or posterior TF ligament tenderness. Decreased range of motion.      Left Achilles Tendon: No defects. Villegas's test negative.      Right foot: No bony tenderness.      Left foot: Crepitus (midfoot) present. No bony tenderness.      Comments: Biomechanical exam: Pain on palpation soft tissue mass to the plantar right foot. There is equinus deformity bilateral with decreased dorsiflexion at the ankle joint bilateral. No tenderness with compression of heel. Negative tinels sign. Gait analysis reveals excessive pronation through midstance and propulsion with early heel off. Shoes reveals lateral heel counter wear bilateral     Decreased first MPJ range of motion both weightbearing and nonweightbearing, no crepitus observed the first MP joint, + dorsal flag sign. Mild  bunion deformity is observed .    Patient has hammertoes of digits 2-5 bilateral partially reducible        Skin:     General: Skin is warm and dry.      Coloration: Skin is not pale.      Findings: No bruising, burn or laceration.      Nails: There is no clubbing.      Comments: soft mobile mass on the plantar lateral left foot, the mass transilluminates with the pen light. No open wounds, no rashes or maceration noted. Minimal pain of the soft tissue mass,  measuring approximately 2x3cm. No gross osseous deformities noted.      Neurological:      Sensory: No sensory deficit.      Motor: No tremor, atrophy or abnormal muscle tone.      Deep Tendon Reflexes: Reflexes are normal and symmetric.   Psychiatric:         Attention and Perception: She is attentive.         Mood and Affect: Mood is not anxious. Affect is not inappropriate.         Speech: She is communicative. Speech is not slurred.         Behavior: Behavior is not combative.             Assessment:       Encounter  Diagnoses   Name Primary?    Right foot pain Yes    Plantar fasciitis     Ganglion cyst of right foot            Plan:     Problem List Items Addressed This Visit       Right foot pain - Primary     Other Visit Diagnoses       Plantar fasciitis        Ganglion cyst of right foot                 I counseled the patient on her conditions, their implications and medical management.    Patient education on the chronic nature of arthralgias of the feet. Discussed non-surgical treatment options, including injection, supportive shoegear, inserts.     Discussed different treatment options for heel pain. I gave written and verbal instructions on stretching exercises. Patient expressed understanding. Discussed icing the affected area as needed and also wearing appropriate shoe gear and avoiding flats, slippers, sandals, and going barefoot. My recommendation for OTC supports is [employer]. Patient instructed on adequate icing techniques. Patient should ice the affected area at least once per day x 10 minutes for 10 days . I advised the patient that extra icing would also be beneficial to ensure adequate anti inflammatory effect. We also discussed cortisone injections and NSAID therapy.     Since the cyst is not interfering with daily function, the patient would like to continue monitoring it and will return to the clinic when patient wants intervention.  I provided reassurance.    Discussed the importance of well padded and supportive shoe gear to ease pressure and friction.  Advised patient to limiting movements or activities that increase pain may bring relief.  Patient instructed on adequate icing techniques. Patient should ice the affected area at least once per day x 10 minutes for 10 days . I advised the  patient that extra icing would also be beneficial to ensure adequate anti inflammatory effect   I prescribed patient non steroidal anti inflammatory medication.  NSAID/Pain Cream will be sent to home. Patient  will take this as directed with food, discontinue if GI upset occurs, and report to an emergency room immediately if thick black tarry stool or coffee ground appearing vomit is encountered.  These are signs of internal bleeding which can be life threatening.    - I discussed condition and treatment which includes conservative and surgical options for the mass to the foot.  All questions were answered.      Since the cyst is not painful and is not interfering with daily function, the patient would like to continue monitoring it and will return to the clinic when patient wants intervention.  I provided reassurance.      RTC PRN

## 2022-11-02 ENCOUNTER — PATIENT MESSAGE (OUTPATIENT)
Dept: FAMILY MEDICINE | Facility: CLINIC | Age: 57
End: 2022-11-02
Payer: COMMERCIAL

## 2022-11-02 ENCOUNTER — E-VISIT (OUTPATIENT)
Dept: FAMILY MEDICINE | Facility: CLINIC | Age: 57
End: 2022-11-02
Payer: COMMERCIAL

## 2022-11-02 DIAGNOSIS — J01.81 OTHER ACUTE RECURRENT SINUSITIS: Primary | ICD-10-CM

## 2022-11-03 ENCOUNTER — OFFICE VISIT (OUTPATIENT)
Dept: OTOLARYNGOLOGY | Facility: CLINIC | Age: 57
End: 2022-11-03
Payer: COMMERCIAL

## 2022-11-03 DIAGNOSIS — J01.91 ACUTE RECURRENT SINUSITIS, UNSPECIFIED LOCATION: Primary | ICD-10-CM

## 2022-11-03 DIAGNOSIS — Z98.890 HISTORY OF SINUS SURGERY: ICD-10-CM

## 2022-11-03 PROCEDURE — 99202 PR OFFICE/OUTPT VISIT, NEW, LEVL II, 15-29 MIN: ICD-10-PCS | Mod: 95,,, | Performed by: PHYSICIAN ASSISTANT

## 2022-11-03 PROCEDURE — 99421 PR E&M, ONLINE DIGIT, EST, < 7 DAYS, 5-10 MINS: ICD-10-PCS | Mod: S$GLB,,, | Performed by: FAMILY MEDICINE

## 2022-11-03 PROCEDURE — 99202 OFFICE O/P NEW SF 15 MIN: CPT | Mod: 95,,, | Performed by: PHYSICIAN ASSISTANT

## 2022-11-03 PROCEDURE — 99421 OL DIG E/M SVC 5-10 MIN: CPT | Mod: S$GLB,,, | Performed by: FAMILY MEDICINE

## 2022-11-03 RX ORDER — AMOXICILLIN AND CLAVULANATE POTASSIUM 875; 125 MG/1; MG/1
1 TABLET, FILM COATED ORAL 2 TIMES DAILY
Qty: 20 TABLET | Refills: 0 | Status: SHIPPED | OUTPATIENT
Start: 2022-11-03 | End: 2022-11-13

## 2022-11-03 RX ORDER — AMOXICILLIN AND CLAVULANATE POTASSIUM 875; 125 MG/1; MG/1
1 TABLET, FILM COATED ORAL 2 TIMES DAILY
Qty: 20 TABLET | Refills: 0 | Status: SHIPPED | OUTPATIENT
Start: 2022-11-03 | End: 2022-11-03

## 2022-11-03 NOTE — PROGRESS NOTES
Patient ID: Estephania Rogers is a 57 y.o. female.    Chief Complaint: No chief complaint on file.    The patient initiated a request through [email or web address] on 11/2/2022 for evaluation and management with a chief complaint of No chief complaint on file.     I evaluated the questionnaire submission on 11/3/22.    [facility] Peq Evisit Upper Respitatory/Cough Questionnaire    11/2/2022  4:16 PM CDT - Filed by Patient   Do you agree to participate in an E-Visit? Yes   If you have any of the following symptoms, please present to your local ER or call 911:  I acknowledge   What is the main issue that you would like for your doctor to address today? Sinus infection   Are you able to take your vital signs? No   What symptoms do you currently have?  Chills;  Fatigue;  Nasal Congestion   Have you had a fever? Yes   What has been the range of your fever? Less than 100.4   When did your symptoms first appear? 10/31/2022   In the last two weeks, have you been in close contact with someone who has COVID-19 or the Flu? No   In the last two weeks, have you worked or volunteered in a healthcare facility or as a ? Healthcare facilities include a hospital, medical or dental clinic, long-term care facility, or nursing home No   Do you live in a long-term care facility, nursing home, or homeless shelter? No   List what you have done or taken to help your symptoms. Tylenol   How severe are your symptoms? Moderate   Have you taken an at home Covid test? Yes   What were the results? Negative   Have you taken a Flu test? No   Have you been fully vaccinated for COVID? (2 Pfizer, 2 Moderna or 1 [employer] vaccine injections) Yes   Have you received a booster? Yes   Have you recieved a Flu shot? Yes   When did you recieve your Flu shot? 10/5/2022   Do you have transportation to get tested for COVID if it is indicated and ordered for you at an [facility] location? Yes   Provide any information you feel is important to your history  not asked above History if infection on left side   Please attach any relevant images or files           Active Problem List with Overview Notes    Diagnosis Date Noted    Right foot pain 2022    Right ankle pain 10/06/2021    Fatty liver 2021    Hepatic fibrosis, stage 3 2020    Gilbert's syndrome 2020     See [id number] genotype      KRAUS (nonalcoholic steatohepatitis) 10/16/2020    Anxiety 2013    Iron deficiency anemia 10/19/2012    B12 deficiency anemia 10/19/2012    Recurrent nephrolithiasis     Other specified intestinal malabsorption       Recent Labs Obtained:  No visits with results within 7 Day(s) from this visit.   Latest known visit with results is:   Lab Visit on 2022   Component Date Value Ref Range Status    Creatinine 2022 0.7  0.5 - 1.4 mg/dL Final    eGFR if African American 2022 >60  >60 mL/min/1.73 m^2 Final    eGFR if non African American 2022 >60  >60 mL/min/1.73 m^2 Final    Comment: Calculation used to obtain the estimated glomerular filtration  rate (eGFR) is the CKD-EPI equation.          No diagnosis found.     No orders of the defined types were placed in this encounter.           E-Visit Time Trackin minutes

## 2022-11-03 NOTE — PROGRESS NOTES
Subjective:       Patient ID: Estephania Rogers is a 57 y.o. female.    Chief Complaint: Sinusitis    The patient location is: [place], [place]  The chief complaint leading to consultation is: sinus infection    Visit type: audiovisual    Face to Face time with patient: 9 minutes  20 minutes of total time spent on the encounter, which includes face to face time and non-face to face time preparing to see the patient (eg, review of tests), Obtaining and/or reviewing separately obtained history, Documenting clinical information in the electronic or other health record, Independently interpreting results (not separately reported) and communicating results to the patient/family/caregiver, or Care coordination (not separately reported).         Each patient to whom he or she provides medical services by telemedicine is:  (1) informed of the relationship between the physician and patient and the respective role of any other health care provider with respect to management of the patient; and (2) notified that he or she may decline to receive medical services by telemedicine and may withdraw from such care at any time.    Notes:     Patient is a pleasant 57 year old female with complaints of sinusitis.  She has hx of balloon sinuplasty about 10 years ago.  States surgery helped to decrease her number of sinus infections annually but she now seems to be having infections again 2-3 times a year and reports that it's always her LEFT side that is bothersome.  She had URI about 10 days ago and thought it was improving.  She then developed sinus pain and pressure (left cheek and ear); also pressure top of her head.  She's having minimal nasal drainage; no cough or dental pain.  She has been fatigued and had fever earlier this week as well.  Had home COVID test that was negative.  She does not smoke.  She uses Flonase almost daily.  She was last treated for sinusitis about 6 months ago.  She had Medrol Dose Pack in 9/2022 for  plantar fasciitis.    Review of Systems   Constitutional:  Positive for fever.   HENT:  Positive for ear pain, postnasal drip and sinus pressure/congestion.    Eyes: Negative.    Respiratory: Negative.     Cardiovascular: Negative.    Gastrointestinal: Negative.    Endocrine: Negative.    Genitourinary: Negative.    Musculoskeletal: Negative.    Integumentary:  Negative.   Neurological: Negative.    Hematological: Negative.    Psychiatric/Behavioral: Negative.         Objective:      Physical Exam  Constitutional:       Appearance: Normal appearance.   HENT:      Head: Normocephalic and atraumatic.      Right Ear: External ear normal.      Left Ear: External ear normal.      Nose: Nose normal.   Pulmonary:      Effort: Pulmonary effort is normal.   Neurological:      General: No focal deficit present.      Mental Status: She is alert.       Assessment:       Problem List Items Addressed This Visit    None  Visit Diagnoses       Acute recurrent sinusitis, unspecified location    -  Primary    History of sinus surgery                  Plan:         Recommend Augmentin BID x 10 days.  Discussed importance of consistent DAILY use of Flonase and could add plain Mucinex BID and frequent nasal saline spray to facilitate clearance of secretions.  Advised her to email me with her progress early next week if not making improvement and could add short burst of steroids but would like to try and avoid due to recent Medrol Dose Pack in past 2 months.  She voiced understanding.  I also recommend she get re-established with ENT given that her frequency of sinusitis is increasing and always affects her LEFT side since balloon sinuplasty 10 years ago.      Answers submitted by the patient for this visit:  Review of Symptoms Questionnaire  (Submitted on 11/3/2022)  Fatigue (Tiredness)?: Yes  Sinus infection(s)?: Yes  Seasonal Allergies?: Yes

## 2022-11-07 ENCOUNTER — PATIENT MESSAGE (OUTPATIENT)
Dept: OTOLARYNGOLOGY | Facility: CLINIC | Age: 57
End: 2022-11-07
Payer: COMMERCIAL

## 2022-11-08 ENCOUNTER — HOSPITAL ENCOUNTER (OUTPATIENT)
Dept: RADIOLOGY | Facility: HOSPITAL | Age: 57
Discharge: HOME | End: 2022-11-08
Attending: PODIATRIST
Payer: COMMERCIAL

## 2022-11-08 ENCOUNTER — OFFICE VISIT (OUTPATIENT)
Dept: PODIATRY | Facility: CLINIC | Age: 57
End: 2022-11-08
Payer: COMMERCIAL

## 2022-11-08 VITALS — WEIGHT: 214.06 LBS | HEIGHT: 71 IN | BODY MASS INDEX: 29.97 KG/M2

## 2022-11-08 DIAGNOSIS — M21.6X1 ACQUIRED EQUINUS DEFORMITY OF BOTH FEET: ICD-10-CM

## 2022-11-08 DIAGNOSIS — M79.671 FOOT PAIN, BILATERAL: ICD-10-CM

## 2022-11-08 DIAGNOSIS — M20.5X2 HALLUX LIMITUS, ACQUIRED, LEFT: ICD-10-CM

## 2022-11-08 DIAGNOSIS — M79.89 FOOT SWELLING: ICD-10-CM

## 2022-11-08 DIAGNOSIS — M79.672 FOOT PAIN, BILATERAL: ICD-10-CM

## 2022-11-08 DIAGNOSIS — M79.671 FOOT PAIN, BILATERAL: Primary | ICD-10-CM

## 2022-11-08 DIAGNOSIS — M79.672 FOOT PAIN, BILATERAL: Primary | ICD-10-CM

## 2022-11-08 DIAGNOSIS — M72.2 PLANTAR FASCIITIS: ICD-10-CM

## 2022-11-08 DIAGNOSIS — M20.5X1 HALLUX LIMITUS, ACQUIRED, RIGHT: ICD-10-CM

## 2022-11-08 DIAGNOSIS — M21.6X2 ACQUIRED EQUINUS DEFORMITY OF BOTH FEET: ICD-10-CM

## 2022-11-08 PROCEDURE — 73630 XR FOOT COMPLETE 3 VIEW BILATERAL: ICD-10-PCS | Mod: 26,50,, | Performed by: INTERNAL MEDICINE

## 2022-11-08 PROCEDURE — 99999 PR PBB SHADOW E&M-EST. PATIENT-LVL V: CPT | Mod: PBBFAC,,, | Performed by: PODIATRIST

## 2022-11-08 PROCEDURE — 99214 PR OFFICE/OUTPT VISIT, EST, LEVL IV, 30-39 MIN: ICD-10-PCS | Mod: 25,S$GLB,, | Performed by: PODIATRIST

## 2022-11-08 PROCEDURE — 20600 PR DRAIN/INJECT SMALL JOINT/BURSA: ICD-10-PCS | Mod: RT,S$GLB,, | Performed by: PODIATRIST

## 2022-11-08 PROCEDURE — 99999 PR PBB SHADOW E&M-EST. PATIENT-LVL V: ICD-10-PCS | Mod: PBBFAC,,, | Performed by: PODIATRIST

## 2022-11-08 PROCEDURE — 99214 OFFICE O/P EST MOD 30 MIN: CPT | Mod: 25,S$GLB,, | Performed by: PODIATRIST

## 2022-11-08 PROCEDURE — 73630 X-RAY EXAM OF FOOT: CPT | Mod: TC,50,FY,PO

## 2022-11-08 PROCEDURE — 73630 X-RAY EXAM OF FOOT: CPT | Mod: 26,50,, | Performed by: INTERNAL MEDICINE

## 2022-11-08 PROCEDURE — 20600 DRAIN/INJ JOINT/BURSA W/O US: CPT | Mod: RT,S$GLB,, | Performed by: PODIATRIST

## 2022-11-08 RX ORDER — LIDOCAINE HYDROCHLORIDE 20 MG/ML
1 INJECTION, SOLUTION EPIDURAL; INFILTRATION; INTRACAUDAL; PERINEURAL ONCE
Status: COMPLETED | OUTPATIENT
Start: 2022-11-08 | End: 2022-11-08

## 2022-11-08 RX ORDER — DEXAMETHASONE SODIUM PHOSPHATE 4 MG/ML
2 INJECTION, SOLUTION INTRA-ARTICULAR; INTRALESIONAL; INTRAMUSCULAR; INTRAVENOUS; SOFT TISSUE ONCE
Status: COMPLETED | OUTPATIENT
Start: 2022-11-08 | End: 2022-11-08

## 2022-11-08 RX ORDER — BUPIVACAINE HYDROCHLORIDE 5 MG/ML
1 INJECTION, SOLUTION EPIDURAL; INTRACAUDAL ONCE
Status: COMPLETED | OUTPATIENT
Start: 2022-11-08 | End: 2022-11-08

## 2022-11-08 RX ORDER — TRIAMCINOLONE ACETONIDE 40 MG/ML
20 INJECTION, SUSPENSION INTRA-ARTICULAR; INTRAMUSCULAR ONCE
Status: COMPLETED | OUTPATIENT
Start: 2022-11-08 | End: 2022-11-08

## 2022-11-08 RX ADMIN — TRIAMCINOLONE ACETONIDE 20 MG: 40 INJECTION, SUSPENSION INTRA-ARTICULAR; INTRAMUSCULAR at 09:11

## 2022-11-08 RX ADMIN — DEXAMETHASONE SODIUM PHOSPHATE 2 MG: 4 INJECTION, SOLUTION INTRA-ARTICULAR; INTRALESIONAL; INTRAMUSCULAR; INTRAVENOUS; SOFT TISSUE at 09:11

## 2022-11-08 RX ADMIN — LIDOCAINE HYDROCHLORIDE 20 MG: 20 INJECTION, SOLUTION EPIDURAL; INFILTRATION; INTRACAUDAL; PERINEURAL at 09:11

## 2022-11-08 RX ADMIN — BUPIVACAINE HYDROCHLORIDE 5 MG: 5 INJECTION, SOLUTION EPIDURAL; INTRACAUDAL at 09:11

## 2022-11-08 NOTE — PATIENT INSTRUCTIONS
Supplements for inflammation: Arnica Tabs, bromelain with tumeric, alpha lipoic acid, garlic     Over the counter pain creams: Voltaren Gel, Biofreeze, Bengay, tiger balm, two old goat, lidocaine gel,  Absorbine Veterinary Liniment Gel Topical Analgesic Sore Muscle and Joint Pain Relief    Recommend lotions: eucerin, eucerin for diabetics, aquaphor, A&D ointment, gold bond for diabetics, sween, Burke's Bees all purpose baby ointment,  urea 40 with aloe (found on amazon.com)    Shoe recommendations: (try 6pm.com, zappos.[email or web address] , nordstromrack.ScubaTribe, or shoes.ScubaTribe for discounted prices) you can visit [employer] shoes in [place]  or [employer] [facility] in the [facility] (there are also several shoe brand outlets in the [facility])    Asics (GT 2000 or gel foundations), new balance stability type shoes (such as the 940 series), cheli (stabil c3),  Smith (GTS or Beast or transcend), propet, Chemo (tennis shoe)    Sofft Brand, baretraps (women) Hernandez&Streeter (men), clarks, crocs, aerosoles, naturalizers, SAS, ecco, born, sergey carbone, rockports (dress shoes)    Vionic, burkenstocks, fitflops, propet, baretraps (sandals)    HokaOne sandals, rubber birkenstock sandals crocs, propet (house shoes)    Nail Home remedy:  Vicks Vapor rub or Emuaid to nails for easier manageability    Occasional soaks for 15-20 mins in luke warm water with 1/2 cup of listerine and 1 cup of apple cider vinegar are ok You may add several drops of oil of oregano or tea tree oil as well      What Is Arthritis in the Foot?  Degenerative arthritis is a condition that slowly wears away joints, the area where bones meet and move. In the beginning, you may notice that the affected joint seems stiff. It may even ache. As the joint lining (cartilage) breaks down, the bones rub against each other, causing pain and swelling. Over time, small pieces of rough or splintered bone (bone spurs) develop, and the joints range of motion becomes limited. But movement doesnt have to  cause pain. The effects of arthritis can be reduced.    The big-toe joint  When arthritis affects your big toe, your foot hurts when it pushes off the ground. Arthritis often appears in the big-toe joint along with a bunion (a bony bump at the side of the joint) or a bone spur on top of the joint.    Other joints  When arthritis affects the rear or midfoot joints, you feel pain when you put weight on your foot. Arthritis may affect the joint where the ankle and foot meet. It may also affect other joints nearby.  Date Last Reviewed: 7/1/2016  © 4799-0910 [employer]. [address] [place] [address], [place] [address]. All rights reserved. This information is not intended as a substitute for professional medical care. Always follow your healthcare professional's instructions.        Treating Arthritis in the Foot  If your symptoms are mild, medications may be enough to reduce pain and swelling. For more severe arthritis, surgery may be needed to improve the condition of the joint.    Medicine  Your doctor may prescribe medicine--pills or injections--to limit pain and swelling. Ice, aspirin, acetaminophen, or ibuprofen may help relieve mild symptoms that occur after activity.  Surgery and bone trimming  To ease movement and reduce pain, your doctor may trim damaged bone. If arthritis is severe, the joint may be fused or removed. If the bone is not damaged too badly, your doctor may simply shave away bone spurs. Any excess bone growth related to a bunion may also be trimmed.  Fusing joints  If damage is more severe, your doctor may fuse the joint to prevent the bones from rubbing. Afterward, staples, plates, or screws may hold the bones in place so they heal properly. In some cases, the joint may be removed and replaced with an implant.  After surgery  During the early stages of recovery, your foot is likely to be bandaged and immobilized for a while. For best results, follow up with your doctor as scheduled. These  visits help ensure that your foot heals properly.  As you heal  After surgery, youll be told how to care for your incision and how soon to begin walking on the foot. Until the foot can bear weight, you may need to walk with crutches or a cane.  For surgery on the big toe, your foot may be splinted to limit movement for several weeks. Despite this, you should be able to walk soon after surgery.  For surgery on rear or midfoot joints, you may need to wear a cast or surgical shoe. These joints are fairly large, so full recovery may take a few months. Once the bone has healed, any staples, plates, or screws may be removed.  Date Last Reviewed: 7/1/2016 © 2000-2017 [employer]. [address] [place] [address], [place] [address]. All rights reserved. This information is not intended as a substitute for professional medical care. Always follow your healthcare professional's instructions.        Foot Surgery: Degenerative Joint Disease    Degenerative joint disease (arthritis) often happens in the joint of a big toe. This bone growth may cause pain and stiffness in the joint. Left untreated, arthritis can break down the cartilage and destroy the joint. Your treatment choices depend on how damaged your joint is. There are many nonsurgical treatments, but if these are not helpful, surgery may be considered.    Cheilectomy  This is done when the arthritic joint and cartilage can be saved. A bone spur caused by arthritis may be symptomatic on the top of the big toe joint. The procedure involves removing this bone spur, usually with a small part of the top of the joint itself.  You will need to wear a surgical shoe for several weeks. Once the foot heals, joint movement is restored.    Fusion  In fusion, the cartilage and some bone on both sides of the joint are removed. Then, the big toe and metatarsal bones are held together with staples, screws, or a plate and screws. Your foot may be placed in a cast. While you heal,  you will be asked not to bear weight on this foot. You may also need crutches for several weeks. Because the joint has been removed, your toe will be less flexible.    Arthroplasty  During surgery, bone growth caused by the arthritis is trimmed, and part of the joint is removed. A pin can be used to align the bones and to keep them from touching. The pin is removed after several weeks. In some cases, the entire joint may be replaced with an implant. You may have to wear a splint or a surgical shoe for several weeks. When healed, the bones become connected with scar tissue.  Date Last Reviewed: 10/15/2015  © 0205-0886 [employer]. [address] [place] [address], [place] [address]. All rights reserved. This information is not intended as a substitute for professional medical care. Always follow your healthcare professional's instructions.

## 2022-11-08 NOTE — PROGRESS NOTES
Subjective:      Patient ID: Estephania Rogers is a 57 y.o. female.    Chief Complaint: Foot Pain (left)      Estephania Rogers is a 57 y.o. female who presents to the podiatry clinic  with complaint of painful lump to the plantar lateral right foot pain, especially with palpation and ambulation in certain shoe gear . Description: mild and moderate Nature: aching and sharp Onset of the symptoms was  approx 2 weeks ago . Precipitating event:  use of a worn sandal .  History of injury: no Current symptoms include: swelling. Alleviating factors: icing and change in shoes with improvement Patient has had prior foot problems. Evaluation to date: none. . Patients rates pain 3/10 on pain scale.    09/28/2022 returns to clinic for a flare of plantar fascial pain.    11/08/2022 returns to clinic complaining of bilateral foot pain.  She relates pain began with right midfoot pain and then right 1st MTPJ pain which became so unbearable that when returned to [facility] last week.  She now has left plantar arch pain. Presents to clinic in [facility] boot right and Asics tennis shoe left    Patient believes she has full body inflammation that she would like assessed    Patient Active Problem List   Diagnosis    Recurrent nephrolithiasis    Other specified intestinal malabsorption    Iron deficiency anemia    B12 deficiency anemia    Anxiety    KRAUS (nonalcoholic steatohepatitis)    Hepatic fibrosis, stage 3    Gilbert's syndrome    Fatty liver    Right ankle pain    Right foot pain       Current Outpatient Medications on File Prior to Visit   Medication Sig Dispense Refill    amoxicillin-clavulanate 875-125mg (AUGMENTIN) 875-125 mg per tablet Take 1 tablet by mouth 2 (two) times daily. for 10 days 20 tablet 0    ascorbic acid, vitamin C, (VITAMIN C) 500 MG tablet Take 500 mg by mouth once daily.      cholecalciferol, vitamin D3, 50,000 unit Wafr Take 50,000 Units by mouth once daily.      FLUoxetine 20 MG capsule TAKE 1 CAPSULE  BY MOUTH EVERY DAY 90 capsule 3    gabapentin (NEURONTIN) 300 MG capsule Take 1 capsule (300 mg total) by mouth every evening. 90 capsule 1    multivitamin (THERAGRAN) per tablet Take 1 tablet by mouth once daily.      phytonadione, vit K1, (VITAMIN K1 MISC) 1,000 mg by Misc.(Non-Drug; Combo Route) route once daily.      RESTASIS 0.05 % ophthalmic emulsion   3    vitamin A 44818 UNIT capsule Take 25,000 Units by mouth once daily.      vitamin E acetate (VITAMIN E ORAL) Take 1 tablet by mouth once daily.      ALPRAZolam (XANAX) 0.25 MG tablet Take 1 tablet (0.25 mg total) by mouth daily as needed for Anxiety. 20 tablet 0     No current facility-administered medications on file prior to visit.       Review of patient's allergies indicates:  No Known Allergies    Past Surgical History:   Procedure Laterality Date    ANAL FISTULOTOMY  2005    APPENDECTOMY  2005    AUGMENTATION OF BREAST  2014    BREAST SURGERY  2014    CHOLECYSTECTOMY  2005    GASTRIC BYPASS  2005    SINUS SURGERY      Dr. Oral Cobb    SPINE SURGERY      TONSILLECTOMY      TOTAL REDUCTION MAMMOPLASTY  2014       Family History   Problem Relation Age of Onset    Cancer Father         pancreatic    Diabetes Father     Diabetes Mother     Hypertension Mother     Miscarriages / Stillbirths Mother     Stroke Mother     Diabetes Sister     Cancer Maternal Aunt         breast    Arthritis Maternal Grandmother     Arthritis Paternal Grandmother     Diabetes Sister     Breast cancer Paternal Aunt        Social History     Socioeconomic History    Marital status:    Tobacco Use    Smoking status: Never    Smokeless tobacco: Never    Tobacco comments:     Clerical work   Substance and Sexual Activity    Alcohol use: Yes    Drug use: No    Sexual activity: Yes     Partners: Male     Birth control/protection: None     Comment: :  Gianni     Social Determinants of Health     Financial Resource Strain: Unknown    Difficulty of Paying Living Expenses:  "Patient refused   Food Insecurity: Unknown    Worried About Running Out of Food in the Last Year: Patient refused    Ran Out of Food in the Last Year: Patient refused   Transportation Needs: Unknown    Lack of Transportation (Medical): Patient refused    Lack of Transportation (Non-Medical): Patient refused   Physical Activity: Unknown    Days of Exercise per Week: 0 days    Minutes of Exercise per Session: Patient refused   Stress: No Stress Concern Present    Feeling of Stress : Only a little   Social Connections: Unknown    Frequency of Communication with Friends and Family: Patient refused    Frequency of Social Gatherings with Friends and Family: Patient refused    Active Member of Clubs or Organizations: Patient refused    Attends Club or Organization Meetings: Patient refused    Marital Status: Patient refused   Housing Stability: Unknown    Unable to Pay for Housing in the Last Year: Patient refused    Unstable Housing in the Last Year: Patient refused       Review of Systems   Constitutional: Negative for chills and fever.   Cardiovascular:  Negative for claudication and leg swelling.   Respiratory:  Negative for cough and shortness of breath.    Skin:  Positive for dry skin and suspicious lesions. Negative for itching, nail changes and rash.   Musculoskeletal:  Positive for arthritis, joint pain, myalgias and stiffness. Negative for falls, joint swelling and muscle weakness.   Gastrointestinal:  Negative for diarrhea, nausea and vomiting.   Neurological:  Positive for paresthesias. Negative for numbness, tremors and weakness.   Psychiatric/Behavioral:  Negative for altered mental status and hallucinations. The patient is nervous/anxious.          Objective:      Vitals:    11/08/22 0737   Weight: 97.1 kg (214 lb 1.1 oz)   Height: 5' 11" (1.803 m)       Physical Exam  Nursing note reviewed.   Constitutional:       General: She is not in acute distress.     Appearance: She is not toxic-appearing or " diaphoretic.   Cardiovascular:      Pulses:           Dorsalis pedis pulses are 2+ on the right side and 2+ on the left side.        Posterior tibial pulses are 2+ on the right side and 2+ on the left side.   Pulmonary:      Effort: No respiratory distress.   Musculoskeletal:      Right ankle: No swelling. No tenderness. No lateral malleolus, medial malleolus, AITF ligament, CF ligament or posterior TF ligament tenderness. Decreased range of motion.      Right Achilles Tendon: No defects. Villegas's test negative.      Left ankle: No swelling. No tenderness. No lateral malleolus, medial malleolus, AITF ligament, CF ligament or posterior TF ligament tenderness. Decreased range of motion.      Left Achilles Tendon: No defects. Villegas's test negative.      Right foot: Swelling, tenderness and crepitus (midfoot and 1st MTPJ) present. No bony tenderness.      Left foot: Crepitus (midfoot) present. No bony tenderness.      Comments: Pain on palpation bilateral medial calcaneal tubercle at origin of plantar fascia and plantar medial arch. Pain to right 1st MTPJ with palpation and end ROM. There is equinus deformity bilateral with decreased dorsiflexion at the ankle joint bilateral. No tenderness with compression of heel. Negative tinels sign. Gait analysis reveals excessive pronation through midstance and propulsion with early heel off. Shoes reveals lateral heel counter wear bilateral     Decreased first MPJ range of motion both weightbearing and nonweightbearing, no crepitus observed the first MP joint, + dorsal flag sign. Mild  bunion deformity is observed .    Patient has hammertoes of digits 2-5 bilateral partially reducible      Skin:     General: Skin is warm and dry.      Coloration: Skin is not pale.      Findings: No bruising, burn or laceration.      Nails: There is no clubbing.      Comments: soft mobile mass on the plantar lateral left foot, the mass transilluminates with the pen light. No open wounds, no  rashes or maceration noted. Minimal pain of the soft tissue mass,  measuring approximately 2x3cm. No gross osseous deformities noted.      Neurological:      Sensory: No sensory deficit.      Motor: No tremor, atrophy or abnormal muscle tone.      Deep Tendon Reflexes: Reflexes are normal and symmetric.   Psychiatric:         Attention and Perception: She is attentive.         Mood and Affect: Mood is not anxious. Affect is not inappropriate.         Speech: She is communicative. Speech is not slurred.         Behavior: Behavior is not combative.             Assessment:       Encounter Diagnoses   Name Primary?    Foot pain, bilateral Yes    Foot swelling     Hallux limitus, acquired, right     Hallux limitus, acquired, left     Plantar fasciitis     Acquired equinus deformity of both feet            Plan:     Problem List Items Addressed This Visit    None  Visit Diagnoses       Foot pain, bilateral    -  Primary    Relevant Orders    X-Ray Foot Complete 3 view Bilateral    Ambulatory referral/consult to Rheumatology    Foot swelling        Relevant Orders    X-Ray Foot Complete 3 view Bilateral    Hallux limitus, acquired, right        Hallux limitus, acquired, left        Plantar fasciitis        Acquired equinus deformity of both feet               Orders Placed This Encounter    X-Ray Foot Complete 3 view Bilateral    Uric acid    Ambulatory referral/consult to Rheumatology    Ambulatory referral/consult to Physical/Occupational Therapy    BUPivacaine (PF) 0.5% (5 mg/mL) injection 5 mg    dexAMETHasone injection 2 mg    LIDOcaine (PF) 20 mg/ml (2%) injection 20 mg    triamcinolone acetonide injection 20 mg       I counseled the patient on her conditions, their implications and medical management.    Patient education on the chronic nature of arthralgias of the feet. Discussed non-surgical treatment options, including injection, supportive shoegear, inserts.       I did  the patient in detail regarding  surgical and conservative treatment measures for hallux limitus. I informed the  patient that the majority of pain is secondary to an arthritic joint with decreased joint spaces. Informed patient that outside of surgical intervention the main goal of therapy is to decreased the  range of motion at the first MPJ joint. This can be done so by utilizing either and extremely hard soled nonflexible shoe or forefoot rocker    Patient would like injection today. Skin was prepped with alcohol and anesthetized with ethyl chloride.  The following mixture was injected into right 1st MTPJ, dorsomedial approach: 3ccs of mixture of (1cc 1% plain Lidocaine : 1cc 0.5% Marcaine plain:  0.5cc kenalog-40 : 0.5cc dexamethasone) directly into problematic areas.  Patient  tolerated the injection well. Related nearly 100% relief following injection.     Discussed different treatment options for heel pain. I gave written and verbal instructions on stretching exercises. Patient expressed understanding. Discussed icing the affected area as needed and also wearing appropriate shoe gear and avoiding flats, slippers, sandals, and going barefoot. My recommendation for OTC supports is [facility]. Patient instructed on adequate icing techniques. Patient should ice the affected area at least once per day x 10 minutes for 10 days . I advised the patient that extra icing would also be beneficial to ensure adequate anti inflammatory effect. We also discussed cortisone injections and NSAID therapy.     Referral placed to PT to aid in increasing ROM and breaking up of scar tissue. Dry needling requested      Since the cyst is not interfering with daily function, the patient would like to continue monitoring it and will return to the clinic when patient wants intervention.  I provided reassurance.    Discussed the importance of well padded and supportive shoe gear to ease pressure and friction.  Advised patient to limiting movements or  activities that increase pain may bring relief.  Patient instructed on adequate icing techniques. Patient should ice the affected area at least once per day x 10 minutes for 10 days . I advised the  patient that extra icing would also be beneficial to ensure adequate anti inflammatory effect   I prescribed patient non steroidal anti inflammatory medication.  NSAID/Pain Cream will be sent to home. Patient will take this as directed with food, discontinue if GI upset occurs, and report to an emergency room immediately if thick black tarry stool or coffee ground appearing vomit is encountered.  These are signs of internal bleeding which can be life threatening.    - I discussed condition and treatment which includes conservative and surgical options for the mass to the foot.  All questions were answered.         Referral to rheumatology at patient request      RTC PRN

## 2022-11-09 ENCOUNTER — HOSPITAL ENCOUNTER (OUTPATIENT)
Dept: RADIOLOGY | Facility: HOSPITAL | Age: 57
Discharge: HOME | End: 2022-11-09
Attending: NURSE PRACTITIONER
Payer: COMMERCIAL

## 2022-11-09 DIAGNOSIS — K74.02 HEPATIC FIBROSIS, STAGE 3: ICD-10-CM

## 2022-11-09 DIAGNOSIS — K75.81 NASH (NONALCOHOLIC STEATOHEPATITIS): ICD-10-CM

## 2022-11-09 PROCEDURE — 76705 ECHO EXAM OF ABDOMEN: CPT | Mod: 26,,, | Performed by: RADIOLOGY

## 2022-11-09 PROCEDURE — 76705 US ABDOMEN LIMITED: ICD-10-PCS | Mod: 26,,, | Performed by: RADIOLOGY

## 2022-11-09 PROCEDURE — 76705 ECHO EXAM OF ABDOMEN: CPT | Mod: TC

## 2022-11-10 ENCOUNTER — PATIENT MESSAGE (OUTPATIENT)
Dept: HEMATOLOGY/ONCOLOGY | Facility: CLINIC | Age: 57
End: 2022-11-10
Payer: COMMERCIAL

## 2022-11-16 ENCOUNTER — OFFICE VISIT (OUTPATIENT)
Dept: HEPATOLOGY | Facility: CLINIC | Age: 57
End: 2022-11-16
Payer: COMMERCIAL

## 2022-11-16 ENCOUNTER — PROCEDURE VISIT (OUTPATIENT)
Dept: HEPATOLOGY | Facility: CLINIC | Age: 57
End: 2022-11-16
Payer: COMMERCIAL

## 2022-11-16 VITALS
TEMPERATURE: 98 F | BODY MASS INDEX: 29.32 KG/M2 | SYSTOLIC BLOOD PRESSURE: 152 MMHG | WEIGHT: 209.44 LBS | DIASTOLIC BLOOD PRESSURE: 66 MMHG | RESPIRATION RATE: 18 BRPM | HEART RATE: 56 BPM | HEIGHT: 71 IN | OXYGEN SATURATION: 98 %

## 2022-11-16 DIAGNOSIS — K75.81 NASH (NONALCOHOLIC STEATOHEPATITIS): ICD-10-CM

## 2022-11-16 DIAGNOSIS — D64.9 ANEMIA, UNSPECIFIED TYPE: ICD-10-CM

## 2022-11-16 DIAGNOSIS — K76.0 FATTY LIVER: ICD-10-CM

## 2022-11-16 DIAGNOSIS — K74.02 HEPATIC FIBROSIS, STAGE 3: Primary | ICD-10-CM

## 2022-11-16 DIAGNOSIS — D72.829 LEUKOCYTOSIS, UNSPECIFIED TYPE: ICD-10-CM

## 2022-11-16 PROCEDURE — 76981 USE PARENCHYMA: CPT | Mod: S$GLB,,, | Performed by: NURSE PRACTITIONER

## 2022-11-16 PROCEDURE — 99999 PR PBB SHADOW E&M-EST. PATIENT-LVL IV: ICD-10-PCS | Mod: PBBFAC,,, | Performed by: NURSE PRACTITIONER

## 2022-11-16 PROCEDURE — 99214 PR OFFICE/OUTPT VISIT, EST, LEVL IV, 30-39 MIN: ICD-10-PCS | Mod: S$GLB,,, | Performed by: NURSE PRACTITIONER

## 2022-11-16 PROCEDURE — 99214 OFFICE O/P EST MOD 30 MIN: CPT | Mod: S$GLB,,, | Performed by: NURSE PRACTITIONER

## 2022-11-16 PROCEDURE — 99999 PR PBB SHADOW E&M-EST. PATIENT-LVL IV: CPT | Mod: PBBFAC,,, | Performed by: NURSE PRACTITIONER

## 2022-11-16 PROCEDURE — 76981 FIBROSCAN (VIBRATION CONTROLLED TRANSIENT ELASTOGRAPHY): ICD-10-PCS | Mod: S$GLB,,, | Performed by: NURSE PRACTITIONER

## 2022-11-16 NOTE — PATIENT INSTRUCTIONS
Labs in a few weeks to repeat CBC    Continue lab and ultrasound monitoring every 6 months- next due in May        Your testing shows you have stage 3 scar tissue in the liver which is pre-cirrhosis.  Your liver is working well. We want to protect it and prevent further damage.  AVOID ALL alcohol (includes beer, wine and liquor)  AVOID non-steroidal anti-inflammatory drugs (NSAIDs) such as ibuprofen (Motrin, Advil), naproxen (Naprosyn, Aleve), meloxicam (Mobic)   Tylenol/acetaminophen is OKAY for pain, no more than 2000 mg per day  No raw shellfish due to infection risk.     Liver cancer screening (ultrasound and blood test) is needed every 6 months.  - Next due: May 2023

## 2022-11-16 NOTE — PROGRESS NOTES
[facility] - Established Patient     Last Clinic Visit: 9/17/21    Chief Complaint: Follow-up for KRAUS with hepatic fibrosis      HISTORY     This is a 57 y.o. female with PMH noted below, here for follow-up of KRAUS with advanced fibrosis (F3).    She was initially told that she had fatty liver ~25 years ago.     Her transaminases have been intermittently elevated since 2005.   AST>ALT, <100. TBili also mildly elevated since 2011. Liver enzymes have improved with weight loss.      Serological workup has been negative for other etiologies of liver disease. UGT1A1 testing confirms Gilbert's.      Her Fibroscan suggested advanced fibrosis (F3) so she underwent liver biopsy 11/12/20 to confirm.    FINAL PATHOLOGIC DIAGNOSIS  Mild macrovesicular steatosis with minimally active steatohepatitis and multifocal bridging fibrosis with focal nodule formation. The NAFLD activity score (HARIS) = 3 (1+1+1) with stage 3 fibrosis.     Not eligible for KRAUS clinical trials as HARIS score was only 3.    Health Maintenance:  -- HCC screening: [facility] 11/9/22 without focal hepatic lesion; AFP <2   -- Variceal screening: no EGD  -- Hepatitis A & B vaccination: complete    Interval history:  Notes ongoing fatigue. Still having problems with her foot. Notes generalized inflammation that has been going on for some time.     Denies symptoms of hepatic decompensation including jaundice, ascites, cognitive problems to suggest hepatic encephalopathy, or GI bleeding.     CBC with elevated WBC and low H/H / platelets.   Recently treated for a sinus infection; took antibiotics.  Last iron levels were normal; gets iron infusions [id number].     Updates on risk factors for fatty liver:     Weight -- Body mass index is 29.21 kg/m².  Weight continues to trend down; has lost ~15 lb total                Dyslipidemia -- well controlled last year                         Insulin resistance / diabetes -- last HgbA1c 5.0   Hypertension -- higher  today though previously well controlled             Past medical history, surgical history, problem list, family history, social history, allergies: Reviewed and updated in the appropriate section of the electronic medical record.  Pertinent findings:  Gastric bypass in 2005       Current Outpatient Medications   Medication Sig Dispense Refill    ascorbic acid, vitamin C, (VITAMIN C) 500 MG tablet Take 500 mg by mouth once daily.      cholecalciferol, vitamin D3, 50,000 unit Wafr Take 50,000 Units by mouth once daily.      FLUoxetine 20 MG capsule TAKE 1 CAPSULE BY MOUTH EVERY DAY 90 capsule 3    gabapentin (NEURONTIN) 300 MG capsule Take 1 capsule (300 mg total) by mouth every evening. 90 capsule 1    multivitamin (THERAGRAN) per tablet Take 1 tablet by mouth once daily.      phytonadione, vit K1, (VITAMIN K1 MISC) 1,000 mg by Misc.(Non-Drug; Combo Route) route once daily.      RESTASIS 0.05 % ophthalmic emulsion   3    vitamin A 10579 UNIT capsule Take 25,000 Units by mouth once daily.      vitamin E acetate (VITAMIN E ORAL) Take 1 tablet by mouth once daily.      ALPRAZolam (XANAX) 0.25 MG tablet Take 1 tablet (0.25 mg total) by mouth daily as needed for Anxiety. 20 tablet 0     Current Facility-Administered Medications   Medication Dose Route Frequency Provider Last Rate Last Admin    BUPivacaine (PF) 0.5% (5 mg/mL) injection 5 mg  1 mL Intra-articular Once Lise O. Christina, DPM        dexAMETHasone injection 2 mg  2 mg Intra-articular Once Lise O. Christina, DPM        LIDOcaine (PF) 20 mg/ml (2%) injection 20 mg  1 mL Other Once Lise O. Christina, DPM        triamcinolone acetonide injection 20 mg  20 mg Intra-articular Once Lise O. Christina, DPM         Medication list reviewed and updated.      Review of Systems   As per HPI      Physical Exam   Constitutional: Well-nourished. No distress. Alert and oriented.  Eyes: No scleral icterus.   Pulmonary/Chest: Respiratory effort normal. No respiratory distress.   Abdominal: No  distension, no ascites appreciated.   Extremities: No edema.   Neurological: No tremor or asterixis. Gait normal.  Skin: No jaundice. No spider telangiectasias or palmar erythema.  Psychiatric: Normal mood and affect. Speech, behavior, and thought content normal. No depression or anxiety noted.         LABS & DIAGNOSTIC STUDIES     I have personally reviewed pertinent laboratory findings:    Lab Results   Component Value Date    ALT 50 (H) 11/09/2022    AST 34 11/09/2022    ALKPHOS 133 11/09/2022    BILITOT 0.8 11/09/2022    ALBUMIN 4.1 11/09/2022    INR 1.0 11/09/2022       Lab Results   Component Value Date    WBC 14.85 (H) 11/09/2022    HGB 10.5 (L) 11/09/2022    HCT 32.5 (L) 11/09/2022    MCV 98 11/09/2022     (L) 11/09/2022       Lab Results   Component Value Date     11/09/2022    K 3.9 11/09/2022    BUN 12 11/09/2022    CREATININE 0.7 11/09/2022    ESTGFRAFRICA >60 06/21/2022    EGFRNONAA >60 06/21/2022       Lab Results   Component Value Date    SMOOTHMUSCAB Positive 1:40 (A) 10/08/2020    AMAIFA Negative 1:40 10/08/2020    IGGSERUM 1033 10/08/2020    ANASCREEN Negative <1:80 06/10/2022    FERRITIN 798 (H) 06/10/2022    FESATURATED 43 06/10/2022    KRVNM3RTXABI MM 12/01/2020    SQUFE8SUUSUI 134 12/01/2020    CERULOPLSM 26.0 06/10/2022    HEPBSAG Negative 10/08/2020    HEPBCAB Negative 10/08/2020    HEPCAB Negative 09/22/2020       Lab Results   Component Value Date    AFP <2.0 11/09/2022       I have personally reviewed the following result reports:  Abdominal US - 11/9/22  Liver biopsy - 11/12/20      ASSESSMENT & PLAN     57 y.o. female with:    1. KRAUS with advanced fibrosis (F3)   -- Liver enzymes previously improving with weight loss. Continue to monitor LFTs every 6 months  -- Continue HCC screening every 6 months with ultrasound and AFP, next due 5/2023  -- Defer EGD given F3, normal platelet count, no evidence of portal HTN.   -- Not eligible for KRAUS clinical trials given HARIS score  3    2. Body mass index is 29.21 kg/m².  -- Commended on weight loss success, encouraged to continue these efforts  -- Maintain control of blood pressure, cholesterol, and blood sugar as these are risk factors for fatty liver.    3. Leukocytosis, thrombocytopenia, anemia  -- Suspect due to recent sinus infection to some degree  -- Repeat CBC in 4 weeks        Orders Placed This Encounter   Procedures    US Abdomen Limited    CBC Without Differential    CBC Without Differential    Comprehensive Metabolic Panel    Protime-INR    AFP Tumor Marker       *See AVS for patient education and instructions.      F/u in 6 months with labs/US prior.       Thank you for allowing me to participate in the care of Estephania Kellirupali Cornelius, EDWARDP-C  Hepatology        Duration of encounter: 30 min  This includes face to face time and non-face to face time preparing to see the patient (eg, review of tests), obtaining and/or reviewing separately obtained history, documenting clinical information in the electronic or other health record, independently interpreting results and communicating results to the patient/family/caregiver, or care coordination.

## 2022-11-16 NOTE — PROCEDURES
FibroScan (Vibration Controlled Transient Elastography)    Date/Time: 11/16/2022 9:00 AM  Performed by: Keri Cornelius NP  Authorized by: Keri Cornelius NP     Diagnosis:  NAFLD    Probe:  M    Universal Protocol: Patient's identity, procedure and site were verified, confirmatory pause was performed.  Discussed procedure including risks and potential complications.  Questions answered.  Patient verbalizes understanding and wishes to proceed with VCTE.     Procedure: After providing explanations of the procedure, patient was placed in the supine position with right arm in maximum abduction to allow optimal exposure of right lateral abdomen.  Patient was briefly assessed, Testing was performed in the mid-axillary location, 50Hz Shear Wave pulses were applied and the resulting Shear Wave and Propagation Speed detected with a 3.5 MHz ultrasonic signal, using the FibroScan probe, Skin to liver capsule distance and liver parenchyma were accessed during the entire examination with the FibroScan probe, Patient was instructed to breathe normally and to abstain from sudden movements during the procedure, allowing for random measurements of liver stiffness. At least 10 Shear Waves were produced, Individual measurements of each Shear Wave were calculated.  Patient tolerated the procedure well with no complications.  Meets discharge criteria as was dismissed.  Rates pain 0 out of 10.  Patient will follow up with ordering provider to review results.    Findings  Median liver stiffness score:  12.2  CAP Reading: dB/m:  398    IQR/med %:  11  Interpretation  Fibrosis interpretation is based on medial liver stiffness - Kilopascal (kPa).    Fibrosis Stage:  F3  Steatosis interpretation is based on controlled attenuation parameter - (dB/m).    Steatosis Grade:  S3

## 2022-11-22 ENCOUNTER — PATIENT MESSAGE (OUTPATIENT)
Dept: HEMATOLOGY/ONCOLOGY | Facility: CLINIC | Age: 57
End: 2022-11-22
Payer: COMMERCIAL

## 2022-11-27 ENCOUNTER — PATIENT MESSAGE (OUTPATIENT)
Dept: FAMILY MEDICINE | Facility: CLINIC | Age: 57
End: 2022-11-27
Payer: COMMERCIAL

## 2022-12-08 ENCOUNTER — PATIENT MESSAGE (OUTPATIENT)
Dept: HEPATOLOGY | Facility: CLINIC | Age: 57
End: 2022-12-08
Payer: COMMERCIAL

## 2022-12-21 ENCOUNTER — LAB VISIT (OUTPATIENT)
Dept: LAB | Facility: HOSPITAL | Age: 57
End: 2022-12-21
Attending: FAMILY MEDICINE
Payer: COMMERCIAL

## 2022-12-21 DIAGNOSIS — K74.02 HEPATIC FIBROSIS, STAGE 3: ICD-10-CM

## 2022-12-21 LAB
ERYTHROCYTE [DISTWIDTH] IN BLOOD BY AUTOMATED COUNT: 12.3 % (ref 11.5–14.5)
HCT VFR BLD AUTO: 41.2 % (ref 37–48.5)
HGB BLD-MCNC: 12.6 G/DL (ref 12–16)
MCH RBC QN AUTO: 31.3 PG (ref 27–31)
MCHC RBC AUTO-ENTMCNC: 30.6 G/DL (ref 32–36)
MCV RBC AUTO: 102 FL (ref 82–98)
PLATELET # BLD AUTO: 208 K/UL (ref 150–450)
PMV BLD AUTO: 12.1 FL (ref 9.2–12.9)
RBC # BLD AUTO: 4.03 M/UL (ref 4–5.4)
WBC # BLD AUTO: 5.89 K/UL (ref 3.9–12.7)

## 2022-12-21 PROCEDURE — 36415 COLL VENOUS BLD VENIPUNCTURE: CPT | Mod: PO | Performed by: NURSE PRACTITIONER

## 2022-12-21 PROCEDURE — 85027 COMPLETE CBC AUTOMATED: CPT | Performed by: NURSE PRACTITIONER

## 2022-12-22 ENCOUNTER — PATIENT MESSAGE (OUTPATIENT)
Dept: ADMINISTRATIVE | Facility: HOSPITAL | Age: 57
End: 2022-12-22
Payer: COMMERCIAL

## 2022-12-22 ENCOUNTER — PATIENT MESSAGE (OUTPATIENT)
Dept: HEMATOLOGY/ONCOLOGY | Facility: CLINIC | Age: 57
End: 2022-12-22
Payer: COMMERCIAL

## 2022-12-28 ENCOUNTER — INFUSION (OUTPATIENT)
Dept: INFUSION THERAPY | Facility: HOSPITAL | Age: 57
End: 2022-12-28
Attending: INTERNAL MEDICINE
Payer: COMMERCIAL

## 2022-12-28 VITALS
OXYGEN SATURATION: 97 % | DIASTOLIC BLOOD PRESSURE: 73 MMHG | RESPIRATION RATE: 18 BRPM | TEMPERATURE: 98 F | SYSTOLIC BLOOD PRESSURE: 146 MMHG | HEART RATE: 55 BPM

## 2022-12-28 DIAGNOSIS — D50.9 IRON DEFICIENCY ANEMIA: Primary | ICD-10-CM

## 2022-12-28 PROCEDURE — 63600175 PHARM REV CODE 636 W HCPCS: Performed by: INTERNAL MEDICINE

## 2022-12-28 PROCEDURE — 96365 THER/PROPH/DIAG IV INF INIT: CPT

## 2022-12-28 PROCEDURE — 25000003 PHARM REV CODE 250: Performed by: INTERNAL MEDICINE

## 2022-12-28 RX ADMIN — IRON SUCROSE 200 MG: 20 INJECTION, SOLUTION INTRAVENOUS at 03:12

## 2022-12-28 NOTE — PLAN OF CARE
Patient arrived to unit for q 12 week Venofer IVPB infusion.No new or wrosening symptoms to report today. Plan of care reviewed, patient agreeable to plan. Patient tolerated infusion well.  VSS. Discharge instructions reviewed, patient instructed to return 3/22/23. Patient ambulated off unit unassisted. Patient in NAD at time of discharge.

## 2022-12-29 ENCOUNTER — DOCUMENTATION ONLY (OUTPATIENT)
Dept: HEMATOLOGY/ONCOLOGY | Facility: CLINIC | Age: 57
End: 2022-12-29
Payer: COMMERCIAL

## 2022-12-29 DIAGNOSIS — Z12.11 SCREENING FOR COLON CANCER: ICD-10-CM

## 2023-01-17 NOTE — PROGRESS NOTES
Subjective:       Patient ID: Estephania Rogers is a 57 y.o. female.    Chief Complaint: Other iron deficiency anemia    HPI Patient has not been seen in Hem/Onc clinic in 4 years. She has been receiving injectafer every 12 weeks at [facility].   Patient presents for follow up for iron defieincy anemia.     Overall she is doing well. She notes she is fatigued, but not as severe as when she was anemic.   Denies PICA and denies shortness of breath.   She does have intermittent leg cramping at night. She has not related this to her iron infusions.  She does have restless legs and that is worse right before she receives another iron infusion  Appetite is good. Bowel movements are good.         Per Dr. Andres's last note: In regards to her iron deficiency:  This is a 50-year-old female with iron deficiency anemia secondary to bariatric surgery about 7 years ago. In April 2010, she was found to be anemic with a hemoglobin of 9.8. She was placed on iron therapy at that time and repeat blood work in September revealed a ferritin of 5. At that time, she was referred for further evaluation of her iron deficiency. We initiated Ferrlecit infusions in the fall for a total of 8 weeks and she had a good response. She is here today for a follow up. GI evaluation including EGD and colonoscopy was negative.   She has a history of prior bariatric surgery.    Review of Systems   Constitutional:  Positive for fatigue. Negative for activity change, appetite change, chills, diaphoresis, fever and unexpected weight change.   HENT:  Negative for nosebleeds.    Eyes:  Negative for redness and visual disturbance.   Respiratory:  Negative for cough, chest tightness, shortness of breath and wheezing.    Cardiovascular:  Negative for chest pain, palpitations and leg swelling.   Gastrointestinal:  Negative for abdominal distention, abdominal pain, blood in stool, constipation, diarrhea, nausea and vomiting.        Chronic changes since  bypass   Genitourinary:  Negative for hematuria.   Skin:  Negative for color change and rash.   Neurological:  Negative for dizziness, weakness, light-headedness, numbness and headaches.   Hematological:  Negative for adenopathy. Does not bruise/bleed easily.   Psychiatric/Behavioral:  Negative for confusion.      Objective:      Physical Exam  Vitals and nursing note reviewed.   Constitutional:       General: She is not in acute distress.     Appearance: Normal appearance. She is well-developed.      Comments: Presents alone   HENT:      Head: Normocephalic.      Mouth/Throat:      Pharynx: No oropharyngeal exudate.   Eyes:      Pupils: Pupils are equal, round, and reactive to light.   Cardiovascular:      Rate and Rhythm: Normal rate and regular rhythm.      Heart sounds: Normal heart sounds.   Pulmonary:      Effort: Pulmonary effort is normal.      Breath sounds: Normal breath sounds.   Abdominal:      General: Bowel sounds are normal. There is no distension.      Palpations: Abdomen is soft.      Tenderness: There is no abdominal tenderness.   Musculoskeletal:      Cervical back: Normal range of motion.   Lymphadenopathy:      Head:      Right side of head: No preauricular adenopathy.      Left side of head: No preauricular adenopathy.      Cervical: No cervical adenopathy.      Upper Body:      Right upper body: No supraclavicular adenopathy.      Left upper body: No supraclavicular adenopathy.   Skin:     General: Skin is warm and dry.      Findings: No rash.   Neurological:      Mental Status: She is alert and oriented to person, place, and time.   Psychiatric:         Behavior: Behavior normal.     Labs- reviewed  Assessment:       1. Other iron deficiency anemia    2. Other vitamin B12 deficiency anemia        Plan:     1. Continue  q10 week venofer replacement  Opts for Thursday afternoons with treatment at [facility].  Will obtain labs today  Will try magnesium for leg cramps.   She is currently taking  50,000 units of Vitamin D, will draw labs today      Return to clinic in 6 months with MD appointment and labs.     Patient is in agreement with the proposed treatment plan. All questions were answered to the patient's satisfaction. Patient knows to call clinic for any new or worsening symptoms and if anything is needed before the next clinic visit.          Nova Capps, FNP-C  Hematology & Medical Oncology   [address] [place] [address]  ph. [phone]  Fax. [phone]    Collaborating physician, Dr. Andres.      Route Chart for Scheduling    Med Onc Chart Routing      Follow up with physician    Follow up with AIDEN 6 months.   Infusion scheduling note ferritin every 10 weeks at the [facility] last infusion 12/28   Injection scheduling note    Labs CBC, ferritin and iron and TIBC   Lab interval:  before she sees me in 6 months   Imaging    Pharmacy appointment    Other referrals          Therapy Plan Information  6. Flushes  heparin, porcine (PF) 100 unit/mL injection flush 500 Units  500 Units, Intravenous, PRN  Medications  iron sucrose (VENOFER) 200 mg in sodium chloride 0.9% 100 mL IVPB  200 mg, Intravenous, Every visit

## 2023-01-19 ENCOUNTER — PATIENT MESSAGE (OUTPATIENT)
Dept: HEMATOLOGY/ONCOLOGY | Facility: CLINIC | Age: 58
End: 2023-01-19
Payer: COMMERCIAL

## 2023-01-31 ENCOUNTER — LAB VISIT (OUTPATIENT)
Dept: LAB | Facility: HOSPITAL | Age: 58
End: 2023-01-31
Attending: NURSE PRACTITIONER
Payer: COMMERCIAL

## 2023-01-31 ENCOUNTER — OFFICE VISIT (OUTPATIENT)
Dept: HEMATOLOGY/ONCOLOGY | Facility: CLINIC | Age: 58
End: 2023-01-31
Payer: COMMERCIAL

## 2023-01-31 VITALS
HEIGHT: 71 IN | HEART RATE: 56 BPM | OXYGEN SATURATION: 96 % | BODY MASS INDEX: 29.85 KG/M2 | TEMPERATURE: 99 F | SYSTOLIC BLOOD PRESSURE: 136 MMHG | DIASTOLIC BLOOD PRESSURE: 70 MMHG | WEIGHT: 213.19 LBS | RESPIRATION RATE: 18 BRPM

## 2023-01-31 DIAGNOSIS — D51.8 OTHER VITAMIN B12 DEFICIENCY ANEMIA: ICD-10-CM

## 2023-01-31 DIAGNOSIS — E55.9 VITAMIN D DEFICIENCY: ICD-10-CM

## 2023-01-31 DIAGNOSIS — D50.8 OTHER IRON DEFICIENCY ANEMIA: Primary | ICD-10-CM

## 2023-01-31 DIAGNOSIS — D50.8 OTHER IRON DEFICIENCY ANEMIA: ICD-10-CM

## 2023-01-31 LAB
25(OH)D3+25(OH)D2 SERPL-MCNC: 30 NG/ML (ref 30–96)
ERYTHROCYTE [DISTWIDTH] IN BLOOD BY AUTOMATED COUNT: 12.2 % (ref 11.5–14.5)
FERRITIN SERPL-MCNC: 1089 NG/ML (ref 20–300)
HCT VFR BLD AUTO: 40.5 % (ref 37–48.5)
HGB BLD-MCNC: 13.1 G/DL (ref 12–16)
IMM GRANULOCYTES # BLD AUTO: 0.01 K/UL (ref 0–0.04)
IRON SERPL-MCNC: 156 UG/DL (ref 30–160)
MCH RBC QN AUTO: 31.7 PG (ref 27–31)
MCHC RBC AUTO-ENTMCNC: 32.3 G/DL (ref 32–36)
MCV RBC AUTO: 98 FL (ref 82–98)
NEUTROPHILS # BLD AUTO: 3.6 K/UL (ref 1.8–7.7)
PLATELET # BLD AUTO: 197 K/UL (ref 150–450)
PMV BLD AUTO: 10.9 FL (ref 9.2–12.9)
RBC # BLD AUTO: 4.13 M/UL (ref 4–5.4)
SATURATED IRON: 54 % (ref 20–50)
TOTAL IRON BINDING CAPACITY: 287 UG/DL (ref 250–450)
TRANSFERRIN SERPL-MCNC: 194 MG/DL (ref 200–375)
VIT B12 SERPL-MCNC: 766 PG/ML (ref 210–950)
WBC # BLD AUTO: 5.77 K/UL (ref 3.9–12.7)

## 2023-01-31 PROCEDURE — 99214 OFFICE O/P EST MOD 30 MIN: CPT | Mod: S$GLB,,, | Performed by: NURSE PRACTITIONER

## 2023-01-31 PROCEDURE — 82728 ASSAY OF FERRITIN: CPT | Performed by: NURSE PRACTITIONER

## 2023-01-31 PROCEDURE — 84466 ASSAY OF TRANSFERRIN: CPT | Performed by: NURSE PRACTITIONER

## 2023-01-31 PROCEDURE — 99999 PR PBB SHADOW E&M-EST. PATIENT-LVL IV: ICD-10-PCS | Mod: PBBFAC,,, | Performed by: NURSE PRACTITIONER

## 2023-01-31 PROCEDURE — 99999 PR PBB SHADOW E&M-EST. PATIENT-LVL IV: CPT | Mod: PBBFAC,,, | Performed by: NURSE PRACTITIONER

## 2023-01-31 PROCEDURE — 36415 COLL VENOUS BLD VENIPUNCTURE: CPT | Performed by: NURSE PRACTITIONER

## 2023-01-31 PROCEDURE — 99214 PR OFFICE/OUTPT VISIT, EST, LEVL IV, 30-39 MIN: ICD-10-PCS | Mod: S$GLB,,, | Performed by: NURSE PRACTITIONER

## 2023-01-31 PROCEDURE — 82306 VITAMIN D 25 HYDROXY: CPT | Performed by: NURSE PRACTITIONER

## 2023-01-31 PROCEDURE — 82607 VITAMIN B-12: CPT | Performed by: NURSE PRACTITIONER

## 2023-01-31 PROCEDURE — 85027 COMPLETE CBC AUTOMATED: CPT | Performed by: NURSE PRACTITIONER

## 2023-02-01 ENCOUNTER — CLINICAL SUPPORT (OUTPATIENT)
Dept: REHABILITATION | Facility: HOSPITAL | Age: 58
End: 2023-02-01
Attending: PODIATRIST
Payer: COMMERCIAL

## 2023-02-01 DIAGNOSIS — R26.9 IMPAIRED GAIT: ICD-10-CM

## 2023-02-01 DIAGNOSIS — M25.674 DECREASED ROM OF RIGHT FOOT: ICD-10-CM

## 2023-02-01 DIAGNOSIS — R29.898 DECREASED STRENGTH OF LOWER EXTREMITY: ICD-10-CM

## 2023-02-01 DIAGNOSIS — M79.671 FOOT PAIN, BILATERAL: ICD-10-CM

## 2023-02-01 DIAGNOSIS — M79.672 FOOT PAIN, BILATERAL: ICD-10-CM

## 2023-02-01 DIAGNOSIS — E55.9 VITAMIN D DEFICIENCY: Primary | ICD-10-CM

## 2023-02-01 PROCEDURE — 97110 THERAPEUTIC EXERCISES: CPT | Mod: PN

## 2023-02-01 PROCEDURE — 97161 PT EVAL LOW COMPLEX 20 MIN: CPT | Mod: PN

## 2023-02-01 RX ORDER — CHOLECALCIFEROL (VITAMIN D3) 1250 MCG
1250 TABLET ORAL
Qty: 12 TABLET | Refills: 3 | Status: SHIPPED | OUTPATIENT
Start: 2023-02-01 | End: 2024-01-08

## 2023-02-02 NOTE — PLAN OF CARE
[facility]   Physical Therapy Initial Evaluation     Date: 2/1/2023   Name: Estephania Rogers  Clinic Number: [id number]    Therapy Diagnosis:   Encounter Diagnoses   Name Primary?    Foot pain, bilateral     Decreased ROM of right foot     Decreased strength of lower extremity     Impaired gait      Physician: Lise Vasquez DPM    Physician Orders: PT Eval and Treat   Medical Diagnosis from Referral: M79.671,M79.672 (ICD-10-CM) - Foot pain, bilateral  Evaluation Date: 2/1/2023  Authorization Period Expiration: 12/31/2022  Plan of Care Expiration: 5/1/23  Progress Note Due: 3/1/23  Visit # / Visits authorized: 1/ 1   FOTO: 1/5    Precautions: Standard and anemia, anxiety, HTN     Time In: 4:20pm  Time Out: 5:15pm  Total Appointment Time (timed & untimed codes): 55 minutes      SUBJECTIVE     Date of onset: 6-8 months     History of current condition - Estephania reports: She has had issues off and on for years with plantar fasciitis in both feet. Over the last  6-8 months she has started to have some joint pain in her Right big toe. It was painful and stiff and she has an injection back in November. The doctor told her that she is walking and putting too much pressure on that side of her foot. She has been delayed due to a recent prostate cancer diagnosis for her . She has had some relief of joint pain since the injection. But she has pressure issues with friction when she is walking. She gets a little soreness/burning under neath the big toe. She tries to be more aware of wearing shoes that are appropriate. She has inserts at home and has been trying to baby it for a while. It is better but she wants to try and improve the way that she walks. She will try and wear bandaids to pad her foot. She use to have a callus that is no longer there but her foot is a little sensitive.     Falls: none    Imaging, x-ray per Dominick Audin FINDINGS:  Diffuse osteopenia.  No acute fracture or  "dislocation.  Mild scattered degenerative changes.  Achilles enthesopathy and plantar calcaneal spurs bilaterally.  There are calcifications within the Achilles tendons and plantar fascia bilaterally, likely secondary to chronic tendinopathy or partial tears.  Vascular calcifications are present.:     Prior Therapy: none  Social History: single story lives with their spouse  Occupation: desk job at the local [employer]  Prior Level of Function: independent  Current Level of Function: difficulty walking without pain,     Pain:  Current 1/10, worst 7/10, best 1/10   Location: right foot/feet   Description: Burning and raw sensitive feeling  Aggravating Factors: walking, standing, touching  Easing Factors: rest and getting off of her foot    Patients goals: "I would like to be able to move in a better way that does not cause any friction or pain when walking."     Medical History:   Past Medical History:   Diagnosis Date    Anemia     iron deficiency    Anxiety     Diabetes mellitus, type 2     Hypertension     Malabsorption     Migraine headache     Nephrolithiasis     Other specified intestinal malabsorption        Surgical History:   Estephania Rogers  has a past surgical history that includes Tonsillectomy; Spine surgery; Anal fistulotomy (2005); Gastric bypass (2005); Appendectomy (2005); Cholecystectomy (2005); Sinus surgery; Total Reduction Mammoplasty (2014); Breast surgery (2014); and Augmentation of breast (2014).    Medications:   Estephania has a current medication list which includes the following prescription(s): alprazolam, ascorbic acid (vitamin c), cholecalciferol (vitamin d3), fluoxetine, gabapentin, multivitamin, phytonadione (vit k1), restasis, vitamin a, and vitamin e acetate.    Allergies:   Review of patient's allergies indicates:  No Known Allergies       OBJECTIVE       Observation: redness noted at medial aspect of Right big toe    Range of Motion: Ankle    Left Right   Dorsiflexion: " "10 5   Plantarflexion 50 55   Inversion 30 30   Eversion 15 15     Strength: Ankle    Left Right   Gastrocnemius 4+/5 4+/5   Dorsiflexion 4+/5 4+/5   Inversion 4+/5 4+/5   Eversion 4+/5 4+/5     Strength: Knee   Left Right   Quadriceps 4+/5 4+/5   Hamstrings 4+/5 4+/5       Strength: Hip    Left Right   Iliopsoas 4+/5 4+/5   Glute Med 4-/5 4/5   IR 4/5 4+/5   ER 4/5 4+/5   Ext 4-/5 4/5     Special Tests  Functional squat: increase in pronation on Right as compared to Left   Single leg stance: contralateral hip drop when standing on Left     Joint Mobility: hypomobility of Right ankle with anterior to posterior glide for dorsiflexion     Palpation: tenderness around big toe  Sensation: intact to light touch    Edema:  Left: absent  Right: absent    Gait: Ken ambulated 50 feet with none.  Level of Assistance: independent  Patient displays increase in external rotation of right lower extremity with early pronation .   Balance: Maintains SLS 5 seconds with fair balance strategies.      Limitation/Restriction for FOTO Ankle Survey    Therapist reviewed FOTO scores for Estephania Rogers on 2/1/2023.   FOTO documents entered into [employer] - see Media section.    Limitation Score: 33%         TREATMENT     Total Treatment time (time-based codes) separate from Evaluation: 10 minutes      Estephania received the treatments listed below:      therapeutic exercises to develop strength, ROM, and flexibility for 10 minutes including:    Ankle dorsiflexion mobilization on chair 5" hold x20  Bridges 5" 2x10  Clams 2"hold 2x10 ea     PATIENT EDUCATION AND HOME EXERCISES     Education provided:   - Home exercise program  - Plan of Care     Written Home Exercises Provided: yes. Exercises were reviewed and Estephania was able to demonstrate them prior to the end of the session.  Estephania demonstrated good  understanding of the education provided. See EMR under Patient Instructions for exercises provided during therapy " sessions.    ASSESSMENT     Estephania is a 57 y.o. female referred to outpatient Physical Therapy with a medical diagnosis of Foot pain, bilateral. Patient presents with redness localized to medial aspect of Right big toe. She displays hip drop with stance on left lower extremity indicating hip weakness. Patient also has excess in external rotation and pronation  of right lower extremity. Decrease in talocrural mobility with dorsiflexion limiting gait. Patient currently has most pain when walking and  putting pressure on the inside of her foot.     Patient prognosis is Good.   Patient will benefit from skilled outpatient Physical Therapy to address the deficits stated above and in the chart below, provide patient /family education, and to maximize patientt's level of independence.     Plan of care discussed with patient: Yes  Patient's spiritual, cultural and educational needs considered and patient is agreeable to the plan of care and goals as stated below:     Anticipated Barriers for therapy: chronicity of condition     Medical Necessity is demonstrated by the following  History  Co-morbidities and personal factors that may impact the plan of care Co-morbidities:   Anemia    iron deficiency   Anxiety    Diabetes mellitus, type 2    Hypertension    Malabsorption    Migraine headache    Nephrolithiasis    Other specified intestinal malabsorption        Personal Factors:   age  social background     moderate   Examination  Body Structures and Functions, activity limitations and participation restrictions that may impact the plan of care Body Regions:   lower extremities    Body Systems:    gross symmetry  ROM  strength  gross coordinated movement  balance  gait  transfers  transitions  motor control  motor learning    Participation Restrictions:   Walking or standing for prolonged periods of time    Activity limitations:   Learning and applying knowledge  no deficits    General Tasks and Commands  no  deficits    Communication  no deficits    Mobility  walking    Self care  no deficits    Domestic Life  shopping  cooking  doing house work (cleaning house, washing dishes, laundry)  assisting others    Interactions/Relationships  no deficits    Life Areas  no deficits    Community and Social Life  community life  recreation and leisure         moderate   Clinical Presentation stable and uncomplicated low   Decision Making/ Complexity Score: low         Short Term GOALS: 5 weeks. Pt agrees with goals set.  1. Patient demonstrates independence with HEP.   2. Patient demonstrates independence with Postural Awareness.   3. Patient demonstrates ability to perform light activities around her house with no difficulty to improve tolerance to functional tasks pain free.   4. Patient demonstrates increased Right dorsiflexion  to 10 to improve tolerance to functional activities pain free.    5. Patient demonstrates ability to walk between rooms with no difficulty to improve tolerance to functional tasks pain free.     Long Term GOALS: 10 weeks. Pt agrees with goals set.  1.Patient demonstrates ability to walk several blocks with no difficulty to improve tolerance to functional tasks pain free.  2. Patient demonstrates increased strength BLE's to 4+/5 or greater to improve tolerance to functional activities pain free.   3. Patient demonstrates improved overall function per FOTO Ankle Survey to 29% Limitation or less.       PLAN   Plan of care Certification: 2/1/2023 to 5/1/23.    Outpatient Physical Therapy 1-2 times weekly for 10 weeks to include the following interventions: Gait Training, Manual Therapy, Neuromuscular Re-ed, Patient Education, Therapeutic Activities, and Therapeutic Exercise.     Funmilayo Carlisle, PT,DPT  02/01/2023        I CERTIFY THE NEED FOR THESE SERVICES FURNISHED UNDER THIS PLAN OF TREATMENT AND WHILE UNDER MY CARE   Physician's comments:     Physician's Signature:  ___________________________________________________

## 2023-02-06 ENCOUNTER — PATIENT MESSAGE (OUTPATIENT)
Dept: HEMATOLOGY/ONCOLOGY | Facility: CLINIC | Age: 58
End: 2023-02-06
Payer: COMMERCIAL

## 2023-02-08 ENCOUNTER — CLINICAL SUPPORT (OUTPATIENT)
Dept: REHABILITATION | Facility: HOSPITAL | Age: 58
End: 2023-02-08
Payer: COMMERCIAL

## 2023-02-08 DIAGNOSIS — R29.898 DECREASED STRENGTH OF LOWER EXTREMITY: ICD-10-CM

## 2023-02-08 DIAGNOSIS — M25.674 DECREASED ROM OF RIGHT FOOT: Primary | ICD-10-CM

## 2023-02-08 DIAGNOSIS — R26.9 IMPAIRED GAIT: ICD-10-CM

## 2023-02-08 PROCEDURE — 97110 THERAPEUTIC EXERCISES: CPT | Mod: PN

## 2023-02-08 PROCEDURE — 97112 NEUROMUSCULAR REEDUCATION: CPT | Mod: PN

## 2023-02-08 PROCEDURE — 97140 MANUAL THERAPY 1/> REGIONS: CPT | Mod: PN

## 2023-02-08 NOTE — PROGRESS NOTES
[facility]   Physical Therapy Treatment Note     Name: Estephania Rogers  Clinic Number: [id number]    Therapy Diagnosis:   Encounter Diagnoses   Name Primary?    Decreased ROM of right foot Yes    Decreased strength of lower extremity     Impaired gait      Physician: No ref. provider found    Visit Date: 2/8/2023       Physician Orders: PT Eval and Treat   Medical Diagnosis from Referral: M79.671,M79.672 (ICD-10-CM) - Foot pain, bilateral  Evaluation Date: 2/1/2023  Authorization Period Expiration: 12/31/2022  Plan of Care Expiration: 5/1/23  Progress Note Due: 3/1/23  Visit # / Visits authorized: 2/?   FOTO: 2/5     Precautions: Standard and anemia, anxiety, HTN      Time In: 5:29pm  Time Out: 6:29pm  Total Appointment Time (timed & untimed codes): 60 minutes (1MT, 2TE, 1NMR)    SUBJECTIVE     Pt reports: pt reports that she is feeling pretty good today. No real pain but feels like the exercies she was given are working.   She was compliant with home exercise program.  Response to previous treatment: last was evaluation  Functional change: feeling better     Pain: 0/10  Location: right ankles     OBJECTIVE     Objective Measures updated at progress report unless specified.     Treatment     Estephania received the treatments listed below:      Estephania received the following manual therapy techniques: Joint mobilizations were applied to the: right ankle/foot for 15 minutes, including:  TC AP mobilization  Subtalar side lying mobilization   Great toe distraction and mobilization with movement into flexion and extension     Estephania participated in neuromuscular re-education activities to improve: Proprioception and Posture for 20 minutes. The following activities were included:  Toe curl c plantarflexion green theraband 2x20  Double leg heel raise c ball between heels to encouraged proper motion over toes during ambulation 2x20      Estephania received therapeutic exercises to develop  strength, endurance, ROM, and flexibility for 25 minutes including:  TC mobilization on step stool 2x20   Sneakly lunge bilateral 3x10  Single leg RDL 3x10 bilateral   Sidelying clamshell red theraband 3x10 bilateral         Patient Education and Home Exercises     Home Exercises Provided and Patient Education Provided     Education provided:   - Pt educated on POC  - Pt educated on anatomy and physiology of current conditions as it relates to signs and symptoms  - Pt educated on HEP     Written Home Exercises Provided: Patient instructed to cont prior HEP. Exercises were reviewed and Estephania was able to demonstrate them prior to the end of the session.  Estephania demonstrated good  understanding of the education provided. See EMR under Patient Instructions for exercises provided during therapy sessions    ASSESSMENT     Estephania presents to PT today with no discomfort but continues to demonstrate abnormal gait mechanics including improper distribution of weight on forefoot during push off phase of gait. PT focused today on both intrinsic and extrinsic foot strengthening. Possible addition of further foot intrinsic strengthening at upcoming visits.     Estephania Is progressing well towards her goals.   Pt prognosis is Good.     Pt will continue to benefit from skilled outpatient physical therapy to address the deficits listed in the problem list box on initial evaluation, provide pt/family education and to maximize pt's level of independence in the home and community environment.     Pt's spiritual, cultural and educational needs considered and pt agreeable to plan of care and goals.     Anticipated barriers to physical therapy: chronicity of condition     Goals:   Short Term GOALS: 5 weeks. Pt agrees with goals set.  1. Patient demonstrates independence with HEP.   2. Patient demonstrates independence with Postural Awareness.   3. Patient demonstrates ability to perform light activities around her house with no  difficulty to improve tolerance to functional tasks pain free.   4. Patient demonstrates increased Right dorsiflexion  to 10 to improve tolerance to functional activities pain free.    5. Patient demonstrates ability to walk between rooms with no difficulty to improve tolerance to functional tasks pain free.      Long Term GOALS: 10 weeks. Pt agrees with goals set.  1.Patient demonstrates ability to walk several blocks with no difficulty to improve tolerance to functional tasks pain free.  2. Patient demonstrates increased strength BLE's to 4+/5 or greater to improve tolerance to functional activities pain free.   3. Patient demonstrates improved overall function per FOTO Ankle Survey to 29% Limitation or less.     PLAN     Continue with plan of care as indicated above    Brea Andre, PT, DPT, OCS

## 2023-02-14 NOTE — PROGRESS NOTES
[facility]   Physical Therapy Treatment Note     Name: Estephania Rogers  Clinic Number: [id number]    Therapy Diagnosis:   Encounter Diagnoses   Name Primary?    Decreased ROM of right foot Yes    Decreased strength of lower extremity     Impaired gait        Physician: No ref. provider found    Visit Date: 2/15/2023       Physician Orders: PT Eval and Treat   Medical Diagnosis from Referral: M79.671,M79.672 (ICD-10-CM) - Foot pain, bilateral  Evaluation Date: 2/1/2023  Authorization Period Expiration: 12/31/2022  Plan of Care Expiration: 5/1/23  Progress Note Due: 3/1/23  Visit # / Visits authorized: 2/?   FOTO: 2/5     Precautions: Standard and anemia, anxiety, HTN      Time In: 5:25pm  Time Out: 6:20pm  Total Appointment Time (timed & untimed codes): 55 minutes (1MT, 2TE, 1NMR)    SUBJECTIVE     Pt reports: The foot has been feeling pretty good. She started to have some pain last night. She wore a new shoe yesterday and felt some tightness.   She was compliant with home exercise program.  Response to previous treatment: last was evaluation  Functional change: feeling better     Pain: 0/10  Location: right ankles     OBJECTIVE     Objective Measures updated at progress report unless specified.     Treatment     Estephania received the treatments listed below:      Estephania received the following manual therapy techniques: Joint mobilizations were applied to the: right ankle/foot for 10 minutes, including:  TC AP mobilization  Subtalar side lying mobilization   Great toe distraction and mobilization with movement into flexion and extension     Estephania participated in neuromuscular re-education activities to improve: Proprioception and Posture for 20 minutes. The following activities were included:  Toe curl c plantarflexion green theraband 2x20  Double leg heel raise c ball between heels to encouraged proper motion over toes during ambulation 2x20  +arch doming 2x20  +arch doming with  bridges x20    Estephania received therapeutic exercises to develop strength, endurance, ROM, and flexibility for 25 minutes including:  TC mobilization on step stool 2x20   Sneakly lunge bilateral 3x10  Single leg RDL 3x10 bilateral   Sidelying clamshell red theraband 3x10 bilateral         Patient Education and Home Exercises     Home Exercises Provided and Patient Education Provided     Education provided:   - Pt educated on POC  - Pt educated on anatomy and physiology of current conditions as it relates to signs and symptoms  - Pt educated on HEP     Written Home Exercises Provided: Patient instructed to cont prior HEP. Exercises were reviewed and Estephania was able to demonstrate them prior to the end of the session.  Estephania demonstrated good  understanding of the education provided. See EMR under Patient Instructions for exercises provided during therapy sessions    ASSESSMENT     Estephania arrived to today's session without pain but with redness around 1st MET. [place] noted with great toe distraction for improvement in mobility with flexion and extension. PT added arch doming for improvement in foot intrinsics and doming with bridges for functional foot intrinsic strengthening.     Estephania Is progressing well towards her goals.   Pt prognosis is Good.     Pt will continue to benefit from skilled outpatient physical therapy to address the deficits listed in the problem list box on initial evaluation, provide pt/family education and to maximize pt's level of independence in the home and community environment.     Pt's spiritual, cultural and educational needs considered and pt agreeable to plan of care and goals.     Anticipated barriers to physical therapy: chronicity of condition     Goals:   Short Term GOALS: 5 weeks. Pt agrees with goals set.  1. Patient demonstrates independence with HEP.   2. Patient demonstrates independence with Postural Awareness.   3. Patient demonstrates ability to perform light  activities around her house with no difficulty to improve tolerance to functional tasks pain free.   4. Patient demonstrates increased Right dorsiflexion  to 10 to improve tolerance to functional activities pain free.    5. Patient demonstrates ability to walk between rooms with no difficulty to improve tolerance to functional tasks pain free.      Long Term GOALS: 10 weeks. Pt agrees with goals set.  1.Patient demonstrates ability to walk several blocks with no difficulty to improve tolerance to functional tasks pain free.  2. Patient demonstrates increased strength BLE's to 4+/5 or greater to improve tolerance to functional activities pain free.   3. Patient demonstrates improved overall function per FOTO Ankle Survey to 29% Limitation or less.     PLAN     Continue with plan of care as indicated above    Funmilayo Carlisle, PT, DPT  02/15/2023

## 2023-02-15 ENCOUNTER — CLINICAL SUPPORT (OUTPATIENT)
Dept: REHABILITATION | Facility: HOSPITAL | Age: 58
End: 2023-02-15
Payer: COMMERCIAL

## 2023-02-15 DIAGNOSIS — R29.898 DECREASED STRENGTH OF LOWER EXTREMITY: ICD-10-CM

## 2023-02-15 DIAGNOSIS — M25.674 DECREASED ROM OF RIGHT FOOT: Primary | ICD-10-CM

## 2023-02-15 DIAGNOSIS — R26.9 IMPAIRED GAIT: ICD-10-CM

## 2023-02-15 PROCEDURE — 97110 THERAPEUTIC EXERCISES: CPT | Mod: PN

## 2023-02-15 PROCEDURE — 97112 NEUROMUSCULAR REEDUCATION: CPT | Mod: PN

## 2023-02-15 PROCEDURE — 97140 MANUAL THERAPY 1/> REGIONS: CPT | Mod: PN

## 2023-03-01 ENCOUNTER — CLINICAL SUPPORT (OUTPATIENT)
Dept: REHABILITATION | Facility: HOSPITAL | Age: 58
End: 2023-03-01
Payer: COMMERCIAL

## 2023-03-01 DIAGNOSIS — R26.9 IMPAIRED GAIT: ICD-10-CM

## 2023-03-01 DIAGNOSIS — R29.898 DECREASED STRENGTH OF LOWER EXTREMITY: ICD-10-CM

## 2023-03-01 DIAGNOSIS — M25.674 DECREASED ROM OF RIGHT FOOT: Primary | ICD-10-CM

## 2023-03-01 PROCEDURE — 97110 THERAPEUTIC EXERCISES: CPT | Mod: PN,CQ

## 2023-03-01 PROCEDURE — 97112 NEUROMUSCULAR REEDUCATION: CPT | Mod: PN,CQ

## 2023-03-01 NOTE — PROGRESS NOTES
"[facility]   Physical Therapy Treatment Note     Name: Estephania Rogers  Clinic Number: [id number]    Therapy Diagnosis:   Encounter Diagnoses   Name Primary?    Decreased ROM of right foot Yes    Decreased strength of lower extremity     Impaired gait          Physician: No ref. provider found    Visit Date: 3/1/2023       Physician Orders: PT Eval and Treat   Medical Diagnosis from Referral: M79.671,M79.672 (ICD-10-CM) - Foot pain, bilateral  Evaluation Date: 2/1/2023  Authorization Period Expiration: 12/31/2022  Plan of Care Expiration: 5/1/23  Progress Note Due: 3/1/23  Visit # / Visits authorized: 3/?   FOTO: 2/5     Precautions: Standard and anemia, anxiety, HTN      Time In: 5:25  Time Out: 6:05  Total Appointment Time (timed & untimed codes): 40 minutes    SUBJECTIVE     Pt reports: "I have really been feel good since the shot."  She was compliant with home exercise program.  Response to previous treatment: last was evaluation  Functional change: feeling better     Pain: 0/10  Location: right ankles     OBJECTIVE     Objective Measures updated at progress report unless specified.     Treatment     Estephania received the treatments listed below:      Estephania received the following manual therapy techniques: Joint mobilizations were applied to the: right ankle/foot for 00 minutes, including:  TC AP mobilization  Subtalar side lying mobilization   Great toe distraction and mobilization with movement into flexion and extension     Estephania participated in neuromuscular re-education activities to improve: Proprioception and Posture for 25 minutes. The following activities were included:  Toe curl c plantarflexion green theraband 2x20  Double leg heel raise c ball between heels to encouraged proper motion over toes during ambulation 2x20  arch doming 2x20  arch doming with bridges x20  + SLS on airex 3x30"  +marble    Toe yoga     Estephania received therapeutic exercises to " develop strength, endurance, ROM, and flexibility for 15 minutes including:  TC mobilization on step stool 2x20   Sneakly lunge bilateral 3x10  Single leg RDL 3x10 bilateral   Sidelying clamshell red theraband 3x10 bilateral       Patient Education and Home Exercises     Home Exercises Provided and Patient Education Provided     Education provided:   - Pt educated on POC  - Pt educated on anatomy and physiology of current conditions as it relates to signs and symptoms  - Pt educated on HEP     Written Home Exercises Provided: Patient instructed to cont prior HEP. Exercises were reviewed and Estephania was able to demonstrate them prior to the end of the session.  Estephania demonstrated good  understanding of the education provided. See EMR under Patient Instructions for exercises provided during therapy sessions    ASSESSMENT     Estephania presents with no major complaints of foot pain, however has beginnings of a migraine. Patient continued with exercises to increase ankle stability and strength of arch. Exercises added to increase intrinsic foot strength with good tolerance noted. Patient has no onset of pain or fatigue this session. Plan to progress strength and balance activities as tolerated next visit.     Estephania Is progressing well towards her goals.   Pt prognosis is Good.     Pt will continue to benefit from skilled outpatient physical therapy to address the deficits listed in the problem list box on initial evaluation, provide pt/family education and to maximize pt's level of independence in the home and community environment.     Pt's spiritual, cultural and educational needs considered and pt agreeable to plan of care and goals.     Anticipated barriers to physical therapy: chronicity of condition     Goals:   Short Term GOALS: 5 weeks. Pt agrees with goals set.  1. Patient demonstrates independence with HEP.   2. Patient demonstrates independence with Postural Awareness.   3. Patient demonstrates ability  to perform light activities around her house with no difficulty to improve tolerance to functional tasks pain free.   4. Patient demonstrates increased Right dorsiflexion  to 10 to improve tolerance to functional activities pain free.    5. Patient demonstrates ability to walk between rooms with no difficulty to improve tolerance to functional tasks pain free.      Long Term GOALS: 10 weeks. Pt agrees with goals set.  1.Patient demonstrates ability to walk several blocks with no difficulty to improve tolerance to functional tasks pain free.  2. Patient demonstrates increased strength BLE's to 4+/5 or greater to improve tolerance to functional activities pain free.   3. Patient demonstrates improved overall function per FOTO Ankle Survey to 29% Limitation or less.     PLAN     Continue with plan of care as indicated above    Yesi James, PTA  03/01/2023

## 2023-03-06 ENCOUNTER — OFFICE VISIT (OUTPATIENT)
Dept: FAMILY MEDICINE | Facility: CLINIC | Age: 58
End: 2023-03-06
Payer: COMMERCIAL

## 2023-03-06 VITALS
DIASTOLIC BLOOD PRESSURE: 74 MMHG | SYSTOLIC BLOOD PRESSURE: 134 MMHG | HEART RATE: 59 BPM | HEIGHT: 71 IN | BODY MASS INDEX: 30.17 KG/M2 | WEIGHT: 215.5 LBS | OXYGEN SATURATION: 96 % | TEMPERATURE: 99 F | RESPIRATION RATE: 18 BRPM

## 2023-03-06 DIAGNOSIS — G43.011 INTRACTABLE MIGRAINE WITHOUT AURA AND WITH STATUS MIGRAINOSUS: Primary | ICD-10-CM

## 2023-03-06 DIAGNOSIS — K74.02 HEPATIC FIBROSIS, STAGE 3: ICD-10-CM

## 2023-03-06 PROCEDURE — 96372 THER/PROPH/DIAG INJ SC/IM: CPT | Mod: S$GLB,,, | Performed by: FAMILY MEDICINE

## 2023-03-06 PROCEDURE — 99999 PR PBB SHADOW E&M-EST. PATIENT-LVL V: CPT | Mod: PBBFAC,,, | Performed by: FAMILY MEDICINE

## 2023-03-06 PROCEDURE — 99214 PR OFFICE/OUTPT VISIT, EST, LEVL IV, 30-39 MIN: ICD-10-PCS | Mod: 25,S$GLB,, | Performed by: FAMILY MEDICINE

## 2023-03-06 PROCEDURE — 99999 PR PBB SHADOW E&M-EST. PATIENT-LVL V: ICD-10-PCS | Mod: PBBFAC,,, | Performed by: FAMILY MEDICINE

## 2023-03-06 PROCEDURE — 96372 PR INJECTION,THERAP/PROPH/DIAG2ST, IM OR SUBCUT: ICD-10-PCS | Mod: S$GLB,,, | Performed by: FAMILY MEDICINE

## 2023-03-06 PROCEDURE — 99214 OFFICE O/P EST MOD 30 MIN: CPT | Mod: 25,S$GLB,, | Performed by: FAMILY MEDICINE

## 2023-03-06 RX ORDER — KETOROLAC TROMETHAMINE 30 MG/ML
30 INJECTION, SOLUTION INTRAMUSCULAR; INTRAVENOUS
Status: COMPLETED | OUTPATIENT
Start: 2023-03-06 | End: 2023-03-06

## 2023-03-06 RX ADMIN — KETOROLAC TROMETHAMINE 30 MG: 30 INJECTION, SOLUTION INTRAMUSCULAR; INTRAVENOUS at 01:03

## 2023-03-06 NOTE — PROGRESS NOTES
"Routine Office Visit    Patient Name: Estephania Rogers    : 1965  MRN: [id number]    Subjective:  Estephania is a 57 y.o. female who presents today for:    1. Migraine headache  Patient presenting today for migraine headache that has been going on since last week.  She has Ubrelvy that is prescribed by neurology and states it has been keeping the headache from worsening.  No auras and no weakness or slurred speech.  She has KRAUS, so tries to avoid taking ibuprofen on a regular basis.  Her last dose of Ubrelvy was yesterday morning.  She has been tried on Maxalt in the past and states it started to make her feel "off" when she took it, so was changed to Ubrelvy.  She states she gets migraines in waves.  She will get a them  every month and then go months without any.  She states she was just seen by neurolgoy and hepatology.  She is scheduled to see neurology in about 6 months    Past Medical History  Past Medical History:   Diagnosis Date    Anemia     iron deficiency    Anxiety     Diabetes mellitus, type 2     Hypertension     Malabsorption     Migraine headache     Nephrolithiasis     Other specified intestinal malabsorption        Past Surgical History  Past Surgical History:   Procedure Laterality Date    ANAL FISTULOTOMY  2005    APPENDECTOMY  2005    AUGMENTATION OF BREAST  2014    BREAST SURGERY      CHOLECYSTECTOMY  2005    GASTRIC BYPASS  2005    SINUS SURGERY      Dr. Oral Cobb    SPINE SURGERY      TONSILLECTOMY      TOTAL REDUCTION MAMMOPLASTY  2014       Family History  Family History   Problem Relation Age of Onset    Cancer Father         pancreatic    Diabetes Father     Diabetes Mother     Hypertension Mother     Miscarriages / Stillbirths Mother     Stroke Mother     Diabetes Sister     Cancer Maternal Aunt         breast    Arthritis Maternal Grandmother     Arthritis Paternal Grandmother     Diabetes Sister     Breast cancer Paternal Aunt        Social History  Social History "     Socioeconomic History    Marital status:     Number of children: 0   Tobacco Use    Smoking status: Never     Passive exposure: Never    Smokeless tobacco: Never    Tobacco comments:     Clerical work   Substance and Sexual Activity    Alcohol use: Yes     Alcohol/week: 1.0 standard drink     Types: 1 Glasses of wine per week    Drug use: No    Sexual activity: Yes     Partners: Male     Birth control/protection: None     Comment: :  Gianni     Social Determinants of Health     Financial Resource Strain: Unknown    Difficulty of Paying Living Expenses: Patient refused   Food Insecurity: Unknown    Worried About Running Out of Food in the Last Year: Patient refused    Ran Out of Food in the Last Year: Patient refused   Transportation Needs: Unknown    Lack of Transportation (Medical): Patient refused    Lack of Transportation (Non-Medical): Patient refused   Physical Activity: Unknown    Days of Exercise per Week: 0 days    Minutes of Exercise per Session: Patient refused   Stress: No Stress Concern Present    Feeling of Stress : Only a little   Social Connections: Unknown    Frequency of Communication with Friends and Family: Patient refused    Frequency of Social Gatherings with Friends and Family: Patient refused    Active Member of Clubs or Organizations: Patient refused    Attends Club or Organization Meetings: Patient refused    Marital Status: Patient refused   Housing Stability: Unknown    Unable to Pay for Housing in the Last Year: Patient refused    Unstable Housing in the Last Year: Patient refused       Current Medications  Current Outpatient Medications on File Prior to Visit   Medication Sig Dispense Refill    ascorbic acid, vitamin C, (VITAMIN C) 500 MG tablet Take 500 mg by mouth once daily.      cholecalciferol, vitamin D3, 1,250 mcg (50,000 unit) Tab Take 1,250 mcg by mouth every 7 days. 12 tablet 3    FLUoxetine 20 MG capsule TAKE 1 CAPSULE BY MOUTH EVERY DAY 90 capsule 3     "gabapentin (NEURONTIN) 300 MG capsule Take 1 capsule (300 mg total) by mouth every evening. 90 capsule 1    multivitamin (THERAGRAN) per tablet Take 1 tablet by mouth once daily.      phytonadione, vit K1, (VITAMIN K1 MISC) 1,000 mg by Misc.(Non-Drug; Combo Route) route once daily.      RESTASIS 0.05 % ophthalmic emulsion   3    vitamin A 46440 UNIT capsule Take 25,000 Units by mouth once daily.      vitamin E acetate (VITAMIN E ORAL) Take 1 tablet by mouth once daily.      ALPRAZolam (XANAX) 0.25 MG tablet Take 1 tablet (0.25 mg total) by mouth daily as needed for Anxiety. 20 tablet 0     No current facility-administered medications on file prior to visit.       Allergies   Review of patient's allergies indicates:  No Known Allergies    Review of Systems (Pertinent positives)  .ros      /74   Pulse (!) 59   Temp 98.9 °F (37.2 °C) (Oral)   Resp 18   Ht 5' 11" (1.803 m)   Wt 97.7 kg (215 lb 8 oz)   LMP 11/16/2014   SpO2 96%   BMI 30.06 kg/m²     GENERAL APPEARANCE: in no apparent distress and well developed and well nourished  HEENT: PERRL, EOMI, Sclera clear, anicteric, Oropharynx clear, no lesions, Neck supple with midline trachea  NECK: normal, supple, no adenopathy, thyroid normal in size  RESPIRATORY: appears well, vitals normal, no respiratory distress, acyanotic, normal RR, chest clear, no wheezing, crepitations, rhonchi, normal symmetric air entry  HEART: regular rate and rhythm, S1, S2 normal, no murmur, click, rub or gallop.    ABDOMEN: abdomen is soft without tenderness, no masses, no hernias, no organomegaly, no rebound, no guarding.   NEUROLOGIC: normal without focal findings, CN II-XII are intact.    Extremities: warm/well perfused.  No abnormal hair patterns.  No clubbing, cyanosis or edema.  no muscular tenderness noted, full range of motion without pain.  no joint tenderness, deformity or swelling noted.   SKIN: no rashes, no wounds, no other lesions  PSYCH: Alert, oriented x 3, thought " content appropriate, speech normal, pleasant and cooperative, good eye contact, well groomed.    Assessment/Plan:  Estephania Rogers is a 57 y.o. female who presents today for :    Estephania was seen today for migraine.    Diagnoses and all orders for this visit:    Intractable migraine without aura and with status migrainosus  -     ketorolac injection 30 mg       Toradol has worked for patient in the past and due to the adverse reaction of maxalt will try toradol again  She was told she can take ubrelvy tonight if the headache worsens  She is to follow up with hepatology about options for inflammation including plantar fasciitis.  She states she is aware of risks given her liver condition and because her sister and father  of diseases of the liver (not KRAUS)  Call with any concerns  She is to call Dr. Ruiz should headache perisst  She is to go to the ED for any sudden worsening in headache or should new symptoms occur with the headache including blurred vision, weakness, or slurred speech  BART Keller MD

## 2023-03-06 NOTE — PROGRESS NOTES
Patient given Toradol 30mg to L gluteal site via IM injection per MD order. Tolerated well, 0 complaints of.

## 2023-03-22 ENCOUNTER — INFUSION (OUTPATIENT)
Dept: INFUSION THERAPY | Facility: HOSPITAL | Age: 58
End: 2023-03-22
Attending: INTERNAL MEDICINE
Payer: COMMERCIAL

## 2023-03-22 VITALS
RESPIRATION RATE: 17 BRPM | OXYGEN SATURATION: 98 % | HEART RATE: 51 BPM | SYSTOLIC BLOOD PRESSURE: 138 MMHG | DIASTOLIC BLOOD PRESSURE: 67 MMHG | TEMPERATURE: 98 F

## 2023-03-22 DIAGNOSIS — D50.9 IRON DEFICIENCY ANEMIA: Primary | ICD-10-CM

## 2023-03-22 PROCEDURE — 63600175 PHARM REV CODE 636 W HCPCS: Performed by: INTERNAL MEDICINE

## 2023-03-22 PROCEDURE — 25000003 PHARM REV CODE 250: Performed by: INTERNAL MEDICINE

## 2023-03-22 PROCEDURE — 96365 THER/PROPH/DIAG IV INF INIT: CPT

## 2023-03-22 RX ORDER — HEPARIN 100 UNIT/ML
500 SYRINGE INTRAVENOUS
Status: CANCELLED | OUTPATIENT
Start: 2023-03-22

## 2023-03-22 RX ADMIN — IRON SUCROSE 200 MG: 20 INJECTION, SOLUTION INTRAVENOUS at 03:03

## 2023-03-22 NOTE — PLAN OF CARE
Pt received her q12w Venofer IVPB. Voices no new or worsening complaints today. VSS. Venofer tolerated well. Pt has her next appointments through [facility]. Discharged from unit in [facility].

## 2023-03-31 ENCOUNTER — OFFICE VISIT (OUTPATIENT)
Dept: OBSTETRICS AND GYNECOLOGY | Facility: CLINIC | Age: 58
End: 2023-03-31
Payer: COMMERCIAL

## 2023-03-31 VITALS
WEIGHT: 214.31 LBS | SYSTOLIC BLOOD PRESSURE: 124 MMHG | DIASTOLIC BLOOD PRESSURE: 82 MMHG | BODY MASS INDEX: 29.89 KG/M2

## 2023-03-31 DIAGNOSIS — Z01.419 ENCOUNTER FOR GYNECOLOGICAL EXAMINATION WITHOUT ABNORMAL FINDING: Primary | ICD-10-CM

## 2023-03-31 DIAGNOSIS — Z12.31 BREAST CANCER SCREENING BY MAMMOGRAM: ICD-10-CM

## 2023-03-31 PROCEDURE — 99386 PR PREVENTIVE VISIT,NEW,40-64: ICD-10-PCS | Mod: S$GLB,,, | Performed by: OBSTETRICS & GYNECOLOGY

## 2023-03-31 PROCEDURE — 87624 HPV HI-RISK TYP POOLED RSLT: CPT | Performed by: OBSTETRICS & GYNECOLOGY

## 2023-03-31 PROCEDURE — 99386 PREV VISIT NEW AGE 40-64: CPT | Mod: S$GLB,,, | Performed by: OBSTETRICS & GYNECOLOGY

## 2023-03-31 PROCEDURE — 88175 CYTOPATH C/V AUTO FLUID REDO: CPT | Performed by: OBSTETRICS & GYNECOLOGY

## 2023-03-31 PROCEDURE — 99999 PR PBB SHADOW E&M-EST. PATIENT-LVL III: CPT | Mod: PBBFAC,,, | Performed by: OBSTETRICS & GYNECOLOGY

## 2023-03-31 PROCEDURE — 99999 PR PBB SHADOW E&M-EST. PATIENT-LVL III: ICD-10-PCS | Mod: PBBFAC,,, | Performed by: OBSTETRICS & GYNECOLOGY

## 2023-03-31 NOTE — PROGRESS NOTES
Subjective:      Patient ID: Estephania Rogers is a 57 y.o. female.    Chief Complaint:  Well Woman      History of Present Illness  HPI  Annual Exam-Postmenopausal  Patient presents for annual exam. The patient has no complaints today. The patient is sexually active. GYN screening history: last pap: was normal and last mammogram: was normal. The patient is not taking hormone replacement therapy. Patient denies post-menopausal vaginal bleeding. The patient wears seatbelts: yes. The patient participates in regular exercise: yes. Has the patient ever been transfused or tattooed?: not asked. The patient reports that there is not domestic violence in her life.    GYN & OB History  Patient's last menstrual period was 2014.   Date of Last Pap: 3/31/2023    OB History    Para Term  AB Living   0   0         SAB IAB Ectopic Multiple Live Births                 Past Medical History:  Past Medical History:   Diagnosis Date    Anemia     iron deficiency    Anxiety     Diabetes mellitus, type 2     Hypertension     Malabsorption     Migraine headache     Nephrolithiasis     Other specified intestinal malabsorption        Past Surgical History:  Past Surgical History:   Procedure Laterality Date    ANAL FISTULOTOMY      APPENDECTOMY      AUGMENTATION OF BREAST  2014    BREAST SURGERY      CHOLECYSTECTOMY  2005    GASTRIC BYPASS  2005    SINUS SURGERY      Dr. Oral Cobb    SPINE SURGERY      TONSILLECTOMY      TOTAL REDUCTION MAMMOPLASTY         Family History:  Family History   Problem Relation Age of Onset    Cancer Father         pancreatic    Diabetes Father     Diabetes Mother     Hypertension Mother     Miscarriages / Stillbirths Mother     Stroke Mother     Diabetes Sister     Cancer Maternal Aunt         breast    Arthritis Maternal Grandmother     Arthritis Paternal Grandmother     Diabetes Sister     Breast cancer Paternal Aunt        Allergies:  Review of patient's allergies  indicates:  No Known Allergies    Medications:  Current Outpatient Medications on File Prior to Visit   Medication Sig Dispense Refill    ascorbic acid, vitamin C, (VITAMIN C) 500 MG tablet Take 500 mg by mouth once daily.      cholecalciferol, vitamin D3, 1,250 mcg (50,000 unit) Tab Take 1,250 mcg by mouth every 7 days. 12 tablet 3    FLUoxetine 20 MG capsule TAKE 1 CAPSULE BY MOUTH EVERY DAY 90 capsule 3    gabapentin (NEURONTIN) 300 MG capsule Take 1 capsule (300 mg total) by mouth every evening. 90 capsule 1    multivitamin (THERAGRAN) per tablet Take 1 tablet by mouth once daily.      phytonadione, vit K1, (VITAMIN K1 MISC) 1,000 mg by Misc.(Non-Drug; Combo Route) route once daily.      RESTASIS 0.05 % ophthalmic emulsion   3    vitamin A 19606 UNIT capsule Take 25,000 Units by mouth once daily.      vitamin E acetate (VITAMIN E ORAL) Take 1 tablet by mouth once daily.      ALPRAZolam (XANAX) 0.25 MG tablet Take 1 tablet (0.25 mg total) by mouth daily as needed for Anxiety. 20 tablet 0     No current facility-administered medications on file prior to visit.       Social History:  Social History     Tobacco Use    Smoking status: Never     Passive exposure: Never    Smokeless tobacco: Never    Tobacco comments:     Clerical work   Substance Use Topics    Alcohol use: Yes     Alcohol/week: 1.0 standard drink     Types: 1 Glasses of wine per week    Drug use: No            Review of Systems  Review of Systems   Constitutional: Negative.    HENT: Negative.     Eyes: Negative.    Respiratory: Negative.     Cardiovascular: Negative.    Gastrointestinal: Negative.    Endocrine: Negative.    Genitourinary: Negative.    Musculoskeletal: Negative.    Integumentary:  Negative.   Neurological: Negative.    Hematological: Negative.    Psychiatric/Behavioral: Negative.     Breast: negative.         Objective:     Physical Exam:   Constitutional: She is oriented to person, place, and time. She appears well-nourished.    HENT:    Head: Normocephalic and atraumatic.    Eyes: EOM are normal. Right eye exhibits normal extraocular motion. Left eye exhibits normal extraocular motion.    Neck: No thyromegaly present.    Cardiovascular:  Normal rate.             Pulmonary/Chest: Effort normal. No respiratory distress. Right breast exhibits no mass, no skin change and no tenderness. Left breast exhibits no mass, no skin change and no tenderness. Breasts are symmetrical.        Abdominal: Soft. She exhibits no distension and no mass. There is no abdominal tenderness.     Genitourinary:    Vagina, uterus, right adnexa and left adnexa normal.      Pelvic exam was performed with patient supine.   The external female genitalia was normal.   No external genitalia lesions identified,Genitalia hair distrobution normal .   Labial bartholins normal.There is no rash or lesion on the right labia. There is no rash or lesion on the left labia. Cervix is normal. Right adnexum displays no tenderness and no fullness. Left adnexum displays no tenderness and no fullness. No  no vaginal discharge or bleeding in the vagina.    No signs of injury in the vagina.   Vagina was moist.Cervix exhibits no motion tenderness and no friability. Uterus consistancy normal. and Uerus contour normal  Uterus is not tender. Normal urethral meatus.Urethral Meatus exhibits: urethral lesion and prolapsedUrethra findings: no urethral mass and no tendernessBladder findings: no bladder distention and no bladder tenderness          Musculoskeletal: Normal range of motion.      Lymphadenopathy:     She has no cervical adenopathy.    Neurological: She is oriented to person, place, and time.   Cranial Nerves II-XII grossly intact.    Skin: No rash noted. No erythema.    Psychiatric: She has a normal mood and affect. Her behavior is normal.       Assessment:     1. Encounter for gynecological examination without abnormal finding    2. Breast cancer screening by mammogram               Plan:      1. Encounter for gynecological examination without abnormal finding  - Pap and HPV done today.  -   Screening tests as ordered.  - Diet and exercise encouraged.    Counseling: injury prevention: Driving under the influence of alcohol  Seatbelts  Stress management techniques  indications for and frequency of periodic gynecologic exam  reviewed current Pap guidelines. Explained new understanding of natural history of cervical disease and improved Paps. Recommended guideline concordant care.  - Liquid-Based Pap Smear, Screening  - HPV High Risk Genotypes, PCR    2. Breast cancer screening by mammogram  - Self breast exams encouraged  - Mammo Digital Screening Bilat w/ Billy; Future

## 2023-04-06 LAB
HPV HR 12 DNA SPEC QL NAA+PROBE: NEGATIVE
HPV16 AG SPEC QL: NEGATIVE
HPV18 DNA SPEC QL NAA+PROBE: NEGATIVE

## 2023-04-11 ENCOUNTER — PATIENT MESSAGE (OUTPATIENT)
Dept: OBSTETRICS AND GYNECOLOGY | Facility: CLINIC | Age: 58
End: 2023-04-11
Payer: COMMERCIAL

## 2023-04-11 LAB
FINAL PATHOLOGIC DIAGNOSIS: NORMAL
Lab: NORMAL

## 2023-04-12 ENCOUNTER — PATIENT MESSAGE (OUTPATIENT)
Dept: ADMINISTRATIVE | Facility: HOSPITAL | Age: 58
End: 2023-04-12
Payer: COMMERCIAL

## 2023-05-09 ENCOUNTER — HOSPITAL ENCOUNTER (OUTPATIENT)
Dept: RADIOLOGY | Facility: HOSPITAL | Age: 58
Discharge: HOME | End: 2023-05-09
Attending: NURSE PRACTITIONER
Payer: COMMERCIAL

## 2023-05-09 DIAGNOSIS — K74.02 HEPATIC FIBROSIS, STAGE 3: ICD-10-CM

## 2023-05-09 PROCEDURE — 76705 US ABDOMEN LIMITED: ICD-10-PCS | Mod: 26,,, | Performed by: RADIOLOGY

## 2023-05-09 PROCEDURE — 76705 ECHO EXAM OF ABDOMEN: CPT | Mod: TC

## 2023-05-09 PROCEDURE — 76705 ECHO EXAM OF ABDOMEN: CPT | Mod: 26,,, | Performed by: RADIOLOGY

## 2023-05-16 ENCOUNTER — OFFICE VISIT (OUTPATIENT)
Dept: HEPATOLOGY | Facility: CLINIC | Age: 58
End: 2023-05-16
Payer: COMMERCIAL

## 2023-05-16 DIAGNOSIS — K75.81 NASH (NONALCOHOLIC STEATOHEPATITIS): Primary | ICD-10-CM

## 2023-05-16 DIAGNOSIS — K74.02 HEPATIC FIBROSIS, STAGE 3: ICD-10-CM

## 2023-05-16 PROCEDURE — 99214 OFFICE O/P EST MOD 30 MIN: CPT | Mod: 95,,, | Performed by: NURSE PRACTITIONER

## 2023-05-16 PROCEDURE — 99214 PR OFFICE/OUTPT VISIT, EST, LEVL IV, 30-39 MIN: ICD-10-PCS | Mod: 95,,, | Performed by: NURSE PRACTITIONER

## 2023-05-16 NOTE — PROGRESS NOTES
The patient location is: [place], [place]  The chief complaint leading to consultation is: F/u for fatty liver    Visit type: audiovisual    Face to Face time with patient: 23 min  35 minutes of total time spent on the encounter, which includes face to face time and non-face to face time preparing to see the patient (eg, review of tests), Obtaining and/or reviewing separately obtained history, Documenting clinical information in the electronic or other health record, Independently interpreting results (not separately reported) and communicating results to the patient/family/caregiver, or Care coordination (not separately reported).     Each patient to whom he or she provides medical services by telemedicine is:  (1) informed of the relationship between the physician and patient and the respective role of any other health care provider with respect to management of the patient; and (2) notified that he or she may decline to receive medical services by telemedicine and may withdraw from such care at any time.        [facility] - Established Patient     Last Clinic Visit: 11/16/22    Chief Complaint: Follow-up for KRAUS with hepatic fibrosis      HISTORY     This is a 57 y.o. female with PMH noted below, here for follow-up of KRAUS with advanced fibrosis (F3).    She was initially told that she had fatty liver ~25 years ago.     Her transaminases have been intermittently elevated since 2005.   AST>ALT, <100. TBili also mildly elevated since 2011. Liver enzymes have improved with weight loss.      Serological workup has been negative for other etiologies of liver disease. UGT1A1 testing confirms Gilbert's.      Her Fibroscan suggested advanced fibrosis (F3) so she underwent liver biopsy 11/12/20 to confirm.    FINAL PATHOLOGIC DIAGNOSIS  Mild macrovesicular steatosis with minimally active steatohepatitis and multifocal bridging fibrosis with focal nodule formation. The NAFLD activity score (HARIS) = 3 (1+1+1) with  stage 3 fibrosis.     Follow-up fibrosis staging:  Fibroscan 11/2022 = F3 (kPa 12.2), S3 ()    Interval history:  Notes ongoing fatigue.     Denies symptoms of hepatic decompensation including jaundice, ascites, cognitive problems to suggest hepatic encephalopathy, or GI bleeding.     Updates on risk factors for fatty liver:     Weight -- BMI ~30  Reports weight relatively unchanged            Dyslipidemia -- well controlled in 2021                      Insulin resistance / diabetes -- last HgbA1c 5.0 in 2021  Hypertension -- well controlled    Health Maintenance:  -- HCC screening: [facility] 5/9/23 without focal hepatic lesion; AFP <2  -- Variceal screening: no EGD  -- Hepatitis A & B vaccination: complete        Past medical history, surgical history, problem list, family history, social history, allergies: Reviewed and updated in the appropriate section of the electronic medical record.  Pertinent findings:  Gastric bypass in 2005 - lost >100 lb      Current Outpatient Medications   Medication Sig Dispense Refill    ALPRAZolam (XANAX) 0.25 MG tablet Take 1 tablet (0.25 mg total) by mouth daily as needed for Anxiety. 20 tablet 0    ascorbic acid, vitamin C, (VITAMIN C) 500 MG tablet Take 500 mg by mouth once daily.      cholecalciferol, vitamin D3, 1,250 mcg (50,000 unit) Tab Take 1,250 mcg by mouth every 7 days. 12 tablet 3    FLUoxetine 20 MG capsule TAKE 1 CAPSULE BY MOUTH EVERY DAY 90 capsule 3    gabapentin (NEURONTIN) 300 MG capsule Take 1 capsule (300 mg total) by mouth every evening. 90 capsule 1    multivitamin (THERAGRAN) per tablet Take 1 tablet by mouth once daily.      phytonadione, vit K1, (VITAMIN K1 MISC) 1,000 mg by Misc.(Non-Drug; Combo Route) route once daily.      RESTASIS 0.05 % ophthalmic emulsion   3    vitamin A 70682 UNIT capsule Take 25,000 Units by mouth once daily.      vitamin E acetate (VITAMIN E ORAL) Take 1 tablet by mouth once daily.       No current facility-administered  medications for this visit.     Medication list reviewed and updated.      Review of Systems - as per HPI   Constitutional: Negative for unexpected weight change. +fatigue  Respiratory: Negative for shortness of breath.    Cardiovascular: Negative for leg swelling.  Gastrointestinal: Negative for abdominal distention or abdominal pain. Negative for melena or hematemesis.  Musculoskeletal: Negative for myalgias.    Skin: Negative for itching.  Neurological: Negative for confusion or slowed mentation. Negative for tremors.   Hematological: Does not bruise/bleed easily.   Psychiatric: Negative for sleep disturbance.      Physical Exam   Constitutional: No distress. Alert and oriented to person, place, and time.  Pulmonary/Chest: No respiratory distress.   Skin: No jaundice.   Psychiatric: Normal mood and affect. Speech, behavior, and thought content normal. No depression or anxiety noted.       LABS & DIAGNOSTIC STUDIES     I have personally reviewed pertinent laboratory findings:    Lab Results   Component Value Date    ALT 48 (H) 05/09/2023    AST 45 (H) 05/09/2023    ALKPHOS 130 05/09/2023    BILITOT 1.3 (H) 05/09/2023    ALBUMIN 4.1 05/09/2023    INR 1.1 05/09/2023       Lab Results   Component Value Date    WBC 5.17 05/09/2023    HGB 13.1 05/09/2023    HCT 41.9 05/09/2023    MCV 97 05/09/2023     05/09/2023       Lab Results   Component Value Date     05/09/2023    K 4.2 05/09/2023    BUN 11 05/09/2023    CREATININE 0.7 05/09/2023    ESTGFRAFRICA >60 06/21/2022    EGFRNONAA >60 06/21/2022       Lab Results   Component Value Date    SMOOTHMUSCAB Positive 1:40 (A) 10/08/2020    AMAIFA Negative 1:40 10/08/2020    IGGSERUM 1033 10/08/2020    ANASCREEN Negative <1:80 06/10/2022    FERRITIN 1,089 (H) 01/31/2023    FESATURATED 54 (H) 01/31/2023    KQQOY1UVYZFN MM 12/01/2020    GLAIP3UNGZXD 134 12/01/2020    CERULOPLSM 26.0 06/10/2022    HEPBSAG Negative 10/08/2020    HEPBCAB Negative 10/08/2020    HEPCAB  Negative 09/22/2020       Lab Results   Component Value Date    AFP <2.0 05/09/2023       I have personally reviewed the following result reports:  Abdominal US - 5/9/23  Liver biopsy - 11/12/20      ASSESSMENT & PLAN     57 y.o. female with:    1. KRAUS with advanced fibrosis (F3)   -- Liver enzymes remain mildly elevated, anticipate improvement with weight loss. Reassured, liver function remains stable. Continue to monitor LFTs every 6 months.  -- Continue HCC screening every 6 months with ultrasound and AFP, next due 11/2023  -- Defer EGD given F3, normal platelet count, no evidence of portal HTN. Will repeat Fibroscan next year     2. BMI 30  -- Recommend low carbohydrate, low sugar diet.   -- We discussed additional weight loss resources including dietician consult, [facility] program, and bariatric medicine consult. Referral to bariatric medicine.      Orders Placed This Encounter   Procedures    US Abdomen Limited    CBC Without Differential    Comprehensive Metabolic Panel    AFP Tumor Marker    Protime-INR    Ambulatory referral/consult to Bariatric Medicine       *See AVS for patient education and instructions.      F/u in 6 months with labs/US prior.       Thank you for allowing me to participate in the care of Estephania Cornelius FNP-C  Hepatology

## 2023-05-16 NOTE — PATIENT INSTRUCTIONS
Referral to bariatric medicine: [phone]  Lab and ultrasound monitoring every 6 months          Your testing shows you have stage 3 scar tissue in the liver which is pre-cirrhosis.  Your liver is working well. We want to protect it and prevent further damage.  AVOID ALL alcohol (includes beer, wine and liquor)  AVOID non-steroidal anti-inflammatory drugs (NSAIDs) such as ibuprofen (Motrin, Advil), naproxen (Naprosyn, Aleve), meloxicam (Mobic)   Tylenol/acetaminophen is OKAY for pain, no more than 2000 mg per day  No raw shellfish due to infection risk.     Liver cancer screening (ultrasound and blood test) is needed every 6 months.  - Next due: November 2023        FATTY LIVER EDUCATION:  There is no FDA approved therapy for non-alcoholic fatty liver disease (NAFLD); therefore, lifestyle changes are important:  1. Ongoing weight loss efforts   *Weight loss of 5% has been shown to reduce fat in the liver  *Weight loss of 7-10% has been shown to improve fatty liver, inflammation from fatty liver, and liver fibrosis (scarring/damage)    2. Decreasing daily calories is recommended for weight loss. Try to limit carbohydrate intake to LESS than 30-45 grams of carbs with a meal and LESS than 5-10 grams of carbs with a snack. Avoid drinking beverages with sugar/carbohydrates. Meeting with a dietician or using one of the weight loss apps below can be helpful to determine individualized calorie goals and add up carbs throughout the day. Look for snacks high in protein, low in carbohydrates/sugar.    3. Exercise as tolerated. Studies have shown that exercise improves fatty liver even without weight loss.     4. Recommend good control of cholesterol, blood pressure, blood sugar levels (as these are risk factors for fatty liver). Cholesterol lowering medications including statins are typically safe and beneficial (even if liver enzymes are elevated)    In some people, fatty liver can progress to steatohepatitis (inflamatory  fatty liver). This can cause liver scarring or damage (fibrosis) and possibly to cirrhosis. Cirrhosis increases risk of liver cancer and liver failure. Lifestyle changes now can help to decrease this risk.     Ask about our fatty liver/KRAUS clinical trials if you have fibrosis / scar tissue related to fatty liver.        Additional Resources:    Websites with information about fatty liver and inflammation related to fatty liver (KRAUS): [email or web address].[email or web address] and www.nashactually.com   [facility]: Non-Alcoholic Fatty Liver Disease (NAFLD)    Facebook support group with tips and recipes:   Non-Alcoholic Fatty Liver Disease (NAFLD) Diet & Nutrition Support     Can download [employer] Pal or Lose It anthony to add up your carbohydrates throughout the day.      Try www.Grafoid for recipes.    If interested in seeing a dietician to create a weight loss plan, contact the dietician team at [facility]: [email or web address].  You can also call to schedule a consult with a dietician: [phone]  *Virtual visits are available with one of our dieticians.     If you have diabetes or high blood pressure, you can meet with a Health  through [email or web address] program. Let us know if you are interested in a referral.     Let us know if you are interested in a referral to [email or web address] program.      Phone numbers:  Bariatric medicine - [phone]  Medical Fitness - [phone]

## 2023-05-17 ENCOUNTER — TELEPHONE (OUTPATIENT)
Dept: HEPATOLOGY | Facility: CLINIC | Age: 58
End: 2023-05-17
Payer: COMMERCIAL

## 2023-05-17 DIAGNOSIS — E55.9 VITAMIN D DEFICIENCY: Primary | ICD-10-CM

## 2023-05-17 DIAGNOSIS — D50.8 OTHER IRON DEFICIENCY ANEMIA: ICD-10-CM

## 2023-05-17 NOTE — TELEPHONE ENCOUNTER
----- Message from Keri Cornelius NP sent at 5/16/2023 11:19 AM CDT -----  Please schedule labs, US, and virtual visit in 6 months, thanks!

## 2023-05-23 ENCOUNTER — HOSPITAL ENCOUNTER (OUTPATIENT)
Dept: RADIOLOGY | Facility: HOSPITAL | Age: 58
Discharge: HOME | End: 2023-05-23
Attending: OBSTETRICS & GYNECOLOGY
Payer: COMMERCIAL

## 2023-05-23 DIAGNOSIS — Z12.31 BREAST CANCER SCREENING BY MAMMOGRAM: ICD-10-CM

## 2023-05-23 PROCEDURE — 77063 BREAST TOMOSYNTHESIS BI: CPT | Mod: 26,,, | Performed by: RADIOLOGY

## 2023-05-23 PROCEDURE — 77067 SCR MAMMO BI INCL CAD: CPT | Mod: TC,PO

## 2023-05-23 PROCEDURE — 77067 SCR MAMMO BI INCL CAD: CPT | Mod: 26,,, | Performed by: RADIOLOGY

## 2023-05-23 PROCEDURE — 77067 MAMMO DIGITAL SCREENING BILAT WITH TOMO: ICD-10-PCS | Mod: 26,,, | Performed by: RADIOLOGY

## 2023-05-23 PROCEDURE — 77063 MAMMO DIGITAL SCREENING BILAT WITH TOMO: ICD-10-PCS | Mod: 26,,, | Performed by: RADIOLOGY

## 2023-05-31 ENCOUNTER — OFFICE VISIT (OUTPATIENT)
Dept: FAMILY MEDICINE | Facility: CLINIC | Age: 58
End: 2023-05-31
Payer: COMMERCIAL

## 2023-05-31 VITALS
HEART RATE: 56 BPM | SYSTOLIC BLOOD PRESSURE: 132 MMHG | OXYGEN SATURATION: 97 % | DIASTOLIC BLOOD PRESSURE: 78 MMHG | WEIGHT: 212.5 LBS | HEIGHT: 71 IN | BODY MASS INDEX: 29.75 KG/M2 | TEMPERATURE: 98 F

## 2023-05-31 DIAGNOSIS — D51.8 OTHER VITAMIN B12 DEFICIENCY ANEMIA: ICD-10-CM

## 2023-05-31 DIAGNOSIS — D50.8 OTHER IRON DEFICIENCY ANEMIA: ICD-10-CM

## 2023-05-31 DIAGNOSIS — F41.9 ANXIETY: ICD-10-CM

## 2023-05-31 DIAGNOSIS — R53.83 FATIGUE, UNSPECIFIED TYPE: Primary | ICD-10-CM

## 2023-05-31 PROCEDURE — 99999 PR PBB SHADOW E&M-EST. PATIENT-LVL IV: ICD-10-PCS | Mod: PBBFAC,,, | Performed by: FAMILY MEDICINE

## 2023-05-31 PROCEDURE — 99999 PR PBB SHADOW E&M-EST. PATIENT-LVL IV: CPT | Mod: PBBFAC,,, | Performed by: FAMILY MEDICINE

## 2023-05-31 PROCEDURE — 99214 OFFICE O/P EST MOD 30 MIN: CPT | Mod: S$GLB,,, | Performed by: FAMILY MEDICINE

## 2023-05-31 PROCEDURE — 99214 PR OFFICE/OUTPT VISIT, EST, LEVL IV, 30-39 MIN: ICD-10-PCS | Mod: S$GLB,,, | Performed by: FAMILY MEDICINE

## 2023-05-31 NOTE — PROGRESS NOTES
Assessment & Plan  Problem List Items Addressed This Visit          Psychiatric    Anxiety       Oncology    Iron deficiency anemia    B12 deficiency anemia     Other Visit Diagnoses       Fatigue, unspecified type    -  Primary        Related to work.  Will address increased physical activity.  If persists will need further work up for insidious causes.   Pt is currently stable on medication regimen.  Continue current therapy as scheduled.  Contact office with any questions about adjustments on medications.   Patient is stable.  Assess and addressed all modifiable risk factors.  Continue with appropriate management to prevent complications.          Health Maintenance reviewed.    Follow-up: No follow-ups on file.    ______________________________________________________________________    Chief Complaint  Chief Complaint   Patient presents with    Fatigue       HPI  Estephania Rogers is a 57 y.o. female with multiple medical diagnoses as listed in the medical history and problem list that presents for fatigue.  Pt is known to me with last appointment 8/2/2022.    Patient denies any new symptoms including chest pain, SOB, blurry vision, N/V, diarrhea.   She reports that she will feel more fatigue at work or at home.  She does have a sedentary job.  She does move around doing the day.  She does not have a dedicated exercise time.  She takes in a consistent amount of caffeine.  She will sleep 6-7 hours at night.  Former history of snoring.  No noted increased weakness nor SOB.  She is feeling more often that she expects to be.  The fatigue is happening even before lunch.  No symptoms of depression.  No new increased issues of stress.        PAST MEDICAL HISTORY:  Past Medical History:   Diagnosis Date    Anemia     iron deficiency    Anxiety     Diabetes mellitus, type 2     Hypertension     Malabsorption     Migraine headache     Nephrolithiasis     Other specified intestinal malabsorption        PAST  SURGICAL HISTORY:  Past Surgical History:   Procedure Laterality Date    ANAL FISTULOTOMY  2005    APPENDECTOMY  2005    AUGMENTATION OF BREAST  2014    BREAST SURGERY  2014    CHOLECYSTECTOMY  2005    GASTRIC BYPASS  2005    SINUS SURGERY      Dr. Oral Cobb    SPINE SURGERY      TONSILLECTOMY      TOTAL REDUCTION MAMMOPLASTY  2014       SOCIAL HISTORY:  Social History     Socioeconomic History    Marital status:     Number of children: 0   Tobacco Use    Smoking status: Never     Passive exposure: Never    Smokeless tobacco: Never    Tobacco comments:     Clerical work   Substance and Sexual Activity    Alcohol use: Yes     Alcohol/week: 1.0 standard drink     Types: 1 Glasses of wine per week    Drug use: No    Sexual activity: Yes     Partners: Male     Birth control/protection: None     Comment: :  Gianni     Social Determinants of Health     Financial Resource Strain: Unknown    Difficulty of Paying Living Expenses: Patient refused   Food Insecurity: Unknown    Worried About Running Out of Food in the Last Year: Patient refused    Ran Out of Food in the Last Year: Patient refused   Transportation Needs: Unknown    Lack of Transportation (Medical): Patient refused    Lack of Transportation (Non-Medical): Patient refused   Physical Activity: Unknown    Days of Exercise per Week: Patient refused    Minutes of Exercise per Session: Patient refused   Stress: Unknown    Feeling of Stress : Patient refused   Social Connections: Unknown    Frequency of Communication with Friends and Family: Patient refused    Frequency of Social Gatherings with Friends and Family: Patient refused    Active Member of Clubs or Organizations: Patient refused    Attends Club or Organization Meetings: Patient refused    Marital Status: Patient refused   Housing Stability: Unknown    Unable to Pay for Housing in the Last Year: Patient refused    Unstable Housing in the Last Year: Patient refused       FAMILY HISTORY:  Family  History   Problem Relation Age of Onset    Cancer Father         pancreatic    Diabetes Father     Diabetes Mother     Hypertension Mother     Miscarriages / Stillbirths Mother     Stroke Mother     Diabetes Sister     Cancer Maternal Aunt         breast    Arthritis Maternal Grandmother     Arthritis Paternal Grandmother     Diabetes Sister     Breast cancer Paternal Aunt        ALLERGIES AND MEDICATIONS: updated and reviewed.  Review of patient's allergies indicates:  No Known Allergies  Current Outpatient Medications   Medication Sig Dispense Refill    ALPRAZolam (XANAX) 0.25 MG tablet Take 1 tablet (0.25 mg total) by mouth daily as needed for Anxiety. 20 tablet 0    ascorbic acid, vitamin C, (VITAMIN C) 500 MG tablet Take 500 mg by mouth once daily.      cholecalciferol, vitamin D3, 1,250 mcg (50,000 unit) Tab Take 1,250 mcg by mouth every 7 days. 12 tablet 3    FLUoxetine 20 MG capsule TAKE 1 CAPSULE BY MOUTH EVERY DAY 90 capsule 3    gabapentin (NEURONTIN) 300 MG capsule Take 1 capsule (300 mg total) by mouth every evening. 90 capsule 1    multivitamin (THERAGRAN) per tablet Take 1 tablet by mouth once daily.      RESTASIS 0.05 % ophthalmic emulsion   3    vitamin A 47225 UNIT capsule Take 25,000 Units by mouth once daily.      vitamin E acetate (VITAMIN E ORAL) Take 1 tablet by mouth once daily.      phytonadione, vit K1, (VITAMIN K1 MISC) 1,000 mg by Misc.(Non-Drug; Combo Route) route once daily.       No current facility-administered medications for this visit.         ROS  Review of Systems   Constitutional:  Positive for fatigue. Negative for activity change, appetite change, fever and unexpected weight change.   HENT: Negative.  Negative for ear discharge, ear pain, rhinorrhea and sore throat.    Eyes: Negative.    Respiratory:  Negative for apnea, cough, chest tightness, shortness of breath and wheezing.    Cardiovascular:  Negative for chest pain, palpitations and leg swelling.   Gastrointestinal:   "Negative for abdominal distention, abdominal pain, constipation, diarrhea and vomiting.   Endocrine: Negative for cold intolerance, heat intolerance, polydipsia and polyuria.   Genitourinary:  Negative for decreased urine volume and urgency.   Musculoskeletal: Negative.    Skin:  Negative for rash.   Neurological:  Negative for dizziness and headaches.   Hematological:  Does not bruise/bleed easily.   Psychiatric/Behavioral:  Negative for agitation, sleep disturbance and suicidal ideas.          Physical Exam  Vitals:    05/31/23 0754   BP: 132/78   Pulse: (!) 56   Temp: 98.3 °F (36.8 °C)   TempSrc: Oral   SpO2: 97%   Weight: 96.4 kg (212 lb 8.4 oz)   Height: 5' 11" (1.803 m)    Body mass index is 29.64 kg/m².  Weight: 96.4 kg (212 lb 8.4 oz)   Height: 5' 11" (180.3 cm)   Physical Exam  Vitals reviewed.   Constitutional:       Appearance: Normal appearance. She is well-developed.   HENT:      Head: Normocephalic and atraumatic.      Right Ear: External ear normal.      Left Ear: External ear normal.      Nose: Nose normal.      Mouth/Throat:      Mouth: Mucous membranes are moist.      Pharynx: Oropharynx is clear.   Eyes:      Extraocular Movements: Extraocular movements intact.      Conjunctiva/sclera: Conjunctivae normal.      Pupils: Pupils are equal, round, and reactive to light.   Cardiovascular:      Rate and Rhythm: Normal rate and regular rhythm.      Heart sounds: Normal heart sounds.   Pulmonary:      Effort: Pulmonary effort is normal.      Breath sounds: Normal breath sounds.   Skin:     General: Skin is warm and dry.   Neurological:      Mental Status: She is alert and oriented to person, place, and time.       Health Maintenance         Date Due Completion Date    HIV Screening Never done ---    TETANUS VACCINE Never done ---    Shingles Vaccine (1 of 2) Never done ---    DEXA Scan 03/31/2018 3/31/2016    COVID-19 Vaccine (4 - Moderna series) 01/03/2022 11/8/2021    Colorectal Cancer Screening " 01/20/2023 1/20/2022    Mammogram 06/01/2023 (Originally 2/10/2023) 2/10/2022    Pneumococcal Vaccines (Age 0-64) (2 - PCV) 08/02/2023 8/2/2022    Hemoglobin A1c (Diabetic Prevention Screening) 10/14/2024 10/14/2021    Lipid Panel 10/14/2026 10/14/2021    Cervical Cancer Screening 03/31/2028 3/31/2023                Patient note was created using Dayjet.  Any errors in syntax or even information may not have been identified and edited on initial review prior to signing this note.

## 2023-06-01 LAB — HEMOCCULT STL QL IA: NEGATIVE

## 2023-06-14 ENCOUNTER — INFUSION (OUTPATIENT)
Dept: INFUSION THERAPY | Facility: HOSPITAL | Age: 58
End: 2023-06-14
Attending: INTERNAL MEDICINE
Payer: COMMERCIAL

## 2023-06-14 VITALS
RESPIRATION RATE: 18 BRPM | OXYGEN SATURATION: 97 % | TEMPERATURE: 99 F | SYSTOLIC BLOOD PRESSURE: 129 MMHG | DIASTOLIC BLOOD PRESSURE: 68 MMHG | HEART RATE: 65 BPM

## 2023-06-14 DIAGNOSIS — D50.9 IRON DEFICIENCY ANEMIA: Primary | ICD-10-CM

## 2023-06-14 PROCEDURE — 96374 THER/PROPH/DIAG INJ IV PUSH: CPT

## 2023-06-14 PROCEDURE — 63600175 PHARM REV CODE 636 W HCPCS: Performed by: INTERNAL MEDICINE

## 2023-06-14 RX ADMIN — IRON SUCROSE 200 MG: 20 INJECTION, SOLUTION INTRAVENOUS at 01:06

## 2023-06-14 NOTE — PLAN OF CARE
Pt received her q12w Venofer IVPB.Denies adverse reactions from previous infusion. VSS. Venofer tolerated well. Pt has her next appointments through [facility]. Discharged from unit in [facility].

## 2023-07-04 NOTE — PROGRESS NOTES
Subjective:       Patient ID: Estephania Rogers is a 57 y.o. female.    Chief Complaint: Other iron deficiency anemia    HPI Patient has not been seen in Hem/Onc clinic in 4 years. She has been receiving injectafer every 12 weeks at [facility].   Patient presents for follow up for iron defieincy anemia.     Overall she is doing well.   She does have some fatigue sometimes.   Appetite is good. Bowel movements are good.   Denies PICA and denies shortness of breath.   Leg cramping is intermittent at night. She has not related this to her iron infusions or anything else at this point either.  She does have restless legs and that is worse right before she receives another iron infusion      Per Dr. Andres's last note: In regards to her iron deficiency:  This is a 57-year-old female with iron deficiency anemia secondary to bariatric surgery about 7 years ago. In April 2010, she was found to be anemic with a hemoglobin of 9.8. She was placed on iron therapy at that time and repeat blood work in September revealed a ferritin of 5. At that time, she was referred for further evaluation of her iron deficiency. We initiated Ferrlecit infusions in the fall for a total of 8 weeks and she had a good response. She is here today for a follow up. GI evaluation including EGD and colonoscopy was negative.   She has a history of prior bariatric surgery.    Review of Systems   Constitutional:  Positive for fatigue. Negative for activity change, appetite change, chills, diaphoresis, fever and unexpected weight change.   HENT:  Negative for nosebleeds.    Eyes:  Negative for redness and visual disturbance.   Respiratory:  Negative for cough, chest tightness, shortness of breath and wheezing.    Cardiovascular:  Negative for chest pain, palpitations and leg swelling.   Gastrointestinal:  Negative for abdominal distention, abdominal pain, blood in stool, constipation, diarrhea, nausea and vomiting.        Chronic changes since  bypass   Genitourinary:  Negative for hematuria.   Musculoskeletal:         Leg cramping and restless legs a night   Skin:  Negative for color change and rash.   Neurological:  Negative for dizziness, weakness, light-headedness, numbness and headaches.   Hematological:  Negative for adenopathy. Does not bruise/bleed easily.   Psychiatric/Behavioral:  Negative for confusion.      Objective:      Physical Exam  Vitals and nursing note reviewed.   Constitutional:       General: She is not in acute distress.     Appearance: Normal appearance. She is well-developed.      Comments: Presents alone   HENT:      Head: Normocephalic.      Mouth/Throat:      Pharynx: No oropharyngeal exudate.   Eyes:      Pupils: Pupils are equal, round, and reactive to light.   Cardiovascular:      Rate and Rhythm: Normal rate and regular rhythm.      Heart sounds: Normal heart sounds.   Pulmonary:      Effort: Pulmonary effort is normal.      Breath sounds: Normal breath sounds.   Abdominal:      General: Bowel sounds are normal. There is no distension.      Palpations: Abdomen is soft.      Tenderness: There is no abdominal tenderness.   Musculoskeletal:      Cervical back: Normal range of motion.   Lymphadenopathy:      Head:      Right side of head: No preauricular adenopathy.      Left side of head: No preauricular adenopathy.      Cervical: No cervical adenopathy.      Upper Body:      Right upper body: No supraclavicular adenopathy.      Left upper body: No supraclavicular adenopathy.   Skin:     General: Skin is warm and dry.      Findings: No rash.   Neurological:      Mental Status: She is alert and oriented to person, place, and time.   Psychiatric:         Behavior: Behavior normal.     Labs- reviewed  Assessment:       1. Other iron deficiency anemia    2. Other vitamin B12 deficiency anemia        Plan:     1. Continue  q12 week venofer replacement  Opts for Thursday afternoons with treatment at [facility].    She is currently  taking 50,000 units of Vitamin D, will draw labs today      Return to clinic in 6 months with MD appointment and labs.     Patient is in agreement with the proposed treatment plan. All questions were answered to the patient's satisfaction. Patient knows to call clinic for any new or worsening symptoms and if anything is needed before the next clinic visit.          Nova Capps, FNP-C  Hematology & Medical Oncology   [address] [place] [address]  ph. [phone]  Fax. [phone]    Collaborating physician, Dr. Andres.      Route Chart for Scheduling    Med Onc Chart Routing      Follow up with physician    Follow up with [facility] 6 months.   Infusion scheduling note    Injection scheduling note    Labs CBC, CMP, vitamin D and vitamin B12   Scheduling:  Preferred lab:  Lab interval:  Vitamin D - request at [facility] office - any day   Imaging    Pharmacy appointment    Other referrals              Therapy Plan Information  6. Flushes  heparin, porcine (PF) 100 unit/mL injection flush 500 Units  500 Units, Intravenous, PRN  Medications  iron sucrose injection 200 mg  200 mg, Intravenous, Every visit    The [place]  The chief complaint leading to consultation is: Iron Deficiency anemia     Visit type: audiovisual    Face to Face time with patient: 10  15 minutes of total time spent on the encounter, which includes face to face time and non-face to face time preparing to see the patient (eg, review of tests), Obtaining and/or reviewing separately obtained history, Documenting clinical information in the electronic or other health record, Independently interpreting results (not separately reported) and communicating results to the patient/family/caregiver, or Care coordination (not separately reported).         Each patient to whom he or she provides medical services by telemedicine is:  (1) informed of the relationship between the physician and patient and the respective  role of any other health care provider with respect to management of the patient; and (2) notified that he or she may decline to receive medical services by telemedicine and may withdraw from such care at any time.    Notes: see above

## 2023-07-05 ENCOUNTER — LAB VISIT (OUTPATIENT)
Dept: LAB | Facility: HOSPITAL | Age: 58
End: 2023-07-05
Payer: COMMERCIAL

## 2023-07-05 DIAGNOSIS — D50.8 OTHER IRON DEFICIENCY ANEMIA: ICD-10-CM

## 2023-07-05 LAB
BASOPHILS # BLD AUTO: 0.02 K/UL (ref 0–0.2)
BASOPHILS NFR BLD: 0.3 % (ref 0–1.9)
DIFFERENTIAL METHOD: ABNORMAL
EOSINOPHIL # BLD AUTO: 0.2 K/UL (ref 0–0.5)
EOSINOPHIL NFR BLD: 2.6 % (ref 0–8)
ERYTHROCYTE [DISTWIDTH] IN BLOOD BY AUTOMATED COUNT: 12.1 % (ref 11.5–14.5)
FERRITIN SERPL-MCNC: 869 NG/ML (ref 20–300)
HCT VFR BLD AUTO: 40.4 % (ref 37–48.5)
HGB BLD-MCNC: 12.8 G/DL (ref 12–16)
IMM GRANULOCYTES # BLD AUTO: 0.02 K/UL (ref 0–0.04)
IMM GRANULOCYTES NFR BLD AUTO: 0.3 % (ref 0–0.5)
IRON SERPL-MCNC: 110 UG/DL (ref 30–160)
LYMPHOCYTES # BLD AUTO: 1.7 K/UL (ref 1–4.8)
LYMPHOCYTES NFR BLD: 26 % (ref 18–48)
MCH RBC QN AUTO: 31.4 PG (ref 27–31)
MCHC RBC AUTO-ENTMCNC: 31.7 G/DL (ref 32–36)
MCV RBC AUTO: 99 FL (ref 82–98)
MONOCYTES # BLD AUTO: 0.4 K/UL (ref 0.3–1)
MONOCYTES NFR BLD: 6.3 % (ref 4–15)
NEUTROPHILS # BLD AUTO: 4.2 K/UL (ref 1.8–7.7)
NEUTROPHILS NFR BLD: 64.5 % (ref 38–73)
NRBC BLD-RTO: 0 /100 WBC
PLATELET # BLD AUTO: 209 K/UL (ref 150–450)
PMV BLD AUTO: 11.4 FL (ref 9.2–12.9)
RBC # BLD AUTO: 4.07 M/UL (ref 4–5.4)
SATURATED IRON: 39 % (ref 20–50)
TOTAL IRON BINDING CAPACITY: 283 UG/DL (ref 250–450)
TRANSFERRIN SERPL-MCNC: 191 MG/DL (ref 200–375)
WBC # BLD AUTO: 6.5 K/UL (ref 3.9–12.7)

## 2023-07-05 PROCEDURE — 82728 ASSAY OF FERRITIN: CPT | Performed by: NURSE PRACTITIONER

## 2023-07-05 PROCEDURE — 84466 ASSAY OF TRANSFERRIN: CPT | Performed by: NURSE PRACTITIONER

## 2023-07-05 PROCEDURE — 36415 COLL VENOUS BLD VENIPUNCTURE: CPT | Mod: PO | Performed by: NURSE PRACTITIONER

## 2023-07-05 PROCEDURE — 85025 COMPLETE CBC W/AUTO DIFF WBC: CPT | Performed by: NURSE PRACTITIONER

## 2023-07-11 ENCOUNTER — OFFICE VISIT (OUTPATIENT)
Dept: HEMATOLOGY/ONCOLOGY | Facility: CLINIC | Age: 58
End: 2023-07-11
Payer: COMMERCIAL

## 2023-07-11 DIAGNOSIS — D50.8 OTHER IRON DEFICIENCY ANEMIA: Primary | ICD-10-CM

## 2023-07-11 DIAGNOSIS — D51.8 OTHER VITAMIN B12 DEFICIENCY ANEMIA: ICD-10-CM

## 2023-07-11 DIAGNOSIS — E55.9 VITAMIN D DEFICIENCY: ICD-10-CM

## 2023-07-11 PROCEDURE — 99214 OFFICE O/P EST MOD 30 MIN: CPT | Mod: 95,,, | Performed by: NURSE PRACTITIONER

## 2023-07-11 PROCEDURE — 99214 PR OFFICE/OUTPT VISIT, EST, LEVL IV, 30-39 MIN: ICD-10-PCS | Mod: 95,,, | Performed by: NURSE PRACTITIONER

## 2023-07-14 ENCOUNTER — LAB VISIT (OUTPATIENT)
Dept: LAB | Facility: HOSPITAL | Age: 58
End: 2023-07-14
Payer: COMMERCIAL

## 2023-07-14 DIAGNOSIS — D50.8 OTHER IRON DEFICIENCY ANEMIA: ICD-10-CM

## 2023-07-14 DIAGNOSIS — E55.9 VITAMIN D DEFICIENCY: ICD-10-CM

## 2023-07-14 LAB — 25(OH)D3+25(OH)D2 SERPL-MCNC: 30 NG/ML (ref 30–96)

## 2023-07-14 PROCEDURE — 82306 VITAMIN D 25 HYDROXY: CPT | Performed by: NURSE PRACTITIONER

## 2023-07-14 PROCEDURE — 36415 COLL VENOUS BLD VENIPUNCTURE: CPT | Mod: PO | Performed by: NURSE PRACTITIONER

## 2023-08-21 ENCOUNTER — OFFICE VISIT (OUTPATIENT)
Dept: FAMILY MEDICINE | Facility: CLINIC | Age: 58
End: 2023-08-21
Payer: COMMERCIAL

## 2023-08-21 DIAGNOSIS — J01.90 ACUTE BACTERIAL SINUSITIS: Primary | ICD-10-CM

## 2023-08-21 DIAGNOSIS — R53.83 FATIGUE, UNSPECIFIED TYPE: ICD-10-CM

## 2023-08-21 DIAGNOSIS — B96.89 ACUTE BACTERIAL SINUSITIS: Primary | ICD-10-CM

## 2023-08-21 PROCEDURE — 99214 PR OFFICE/OUTPT VISIT, EST, LEVL IV, 30-39 MIN: ICD-10-PCS | Mod: 95,,, | Performed by: FAMILY MEDICINE

## 2023-08-21 PROCEDURE — 99214 OFFICE O/P EST MOD 30 MIN: CPT | Mod: 95,,, | Performed by: FAMILY MEDICINE

## 2023-08-21 RX ORDER — AMOXICILLIN AND CLAVULANATE POTASSIUM 875; 125 MG/1; MG/1
1 TABLET, FILM COATED ORAL EVERY 12 HOURS
Qty: 14 TABLET | Refills: 0 | Status: SHIPPED | OUTPATIENT
Start: 2023-08-21 | End: 2023-08-28

## 2023-08-21 RX ORDER — FLUTICASONE PROPIONATE 50 MCG
1 SPRAY, SUSPENSION (ML) NASAL DAILY
Qty: 16 G | Refills: 2 | Status: SHIPPED | OUTPATIENT
Start: 2023-08-21

## 2023-08-21 NOTE — PROGRESS NOTES
"Assessment & Plan:    Acute bacterial sinusitis  -     amoxicillin-clavulanate 875-125mg (AUGMENTIN) 875-125 mg per tablet; Take 1 tablet by mouth every 12 (twelve) hours. for 7 days  Dispense: 14 tablet; Refill: 0  -     fluticasone propionate (FLONASE) 50 mcg/actuation nasal spray; 1 spray (50 mcg total) by Each Nostril route once daily.  Dispense: 16 g; Refill: 2    Fatigue, unspecified type  -     TSH; Future; Expected date: 08/21/2023    Start antibiotic for possible bacterial sinusitis.   Flonase prn.    Patient has follow up blood work ordered by Hematology for CBC, iron studies, B12. Vitamin D was low end of normal range - she is taking a prescription supplement. Will send a message requesting to add TSH to her next lab draw.       The patient location is:  Patient Home   The chief complaint leading to consultation is: noted below  Visit type: Virtual visit with synchronous audio and video  Total time spent with patient: 15 minutes  Each patient to whom he or she provides medical services by telemedicine is:  (1) informed of the relationship between the physician and patient and the respective role of any other health care provider with respect to management of the patient; and (2) notified that he or she may decline to receive medical services by telemedicine and may withdraw from such care at any time.    Follow-up: Follow up if symptoms worsen or fail to improve.    ______________________________________________________________________    Chief Complaint  Chief Complaint   Patient presents with    Sinus Problem    Fatigue       HPI  Estephania Rogers is a 57 y.o. female with multiple medical diagnoses as listed in the medical history and problem list that presents in a virtual visit c/o "head cold" that began 2 weeks ago. Patient is new to me. She c/o sinus pain, malaise, sore throat that began around that time. She stayed home and took OTC cold medication for a week. She states that she had to stop " taking them because they cause her to feel drowsy. She c/o persistent fatigue, mental fog, and head congestion. She denies sleep disruption at night. In fact, she feels that she could sleep all of the time. She has had several negative home COVID tests.       PAST MEDICAL HISTORY:  Past Medical History:   Diagnosis Date    Anemia     iron deficiency    Anxiety     Diabetes mellitus, type 2     Hypertension     Malabsorption     Migraine headache     Nephrolithiasis     Other specified intestinal malabsorption        PAST SURGICAL HISTORY:  Past Surgical History:   Procedure Laterality Date    ANAL FISTULOTOMY  2005    APPENDECTOMY  2005    AUGMENTATION OF BREAST  2014    BREAST SURGERY  2014    CHOLECYSTECTOMY  2005    GASTRIC BYPASS  2005    SINUS SURGERY      Dr. Oral Cobb    SPINE SURGERY      TONSILLECTOMY      TOTAL REDUCTION MAMMOPLASTY  2014       SOCIAL HISTORY:  Social History     Socioeconomic History    Marital status:     Number of children: 0   Tobacco Use    Smoking status: Never     Passive exposure: Never    Smokeless tobacco: Never    Tobacco comments:     Clerical work   Substance and Sexual Activity    Alcohol use: Yes     Alcohol/week: 1.0 standard drink of alcohol     Types: 1 Glasses of wine per week    Drug use: No    Sexual activity: Yes     Partners: Male     Birth control/protection: None     Comment: :  Gianni     Social Determinants of Health     Financial Resource Strain: Unknown (8/21/2023)    Overall Financial Resource Strain (CARDIA)     Difficulty of Paying Living Expenses: Patient refused   Food Insecurity: Unknown (8/21/2023)    Hunger Vital Sign     Worried About Running Out of Food in the Last Year: Patient refused     Ran Out of Food in the Last Year: Patient refused   Transportation Needs: Unknown (8/21/2023)    PRAPARE - Transportation     Lack of Transportation (Medical): Patient refused     Lack of Transportation (Non-Medical): Patient refused   Physical  Activity: Unknown (8/21/2023)    Exercise Vital Sign     Days of Exercise per Week: 0 days   Stress: No Stress Concern Present (8/21/2023)    [place] [employer] - Occupational Stress Questionnaire     Feeling of Stress : Only a little   Social Connections: Unknown (8/21/2023)    Social Connection and Isolation Panel [NHANES]     Frequency of Communication with Friends and Family: Patient refused     Frequency of Social Gatherings with Friends and Family: Patient refused     Active Member of Clubs or Organizations: Patient refused     Attends Club or Organization Meetings: Patient refused     Marital Status:    Housing Stability: Unknown (8/21/2023)    Housing Stability Vital Sign     Unable to Pay for Housing in the Last Year: Patient refused     Unstable Housing in the Last Year: Patient refused       FAMILY HISTORY:  Family History   Problem Relation Age of Onset    Cancer Father         pancreatic    Diabetes Father     Diabetes Mother     Hypertension Mother     Miscarriages / Stillbirths Mother     Stroke Mother     Diabetes Sister     Cancer Maternal Aunt         breast    Arthritis Maternal Grandmother     Arthritis Paternal Grandmother     Diabetes Sister     Breast cancer Paternal Aunt        ALLERGIES AND MEDICATIONS: updated and reviewed.  Review of patient's allergies indicates:  No Known Allergies  Current Outpatient Medications   Medication Sig Dispense Refill    ALPRAZolam (XANAX) 0.25 MG tablet Take 1 tablet (0.25 mg total) by mouth daily as needed for Anxiety. 20 tablet 0    amoxicillin-clavulanate 875-125mg (AUGMENTIN) 875-125 mg per tablet Take 1 tablet by mouth every 12 (twelve) hours. for 7 days 14 tablet 0    ascorbic acid, vitamin C, (VITAMIN C) 500 MG tablet Take 500 mg by mouth once daily.      cholecalciferol, vitamin D3, 1,250 mcg (50,000 unit) Tab Take 1,250 mcg by mouth every 7 days. 12 tablet 3    FLUoxetine 20 MG capsule TAKE 1 CAPSULE BY MOUTH EVERY DAY 90  "capsule 3    fluticasone propionate (FLONASE) 50 mcg/actuation nasal spray 1 spray (50 mcg total) by Each Nostril route once daily. 16 g 2    gabapentin (NEURONTIN) 300 MG capsule Take 1 capsule (300 mg total) by mouth every evening. 90 capsule 1    multivitamin (THERAGRAN) per tablet Take 1 tablet by mouth once daily.      phytonadione, vit K1, (VITAMIN K1 MISC) 1,000 mg by Misc.(Non-Drug; Combo Route) route once daily.      RESTASIS 0.05 % ophthalmic emulsion   3    vitamin A 12865 UNIT capsule Take 25,000 Units by mouth once daily.      vitamin E acetate (VITAMIN E ORAL) Take 1 tablet by mouth once daily.       No current facility-administered medications for this visit.         ROS  Review of Systems   Constitutional:  Positive for activity change and fatigue. Negative for fever and unexpected weight change.   HENT:  Positive for congestion, postnasal drip, rhinorrhea and sinus pressure. Negative for hearing loss, sinus pain, sore throat and trouble swallowing.    Eyes:  Negative for discharge and visual disturbance.   Respiratory:  Positive for chest tightness (chest heaviness). Negative for cough, shortness of breath and wheezing.    Cardiovascular:  Negative for chest pain and palpitations.   Gastrointestinal:  Negative for blood in stool, constipation, diarrhea and vomiting.   Endocrine: Negative for polydipsia and polyuria.   Genitourinary:  Negative for difficulty urinating, dysuria, hematuria and menstrual problem.   Musculoskeletal:  Negative for arthralgias, joint swelling and neck pain.   Neurological:  Negative for weakness and headaches.   Psychiatric/Behavioral:  Positive for confusion ("brain fog"). Negative for dysphoric mood and sleep disturbance.            Physical Exam  Physical Exam  Constitutional:       General: She is not in acute distress.  HENT:      Head: Normocephalic and atraumatic.   Neurological:      Mental Status: She is alert. Mental status is at baseline.   Psychiatric:         " Mood and Affect: Mood normal.         Behavior: Behavior normal.         *Physical exam limited by virtual visit.    Health Maintenance         Date Due Completion Date    HIV Screening Never done ---    TETANUS VACCINE Never done ---    Shingles Vaccine (1 of 2) Never done ---    DEXA Scan 03/31/2018 3/31/2016    COVID-19 Vaccine (4 - Moderna series) 01/03/2022 11/8/2021    Pneumococcal Vaccines (Age 0-64) (2 - PCV) 08/02/2023 8/2/2022    Influenza Vaccine (1) 09/01/2023 9/22/2022    Mammogram 05/23/2024 5/23/2023    Colorectal Cancer Screening 05/24/2024 5/24/2023    Hemoglobin A1c (Diabetic Prevention Screening) 10/14/2024 10/14/2021    Lipid Panel 10/14/2026 10/14/2021    Cervical Cancer Screening 03/31/2028 3/31/2023

## 2023-08-21 NOTE — Clinical Note
Good afternoon,  I just saw this patient for fatigue. I see that she has follow up labs ordered by you. I have ordered a TSH - could we link this to her next lab appointment when it is made?   Thanks!  Dr. Kristina Limon, DO Family Medicine

## 2023-09-06 ENCOUNTER — INFUSION (OUTPATIENT)
Dept: INFUSION THERAPY | Facility: HOSPITAL | Age: 58
End: 2023-09-06
Attending: INTERNAL MEDICINE
Payer: COMMERCIAL

## 2023-09-06 VITALS
OXYGEN SATURATION: 95 % | TEMPERATURE: 99 F | HEART RATE: 64 BPM | RESPIRATION RATE: 17 BRPM | DIASTOLIC BLOOD PRESSURE: 81 MMHG | SYSTOLIC BLOOD PRESSURE: 163 MMHG

## 2023-09-06 DIAGNOSIS — D50.9 IRON DEFICIENCY ANEMIA: Primary | ICD-10-CM

## 2023-09-06 PROCEDURE — 96374 THER/PROPH/DIAG INJ IV PUSH: CPT

## 2023-09-06 PROCEDURE — 63600175 PHARM REV CODE 636 W HCPCS: Performed by: INTERNAL MEDICINE

## 2023-09-06 RX ADMIN — IRON SUCROSE 200 MG: 20 INJECTION, SOLUTION INTRAVENOUS at 03:09

## 2023-11-07 ENCOUNTER — HOSPITAL ENCOUNTER (OUTPATIENT)
Dept: RADIOLOGY | Facility: HOSPITAL | Age: 58
Discharge: HOME | End: 2023-11-07
Attending: NURSE PRACTITIONER
Payer: COMMERCIAL

## 2023-11-07 DIAGNOSIS — K75.81 NASH (NONALCOHOLIC STEATOHEPATITIS): ICD-10-CM

## 2023-11-07 DIAGNOSIS — K74.02 HEPATIC FIBROSIS, STAGE 3: ICD-10-CM

## 2023-11-07 PROCEDURE — 76705 ECHO EXAM OF ABDOMEN: CPT | Mod: 26,,, | Performed by: RADIOLOGY

## 2023-11-07 PROCEDURE — 76705 US ABDOMEN LIMITED: ICD-10-PCS | Mod: 26,,, | Performed by: RADIOLOGY

## 2023-11-07 PROCEDURE — 76705 ECHO EXAM OF ABDOMEN: CPT | Mod: TC

## 2023-11-09 ENCOUNTER — HOSPITAL ENCOUNTER (OUTPATIENT)
Dept: RADIOLOGY | Facility: CLINIC | Age: 58
Discharge: HOME | End: 2023-11-09
Attending: FAMILY MEDICINE
Payer: COMMERCIAL

## 2023-11-09 DIAGNOSIS — Z78.0 ASYMPTOMATIC MENOPAUSAL STATE: ICD-10-CM

## 2023-11-09 PROCEDURE — 77080 DXA BONE DENSITY AXIAL: CPT | Mod: TC,PO

## 2023-11-09 PROCEDURE — 77080 DEXA BONE DENSITY SPINE HIP: ICD-10-PCS | Mod: 26,,, | Performed by: INTERNAL MEDICINE

## 2023-11-09 PROCEDURE — 77080 DXA BONE DENSITY AXIAL: CPT | Mod: 26,,, | Performed by: INTERNAL MEDICINE

## 2023-11-14 ENCOUNTER — OFFICE VISIT (OUTPATIENT)
Dept: HEPATOLOGY | Facility: CLINIC | Age: 58
End: 2023-11-14
Payer: COMMERCIAL

## 2023-11-14 DIAGNOSIS — E80.4 GILBERT'S SYNDROME: ICD-10-CM

## 2023-11-14 DIAGNOSIS — E66.3 OVERWEIGHT (BMI 25.0-29.9): ICD-10-CM

## 2023-11-14 DIAGNOSIS — K74.02 HEPATIC FIBROSIS, STAGE 3: ICD-10-CM

## 2023-11-14 DIAGNOSIS — K75.81 NASH (NONALCOHOLIC STEATOHEPATITIS): Primary | ICD-10-CM

## 2023-11-14 DIAGNOSIS — K76.0 FATTY LIVER: ICD-10-CM

## 2023-11-14 PROCEDURE — 99214 OFFICE O/P EST MOD 30 MIN: CPT | Mod: 95,,, | Performed by: NURSE PRACTITIONER

## 2023-11-14 PROCEDURE — 99214 PR OFFICE/OUTPT VISIT, EST, LEVL IV, 30-39 MIN: ICD-10-PCS | Mod: 95,,, | Performed by: NURSE PRACTITIONER

## 2023-11-14 NOTE — PROGRESS NOTES
[facility] - Established Patient     Last Clinic Visit: 5/16/23    Chief Complaint: Follow-up for KRAUS with hepatic fibrosis      HISTORY     This is a 58 y.o. female with PMH noted below, here for follow-up of KRAUS with advanced fibrosis (F3).    She was initially told that she had fatty liver ~25 years ago.    H/o gastric bypass in 2005 - lost >100 lb.     Her transaminases have been intermittently elevated since 2005. AST>ALT, <100. TBili also mildly elevated since 2011. Liver enzymes have improved with weight loss.      Serologic workup has been negative for other etiologies of liver disease. UGT1A1 testing confirms Gilbert's.      Her Fibroscan suggested advanced fibrosis (F3) so she underwent liver biopsy 11/12/20 to confirm.    FINAL PATHOLOGIC DIAGNOSIS  Mild macrovesicular steatosis with minimally active steatohepatitis and multifocal bridging fibrosis with focal nodule formation. The NAFLD activity score (HARIS) = 3 (1+1+1) with stage 3 fibrosis.     Follow-up fibrosis staging:  Fibroscan 11/2022 = F3 (kPa 12.2), S3 ()    Interval history:  Feels well overall, no new concerns from liver standpoint.    Denies symptoms of hepatic decompensation including jaundice, ascites, cognitive problems to suggest hepatic encephalopathy, or GI bleeding.     Updates on risk factors for fatty liver:   Weight -- BMI ~29  Recently started wegovy. Has noticed a decreased in sugar cravings.   Has lost a few lb            Dyslipidemia -- well controlled in 2021                      Insulin resistance / diabetes -- last HgbA1c 5.0 in 2021  Alcohol use -- occasional glass of wine     Health Maintenance:  -- HCC screening: [facility] 11/7/23 without focal hepatic lesion; AFP <2  -- Variceal screening: no EGD  -- Hepatitis A & B vaccination: complete        Past medical history, surgical history, problem list, family history, social history, allergies: Reviewed and updated in the appropriate section of the  electronic medical record.      Current Outpatient Medications   Medication Sig Dispense Refill    ALPRAZolam (XANAX) 0.25 MG tablet Take 1 tablet (0.25 mg total) by mouth daily as needed for Anxiety. 20 tablet 0    ascorbic acid, vitamin C, (VITAMIN C) 500 MG tablet Take 500 mg by mouth once daily.      cholecalciferol, vitamin D3, 1,250 mcg (50,000 unit) Tab Take 1,250 mcg by mouth every 7 days. 12 tablet 3    FLUoxetine 20 MG capsule TAKE 1 CAPSULE BY MOUTH EVERY DAY 90 capsule 3    fluticasone propionate (FLONASE) 50 mcg/actuation nasal spray 1 spray (50 mcg total) by Each Nostril route once daily. 16 g 2    gabapentin (NEURONTIN) 300 MG capsule Take 1 capsule (300 mg total) by mouth every evening. 90 capsule 1    multivitamin (THERAGRAN) per tablet Take 1 tablet by mouth once daily.      phytonadione, vit K1, (VITAMIN K1 MISC) 1,000 mg by Misc.(Non-Drug; Combo Route) route once daily.      RESTASIS 0.05 % ophthalmic emulsion   3    vitamin A 44027 UNIT capsule Take 25,000 Units by mouth once daily.      vitamin E acetate (VITAMIN E ORAL) Take 1 tablet by mouth once daily.       No current facility-administered medications for this visit.     Medication list reviewed and updated.      Review of Systems - as per HPI  Constitutional: Negative for fatigue or unexpected weight change.   Respiratory: Negative for shortness of breath.    Cardiovascular: Negative for leg swelling.  Gastrointestinal: Negative for abdominal distention or abdominal pain. Negative for melena or hematemesis.  Musculoskeletal: Negative for myalgias.    Skin: Negative for jaundice or itching.  Neurological: Negative for confusion or slowed mentation. Negative for tremors.   Hematological: Does not bruise/bleed easily.   Psychiatric: Negative for sleep disturbance.      Physical Exam - limited by virtual visit  Constitutional: No distress. Alert and oriented to person, place, and time.  Pulmonary/Chest: No respiratory distress.   Skin: No  jaundice.   Psychiatric: Normal mood and affect. Speech, behavior, and thought content normal. No depression or anxiety noted.         LABS & DIAGNOSTIC STUDIES     I have personally reviewed pertinent laboratory findings:    Lab Results   Component Value Date    ALT 53 (H) 11/07/2023    AST 52 (H) 11/07/2023    ALKPHOS 146 (H) 11/07/2023    BILITOT 1.0 11/07/2023    ALBUMIN 4.1 11/07/2023    INR 1.1 11/07/2023       Lab Results   Component Value Date    WBC 4.33 11/07/2023    HGB 14.0 11/07/2023    HCT 43.3 11/07/2023    MCV 96 11/07/2023     11/07/2023       Lab Results   Component Value Date     11/07/2023    K 4.1 11/07/2023    BUN 11 11/07/2023    CREATININE 0.7 11/07/2023    ESTGFRAFRICA >60 06/21/2022    EGFRNONAA >60 06/21/2022       Lab Results   Component Value Date    SMOOTHMUSCAB Positive 1:40 (A) 10/08/2020    AMAIFA Negative 1:40 10/08/2020    IGGSERUM 1033 10/08/2020    ANASCREEN Negative <1:80 06/10/2022    FERRITIN 869 (H) 07/05/2023    FESATURATED 39 07/05/2023    YARJF5SJOEQJ MM 12/01/2020    EOUGX0SUQSGQ 134 12/01/2020    CERULOPLSM 26.0 06/10/2022    HEPBSAG Negative 10/08/2020    HEPBCAB Negative 10/08/2020    HEPCAB Negative 09/22/2020       Lab Results   Component Value Date    AFP <2.0 11/07/2023       I have personally reviewed the following result reports:  Abdominal US - 11/7/23  Liver biopsy - 11/12/20      ASSESSMENT & PLAN     58 y.o. female with:    1. KRAUS with advanced fibrosis (F3)   -- Liver enzymes remain mildly elevated, anticipate improvement with weight loss.   -- Reassured liver function remains stable, monitor LFTs every 6 months  -- HCC screening every 6 months with ultrasound and AFP, next due 5/2024  -- Defer EGD given F3, normal platelet count, no evidence of portal HTN. Will repeat Fibroscan next year.    2. Overweight with BMI 29  -- Recommend low carbohydrate/sugar, high protein diet   -- Recently started wegovy which should aid with weight loss          Orders Placed This Encounter   Procedures    US Abdomen Limited    CBC Without Differential    Comprehensive Metabolic Panel    AFP Tumor Marker    Protime-INR       *See AVS for patient education and instructions.      F/u in 6 months with labs/US prior.       Thank you for allowing me to participate in the care of Estephania Cortezbessie Cornelius, FNP-C  Hepatology        The patient location is: [place], [place]  The chief complaint leading to consultation is: F/u for fatty liver with hepatic fibrosis     Visit type: audiovisual    Face to Face time with patient: 16 min  30 minutes of total time spent on the encounter, which includes face to face time and non-face to face time preparing to see the patient (eg, review of tests), Obtaining and/or reviewing separately obtained history, Documenting clinical information in the electronic or other health record, Independently interpreting results (not separately reported) and communicating results to the patient/family/caregiver, or Care coordination (not separately reported).     Each patient to whom he or she provides medical services by telemedicine is:  (1) informed of the relationship between the physician and patient and the respective role of any other health care provider with respect to management of the patient; and (2) notified that he or she may decline to receive medical services by telemedicine and may withdraw from such care at any time.

## 2023-11-14 NOTE — PATIENT INSTRUCTIONS
Lab and ultrasound monitoring every 6 months        Your testing shows you have stage 3 scar tissue in the liver which is pre-cirrhosis.  Your liver is working well. We want to protect it and prevent further damage.  AVOID ALL alcohol (includes beer, wine and liquor)  AVOID non-steroidal anti-inflammatory drugs (NSAIDs) such as ibuprofen (Motrin, Advil), naproxen (Naprosyn, Aleve), meloxicam (Mobic)   Tylenol/acetaminophen is OKAY for pain, no more than 2000 mg per day  No raw shellfish due to infection risk.     Liver cancer screening (ultrasound and blood test) is needed every 6 months.  - Next due: May 2024

## 2023-11-15 ENCOUNTER — PATIENT MESSAGE (OUTPATIENT)
Dept: HEMATOLOGY/ONCOLOGY | Facility: CLINIC | Age: 58
End: 2023-11-15
Payer: COMMERCIAL

## 2023-11-15 ENCOUNTER — PATIENT MESSAGE (OUTPATIENT)
Dept: HEPATOLOGY | Facility: CLINIC | Age: 58
End: 2023-11-15
Payer: COMMERCIAL

## 2023-11-15 ENCOUNTER — PATIENT MESSAGE (OUTPATIENT)
Dept: FAMILY MEDICINE | Facility: CLINIC | Age: 58
End: 2023-11-15
Payer: COMMERCIAL

## 2023-11-29 ENCOUNTER — INFUSION (OUTPATIENT)
Dept: INFUSION THERAPY | Facility: HOSPITAL | Age: 58
End: 2023-11-29
Attending: INTERNAL MEDICINE
Payer: COMMERCIAL

## 2023-11-29 VITALS
RESPIRATION RATE: 14 BRPM | HEART RATE: 55 BPM | TEMPERATURE: 99 F | OXYGEN SATURATION: 99 % | SYSTOLIC BLOOD PRESSURE: 152 MMHG | DIASTOLIC BLOOD PRESSURE: 72 MMHG

## 2023-11-29 DIAGNOSIS — D50.9 IRON DEFICIENCY ANEMIA: Primary | ICD-10-CM

## 2023-11-29 PROCEDURE — 63600175 PHARM REV CODE 636 W HCPCS: Performed by: INTERNAL MEDICINE

## 2023-11-29 PROCEDURE — 96374 THER/PROPH/DIAG INJ IV PUSH: CPT

## 2023-11-29 RX ORDER — HEPARIN 100 UNIT/ML
500 SYRINGE INTRAVENOUS
Status: CANCELLED | OUTPATIENT
Start: 2023-11-29

## 2023-11-29 RX ADMIN — IRON SUCROSE 200 MG: 20 INJECTION, SOLUTION INTRAVENOUS at 04:11

## 2023-12-01 NOTE — PLAN OF CARE
Patient presented to unit for  q12wks Venofer IVP. VSS. Patient reported no adverse reactions following previous Venofer infusions. Venofer administered IVP over 5 mins with IVFs infusing. Discharged from unit in NAD.

## 2023-12-20 ENCOUNTER — OFFICE VISIT (OUTPATIENT)
Dept: FAMILY MEDICINE | Facility: CLINIC | Age: 58
End: 2023-12-20
Payer: COMMERCIAL

## 2023-12-20 ENCOUNTER — OFFICE VISIT (OUTPATIENT)
Dept: OTOLARYNGOLOGY | Facility: CLINIC | Age: 58
End: 2023-12-20
Payer: COMMERCIAL

## 2023-12-20 VITALS
DIASTOLIC BLOOD PRESSURE: 83 MMHG | WEIGHT: 208.31 LBS | BODY MASS INDEX: 29.06 KG/M2 | HEART RATE: 67 BPM | SYSTOLIC BLOOD PRESSURE: 152 MMHG

## 2023-12-20 DIAGNOSIS — J01.90 ACUTE NON-RECURRENT SINUSITIS, UNSPECIFIED LOCATION: Primary | ICD-10-CM

## 2023-12-20 DIAGNOSIS — M81.8 OTHER OSTEOPOROSIS WITHOUT CURRENT PATHOLOGICAL FRACTURE: Primary | ICD-10-CM

## 2023-12-20 DIAGNOSIS — K90.89 OTHER SPECIFIED INTESTINAL MALABSORPTION: ICD-10-CM

## 2023-12-20 PROCEDURE — 99999 PR PBB SHADOW E&M-EST. PATIENT-LVL III: CPT | Mod: PBBFAC,,, | Performed by: OTOLARYNGOLOGY

## 2023-12-20 PROCEDURE — 99214 OFFICE O/P EST MOD 30 MIN: CPT | Mod: S$GLB,,, | Performed by: OTOLARYNGOLOGY

## 2023-12-20 PROCEDURE — 99999 PR PBB SHADOW E&M-EST. PATIENT-LVL III: ICD-10-PCS | Mod: PBBFAC,,, | Performed by: OTOLARYNGOLOGY

## 2023-12-20 PROCEDURE — 99214 OFFICE O/P EST MOD 30 MIN: CPT | Mod: 95,,, | Performed by: FAMILY MEDICINE

## 2023-12-20 PROCEDURE — 99214 PR OFFICE/OUTPT VISIT, EST, LEVL IV, 30-39 MIN: ICD-10-PCS | Mod: S$GLB,,, | Performed by: OTOLARYNGOLOGY

## 2023-12-20 PROCEDURE — 99214 PR OFFICE/OUTPT VISIT, EST, LEVL IV, 30-39 MIN: ICD-10-PCS | Mod: 95,,, | Performed by: FAMILY MEDICINE

## 2023-12-20 RX ORDER — AMOXICILLIN AND CLAVULANATE POTASSIUM 875; 125 MG/1; MG/1
1 TABLET, FILM COATED ORAL 2 TIMES DAILY
Qty: 20 TABLET | Refills: 0 | Status: SHIPPED | OUTPATIENT
Start: 2023-12-20 | End: 2023-12-30

## 2023-12-20 NOTE — PROGRESS NOTES
Ms. Rogers     Vitals:    23 1626   BP: (!) 152/83   Pulse: 67       Chief Complaint:  Sinusitis       HPI:   is a 58-year-old white female who presents with complaints of possible sinus infection.  She states that this began several weeks ago with fever and left cheek pain though she did not have any purulent nasal discharge.  Significant past history for balloon sinuplasty proximally 10 years ago which she states helped her significantly.  She has not been doing any regular saline sinus rinses but has recently obtained a power flush rinse and is using this.  She has used Flonase occasionally.    SNOT22- 41 NOSE- 50    Review of Systems:  Constitutional:   weight loss or weight gain: Negative  Allergy/Immunologic:   Negative  Nasal Congestion/Obstruction:   Positive  Nosebleeds:   Negative  Sinus infections:   Positive as above  Headache/Facial Pain:   Positive as above also positive history of migraine headaches.  Snoring/NORMA:   Negative  Throat: Infections/Pain:   Negative  Hoarseness/Speech Disturbance:   Negative  Trauma Hx:  Negative    Cardiovascular:  M/I Angina: Negative  Hypertension: Negative  Endocrine:    DM/Steroids: Negative  GI:   Dysphagia/Reflux: Negative  :   GYN Pregnancy: Negative  Renal:   Dialysis: Negative  Lymphatic:   Neck Mass/Lymphadenopathy: Negative  Muscoloskeletal:   Negative  Hematologic:   Bleeding Disorders/Anemia: Negative  Neurologic:    Cranial/Neuralgia: Negative  Pulmonary:   Asthma/SOB/Cough: Negative  Skin Disorders: Negative    Past Medical/Surgical/Family/Social History:    ENT Surgery:  Status post balloon sinuplasty and tonsillectomy.  Occupational Exposure: Negative   Problems: Negative  Cancer: Negative    Past Family History:   Family history of Cancer: Negative    Past Social History:   Tobacco: Nonsmoker   Alcohol:  Once per week Social Drinker      Allergies and medications: Reviewed per med card.    Physical  Examination:  Ears:   External auditory canals:  Clear   Hearing: Grossly intact   Tympanic Membranes: Clear  Nose:   External: Normal   Intranasal:  Septal deviation and spur to the right with 2+ turbinates creating 75% obstruction.  Mouth:   Intraorally: Lips, teeth, and gums: Normal   Oropharynx: Normal   Mucosa: Normal   Tongue: Normal  Throat:      Palate: Normal palate with elevation   Tonsils:  Absent   Posterior Pharynx: Normal  Fiberoptic exam:  Examination with 0 degree scope number 100LE7 confirms her right-sided septal deviation and spur.  No intranasal polyps lesions or masses are noted.  She does have what appears to be some slight purulent drainage from her left middle meatal complex region.  Her right side does not show any purulence.  Head/Face:     Inspection: Normal and atraumatic   Palpation/Percussion: Non tender   Facial strength: Normal and symmetric   Salivary glands: Normal  Neck: Supple  Thyroid: No masses  Lymphatics: No nodes  Respiratory:   Effort: Normal  Eyes:   Ocular Mobility: Normal   Vision: Grossly intact  Neuro/Psych:   Cranial Nerves: Grossly Intact   Orientation: Normal   Mood/Affect: Normal      Assessment/Plan:  I have discussed my findings with her in detail as well as my recommendations for treatment.  I have encouraged her to use her saline sinus rinses on a daily basis.  I have also recommended that she use Flonase on a daily basis and I have described how this is to be administered as well.  I will prescribe Augmentin 875 p.o. b.i.d. times 10 days for her.  She will contact us regarding follow-up if this does not resolve her issues.

## 2023-12-20 NOTE — PROGRESS NOTES
Assessment & Plan  Problem List Items Addressed This Visit          GI    Other specified intestinal malabsorption     Other Visit Diagnoses       Other osteoporosis without current pathological fracture    -  Primary        Keep appointment with endo   Optimize vitamin D and calcium absorption  Consider benefit and risk of bisphosphonate therapy   Discussed need for weight bearing exercises      Health Maintenance reviewed, .    Follow-up: No follow-ups on file.    ______________________________________________________________________    Chief Complaint  No chief complaint on file.      HPI  Estephania Rogers is a 58 y.o. female with multiple medical diagnoses as listed in the medical history and problem list that presents for osteoporosis diagnosis.  Pt is known to me with last appointment 5/31/2023.    The patient location is: work  The chief complaint leading to consultation is: osteoporosis    Visit type: audiovisual    Face to Face time with patient: 24  32 minutes of total time spent on the encounter, which includes face to face time and non-face to face time preparing to see the patient (eg, review of tests), Obtaining and/or reviewing separately obtained history, Documenting clinical information in the electronic or other health record, Independently interpreting results (not separately reported) and communicating results to the patient/family/caregiver, or Care coordination (not separately reported).         Each patient to whom he or she provides medical services by telemedicine is:  (1) informed of the relationship between the physician and patient and the respective role of any other health care provider with respect to management of the patient; and (2) notified that he or she may decline to receive medical services by telemedicine and may withdraw from such care at any time.    Notes:     Discussed medical intervention to address osteoporosis.  She would like to address all issues full [place].   There are issues with absorption of Vitamin D and Calcium.  Abnormal calcium levels have lead to increased production of kidney stones.  She has had to have this surgically removed.  We discussed risk of bisphosphates.  I am in agreement with plan to meet with endocrinology to see if other modalities can address the vitamin def and hyperparathyroidism.  Last PTH elevated at 102 in 2012.  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  Surgeon was Dr. Niño  Last vitamin D was at the lower end of normal at 30 ng/mL  scheduled to meet with Dr. Rivas.       PAST MEDICAL HISTORY:  Past Medical History:   Diagnosis Date    Anemia     iron deficiency    Anxiety     Diabetes mellitus, type 2     Hypertension     Malabsorption     Migraine headache     Nephrolithiasis     Other specified intestinal malabsorption        PAST SURGICAL HISTORY:  Past Surgical History:   Procedure Laterality Date    ANAL FISTULOTOMY  2005    APPENDECTOMY  2005    AUGMENTATION OF BREAST  2014    BREAST SURGERY  2014    CHOLECYSTECTOMY  2005    GASTRIC BYPASS  2005    SINUS SURGERY      Dr. Oral Cobb    SPINE SURGERY      TONSILLECTOMY      TOTAL REDUCTION MAMMOPLASTY  2014       SOCIAL HISTORY:  Social History     Socioeconomic History    Marital status:     Number of children: 0   Tobacco Use    Smoking status: Never     Passive exposure: Never    Smokeless tobacco: Never    Tobacco comments:     Clerical work   Substance and Sexual Activity    Alcohol use: Yes     Alcohol/week: 1.0 standard drink of alcohol     Types: 1 Glasses of wine per week    Drug use: No    Sexual activity: Yes     Partners: Male     Birth control/protection: None     Comment: :  Gianni     Social Determinants of Health     Financial Resource Strain: Patient Declined (11/14/2023)    Overall Financial Resource Strain (CARDIA)     Difficulty of Paying Living Expenses: Patient declined   Food Insecurity: Patient Declined (11/14/2023)     Hunger Vital Sign     Worried About Running Out of Food in the Last Year: Patient declined     Ran Out of Food in the Last Year: Patient declined   Transportation Needs: Patient Declined (11/14/2023)    PRAPARE - Transportation     Lack of Transportation (Medical): Patient declined     Lack of Transportation (Non-Medical): Patient declined   Physical Activity: Unknown (11/14/2023)    Exercise Vital Sign     Days of Exercise per Week: Patient declined   Stress: Patient Declined (11/14/2023)    [employer] - Occupational Stress Questionnaire     Feeling of Stress : Patient declined   Social Connections: Unknown (11/14/2023)    Social Connection and Isolation Panel [NHANES]     Frequency of Communication with Friends and Family: Patient declined     Frequency of Social Gatherings with Friends and Family: Patient declined     Active Member of Clubs or Organizations: Patient declined     Attends Club or Organization Meetings: Patient declined     Marital Status: Patient declined   Housing Stability: Unknown (11/14/2023)    Housing Stability Vital Sign     Unable to Pay for Housing in the Last Year: Patient refused     Unstable Housing in the Last Year: Patient refused       FAMILY HISTORY:  Family History   Problem Relation Age of Onset    Cancer Father         pancreatic    Diabetes Father     Diabetes Mother     Hypertension Mother     Miscarriages / Stillbirths Mother     Stroke Mother     Diabetes Sister     Cancer Maternal Aunt         breast    Arthritis Maternal Grandmother     Arthritis Paternal Grandmother     Diabetes Sister     Breast cancer Paternal Aunt        ALLERGIES AND MEDICATIONS: updated and reviewed.  Review of patient's allergies indicates:  No Known Allergies  Current Outpatient Medications   Medication Sig Dispense Refill    ALPRAZolam (XANAX) 0.25 MG tablet Take 1 tablet (0.25 mg total) by mouth daily as needed for Anxiety. 20 tablet 0    ascorbic acid, vitamin C,  (VITAMIN C) 500 MG tablet Take 500 mg by mouth once daily.      cholecalciferol, vitamin D3, 1,250 mcg (50,000 unit) Tab Take 1,250 mcg by mouth every 7 days. 12 tablet 3    FLUoxetine 20 MG capsule TAKE 1 CAPSULE BY MOUTH EVERY DAY 90 capsule 3    fluticasone propionate (FLONASE) 50 mcg/actuation nasal spray 1 spray (50 mcg total) by Each Nostril route once daily. 16 g 2    gabapentin (NEURONTIN) 300 MG capsule Take 1 capsule (300 mg total) by mouth every evening. 90 capsule 1    multivitamin (THERAGRAN) per tablet Take 1 tablet by mouth once daily.      phytonadione, vit K1, (VITAMIN K1 MISC) 1,000 mg by Misc.(Non-Drug; Combo Route) route once daily.      RESTASIS 0.05 % ophthalmic emulsion   3    vitamin A 75337 UNIT capsule Take 25,000 Units by mouth once daily.      vitamin E acetate (VITAMIN E ORAL) Take 1 tablet by mouth once daily.       No current facility-administered medications for this visit.         ROS  Review of Systems   Constitutional:  Negative for activity change and unexpected weight change.   HENT:  Positive for rhinorrhea. Negative for hearing loss and trouble swallowing.    Eyes:  Negative for discharge and visual disturbance.   Respiratory:  Negative for chest tightness and wheezing.    Cardiovascular:  Negative for chest pain and palpitations.   Gastrointestinal:  Negative for blood in stool, constipation, diarrhea and vomiting.   Endocrine: Negative for polydipsia and polyuria.   Genitourinary:  Negative for difficulty urinating, dysuria, hematuria and menstrual problem.   Musculoskeletal:  Negative for arthralgias, joint swelling and neck pain.   Neurological:  Negative for weakness and headaches.   Psychiatric/Behavioral:  Negative for confusion and dysphoric mood.            Physical Exam  There were no vitals filed for this visit. There is no height or weight on file to calculate BMI.          Physical Exam  Constitutional:       Appearance: Normal appearance. She is well-developed.    HENT:      Head: Normocephalic and atraumatic.      Right Ear: External ear normal.      Left Ear: External ear normal.      Nose: Nose normal.   Eyes:      Extraocular Movements: Extraocular movements intact.      Conjunctiva/sclera: Conjunctivae normal.   Pulmonary:      Effort: Pulmonary effort is normal.   Neurological:      Mental Status: She is alert and oriented to person, place, and time.   Psychiatric:         Mood and Affect: Mood normal.         Behavior: Behavior normal.           Health Maintenance         Date Due Completion Date    HIV Screening Never done ---    TETANUS VACCINE Never done ---    High Dose Statin Never done ---    Shingles Vaccine (1 of 2) Never done ---    Pneumococcal Vaccines (Age 0-64) (2 - PCV) 08/02/2023 8/2/2022    COVID-19 Vaccine (4 - 2023-24 season) 09/01/2023 11/8/2021    Mammogram 05/23/2024 5/23/2023    Colorectal Cancer Screening 05/24/2024 5/24/2023    Hemoglobin A1c (Diabetic Prevention Screening) 10/14/2024 10/14/2021    DEXA Scan 11/09/2025 11/9/2023    Lipid Panel 10/14/2026 10/14/2021    Cervical Cancer Screening 03/31/2028 3/31/2023                Patient note was created using Imag[employer].  Any errors in syntax or even information may not have been identified and edited on initial review prior to signing this note.

## 2023-12-27 NOTE — PROGRESS NOTES
Subjective:       Patient ID: Estephania Rogers is a 58 y.o. female.    Chief Complaint: Other iron deficiency anemia    HPI Patient has not been seen in Hem/Onc clinic in 4 years. She has been receiving injectafer every 12 weeks at [facility].   Patient presents for follow up for iron defieincy anemia.     Overall she is doing well.  Fairly well. She has been busy.  She does have osteoporosis now. She will see an endocrinologist in 2 weeks.   Energy is still not where she would like it to be. Prefer to have more energy.   Appetite is good. Bowel movements are good.   Denies PICA and denies shortness of breath.   Leg cramping is better, but she has very bad restless leg syndrome.   She does have restless legs and that is worse right before she receives another iron infusion.   She started on Wegovy in November, she has lost about 8-10 lbs.       Per Dr. Andres's last note: In regards to her iron deficiency:  This is a 58-year-old female with iron deficiency anemia secondary to bariatric surgery about 7 years ago. In April 2010, she was found to be anemic with a hemoglobin of 9.8. She was placed on iron therapy at that time and repeat blood work in September revealed a ferritin of 5. At that time, she was referred for further evaluation of her iron deficiency. We initiated Ferrlecit infusions in the fall for a total of 8 weeks and she had a good response. She is here today for a follow up. GI evaluation including EGD and colonoscopy was negative.   She has a history of prior bariatric surgery.    Review of Systems   Constitutional:  Positive for fatigue. Negative for activity change, appetite change, chills, diaphoresis, fever and unexpected weight change.   HENT:  Negative for nosebleeds.    Eyes:  Negative for redness and visual disturbance.   Respiratory:  Negative for cough, chest tightness, shortness of breath and wheezing.    Cardiovascular:  Negative for chest pain, palpitations and leg swelling.    Gastrointestinal:  Negative for abdominal distention, abdominal pain, blood in stool, constipation, diarrhea, nausea and vomiting.        Chronic changes since bypass   Genitourinary:  Negative for hematuria.   Musculoskeletal:         Leg cramping and restless legs a night   Skin:  Negative for color change and rash.   Neurological:  Negative for dizziness, weakness, light-headedness, numbness and headaches.   Hematological:  Negative for adenopathy. Does not bruise/bleed easily.   Psychiatric/Behavioral:  Negative for confusion.      Objective:      Physical Exam  Constitutional:       General: She is not in acute distress.     Appearance: Normal appearance.   HENT:      Head: Normocephalic and atraumatic.      Nose: Nose normal.   Pulmonary:      Effort: Pulmonary effort is normal.   Neurological:      General: No focal deficit present.      Mental Status: She is alert and oriented to person, place, and time.   Psychiatric:         Mood and Affect: Mood normal.         Behavior: Behavior normal.         Thought Content: Thought content normal.         Judgment: Judgment normal.       Limited due to virtual visit  Labs- reviewed  Assessment:       1. Other iron deficiency anemia    2. Other vitamin B12 deficiency anemia    3. Vitamin D deficiency        Plan:     1. Continue  q12 week venofer replacement  Opts for Thursday afternoons with treatment at [facility].    She is currently taking 50,000 units of Vitamin D, will transition to daily as level is < 30      Return to clinic in 6 months with MD appointment and labs.     Patient is in agreement with the proposed treatment plan. All questions were answered to the patient's satisfaction. Patient knows to call clinic for any new or worsening symptoms and if anything is needed before the next clinic visit.          Nova Capps, EDWARDP-C  Hematology & Medical Oncology   [facility] [place] [address]  ph. [phone]  Fax.  [phone]    Collaborating physician, Dr. Andres.      Route Chart for Scheduling    Med Onc Chart Routing      Follow up with physician    Follow up with [facility] 6 months. labs a few days prior   Infusion scheduling note    Injection scheduling note    Labs CBC, CMP, vitamin D, iron and TIBC and ferritin   Scheduling:  Preferred lab:  Lab interval:  [place] [facility] [place] of the labs due in 6 months   Imaging    Pharmacy appointment    Other referrals                    Therapy Plan Information  iron sucrose injection 200 mg  200 mg, Intravenous, Every visit    [place]  The chief complaint leading to consultation is: Iron Deficiency anemia     Visit type: audiovisual    Face to Face time with patient: 16  20 minutes of total time spent on the encounter, which includes face to face time and non-face to face time preparing to see the patient (eg, review of tests), Obtaining and/or reviewing separately obtained history, Documenting clinical information in the electronic or other health record, Independently interpreting results (not separately reported) and communicating results to the patient/family/caregiver, or Care coordination (not separately reported).         Each patient to whom he or she provides medical services by telemedicine is:  (1) informed of the relationship between the physician and patient and the respective role of any other health care provider with respect to management of the patient; and (2) notified that he or she may decline to receive medical services by telemedicine and may withdraw from such care at any time.    Notes: see above

## 2024-01-05 ENCOUNTER — LAB VISIT (OUTPATIENT)
Dept: LAB | Facility: HOSPITAL | Age: 59
End: 2024-01-05
Attending: NURSE PRACTITIONER
Payer: COMMERCIAL

## 2024-01-05 DIAGNOSIS — E55.9 VITAMIN D DEFICIENCY: ICD-10-CM

## 2024-01-05 DIAGNOSIS — D50.8 OTHER IRON DEFICIENCY ANEMIA: ICD-10-CM

## 2024-01-05 DIAGNOSIS — D51.8 OTHER VITAMIN B12 DEFICIENCY ANEMIA: ICD-10-CM

## 2024-01-05 LAB
25(OH)D3+25(OH)D2 SERPL-MCNC: 26 NG/ML (ref 30–96)
ERYTHROCYTE [DISTWIDTH] IN BLOOD BY AUTOMATED COUNT: 12.3 % (ref 11.5–14.5)
HCT VFR BLD AUTO: 40.9 % (ref 37–48.5)
HGB BLD-MCNC: 12.9 G/DL (ref 12–16)
IMM GRANULOCYTES # BLD AUTO: 0.01 K/UL (ref 0–0.04)
IRON SERPL-MCNC: 158 UG/DL (ref 30–160)
MCH RBC QN AUTO: 30.9 PG (ref 27–31)
MCHC RBC AUTO-ENTMCNC: 31.5 G/DL (ref 32–36)
MCV RBC AUTO: 98 FL (ref 82–98)
NEUTROPHILS # BLD AUTO: 3.6 K/UL (ref 1.8–7.7)
PLATELET # BLD AUTO: 200 K/UL (ref 150–450)
PMV BLD AUTO: 11.5 FL (ref 9.2–12.9)
RBC # BLD AUTO: 4.17 M/UL (ref 4–5.4)
SATURATED IRON: 56 % (ref 20–50)
TOTAL IRON BINDING CAPACITY: 284 UG/DL (ref 250–450)
TRANSFERRIN SERPL-MCNC: 192 MG/DL (ref 200–375)
VIT B12 SERPL-MCNC: 1131 PG/ML (ref 210–950)
WBC # BLD AUTO: 5.83 K/UL (ref 3.9–12.7)

## 2024-01-05 PROCEDURE — 83540 ASSAY OF IRON: CPT | Performed by: NURSE PRACTITIONER

## 2024-01-05 PROCEDURE — 36415 COLL VENOUS BLD VENIPUNCTURE: CPT | Mod: PO | Performed by: NURSE PRACTITIONER

## 2024-01-05 PROCEDURE — 85027 COMPLETE CBC AUTOMATED: CPT | Performed by: NURSE PRACTITIONER

## 2024-01-05 PROCEDURE — 82728 ASSAY OF FERRITIN: CPT | Performed by: NURSE PRACTITIONER

## 2024-01-05 PROCEDURE — 82607 VITAMIN B-12: CPT | Performed by: NURSE PRACTITIONER

## 2024-01-05 PROCEDURE — 82306 VITAMIN D 25 HYDROXY: CPT | Performed by: NURSE PRACTITIONER

## 2024-01-06 LAB — FERRITIN SERPL-MCNC: 1473 NG/ML (ref 20–300)

## 2024-01-08 ENCOUNTER — OFFICE VISIT (OUTPATIENT)
Dept: HEMATOLOGY/ONCOLOGY | Facility: CLINIC | Age: 59
End: 2024-01-08
Payer: COMMERCIAL

## 2024-01-08 DIAGNOSIS — D50.8 OTHER IRON DEFICIENCY ANEMIA: Primary | ICD-10-CM

## 2024-01-08 DIAGNOSIS — E55.9 VITAMIN D DEFICIENCY: ICD-10-CM

## 2024-01-08 DIAGNOSIS — D51.8 OTHER VITAMIN B12 DEFICIENCY ANEMIA: ICD-10-CM

## 2024-01-08 PROCEDURE — 99214 OFFICE O/P EST MOD 30 MIN: CPT | Mod: 95,,, | Performed by: NURSE PRACTITIONER

## 2024-01-08 RX ORDER — ERGOCALCIFEROL 1.25 MG/1
CAPSULE ORAL
Qty: 60 CAPSULE | Refills: 2 | Status: SHIPPED | OUTPATIENT
Start: 2024-01-08 | End: 2024-01-08

## 2024-01-08 RX ORDER — ERGOCALCIFEROL 1.25 MG/1
CAPSULE ORAL
Qty: 60 CAPSULE | Refills: 2 | Status: SHIPPED | OUTPATIENT
Start: 2024-01-08 | End: 2024-01-10

## 2024-01-09 ENCOUNTER — PATIENT MESSAGE (OUTPATIENT)
Dept: HEMATOLOGY/ONCOLOGY | Facility: CLINIC | Age: 59
End: 2024-01-09
Payer: COMMERCIAL

## 2024-01-09 DIAGNOSIS — E55.9 VITAMIN D DEFICIENCY: Primary | ICD-10-CM

## 2024-01-10 RX ORDER — CHOLECALCIFEROL (VITAMIN D3) 1250 MCG
TABLET ORAL
Qty: 30 TABLET | Refills: 1 | Status: SHIPPED | OUTPATIENT
Start: 2024-01-10

## 2024-01-11 ENCOUNTER — HOSPITAL ENCOUNTER (EMERGENCY)
Facility: HOSPITAL | Age: 59
Discharge: HOME | End: 2024-01-11
Attending: EMERGENCY MEDICINE
Payer: COMMERCIAL

## 2024-01-11 VITALS
BODY MASS INDEX: 27.3 KG/M2 | DIASTOLIC BLOOD PRESSURE: 70 MMHG | WEIGHT: 195 LBS | OXYGEN SATURATION: 97 % | HEIGHT: 71 IN | TEMPERATURE: 99 F | RESPIRATION RATE: 18 BRPM | HEART RATE: 62 BPM | SYSTOLIC BLOOD PRESSURE: 126 MMHG

## 2024-01-11 DIAGNOSIS — M62.838 MUSCLE SPASMS OF NECK: ICD-10-CM

## 2024-01-11 DIAGNOSIS — M54.2 NECK PAIN: ICD-10-CM

## 2024-01-11 DIAGNOSIS — S16.1XXA CERVICAL STRAIN, ACUTE, INITIAL ENCOUNTER: Primary | ICD-10-CM

## 2024-01-11 PROCEDURE — 96372 THER/PROPH/DIAG INJ SC/IM: CPT | Performed by: EMERGENCY MEDICINE

## 2024-01-11 PROCEDURE — 63600175 PHARM REV CODE 636 W HCPCS: Mod: ER | Performed by: EMERGENCY MEDICINE

## 2024-01-11 PROCEDURE — 99284 EMERGENCY DEPT VISIT MOD MDM: CPT | Mod: ER

## 2024-01-11 RX ORDER — TIZANIDINE 4 MG/1
4 TABLET ORAL EVERY 8 HOURS PRN
Qty: 20 TABLET | Refills: 0 | Status: SHIPPED | OUTPATIENT
Start: 2024-01-11

## 2024-01-11 RX ORDER — KETOROLAC TROMETHAMINE 30 MG/ML
30 INJECTION, SOLUTION INTRAMUSCULAR; INTRAVENOUS
Status: COMPLETED | OUTPATIENT
Start: 2024-01-11 | End: 2024-01-11

## 2024-01-11 RX ORDER — DEXAMETHASONE SODIUM PHOSPHATE 4 MG/ML
8 INJECTION, SOLUTION INTRA-ARTICULAR; INTRALESIONAL; INTRAMUSCULAR; INTRAVENOUS; SOFT TISSUE
Status: COMPLETED | OUTPATIENT
Start: 2024-01-11 | End: 2024-01-11

## 2024-01-11 RX ORDER — FAMOTIDINE 20 MG/1
20 TABLET, FILM COATED ORAL 2 TIMES DAILY
Qty: 20 TABLET | Refills: 0 | Status: SHIPPED | OUTPATIENT
Start: 2024-01-11

## 2024-01-11 RX ORDER — KETOROLAC TROMETHAMINE 10 MG/1
10 TABLET, FILM COATED ORAL EVERY 8 HOURS PRN
Qty: 15 TABLET | Refills: 0 | Status: SHIPPED | OUTPATIENT
Start: 2024-01-11 | End: 2024-01-16

## 2024-01-11 RX ADMIN — KETOROLAC TROMETHAMINE 30 MG: 30 INJECTION, SOLUTION INTRAMUSCULAR; INTRAVENOUS at 07:01

## 2024-01-11 RX ADMIN — DEXAMETHASONE SODIUM PHOSPHATE 8 MG: 4 INJECTION, SOLUTION INTRA-ARTICULAR; INTRALESIONAL; INTRAMUSCULAR; INTRAVENOUS; SOFT TISSUE at 07:01

## 2024-01-11 NOTE — ED NOTES
Pt reports pain to her posterior neck x 5days, reports pain has been progressively worsening with heat, ice, OTC pain meds and prescription ibuprofen and flexeril. Pt states she is unable to hold her head up for long periods of time. Denies injury and/or trauma

## 2024-01-11 NOTE — ED PROVIDER NOTES
Encounter Date: 1/11/2024    SCRIBE #1 NOTE: I, Maddydunia Kirk, am scribing for, and in the presence of,  Umm Patel MD.       History     Chief Complaint   Patient presents with    Neck Pain     Complains of posterior neck pain that radiates into bilateral shoulders after taking down valerie decorations 5 days ago. Pain worsens with movement.      58 y.o. female with PMHx of osteoporosis, T2DM, HTN, KRAUS, presents to ED with severe neck pain that began 1/6/2024. Reports pain began on the left side and upper back, and being seen and treated by chiropractor on 1/8/2024 which brought temporary relief, and since yesterday pain is much worse and is across upper back, bilateral neck and posterior head. [facility] chiropractor suspected a few displaced discs and adjusted her. States pain began after cleaning up Valerie decoration w/ some heavy lifting. Reports Hx of similar pain several years ago. Reports trouble sleeping secondary to pain. Attempted Tx with ibuprofen, Flexeril, ice, massages w/o relief. NKDA. No other exacerbating or alleviating factors. Denies nausea, vomiting, numbness, weakness, CP, SOB, or other associated symptoms. Patient drove to ED but states  is on his way.       The history is provided by the patient. No  was used.     Review of patient's allergies indicates:  No Known Allergies  Past Medical History:   Diagnosis Date    Anemia     iron deficiency    Anxiety     Diabetes mellitus, type 2     Hypertension     Malabsorption     Migraine headache     Nephrolithiasis     Other specified intestinal malabsorption      Past Surgical History:   Procedure Laterality Date    ANAL FISTULOTOMY  2005    APPENDECTOMY  2005    AUGMENTATION OF BREAST  2014    BREAST SURGERY  2014    CHOLECYSTECTOMY  2005    GASTRIC BYPASS  2005    SINUS SURGERY      Dr. Oral Cobb    SPINE SURGERY      TONSILLECTOMY      TOTAL REDUCTION MAMMOPLASTY  2014     Family History   Problem Relation Age  of Onset    Cancer Father         pancreatic    Diabetes Father     Diabetes Mother     Hypertension Mother     Miscarriages / Stillbirths Mother     Stroke Mother     Diabetes Sister     Cancer Maternal Aunt         breast    Arthritis Maternal Grandmother     Arthritis Paternal Grandmother     Diabetes Sister     Breast cancer Paternal Aunt      Social History     Tobacco Use    Smoking status: Never     Passive exposure: Never    Smokeless tobacco: Never    Tobacco comments:     Clerical work   Substance Use Topics    Alcohol use: Yes     Alcohol/week: 1.0 standard drink of alcohol     Types: 1 Glasses of wine per week    Drug use: No     Review of Systems   Constitutional:  Negative for chills and fever.   HENT:  Negative for congestion and sore throat.    Eyes:  Negative for visual disturbance.   Respiratory:  Negative for cough and shortness of breath.    Cardiovascular:  Negative for chest pain.   Gastrointestinal:  Negative for abdominal pain, nausea and vomiting.   Genitourinary:  Negative for dysuria and vaginal discharge.   Musculoskeletal:  Positive for back pain and neck pain.   Skin:  Negative for rash.   Neurological:  Positive for headaches. Negative for weakness and numbness.   Psychiatric/Behavioral:  Positive for sleep disturbance. Negative for decreased concentration.        Physical Exam     Initial Vitals [01/11/24 0658]   BP Pulse Resp Temp SpO2   (!) 180/91 94 18 98.5 °F (36.9 °C) 99 %      MAP       --         Physical Exam    Nursing note and vitals reviewed.  Constitutional: She is not diaphoretic. No distress.   HENT:   Head: Normocephalic and atraumatic.   Protecting airway   Eyes: Conjunctivae and EOM are normal. No scleral icterus.   Neck: Neck supple. No tracheal deviation present.   Normal range of motion.  Cardiovascular:  Normal rate, regular rhythm and intact distal pulses.           Pulmonary/Chest: No stridor. No respiratory distress.   Speaking in full sentences    Musculoskeletal:         General: No edema.      Cervical back: Normal range of motion and neck supple.      Comments: Diffuse cervical tenderness, no step-offs.      Neurological: She is alert. No cranial nerve deficit.   Strength and sensation intact in BUE, BLE.    Skin: Skin is warm and dry.   Psychiatric: She has a normal mood and affect.         ED Course   Procedures  Labs Reviewed - No data to display       Imaging Results              X-Ray Cervical Spine AP And Lateral (Final result)  Result time 01/11/24 08:07:04      Final result by Marisa Pang MD (01/11/24 08:07:04)                   Impression:      Spondylosis of the lower cervical spine.      Electronically signed by: Marisa Pang MD  Date:    01/11/2024  Time:    08:07               Narrative:    EXAMINATION:  XR CERVICAL SPINE AP LATERAL    CLINICAL HISTORY:  Cervicalgia    TECHNIQUE:  AP, lateral and open mouth views of the cervical spine were performed.    COMPARISON:  None.    FINDINGS:  Straightening of the cervical lordosis.    The vertebral body heights are well maintained.    There is severe disc space narrowing at C5-6 and C6-C7.  Small anterior osteophyte noted at these levels.    No fracture, no osseous lesions.    The prevertebral soft tissues appear normal.    Soft tissue calcification left mid neck region could be associated with calcified carotid plaque.  The lung apices appear normal.    There is atherosclerotic plaque of the aorta.                                       Medications   ketorolac injection 30 mg (30 mg Intramuscular Given 1/11/24 0742)   dexAMETHasone injection 8 mg (8 mg Intramuscular Given 1/11/24 0742)     Medical Decision Making  Per chart review, patient follows w/  hematology for iron deficiency anemia secondary to bariatric surgery 7 y/ago. Last Venofer infusion 11/29/2023.     Differential diagnosis include but are not limited to: strain, sprain, fracture, spondylolisthesis       Amount  and/or Complexity of Data Reviewed  External Data Reviewed: notes.     Details: I decided to obtain old medical records. See MDM.   Radiology: ordered.    Risk  Prescription drug management.            Scribe Attestation:   Scribe #1: I performed the above scribed service and the documentation accurately describes the services I performed. I attest to the accuracy of the note.        ED Course as of 01/12/24 2306   Thu Jan 11, 2024   0856 Patient is afebrile and in no acute distress at time history and physical.  She has full and symmetrical strength and sensation in bilateral upper lower extremities. Patient has no nuchal rigidity or fever to suggest meningitis.  No focal neurological deficits to suggest CVA. She has no midline C-spine tenderness or step-offs.  X-rays without acute fracture or spondylolisthesis.  There is straightening of the cervical lordosis suggesting muscle spasm.  Patient given Toradol, Decadron in the emergency department with some improvement in symptoms.  She remains clinically stable and is fit for discharge on trial of management with NSAIDs, muscle relaxant, close follow-up with primary physician, PMR. counseled on supportive care, appropriate medication usage, concerning symptoms for which to return to ER and the importance of follow up. Understanding and agreement with treatment plan was expressed.  [SG]      ED Course User Index  [SG] Umm Patel MD          This chart was completed using dictation software, as a result there may be some transcription errors.              I, Umm Patel , personally performed the services described in this documentation.  All medical record entries made by the scribe were at my direction and in my presence.  I have reviewed the chart and agree that the record reflects my personal performance and is accurate and complete.      Clinical Impression:  Final diagnoses:  [M54.2] Neck pain  [S16.1XXA] Cervical strain, acute, initial encounter  (Primary)  [M62.838] Muscle spasms of neck          ED Disposition Condition    Discharge Stable          ED Prescriptions       Medication Sig Dispense Start Date End Date Auth. Provider    ketorolac (TORADOL) 10 mg tablet Take 1 tablet (10 mg total) by mouth every 8 (eight) hours as needed for Pain. 15 tablet 1/11/2024 1/16/2024 Umm Patel MD    tiZANidine (ZANAFLEX) 4 MG tablet Take 1 tablet (4 mg total) by mouth every 8 (eight) hours as needed (mucle tightness, spasm). 20 tablet 1/11/2024 -- Umm Patel MD    famotidine (PEPCID) 20 MG tablet Take 1 tablet (20 mg total) by mouth 2 (two) times daily. 20 tablet 1/11/2024 -- Umm Patel MD          Follow-up Information       Follow up With Specialties Details Why Contact Info    Dulce Altamirano MD Family Medicine, Wound Care Schedule an appointment as soon as possible for a visit   [address] [facility]  [place] [address]  [phone]      [facility] PHYSICAL MEDICINE & REHAB Physical Medicine and Rehabilitation Schedule an appointment as soon as possible for a visit   [address] [facility] [place] [address] [phone]             Umm Patel MD  01/12/24 7227

## 2024-01-11 NOTE — DISCHARGE INSTRUCTIONS
Use Toradol as needed for pain.  Use Pepcid while taking Toradol to prevent acid reflux/gastritis symptoms Use tizanidine as needed for muscle tightness or spasm.  Do not drive or operate heavy machinery while using tizanidine as it can cause drowsiness and decrease coordination.  Schedule close follow-up with your primary physician as well as physical medicine and rehabilitation.  Return to the emergency department for any new, worsening or significantly concerning symptoms.    Thank you for coming to our Emergency Department today. It is important to remember that some problems are difficult to diagnose and may not be found during your first visit. Be sure to follow up with your primary care doctor and review any labs/imaging that was performed with them. If you do not have a primary care doctor, you may contact the one listed on your discharge paperwork or you may also call the [facility] Appointment Desk at [phone] to schedule an appointment with one.     All medications may potentially have side effects and it is impossible to predict which medications may give you side effects. If you feel that you are having a negative effect of any medication you should immediately stop taking them and seek medical attention.    Return to the ER with any questions/concerns, new/concerning symptoms, worsening or failure to improve. Do not drive or make any important decisions for 24 hours if you have received any pain medications, sedatives or mood altering drugs during your ER visit.

## 2024-01-23 RX ORDER — GABAPENTIN 300 MG/1
300 CAPSULE ORAL NIGHTLY
Qty: 90 CAPSULE | Refills: 1 | Status: SHIPPED | OUTPATIENT
Start: 2024-01-23 | End: 2025-01-22

## 2024-01-23 NOTE — TELEPHONE ENCOUNTER
No care due was identified.  [facility] Embedded Care Due Messages. Reference number: [id number].   1/23/2024 8:54:58 AM CST

## 2024-01-25 ENCOUNTER — OFFICE VISIT (OUTPATIENT)
Dept: ENDOCRINOLOGY | Facility: CLINIC | Age: 59
End: 2024-01-25
Payer: COMMERCIAL

## 2024-01-25 VITALS
HEIGHT: 71 IN | HEART RATE: 60 BPM | WEIGHT: 203.81 LBS | BODY MASS INDEX: 28.53 KG/M2 | DIASTOLIC BLOOD PRESSURE: 78 MMHG | OXYGEN SATURATION: 97 % | SYSTOLIC BLOOD PRESSURE: 132 MMHG

## 2024-01-25 DIAGNOSIS — M81.0 AGE-RELATED OSTEOPOROSIS WITHOUT CURRENT PATHOLOGICAL FRACTURE: Primary | ICD-10-CM

## 2024-01-25 PROCEDURE — 99204 OFFICE O/P NEW MOD 45 MIN: CPT | Mod: S$GLB,,, | Performed by: INTERNAL MEDICINE

## 2024-01-25 PROCEDURE — 99999 PR PBB SHADOW E&M-EST. PATIENT-LVL IV: CPT | Mod: PBBFAC,,, | Performed by: INTERNAL MEDICINE

## 2024-01-25 RX ORDER — ASPIRIN 325 MG
TABLET, DELAYED RELEASE (ENTERIC COATED) ORAL
COMMUNITY
Start: 2024-01-10 | End: 2024-01-29

## 2024-01-25 RX ORDER — PREDNISOLONE ACETATE 10 MG/ML
1 SUSPENSION/ DROPS OPHTHALMIC 3 TIMES DAILY
COMMUNITY
Start: 2024-01-23

## 2024-01-25 NOTE — PROGRESS NOTES
"Subjective:      Patient ID: Estephania Rogers is a 58 y.o. female.    Chief Complaint:  No chief complaint on file.      History of Present Illness    Weight loss surgery complicated by vitamin deficiency and KRAUS with hepatic fibrosis.   Today reports left hip pain, resolving cervical neck pain/spasm which required steroid therapy.  Osteoporosis is a new diagnosis. Denies prior treatment.     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   HRT history: denies     High Risk Medications:  Glucocorticoid History: once yearly has a flare up joint/muscle related usually injection and oral steroid taper    PPI: daily pepcid  Diabetes medicines: denies   Anti-seizure medication: denies     Personal history of kidney stones: yes  Family history of kidney stones: denies   Family history of osteoporosis or other bone disease: aunt with RA and maternal grandmother with osteoporosis   Family history of fractures:  denies      Review of Systems  URi in 12/2023    Objective:   Physical Exam  Vitals:    01/25/24 0811   BP: 132/78   Pulse: 60   SpO2: 97%   Weight: 92.4 kg (203 lb 13 oz)   Height: 5' 11" (1.803 m)       BP Readings from Last 3 Encounters:   01/25/24 132/78   01/11/24 126/70   12/20/23 (!) 152/83     Wt Readings from Last 1 Encounters:   01/25/24 0811 92.4 kg (203 lb 13 oz)         Body mass index is 28.43 kg/m².    Lab Review:   Lab Results   Component Value Date    HGBA1C 5.0 10/14/2021     Lab Results   Component Value Date    CHOL 134 10/14/2021    HDL 60 10/14/2021    LDLCALC 63.2 10/14/2021    TRIG 54 10/14/2021    CHOLHDL 44.8 10/14/2021     Lab Results   Component Value Date     11/07/2023    K 4.1 11/07/2023     " 11/07/2023    CO2 28 11/07/2023    GLU 86 11/07/2023    BUN 11 11/07/2023    CREATININE 0.7 11/07/2023    CALCIUM 9.5 11/07/2023    PROT 7.2 11/07/2023    ALBUMIN 4.1 11/07/2023    BILITOT 1.0 11/07/2023    ALKPHOS 146 (H) 11/07/2023    AST 52 (H) 11/07/2023    ALT 53 (H) 11/07/2023    ANIONGAP 8 11/07/2023    ESTGFRAFRICA >60 06/21/2022    EGFRNONAA >60 06/21/2022    TSH 1.092 11/07/2023             Assessment and Plan     Age-related osteoporosis without current pathological fracture  Risk factors: postmenopausal status, bariatric surgery in 2004 (duodenal switch), , family history and vitamin D deficiency and high risk medications    No previous fragility fractures   FRAX not elevated     Due for DXA scan in 11/2023, discussed importance of scheduling in the same location to allow for comparison    Secondary evaluation: PTH, renal function, 24 hr urine for bin/creat  Consider TTG/IgA  Thyroid function tests are normal. Liver enzymes are mildly abnormal.     Continue calcium and vitamin D  Continue to stay active, exercise and focus on strength and core    Treatment options and potential side effects discussed for PO bisphosphonates, Reclast, Prolia.     Discussed risk of osteonecrosis of the jaw and AFF with bisphosphonates and denosumab. Despite low risk the significant benefit on fracture reduction far outweighs the potential for this rare event.

## 2024-01-25 NOTE — ASSESSMENT & PLAN NOTE
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 Liver enzymes are mildly abnormal.     Continue calcium and vitamin D  Continue to stay active, exercise and focus on strength and core    Treatment options and potential side effects discussed for PO bisphosphonates, Reclast, Prolia.     Discussed risk of osteonecrosis of the jaw and AFF with bisphosphonates and denosumab. Despite low risk the significant benefit on fracture reduction far outweighs the potential for this rare event.

## 2024-01-25 NOTE — PATIENT INSTRUCTIONS
"  HOW TO COLLECT AN ACCURATE   24 HOUR URINE SPECIMEN     I want you to collect EVERY drop of your urine during a 24 hour period. I do not care what the volume of the urine is as long as it represents every drop that you pass during that 24 hour period. If you need to have a bowel movement, you may separate the urine but I want every drop.     Begin the collection on a morning which is convenient for you. At that time, pass your urine, flush it down the toilet and note the exact time. Now you have an empty bladder and empty bottle. That starts the collection. Collect every drop all during the day and night until exactly the same time the next morning, when you should pass your urine and add it to the bottle.     Bring the closed container back to the same laboratory that issued the empty container.       Osteoporosis medications  These are the medications we discussed for osteoporosis treatment:    - Bisphosphonates:         - these slow down bone loss         - oral forms (Fosamax and Actonel are examples) - taken once weekly or once monthly         - IV form (Reclast) - given intravenously once yearly    - Prolia (denosumab):          - slows down bone loss           - it is a subcutaneous injection (under the skin) every 6 months, given in the office    - Forteo (teriparatide) or Tymlos (abaloparatide):              - medications that "build bone" or increases bone formation              - subcutaneous injection (under the skin) taken once daily that you self-administer at home   - needs a medicine to "seal" in the bone you have gained, typically we use a bisphosphonate or prolia    - Evenity (romozosumab)   - medications that "build bone" or increases bone formation and slow donw bone loss              - two subcutaneous injection (under the skin) taken once monthly to schedule at     [facility] for twelve months   - needs a medicine to "seal" in the bone you have gained, typically  we use reclast or prolia once " you have completed the 12 month course.   For more information on medications: [email or web address].org/patients/treatment/[email or web address]

## 2024-01-29 ENCOUNTER — PATIENT MESSAGE (OUTPATIENT)
Dept: HEMATOLOGY/ONCOLOGY | Facility: CLINIC | Age: 59
End: 2024-01-29
Payer: COMMERCIAL

## 2024-02-14 ENCOUNTER — HOSPITAL ENCOUNTER (EMERGENCY)
Facility: HOSPITAL | Age: 59
Discharge: HOME | End: 2024-02-14
Attending: EMERGENCY MEDICINE
Payer: COMMERCIAL

## 2024-02-14 VITALS
WEIGHT: 195 LBS | BODY MASS INDEX: 27.3 KG/M2 | HEART RATE: 70 BPM | OXYGEN SATURATION: 99 % | HEIGHT: 71 IN | TEMPERATURE: 99 F | RESPIRATION RATE: 18 BRPM | SYSTOLIC BLOOD PRESSURE: 152 MMHG | DIASTOLIC BLOOD PRESSURE: 74 MMHG

## 2024-02-14 DIAGNOSIS — M79.673 HEEL PAIN: ICD-10-CM

## 2024-02-14 DIAGNOSIS — M76.61 RIGHT ACHILLES TENDINITIS: Primary | ICD-10-CM

## 2024-02-14 PROCEDURE — 63600175 PHARM REV CODE 636 W HCPCS: Mod: ER | Performed by: EMERGENCY MEDICINE

## 2024-02-14 PROCEDURE — 99284 EMERGENCY DEPT VISIT MOD MDM: CPT | Mod: 25,ER

## 2024-02-14 PROCEDURE — 96372 THER/PROPH/DIAG INJ SC/IM: CPT | Performed by: EMERGENCY MEDICINE

## 2024-02-14 RX ORDER — DICLOFENAC SODIUM 10 MG/G
2 GEL TOPICAL 4 TIMES DAILY
Qty: 100 G | Refills: 0 | Status: SHIPPED | OUTPATIENT
Start: 2024-02-14 | End: 2024-04-03

## 2024-02-14 RX ORDER — KETOROLAC TROMETHAMINE 30 MG/ML
30 INJECTION, SOLUTION INTRAMUSCULAR; INTRAVENOUS
Status: COMPLETED | OUTPATIENT
Start: 2024-02-14 | End: 2024-02-14

## 2024-02-14 RX ORDER — IBUPROFEN 400 MG/1
400 TABLET ORAL EVERY 6 HOURS PRN
Qty: 20 TABLET | Refills: 0 | Status: SHIPPED | OUTPATIENT
Start: 2024-02-14 | End: 2024-04-03

## 2024-02-14 RX ORDER — HYDROCODONE BITARTRATE AND ACETAMINOPHEN 5; 325 MG/1; MG/1
1 TABLET ORAL EVERY 6 HOURS PRN
Qty: 12 TABLET | Refills: 0 | Status: SHIPPED | OUTPATIENT
Start: 2024-02-14 | End: 2024-02-27 | Stop reason: ALTCHOICE

## 2024-02-14 RX ADMIN — KETOROLAC TROMETHAMINE 30 MG: 30 INJECTION INTRAMUSCULAR; INTRAVENOUS at 10:02

## 2024-02-15 NOTE — ED PROVIDER NOTES
Encounter Date: 2/14/2024       History     Chief Complaint   Patient presents with    Heel Pain     Pain to right heel, onset last night, denies injury or fall, hx of tight ligaments to same     HPI    58-year-old female with past medical history of anxiety, diabetes, hypertension, migraines presents with right heel pain x2 days.  Patient reports camping recently was going up and down the stairs into the camper, states she noticed a little bit of some pain in her right heel 2 days ago, states it progressively got worse to the point that she can not walk on it as of earlier today.  She reports little bit some swelling redness, states she has been icing it, has no history of gout.  She does report having this issue previously and has been seeing podiatrist, Dr. Vasquez.  She does have a boot at home but has not used it yet.  She reports taking some ibuprofen at home but it has not significantly helped.  No further complaints reported.    Review of patient's allergies indicates:  No Known Allergies  Past Medical History:   Diagnosis Date    Anemia     iron deficiency    Anxiety     Diabetes mellitus, type 2     Hypertension     Malabsorption     Migraine headache     Nephrolithiasis     Other specified intestinal malabsorption      Past Surgical History:   Procedure Laterality Date    ANAL FISTULOTOMY  2005    APPENDECTOMY  2005    AUGMENTATION OF BREAST  2014    BREAST SURGERY  2014    CHOLECYSTECTOMY  2005    GASTRIC BYPASS  2005    SINUS SURGERY      Dr. Oral Cobb    SPINE SURGERY      TONSILLECTOMY      TOTAL REDUCTION MAMMOPLASTY  2014     Family History   Problem Relation Age of Onset    Cancer Father         pancreatic    Diabetes Father     Diabetes Mother     Hypertension Mother     Miscarriages / Stillbirths Mother     Stroke Mother     Diabetes Sister     Cancer Maternal Aunt         breast    Arthritis Maternal Grandmother     Arthritis Paternal Grandmother     Diabetes Sister     Breast cancer Paternal  Aunt      Social History     Tobacco Use    Smoking status: Never     Passive exposure: Never    Smokeless tobacco: Never    Tobacco comments:     Clerical work   Substance Use Topics    Alcohol use: Yes     Alcohol/week: 1.0 standard drink of alcohol     Types: 1 Glasses of wine per week    Drug use: No     Review of Systems   Constitutional: Negative.    HENT: Negative.     Eyes: Negative.    Respiratory: Negative.     Cardiovascular: Negative.    Gastrointestinal: Negative.    Genitourinary: Negative.    Musculoskeletal:         Right heel pain   Skin: Negative.    Neurological: Negative.        Physical Exam     Initial Vitals [02/14/24 1746]   BP Pulse Resp Temp SpO2   (!) 167/71 62 18 98.5 °F (36.9 °C) 99 %      MAP       --         Physical Exam    Nursing note and vitals reviewed.  Constitutional: She appears well-developed and well-nourished. She is not diaphoretic. She appears distressed (mildly).   HENT:   Head: Normocephalic and atraumatic.   Eyes: Pupils are equal, round, and reactive to light. Right eye exhibits no discharge. Left eye exhibits no discharge.   Neck: No tracheal deviation present.   Normal range of motion.  Cardiovascular:  Normal rate and regular rhythm.           Pulmonary/Chest: No stridor. No respiratory distress.   Musculoskeletal:         General: Tenderness and edema (small amount of edema, tenderness and warmth to the right lateral posterior calcaneus with no tenderness with lateral Achilles tendon movement, no plantar tenderness noted, 2+ DP pulses noted.) present. Normal range of motion.      Cervical back: Normal range of motion.     Neurological: She is alert and oriented to person, place, and time.   Skin: Skin is warm and dry.         ED Course   Procedures  Labs Reviewed - No data to display       Imaging Results              X-Ray Foot Complete Right (Final result)  Result time 02/14/24 23:02:20      Final result by Viktoriya Rockwell MD (02/14/24 23:02:20)                    Impression:      No acute bony abnormality detected.  No significant change.      Electronically signed by: Viktoriya Rockwell  Date:    02/14/2024  Time:    23:02               Narrative:    EXAMINATION:  THREE VIEWS OF THE RIGHT FOOT    CLINICAL HISTORY:  Pain in unspecified foot    TECHNIQUE:  AP, lateral, and oblique view of the right foot    COMPARISON:  11/08/2022    FINDINGS:  Three views of the right foot demonstrate no acute fracture or dislocation.  There are small Achilles and plantar spurs.  There are calcific densities at the Achilles tendon insertion, which may be related to Achilles calcific tendinitis.  There are calcific density also at the plantar aspect of the hindfoot, which may be related to plantar fasciitis.  Mild degenerative changes are seen at the 1st metatarsophalangeal changes.  The bones are diffusely osteopenic.                                       Medications   ketorolac injection 30 mg (30 mg Intramuscular Given 2/14/24 2239)     Medical Decision Making  Amount and/or Complexity of Data Reviewed  Radiology: ordered.    Risk  Prescription drug management.      MDM:    58-year-old female with past medical history as noted above presenting with right heel pain.  Differential Diagnosis includes:  Achilles rupture, fracture, Achilles tendonitis, sepsis, cellulitis.  Physical exam as noted above.  ED workup notable for x-ray right foot unremarkable.  Patient presentation appears consistent with right sided Achilles tendonitis, advised on further supportive care, follow-up outpatient with podiatry/Orthopedics.  Do not suspect any additional surgical or medical emergency. Discussed diagnosis and further treatment with patient, including f/u.  Return precautions given and all questions answered.  Patient in understanding of plan.  Pt discharged to home improved and stable.        Note was created using voice recognition software. Note may have occasional typographical or grammatical errors,  tyrellbled syntax, and other bizarre constructions that may not have been identified and edited despite good amrik initial review prior to signing.                                           Clinical Impression:  Final diagnoses:  [M79.673] Heel pain  [M76.61] Right Achilles tendinitis (Primary)          ED Disposition Condition    Discharge Stable          ED Prescriptions       Medication Sig Dispense Start Date End Date Auth. Provider    ibuprofen (ADVIL,MOTRIN) 400 MG tablet Take 1 tablet (400 mg total) by mouth every 6 (six) hours as needed. 20 tablet 2/14/2024 -- Wilfrido Garcia MD    HYDROcodone-acetaminophen (NORCO) 5-325 mg per tablet Take 1 tablet by mouth every 6 (six) hours as needed for Pain. 12 tablet 2/14/2024 -- Wilfrido Garcia MD    diclofenac sodium (VOLTAREN) 1 % Gel Apply 2 g topically 4 (four) times daily. 100 g 2/14/2024 -- Wilfrido Garcia MD          Follow-up Information       Follow up With Specialties Details Why Contact [facility] ED Emergency Medicine Go to  If symptoms worsen [address] [facility] [address] [phone]    Dulce Altamirano MD Family Medicine, Wound Care Go in 1 week As needed [address] [facility] [address] [phone]      [facility] Orthopedics Schedule an appointment as soon as possible for a visit   [address] [facility] [address]             Wilfrido Garcia MD  02/15/24 9676

## 2024-02-15 NOTE — ED TRIAGE NOTES
Estephania Rogers, a 58 y.o. female presents to the ED w/ complaint of right heel pain from a chronic condition in that foot and is having a flare up where she is unable to bear weight on the heel.    Triage note:  Chief Complaint   Patient presents with    Heel Pain     Pain to right heel, onset last night, denies injury or fall, hx of tight ligaments to same     Review of patient's allergies indicates:  No Known Allergies  Past Medical History:   Diagnosis Date    Anemia     iron deficiency    Anxiety     Diabetes mellitus, type 2     Hypertension     Malabsorption     Migraine headache     Nephrolithiasis     Other specified intestinal malabsorption

## 2024-02-21 ENCOUNTER — INFUSION (OUTPATIENT)
Dept: INFUSION THERAPY | Facility: HOSPITAL | Age: 59
End: 2024-02-21
Attending: INTERNAL MEDICINE
Payer: COMMERCIAL

## 2024-02-21 VITALS
DIASTOLIC BLOOD PRESSURE: 65 MMHG | OXYGEN SATURATION: 97 % | RESPIRATION RATE: 16 BRPM | TEMPERATURE: 98 F | HEART RATE: 66 BPM | SYSTOLIC BLOOD PRESSURE: 138 MMHG

## 2024-02-21 DIAGNOSIS — D50.9 IRON DEFICIENCY ANEMIA: Primary | ICD-10-CM

## 2024-02-21 PROCEDURE — 96374 THER/PROPH/DIAG INJ IV PUSH: CPT

## 2024-02-21 PROCEDURE — 63600175 PHARM REV CODE 636 W HCPCS: Performed by: INTERNAL MEDICINE

## 2024-02-21 RX ADMIN — IRON SUCROSE 200 MG: 20 INJECTION, SOLUTION INTRAVENOUS at 03:02

## 2024-02-21 NOTE — PLAN OF CARE
Patient presented to unit for q12wks Venofer IVP infusion. PIV inserted. Venofer administered IVP over 5mins. 250cc saline flush bag infused following iron infusion. No new or worsening symptoms reported. Patient discharged from unit in NAD.

## 2024-02-26 ENCOUNTER — PATIENT MESSAGE (OUTPATIENT)
Dept: HEPATOLOGY | Facility: CLINIC | Age: 59
End: 2024-02-26
Payer: COMMERCIAL

## 2024-02-27 ENCOUNTER — OFFICE VISIT (OUTPATIENT)
Dept: PODIATRY | Facility: CLINIC | Age: 59
End: 2024-02-27
Payer: COMMERCIAL

## 2024-02-27 VITALS
HEIGHT: 71 IN | DIASTOLIC BLOOD PRESSURE: 83 MMHG | WEIGHT: 195.13 LBS | SYSTOLIC BLOOD PRESSURE: 144 MMHG | BODY MASS INDEX: 27.32 KG/M2 | HEART RATE: 65 BPM

## 2024-02-27 DIAGNOSIS — M76.61 ACHILLES TENDINITIS OF RIGHT LOWER EXTREMITY: ICD-10-CM

## 2024-02-27 DIAGNOSIS — M21.6X2 ACQUIRED EQUINUS DEFORMITY OF BOTH FEET: ICD-10-CM

## 2024-02-27 DIAGNOSIS — M79.89 FOOT SWELLING: ICD-10-CM

## 2024-02-27 DIAGNOSIS — M21.6X1 ACQUIRED EQUINUS DEFORMITY OF BOTH FEET: ICD-10-CM

## 2024-02-27 DIAGNOSIS — M79.671 RIGHT FOOT PAIN: Primary | ICD-10-CM

## 2024-02-27 DIAGNOSIS — M76.60 PAIN IN ACHILLES TENDON: ICD-10-CM

## 2024-02-27 PROCEDURE — 99214 OFFICE O/P EST MOD 30 MIN: CPT | Mod: S$GLB,,, | Performed by: PODIATRIST

## 2024-02-27 PROCEDURE — 99999 PR PBB SHADOW E&M-EST. PATIENT-LVL V: CPT | Mod: PBBFAC,,, | Performed by: PODIATRIST

## 2024-02-27 RX ORDER — HYDROCODONE BITARTRATE AND ACETAMINOPHEN 7.5; 325 MG/1; MG/1
1 TABLET ORAL EVERY 6 HOURS PRN
Qty: 18 TABLET | Refills: 0 | Status: SHIPPED | OUTPATIENT
Start: 2024-02-27 | End: 2024-04-03

## 2024-02-27 RX ORDER — METHYLPREDNISOLONE 4 MG/1
TABLET ORAL
Qty: 21 TABLET | Refills: 0 | Status: SHIPPED | OUTPATIENT
Start: 2024-02-27 | End: 2024-04-03

## 2024-02-27 NOTE — PROGRESS NOTES
Subjective:      Patient ID: Estephania Rogers is a 58 y.o. female.    Chief Complaint: Heel Pain    Estephania Rogers is a 58 y.o. female who presents to the podiatry clinic  with complaint of painful lump to the plantar lateral right foot pain, especially with palpation and ambulation in certain shoe gear . Description: mild and moderate Nature: aching and sharp Onset of the symptoms was  approx 2 weeks ago . Precipitating event:  use of a worn sandal .  History of injury: no Current symptoms include: swelling. Alleviating factors: icing and change in shoes with improvement Patient has had prior foot problems. Evaluation to date: none. . Patients rates pain 3/10 on pain scale.    09/28/2022 returns to clinic for a flare of plantar fascial pain.    11/08/2022 returns to clinic complaining of bilateral foot pain.  She relates pain began with right midfoot pain and then right 1st MTPJ pain which became so unbearable that when returned to [facility] last week.  She now has left plantar arch pain. Presents to clinic in [facility] right and [employer] tennis shoe left    2/27/2024  Presents to the podiatry clinic  with complaint of  posterior right heel pain, especially with palpation and ambulation. Description: moderate and severe Nature: aching, sharp, and throbbing  Onset of the symptoms was approx 02/12/2024 while camping.  She was in crocs and was on stairs when she first noted some heel pain that became so severe she presented to ED when she could no longer bear weight on the foot.  Evaluation to date: presented to the ED on 02/04/2024.     Patient Active Problem List   Diagnosis    Recurrent nephrolithiasis    Other specified intestinal malabsorption    Iron deficiency anemia    B12 deficiency anemia    Anxiety    KRAUS (nonalcoholic steatohepatitis)    Hepatic fibrosis, stage 3    Gilbert's syndrome    Fatty liver    Right ankle pain    Right foot pain    Decreased ROM of right foot    Decreased strength of  lower extremity    Impaired gait    Intractable migraine without aura and with status migrainosus    Age-related osteoporosis without current pathological fracture       Current Outpatient Medications on File Prior to Visit   Medication Sig Dispense Refill    ascorbic acid, vitamin C, (VITAMIN C) 500 MG tablet Take 500 mg by mouth once daily.      cholecalciferol, vitamin D3, 1,250 mcg (50,000 unit) Tab Take daily x 2 weeks, then transition to weekly 30 tablet 1    diclofenac sodium (VOLTAREN) 1 % Gel Apply 2 g topically 4 (four) times daily. 100 g 0    famotidine (PEPCID) 20 MG tablet Take 1 tablet (20 mg total) by mouth 2 (two) times daily. 20 tablet 0    FLUoxetine 20 MG capsule TAKE 1 CAPSULE BY MOUTH EVERY DAY 90 capsule 3    fluticasone propionate (FLONASE) 50 mcg/actuation nasal spray 1 spray (50 mcg total) by Each Nostril route once daily. 16 g 2    gabapentin (NEURONTIN) 300 MG capsule Take 1 capsule (300 mg total) by mouth every evening. 90 capsule 1    ibuprofen (ADVIL,MOTRIN) 400 MG tablet Take 1 tablet (400 mg total) by mouth every 6 (six) hours as needed. 20 tablet 0    multivitamin (THERAGRAN) per tablet Take 1 tablet by mouth once daily.      phytonadione, vit K1, (VITAMIN K1 MISC) 1,000 mg by Misc.(Non-Drug; Combo Route) route once daily.      prednisoLONE acetate (PRED FORTE) 1 % DrpS Place 1 drop into the left eye 3 (three) times daily.      RESTASIS 0.05 % ophthalmic emulsion   3    tiZANidine (ZANAFLEX) 4 MG tablet Take 1 tablet (4 mg total) by mouth every 8 (eight) hours as needed (mucle tightness, spasm). 20 tablet 0    vitamin A 49136 UNIT capsule Take 25,000 Units by mouth once daily.      vitamin E acetate (VITAMIN E ORAL) Take 1 tablet by mouth once daily.      ALPRAZolam (XANAX) 0.25 MG tablet Take 1 tablet (0.25 mg total) by mouth daily as needed for Anxiety. 20 tablet 0     No current facility-administered medications on file prior to visit.       Review of patient's allergies  indicates:  No Known Allergies    Past Surgical History:   Procedure Laterality Date    ANAL FISTULOTOMY  2005    APPENDECTOMY  2005    AUGMENTATION OF BREAST  2014    BREAST SURGERY  2014    CHOLECYSTECTOMY  2005    GASTRIC BYPASS  2005    SINUS SURGERY      Dr. Oral Cobb    SPINE SURGERY      TONSILLECTOMY      TOTAL REDUCTION MAMMOPLASTY  2014       Family History   Problem Relation Age of Onset    Cancer Father         pancreatic    Diabetes Father     Diabetes Mother     Hypertension Mother     Miscarriages / Stillbirths Mother     Stroke Mother     Diabetes Sister     Cancer Maternal Aunt         breast    Arthritis Maternal Grandmother     Arthritis Paternal Grandmother     Diabetes Sister     Breast cancer Paternal Aunt        Social History     Socioeconomic History    Marital status:     Number of children: 0   Tobacco Use    Smoking status: Never     Passive exposure: Never    Smokeless tobacco: Never    Tobacco comments:     Clerical work   Substance and Sexual Activity    Alcohol use: Yes     Alcohol/week: 1.0 standard drink of alcohol     Types: 1 Glasses of wine per week    Drug use: No    Sexual activity: Yes     Partners: Male     Birth control/protection: None     Comment: :  Gianni     Social Determinants of Health     Financial Resource Strain: Patient Declined (11/14/2023)    Overall Financial Resource Strain (CARDIA)     Difficulty of Paying Living Expenses: Patient declined   Food Insecurity: Patient Declined (11/14/2023)    Hunger Vital Sign     Worried About Running Out of Food in the Last Year: Patient declined     Ran Out of Food in the Last Year: Patient declined   Transportation Needs: Patient Declined (11/14/2023)    PRAPARE - Transportation     Lack of Transportation (Medical): Patient declined     Lack of Transportation (Non-Medical): Patient declined   Physical Activity: Unknown (11/14/2023)    Exercise Vital Sign     Days of Exercise per Week: Patient declined  "  Stress: Patient Declined (11/14/2023)    [place] [employer] - Occupational Stress Questionnaire     Feeling of Stress : Patient declined   Social Connections: Unknown (11/14/2023)    Social Connection and Isolation Panel [NHANES]     Frequency of Communication with Friends and Family: Patient declined     Frequency of Social Gatherings with Friends and Family: Patient declined     Active Member of Clubs or Organizations: Patient declined     Attends Club or Organization Meetings: Patient declined     Marital Status: Patient declined   Housing Stability: Unknown (11/14/2023)    Housing Stability Vital Sign     Unable to Pay for Housing in the Last Year: Patient refused     Unstable Housing in the Last Year: Patient refused       Review of Systems   Constitutional: Negative for chills and fever.   Cardiovascular:  Negative for claudication and leg swelling.   Respiratory:  Negative for cough and shortness of breath.    Skin:  Positive for dry skin and suspicious lesions. Negative for itching, nail changes and rash.   Musculoskeletal:  Positive for arthritis, joint pain, myalgias and stiffness. Negative for falls, joint swelling and muscle weakness.   Gastrointestinal:  Negative for diarrhea, nausea and vomiting.   Neurological:  Positive for paresthesias. Negative for numbness, tremors and weakness.   Psychiatric/Behavioral:  Negative for altered mental status and hallucinations. The patient is nervous/anxious.            Objective:      Vitals:    02/27/24 0713   BP: (!) 144/83   Pulse: 65   Weight: 88.5 kg (195 lb 1.7 oz)   Height: 5' 11" (1.803 m)       Physical Exam  Nursing note reviewed.   Constitutional:       General: She is not in acute distress.     Appearance: She is not toxic-appearing or diaphoretic.   Cardiovascular:      Pulses:           Dorsalis pedis pulses are 2+ on the right side and 2+ on the left side.        Posterior tibial pulses are 2+ on the right side and 2+ on the " left side.   Pulmonary:      Effort: No respiratory distress.   Musculoskeletal:      Right ankle: Swelling present. No tenderness. No lateral malleolus, medial malleolus, AITF ligament, CF ligament or posterior TF ligament tenderness. Decreased range of motion.      Right Achilles Tendon: Tenderness (pain with palpation of posterior 1/3 calcaneus at region of Achilles tendon insertion and the tendon body) present. Villegas's test negative.      Left ankle: No swelling. No tenderness. No lateral malleolus, medial malleolus, AITF ligament, CF ligament or posterior TF ligament tenderness. Decreased range of motion.      Left Achilles Tendon: No defects. Villegas's test negative.      Right foot: Swelling and crepitus (midfoot and 1st MTPJ) present. No bony tenderness.      Left foot: Crepitus (midfoot) present. No bony tenderness.      Comments: Pain to right 1st MTPJ with palpation and end ROM. There is equinus deformity bilateral with decreased dorsiflexion at the ankle joint bilateral. No tenderness with compression of heel. Negative tinels sign. Gait analysis reveals excessive pronation through midstance and propulsion with early heel off. Shoes reveals lateral heel counter wear bilateral     Decreased first MPJ range of motion both weightbearing and nonweightbearing, no crepitus observed the first MP joint, + dorsal flag sign. Mild  bunion deformity is observed .    Patient has hammertoes of digits 2-5 bilateral partially reducible      Skin:     General: Skin is warm and dry.      Coloration: Skin is not pale.      Findings: No bruising, burn or laceration.      Nails: There is no clubbing.      Comments: soft mobile mass on the plantar lateral left foot, the mass transilluminates with the pen light. No open wounds, no rashes or maceration noted. Minimal pain of the soft tissue mass,  measuring approximately 2x3cm. No gross osseous deformities noted.      Neurological:      Sensory: No sensory deficit.       Motor: No tremor, atrophy or abnormal muscle tone.      Deep Tendon Reflexes: Reflexes are normal and symmetric.   Psychiatric:         Attention and Perception: She is attentive.         Mood and Affect: Mood is not anxious. Affect is not inappropriate.         Speech: She is communicative. Speech is not slurred.         Behavior: Behavior is not combative.         Narrative & Impression  EXAMINATION:  THREE VIEWS OF THE RIGHT FOOT     CLINICAL HISTORY:  Pain in unspecified foot     TECHNIQUE:  AP, lateral, and oblique view of the right foot     COMPARISON:  11/08/2022     FINDINGS:  Three views of the right foot demonstrate no acute fracture or dislocation.  There are small Achilles and plantar spurs.  There are calcific densities at the Achilles tendon insertion, which may be related to Achilles calcific tendinitis.  There are calcific density also at the plantar aspect of the hindfoot, which may be related to plantar fasciitis.  Mild degenerative changes are seen at the 1st metatarsophalangeal changes.  The bones are diffusely osteopenic.     Impression:     No acute bony abnormality detected.  No significant change.        Electronically signed by: Viktoriya Rockwell  Date:                                            02/14/2024  Time:                                           23:02      Assessment:       Encounter Diagnoses   Name Primary?    Right foot pain Yes    Foot swelling     Pain in Achilles tendon     Achilles tendinitis of right lower extremity     Acquired equinus deformity of both feet              Plan:     Problem List Items Addressed This Visit       Right foot pain - Primary    Relevant Orders    MRI Foot (Hindfoot) Right Without Contrast (Completed)     Other Visit Diagnoses       Foot swelling        Relevant Orders    MRI Foot (Hindfoot) Right Without Contrast (Completed)    Pain in Achilles tendon        Relevant Orders    MRI Foot (Hindfoot) Right Without Contrast (Completed)    Achilles  tendinitis of right lower extremity        Acquired equinus deformity of both feet                 Orders Placed This Encounter    MRI Foot (Hindfoot) Right Without Contrast    methylPREDNISolone (MEDROL DOSEPACK) 4 mg tablet    HYDROcodone-acetaminophen (NORCO) 7.5-325 mg per tablet       I counseled the patient on her conditions, their implications and medical management.    Personally and independently visualized and interpreted imaging with patient. Discussed my interpretation in detail, answering patient inquiries.      Clinical exam and subjective concerning for possible partial tearing of achilles.  MRI ordered.    CAM Boot for immobilization, discussed that due to the immobilization of the ankle while in the boot excess ambulation could cause referred pain to the knees hips or the contralateral limb.  Patient instructed to rest affected limb, avoid excessive WB and careful use of a new scooter, crutch or walker assistance if needed.  Instructions on elevation to reduce pain and swelling. When sleeping, place a pillow under the injured leg. When sitting, support the injured leg so it is level with your waist.   Patient instructed on adequate icing techniques. Patient should ice the affected area at least once per day x 10 minutes for 10 days . I advised the  patient that extra icing would also be beneficial to ensure adequate anti inflammatory effect   Rx medrol with norco for breakthrough pain   RTC s/p MRI, sooner PRN

## 2024-02-27 NOTE — PATIENT INSTRUCTIONS
Over the counter pain creams: Voltaren Gel, Biofreeze, Bengay, tiger balm, two old goat, lidocaine gel,  Absorbine Veterinary Liniment Gel Topical Analgesic Sore Muscle and Joint Pain Relief    Recommend lotions: eucerin, eucerin for diabetics, aquaphor, A&D ointment, gold bond for diabetics, sween, [place]'s Bees all purpose baby ointment,  urea 40 with aloe or SkinIntegra rapid crack repair (found on amazon.com)    Shoe recommendations: (try 6pm.com, zappos.com , nordstromrack.PulseOn, or shoes.PulseOn for discounted prices) you can visit varsity shoes in [place], DSW shoes in [place]  or christopher rack in the [facility] (there are also several shoe brand outlets in the [facility])    ONLY purchase stability style tennis shoes NO flex, foam, free, yoga mat style shoes    Shoe examples:    Asics (GT 4000 or gel foundations), new balance stability type shoes (such as the 940 series), saucony (stabil c3),  Smith (GTS or Beast or   transcend), propet, HokaOne (tennis shoe) Chemo (tennis shoes and boots)    Sofft Ezio (women) Mary&Ferdinand (men), clarks, crokiana, aerosoles, naturalizers, SAS, ecco, born, sergey carbone, rockports (dress shoes)    Vionic, burkenstocks, fitflops, propet, taos, baretraps (sandals)    HokaOne sandals, crocs, propet (house shoes)      Nail Home remedy:  Vicks Vapor rub or Emuaid to nails for easier manageability    Occasional soaks for 15-20 mins in luke warm water with 1/2 cup of listerine and 1 cup of apple cider vinegar are ok You may add several drops of oil of oregano or tea tree oil as well      Understanding Achilles Tendonitis    Achilles tendonitis is an overuse injury. It results in inflammation of the Achilles tendon. This tendon is found on the back of the ankle. It links the calf muscle to the heel bone. It helps you do pushing-off movements like running or standing on your toes.     How to say it  uh-KILL-eez ten-raphael-I-juanita   What causes Achilles tendonitis?  Achilles tendonitis can  happen if you do an activity like running, walking, or jumping too much. This overuse can strain, or pull, the tendon. It may lead to minor tearing of the tendon. An injury to the lower leg or foot can also cause it.  If you dont warm up before taking part in sports such as basketball, you are more likely to suffer from this condition. You are also more prone to it if you do too much of such an activity too quickly. Proper training and rest can help prevent it.  Symptoms of Achilles tendonitis  The main symptom of Achilles tendonitis is pain. This pain mostly happens when you move the ankle. The tendon may also feel stiff after a period of no activity, such as sleeping. It may also become swollen. You may hear a crackling sound when you move your ankle.  Treatment for Achilles tendonitis  Symptoms often get better after starting treatment. A full recovery may take several months. Treatments include:  Rest. You should stop or change the activity that caused the injury. The tendon will then have time to heal.  Cold or heat pack. These help reduce pain and swelling.  Prescription or over-the-counter pain medicines. These help reduce pain and swelling.  Shoe inserts. These devices can reduce strain on the Achilles tendon when you move. You may then feel less pain.  Stretching and strengthening exercises. Certain exercises can help you regain flexibility and strength in your Achilles tendon.  Surgery. This option can fix the injured tendon. But you dont often need it unless other treatments dont work.     When to call your healthcare provider   Call your healthcare provider right away if you have any of these:  Fever of 100.4°F (38°C) or higher, or as directed  Pain that gets worse  Symptoms that dont get better, or get worse  New symptoms    Date Last Reviewed: 3/10/2016  © 4532-6755 [employer]. [address] [place] [address], [place] [address]. All rights reserved. This information is not intended as a  substitute for professional medical care. Always follow your healthcare professional's instructions.        Achilles Tendon Rupture, Partial or Complete    The Achilles tendon connects the muscles in the back of your lower leg to your heel bone. It lets you move your foot down--called a step on the gas motion. This movement is important for walking, running, and jumping. A sudden strong contraction of the lower leg can partially tear (rupture) the Achilles tendon. This injury can happen while playing sports. This injury is more likely if you have injured the tendon in the past. It is also more likely if the tendon is inflamed from stress.   When the injury happens, you may feel a pop or snap, or like you have been kicked. An Achilles tendon tear will cause swelling, bruising, and pain in the ankle area. You will have difficulty walking or pushing off with your foot.   A complete Achilles tear is usually treated with surgery to attach the torn ends of the tendon. This is followed by up to 8 weeks in a walking cast, boot, or splint. The injury can also be treated without surgery, but the tendon will take longer to heal. The risk of rupturing it again is also greater than with surgery. With either type of treatment, you will need physical therapy to strengthen your Achilles tendon. It usually takes up to 6 months to return to your former level of activity and about a year to fully recover.  Home care  Medicine  The doctor may prescribe medicines for pain. Or you may use acetaminophen or Ibuprofen to control the pain.  Talk with your doctor before taking these medicines if you have chronic liver or kidney disease. Also talk with your doctor if you have had an ulcer or GI bleeding.  General care  Rest. Use crutches as directed. Do not put any weight on the injured leg until your doctor says it is OK.  You will be told to see an orthopedic doctor as soon as possible. This is a doctor with special training to treat foot  and ankle problems.  Use ice. Put a cold pack on the injured area for 20 minutes at a time. Do this at least 4 to 8 times a day for the first 48 hours. After the first 48 hours, use the cold pack to ease pain and swelling, as needed. You can make your own cold pack from a sealed bag of frozen peas or ice. Wrap the bag in a towel. Dont put the cold source directly on your skin. (If you are using ice, be careful to avoid getting the cast wet as the ice melts.) If you have a splint that can't be removed, put the cold pack over the splint.  Use compression. The doctor may give you an elastic wrap, boot, air cast or splint to help ease swelling. Use this as the doctor tells you to.  Elevate your leg. During the first 48 hours, keep your injured leg elevated on a pillow when you are sitting or lying down. The pillow should be at a level above your heart. This is very important to reduce swelling.  Keep the splint or cast dry. When bathing, protect it with a large plastic bag. Make sure to tape the plastic bag closed. If a fiberglass splint or cast gets wet, you can dry it with a hair dryer.  Follow-up care  You will be referred to an orthopedic doctor for follow-up care. Surgery may be needed to repair this injury, so it is very important to make an appointment with the specialist as soon as you can.  When to seek medical advice  Call your healthcare provider right away if any of these occur:  A plaster splint or cast gets wet or soft or breaks  A fiberglass splint or cast gets wet and does not dry in 24 hours  Pain or swelling gets worse, or redness appears  Toes or the injured area become cold, blue, numb or tingly  You cannot put weight on the injured leg  Date Last Reviewed: 9/29/2015  © 4129-7957 [employer]. [address] [place] [address], [place] [address]. All rights reserved. This information is not intended as a substitute for professional medical care. Always follow your healthcare professional's  instructions.        Calf Raise (Strength)    Stand up straight with both feet flat on the floor, slightly apart. Hold onto a sturdy chair, railing, counter, or table.  Raise both heels so youre standing on the balls of your feet. Dont lock your knees or arch your back. Hold for 5 seconds. Then slowly lower your heels back down to the floor.  Repeat 10 times, or as instructed.  Do this exercise 3 times a day, or as instructed.     Challenge yourself  As you become stronger, do this exercise on one foot at a time.   Date Last Reviewed: 3/10/2016  © 4469-0601 [employer]. [address] [place] [address]. All rights reserved. This information is not intended as a substitute for professional medical care. Always follow your healthcare professional's instructions.        Plantarflexion (Strength)    These instructions are for your right ankle. Switch sides for your left ankle.  Sit on a bed or the floor with your right leg out straight.  Loop an elastic exercise band or tubing around your right foot. Hold the ends in your hands.  Push on the elastic band with the ball of your foot, pointing your toes. Pull the elastic band toward as you do this. Hold for 5 seconds. Then move your foot back to the starting position.  Repeat 10 times, or as instructed.  Do this exercise 3 times a day, or as instructed.  Date Last Reviewed: 3/10/2016  © 1443-7542 [employer]. [address] [place] [address]. All rights reserved. This information is not intended as a substitute for professional medical care. Always follow your healthcare professional's instructions.        Wearing Proper Shoes                    You walk on your feet every day, forcing them to support the weight of your body. Repeated stress on your feet can cause damage over time. The right shoes can help protect your feet. The wrong shoes can cause more foot problems. Read the information below to help you find a shoe that  fits your foot needs.     A good shoe fit will cover your foot outline.       A shoe that doesnt cover the outline is a bad fit.      Whats your foot shape?  To get a good fit, you need to know the shape of your foot. Do this simple test: While standing, place your foot on a piece of paper and trace around it. Is your foot straight or curved? Do you have a foot problem, such as a bunion, that causes your foot outline to show a bulge on the side of your big toe?  Finding your fit  Bring your foot outline to the shoe store to help you find the right shoe. Place a shoe you like on top of the outline to see if it matches the shape. The shoe should cover the outline. (If you have a bunion, the shoe may not cover the bulge on the outline. Look for soft leather shoes to stretch over the bunion.) Once youve found a pair of proper shoes, put them on. Walk around. Be sure the shoes dont rub or pinch. If the shoes feel good, youve found your fit!  The right shoe for you  A good shoe has features that provide comfort and support. It must also be the right size and shape for your feet. Look for a shoe made of breathable fabric and lining, such as leather or canvas. Be sure that shoes have enough tread to prevent slipping. Go to a good shoe store for help finding the right shoe.  Good shoe features  An ideal shoe has the following:  Laces for support. If tying laces is a problem for you, try shoes with Velcro fasteners or flako.  A front of the shoe (toe box) with ½ inch space in front of your longest toes.  An arch shape that supports your foot.  No more than 1½ inches of heel.  A stiff, snug back of the shoe to keep your foot from sliding around.  A smooth lining with no rough seams.  Shoe shopping tips  Below are some dos and donts for when you go to the shoe store.  Do:  Select the shoes that feel right. Wear them around the house. Then bring them to your foot healthcare provider to check for fit. If they dont fit  well, return them.  Shop late in the day, when your feet will be slightly bigger.  Each time you buy shoes, have both your feet measured while you are standing. Foot size changes with time.  Pick shoes to suit their purpose. High heels are OK for an occasional night on the town. But for everyday wear, choose a more sensible shoe.  Try on shoes while wearing any inserts specially made for your feet (orthoses).  Try on both the right and left shoes. If your feet are different sizes, pick a pair that fits the larger foot.  Dont:  Dont buy shoes based on shoe size alone. Always try on shoes, as sizes differ from brand to brand and within brands.  Dont expect shoes to break in. If they dont fit at the store, dont buy them.  Dont buy a shoe that doesnt match your foot shape.  What about socks?  Always wear socks with shoes. Socks help absorb sweat and reduce friction and blistering. When shopping for shoes, choose soft, padded socks with seams that dont irritate your feet.  If you have foot problems  Some foot problems cause deformities. This can make it hard to find a good fit. Look for shoes made of soft leather to stretch over the deformity. If you have bunions, buy shoes with a wider toe box. To fit hammertoes, look for shoes with a tall toe box. If you have arch problems, you may need inserts. In some cases, youll need to have custom footwear or orthoses made for your feet.  Suggested footwear  Ask your healthcare provider what kind of footwear you need. He or she may recommend a certain brand or shoe store.  Date Last Reviewed: 8/1/2016  © 3131-1397 [employer]. [address] [place] [address], [place] [address]. All rights reserved. This information is not intended as a substitute for professional medical care. Always follow your healthcare professional's instructions.

## 2024-03-01 ENCOUNTER — HOSPITAL ENCOUNTER (OUTPATIENT)
Dept: RADIOLOGY | Facility: HOSPITAL | Age: 59
Discharge: HOME | End: 2024-03-01
Attending: PODIATRIST
Payer: COMMERCIAL

## 2024-03-01 DIAGNOSIS — M79.671 RIGHT FOOT PAIN: ICD-10-CM

## 2024-03-01 DIAGNOSIS — M76.60 PAIN IN ACHILLES TENDON: ICD-10-CM

## 2024-03-01 DIAGNOSIS — M79.89 FOOT SWELLING: ICD-10-CM

## 2024-03-01 PROCEDURE — 73718 MRI LOWER EXTREMITY W/O DYE: CPT | Mod: 26,RT,, | Performed by: INTERNAL MEDICINE

## 2024-03-01 PROCEDURE — 73718 MRI LOWER EXTREMITY W/O DYE: CPT | Mod: TC,RT

## 2024-03-06 ENCOUNTER — PATIENT MESSAGE (OUTPATIENT)
Dept: PODIATRY | Facility: CLINIC | Age: 59
End: 2024-03-06
Payer: COMMERCIAL

## 2024-03-08 DIAGNOSIS — E55.9 VITAMIN D DEFICIENCY: ICD-10-CM

## 2024-03-08 RX ORDER — ASPIRIN 325 MG
TABLET, DELAYED RELEASE (ENTERIC COATED) ORAL
Qty: 12 CAPSULE | Refills: 5 | Status: SHIPPED | OUTPATIENT
Start: 2024-03-08 | End: 2024-03-26

## 2024-03-08 RX ORDER — ASPIRIN 325 MG
TABLET, DELAYED RELEASE (ENTERIC COATED) ORAL
Qty: 12 CAPSULE | Refills: 5 | Status: SHIPPED | OUTPATIENT
Start: 2024-03-08 | End: 2024-03-08

## 2024-03-11 ENCOUNTER — OFFICE VISIT (OUTPATIENT)
Dept: ORTHOPEDICS | Facility: CLINIC | Age: 59
End: 2024-03-11
Payer: COMMERCIAL

## 2024-03-11 VITALS — WEIGHT: 195.13 LBS | BODY MASS INDEX: 27.32 KG/M2 | HEIGHT: 71 IN

## 2024-03-11 DIAGNOSIS — M76.61 ACHILLES TENDINITIS, RIGHT LEG: Primary | ICD-10-CM

## 2024-03-11 PROCEDURE — 99999 PR PBB SHADOW E&M-EST. PATIENT-LVL III: CPT | Mod: PBBFAC,,, | Performed by: ORTHOPAEDIC SURGERY

## 2024-03-11 PROCEDURE — 99203 OFFICE O/P NEW LOW 30 MIN: CPT | Mod: S$GLB,,, | Performed by: ORTHOPAEDIC SURGERY

## 2024-03-11 NOTE — PROGRESS NOTES
DATE: 3/11/2024  PATIENT: Estephania Rogers    CHIEF COMPLAINT:  Left heel pain    HISTORY:  Estephania Rogers is a 58 y.o. female HTN, type 2 diabetes, anxiety, and iron-deficiency anemia here for evaluation of left heel pain The pain has been present for approximately 1 month.  Patient reports that the pain 1st began on Carlos's Day and prompted her to present to the emergency department. The patient describes the pain as sharp and intermittent, and localizes the pain to the posterior aspect of the heel.  The pain is worse with ambulation and improved by a recent Medrol Dosepak and immobilization with a boot.  Patient was seen by Podiatry on 02/27/2024 and treated with steroids, boot immobilization, and an MRI was obtained which demonstrated left-sided Achilles tendinopathy with enthesitis, peritendinitis, and retrocalcaneal bursitis.  So was also evidence of plantar fasciitis. There is no associated numbness and tingling.  Prior treatments have included physical therapy, boot immobilization, rest, Tylenol, NSAIDs, Medrol Dosepak, and Norco.  Patient reports that she has decreased liver function has been told by her hepatologist should not be taking NSAIDs.      PAST MEDICAL/SURGICAL HISTORY:  Past Medical History:   Diagnosis Date    Anemia     iron deficiency    Anxiety     Diabetes mellitus, type 2     Hypertension     Malabsorption     Migraine headache     Nephrolithiasis     Other specified intestinal malabsorption      Past Surgical History:   Procedure Laterality Date    ANAL FISTULOTOMY  2005    APPENDECTOMY  2005    AUGMENTATION OF BREAST  2014    BREAST SURGERY  2014    CHOLECYSTECTOMY  2005    GASTRIC BYPASS  2005    SINUS SURGERY      Dr. Oral Cobb    SPINE SURGERY      TONSILLECTOMY      TOTAL REDUCTION MAMMOPLASTY  2014       Current Medications:   Current Outpatient Medications:     ascorbic acid, vitamin C, (VITAMIN C) 500 MG tablet, Take 500 mg by mouth once daily., Disp: , Rfl:      cholecalciferol, vitamin D3, 1,250 mcg (50,000 unit) capsule, TAKE 1 CAPSULE BY MOUTH DAILY FOR 2 WEEKS THEN TAKE 1 CAPSULE WEEKLY THERE AFTER, Disp: 12 capsule, Rfl: 5    diclofenac sodium (VOLTAREN) 1 % Gel, Apply 2 g topically 4 (four) times daily., Disp: 100 g, Rfl: 0    famotidine (PEPCID) 20 MG tablet, Take 1 tablet (20 mg total) by mouth 2 (two) times daily., Disp: 20 tablet, Rfl: 0    FLUoxetine 20 MG capsule, TAKE 1 CAPSULE BY MOUTH EVERY DAY, Disp: 90 capsule, Rfl: 3    fluticasone propionate (FLONASE) 50 mcg/actuation nasal spray, 1 spray (50 mcg total) by Each Nostril route once daily., Disp: 16 g, Rfl: 2    gabapentin (NEURONTIN) 300 MG capsule, Take 1 capsule (300 mg total) by mouth every evening., Disp: 90 capsule, Rfl: 1    HYDROcodone-acetaminophen (NORCO) 7.5-325 mg per tablet, Take 1 tablet by mouth every 6 (six) hours as needed for Pain., Disp: 18 tablet, Rfl: 0    ibuprofen (ADVIL,MOTRIN) 400 MG tablet, Take 1 tablet (400 mg total) by mouth every 6 (six) hours as needed., Disp: 20 tablet, Rfl: 0    methylPREDNISolone (MEDROL DOSEPACK) 4 mg tablet, follow package directions, Disp: 21 tablet, Rfl: 0    multivitamin (THERAGRAN) per tablet, Take 1 tablet by mouth once daily., Disp: , Rfl:     phytonadione, vit K1, (VITAMIN K1 MISC), 1,000 mg by Misc.(Non-Drug; Combo Route) route once daily., Disp: , Rfl:     prednisoLONE acetate (PRED FORTE) 1 % DrpS, Place 1 drop into the left eye 3 (three) times daily., Disp: , Rfl:     RESTASIS 0.05 % ophthalmic emulsion, , Disp: , Rfl: 3    tiZANidine (ZANAFLEX) 4 MG tablet, Take 1 tablet (4 mg total) by mouth every 8 (eight) hours as needed (mucle tightness, spasm)., Disp: 20 tablet, Rfl: 0    vitamin A 43551 UNIT capsule, Take 25,000 Units by mouth once daily., Disp: , Rfl:     vitamin E acetate (VITAMIN E ORAL), Take 1 tablet by mouth once daily., Disp: , Rfl:     ALPRAZolam (XANAX) 0.25 MG tablet, Take 1 tablet (0.25 mg total) by mouth daily as needed for  Anxiety., Disp: 20 tablet, Rfl: 0    Social History:   Social History     Socioeconomic History    Marital status:     Number of children: 0   Tobacco Use    Smoking status: Never     Passive exposure: Never    Smokeless tobacco: Never    Tobacco comments:     Clerical work   Substance and Sexual Activity    Alcohol use: Yes     Alcohol/week: 1.0 standard drink of alcohol     Types: 1 Glasses of wine per week    Drug use: No    Sexual activity: Yes     Partners: Male     Birth control/protection: None     Comment: :  Gianni     Social Determinants of Health     Financial Resource Strain: Patient Declined (11/14/2023)    Overall Financial Resource Strain (CARDIA)     Difficulty of Paying Living Expenses: Patient declined   Food Insecurity: Patient Declined (11/14/2023)    Hunger Vital Sign     Worried About Running Out of Food in the Last Year: Patient declined     Ran Out of Food in the Last Year: Patient declined   Transportation Needs: Patient Declined (11/14/2023)    PRAPARE - Transportation     Lack of Transportation (Medical): Patient declined     Lack of Transportation (Non-Medical): Patient declined   Physical Activity: Unknown (11/14/2023)    Exercise Vital Sign     Days of Exercise per Week: Patient declined   Stress: Patient Declined (11/14/2023)    [employer] [place] [employer] - Occupational Stress Questionnaire     Feeling of Stress : Patient declined   Social Connections: Unknown (11/14/2023)    Social Connection and Isolation Panel [NHANES]     Frequency of Communication with Friends and Family: Patient declined     Frequency of Social Gatherings with Friends and Family: Patient declined     Active Member of Clubs or Organizations: Patient declined     Attends Club or Organization Meetings: Patient declined     Marital Status: Patient declined   Housing Stability: Unknown (11/14/2023)    Housing Stability Vital Sign     Unable to Pay for Housing in the Last Year: Patient  "refused     Unstable Housing in the Last Year: Patient refused       REVIEW OF SYSTEMS:  Constitution: Negative. Negative for chills, fever and night sweats.   Cardiovascular: Negative for chest pain and syncope.   Respiratory: Negative for cough and shortness of breath.   Gastrointestinal: See HPI. Negative for nausea/vomiting. Negative for abdominal pain.  Genitourinary: See HPI. Negative for discoloration or dysuria.  Skin: Negative for dry skin, itching and rash.   Hematologic/Lymphatic: Negative for bleeding problem. Does not bruise/bleed easily.   Musculoskeletal: Negative for falls and muscle weakness.   Neurological: See HPI. No seizures.   Endocrine: Negative for polydipsia, polyphagia and polyuria.   Allergic/Immunologic: Negative for hives and persistent infections.    PHYSICAL EXAMINATION:    Ht 5' 11" (1.803 m)   Wt 88.5 kg (195 lb 1.7 oz)   LMP 11/16/2014   BMI 27.21 kg/m²     General: The patient is a 58 y.o. female in no apparent distress, the patient is oriented to person, place and time.   Psych: Normal mood and affect  HEENT:  NCAT, sclera nonicteric  Lungs:  Respirations are equal and unlabored.  CV:  2+ bilateral upper and lower extremity pulses.  Skin:  Intact throughout.  Musculoskeletal: No pain with the range of motion of the bilateral hips. No trochanteric tenderness to palpation. No pain with range of motion about the bilateral knees.      Left Foot and Ankle Exam    INSPECTION:        Gait:    Ambulates with a slight limp on the left foot   Scars:   None   Swelling:  None   Color:   Normal   Atrophy:  None  Heel / Toe Walking: No difficulty    ALIGNMENT:    Hindfoot  Normal    Midfoot: Normal  Forefoot: Normal    TENDERNESS:  LATERAL:      Sinus tarsi:  None    Syndesmosis:  none    ATFL:   none     CFL:   none    Anterolateral gutter: none    Fibula:   none  Peroneal tendons: none    Peroneal tubercle.  None     ANTERIOR:  Anteromedial joint line:  none  Anterolateral joint " line:  None  Talonavicular:    None  Anterior tibialis:   none  Extensor tendons:   none    POSTERIOR:  Medial/lateral achilles:  Mild  Medial/lateral achilles insertion: Mild    MEDIAL:      Deltoid:  none    Malleolus:  none    PTT:   none    Navicular:  none           CALCANEUS:  Retrocalcaneal:   Mild  Medial achilles:   Mild  Lateral achilles:   Mild  Calcaneal tuberosity:   Minimal    FOOT:    Calcaneal cuboid  none   MT / MT heads:  none   Navicular   none    Medial cord origin PF:  none  Cuneiforms:   none    Web space:   none  Lisfranc    none    Tarsal tunnel:   none  Base of the fifth metatarsal  none   Tinels sign   neg        RANGE OF MOTION:  RIGHT/ LEFT      Ankle DF/PF:  15/45  15/45         Eversion/Inversion: 15/25 15/25     Midfoot ABD/ADD: 10/10 10/10     First MTP DF/PF: 60/25 60/25              (* = pain)                  STRENGTH: (affected)  Anterior tibialis: 5/5  Posterior tibialis: 5/5  Gastroc-soleus: 5/5  Peroneals:  5/5  EHL:   5/5  FHL:   5/5    (* = pain)    SPECIAL TESTS:   ANKLE INSTABILITY: (*pain)    Anterior drawer:   Normal      (C-W contralateral side)     Inversion:   30°     Eversion  10°            Collective Instability: (Ant-post and varus-valgus)     Stable        PROVOCATIVE TESTING:    Forced DF/ER: No pain at syndesmosis.    Mid-leg squeeze  No pain at syndesmosis    Forced DF:  No pain anterior joint line.      Forced PF:  No pain posterior ankle.     Forced INV:  No pain lateral    Forced EV:  No pain medial     Villegass sign: Normal ankle plantar flexion.     Resisted peroneal No subluxation or pain    1st-2nd MT toggle No pain at Lisfranc    MT-T torque  No pain at Lisfranc     NEUROLOGIC TESTING:  All dermatomes foot, ankle and leg have normal sensation light touch    VASCULAR:  2+ pulses PT/DT with brisk capillary refill toes.        IMAGING:     Radiographs and MRI of the left foot/hindfoot were ordered and personally reviewed with the patient today.   There are no fractures or dislocations.  There is evidence of left Achilles tendinopathy with enthesitis, peritendinitis, intertendinous calcification, and calcaneal bursitis.  Patient also has MRI findings suggestive of plantar fasciitis.        ASSESSMENT/PLAN:    Estephania was seen today for pain.    Diagnoses and all orders for this visit:    Achilles tendinitis, right leg insertion        58 y.o. yo female with insertional Achilles tendinitis of the left heel.    Plan: The patient and I had a thorough discussion today.  We discussed the working diagnosis as well as several other potential alternative diagnoses.  Treatment options were discussed, both conservative and surgical.  Conservative treatment options would include things such as activity modifications, a period of rest, tylenol, anti-inflammatory medications, physical therapy, immobilization, corticosteroid injections, and others.     At this time, the patient would like to proceed with conservative management.  The patient will continue with home exercises that she was previously told by physical therapy.  She will work on range of motion of the left heel and focus on heel stretching to relieve the intrinsic tightness of her bilateral heel cords.  She can use topical anti-inflammatory medications for pain.  She can continue to take other medications previously prescribed by Podiatry for pain.  I explained to the patient that I do not believe that her current condition is 1 that would warrant surgical intervention.  I explained to the patient that she may use the boot as tolerated for symptomatic relief during her acute flare of Achilles tendonitis.    Return to clinic PRN.     I have personally taken the history and examined this patient and agree with the residents note as stated above.  No surgical intervention recommended at this time

## 2024-03-14 ENCOUNTER — PATIENT MESSAGE (OUTPATIENT)
Dept: ENDOCRINOLOGY | Facility: CLINIC | Age: 59
End: 2024-03-14
Payer: COMMERCIAL

## 2024-03-19 ENCOUNTER — LAB VISIT (OUTPATIENT)
Dept: LAB | Facility: HOSPITAL | Age: 59
End: 2024-03-19
Attending: INTERNAL MEDICINE
Payer: COMMERCIAL

## 2024-03-19 DIAGNOSIS — M81.0 AGE-RELATED OSTEOPOROSIS WITHOUT CURRENT PATHOLOGICAL FRACTURE: ICD-10-CM

## 2024-03-19 LAB
25(OH)D3+25(OH)D2 SERPL-MCNC: 27 NG/ML (ref 30–96)
ALBUMIN SERPL BCP-MCNC: 3.9 G/DL (ref 3.5–5.2)
ANION GAP SERPL CALC-SCNC: 9 MMOL/L (ref 8–16)
BUN SERPL-MCNC: 8 MG/DL (ref 6–20)
CALCIUM 24H UR-MRATE: 3 MG/HR (ref 4–12)
CALCIUM SERPL-MCNC: 9.9 MG/DL (ref 8.7–10.5)
CALCIUM UR-MCNC: 3.2 MG/DL (ref 0–15)
CALCIUM URINE (MG/SPEC): 75 MG/SPEC
CHLORIDE SERPL-SCNC: 110 MMOL/L (ref 95–110)
CO2 SERPL-SCNC: 24 MMOL/L (ref 23–29)
CREAT 24H UR-MRATE: 47 MG/HR (ref 40–75)
CREAT SERPL-MCNC: 0.6 MG/DL (ref 0.5–1.4)
CREAT UR-MCNC: 48 MG/DL (ref 15–325)
CREATININE, URINE (MG/SPEC): 1128 MG/SPEC
EST. GFR  (NO RACE VARIABLE): >60 ML/MIN/1.73 M^2
GLUCOSE SERPL-MCNC: 82 MG/DL (ref 70–110)
PHOSPHATE SERPL-MCNC: 3.3 MG/DL (ref 2.7–4.5)
POTASSIUM SERPL-SCNC: 4 MMOL/L (ref 3.5–5.1)
PTH-INTACT SERPL-MCNC: 118.6 PG/ML (ref 9–77)
SODIUM SERPL-SCNC: 143 MMOL/L (ref 136–145)
URINE COLLECTION DURATION: 24 HR
URINE COLLECTION DURATION: 24 HR
URINE VOLUME: 2350 ML
URINE VOLUME: 2350 ML

## 2024-03-19 PROCEDURE — 86364 TISS TRNSGLTMNASE EA IG CLAS: CPT | Performed by: INTERNAL MEDICINE

## 2024-03-19 PROCEDURE — 82570 ASSAY OF URINE CREATININE: CPT | Performed by: INTERNAL MEDICINE

## 2024-03-19 PROCEDURE — 80069 RENAL FUNCTION PANEL: CPT | Performed by: INTERNAL MEDICINE

## 2024-03-19 PROCEDURE — 36415 COLL VENOUS BLD VENIPUNCTURE: CPT | Mod: PO | Performed by: INTERNAL MEDICINE

## 2024-03-19 PROCEDURE — 82340 ASSAY OF CALCIUM IN URINE: CPT | Performed by: INTERNAL MEDICINE

## 2024-03-19 PROCEDURE — 82306 VITAMIN D 25 HYDROXY: CPT | Performed by: INTERNAL MEDICINE

## 2024-03-19 PROCEDURE — 83970 ASSAY OF PARATHORMONE: CPT | Performed by: INTERNAL MEDICINE

## 2024-03-22 ENCOUNTER — PATIENT MESSAGE (OUTPATIENT)
Dept: HEMATOLOGY/ONCOLOGY | Facility: CLINIC | Age: 59
End: 2024-03-22
Payer: COMMERCIAL

## 2024-03-22 ENCOUNTER — PATIENT MESSAGE (OUTPATIENT)
Dept: ENDOCRINOLOGY | Facility: CLINIC | Age: 59
End: 2024-03-22
Payer: COMMERCIAL

## 2024-03-22 DIAGNOSIS — E55.9 VITAMIN D DEFICIENCY: ICD-10-CM

## 2024-03-22 LAB — TTG IGA SER-ACNC: 0.2 U/ML

## 2024-03-22 NOTE — TELEPHONE ENCOUNTER
Results for orders placed or performed in visit on 03/19/24   Calcium, Timed Urine [facility]; 24 Hours   Result Value Ref Range    Urine Volume 2350 mL    Urine Collection Duration 24 Hr    Calcium, Urine 3.2 0.0 - 15.0 mg/dL    Calcium, 24 Hr Urine 3 (L) 4 - 12 mg/Hr    Calcium, Urine (mg/spec) 75 mg/Spec   Creatinine, urine, timed 24 Hours   Result Value Ref Range    Urine Volume 2350 mL    Urine Collection Duration 24 Hr    Creatinine, Urine 48.0 15.0 - 325.0 mg/dL    Creatinine, Timed Urine 47.0 40.0 - 75.0 mg/Hr    Creatinine, Urinr (mg/spec) 1128.0 mg/Spec   Renal Function Panel   Result Value Ref Range    Glucose 82 70 - 110 mg/dL    Sodium 143 136 - 145 mmol/L    Potassium 4.0 3.5 - 5.1 mmol/L    Chloride 110 95 - 110 mmol/L    CO2 24 23 - 29 mmol/L    BUN 8 6 - 20 mg/dL    Calcium 9.9 8.7 - 10.5 mg/dL    Creatinine 0.6 0.5 - 1.4 mg/dL    Albumin 3.9 3.5 - 5.2 g/dL    Phosphorus 3.3 2.7 - 4.5 mg/dL    eGFR >60.0 >60 mL/min/1.73 m^2    Anion Gap 9 8 - 16 mmol/L   PTH, Intact   Result Value Ref Range    PTH, Intact 118.6 (H) 9.0 - 77.0 pg/mL   Vitamin D   Result Value Ref Range    Vit D, 25-Hydroxy 27 (L) 30 - 96 ng/mL

## 2024-03-26 RX ORDER — ASPIRIN 325 MG
TABLET, DELAYED RELEASE (ENTERIC COATED) ORAL
Qty: 50 CAPSULE | Refills: 1 | Status: SHIPPED | OUTPATIENT
Start: 2024-03-26

## 2024-03-27 ENCOUNTER — PATIENT MESSAGE (OUTPATIENT)
Dept: HEPATOLOGY | Facility: CLINIC | Age: 59
End: 2024-03-27
Payer: COMMERCIAL

## 2024-04-01 DIAGNOSIS — F41.9 ANXIETY: ICD-10-CM

## 2024-04-01 NOTE — TELEPHONE ENCOUNTER
No care due was identified.  [facility] Embedded Care Due Messages. Reference number: [id number].   4/01/2024 9:20:09 AM CDT

## 2024-04-02 DIAGNOSIS — J01.91 ACUTE RECURRENT SINUSITIS, UNSPECIFIED LOCATION: Primary | ICD-10-CM

## 2024-04-02 RX ORDER — AMOXICILLIN AND CLAVULANATE POTASSIUM 875; 125 MG/1; MG/1
1 TABLET, FILM COATED ORAL 2 TIMES DAILY
Qty: 20 TABLET | Refills: 0 | Status: SHIPPED | OUTPATIENT
Start: 2024-04-02 | End: 2024-04-12

## 2024-04-03 ENCOUNTER — OFFICE VISIT (OUTPATIENT)
Dept: ENDOCRINOLOGY | Facility: CLINIC | Age: 59
End: 2024-04-03
Payer: COMMERCIAL

## 2024-04-03 DIAGNOSIS — N20.0 RECURRENT NEPHROLITHIASIS: ICD-10-CM

## 2024-04-03 DIAGNOSIS — M81.0 AGE-RELATED OSTEOPOROSIS WITHOUT CURRENT PATHOLOGICAL FRACTURE: Primary | ICD-10-CM

## 2024-04-03 PROCEDURE — 99214 OFFICE O/P EST MOD 30 MIN: CPT | Mod: 95,,, | Performed by: INTERNAL MEDICINE

## 2024-04-03 RX ORDER — HEPARIN 100 UNIT/ML
500 SYRINGE INTRAVENOUS
Status: CANCELLED | OUTPATIENT
Start: 2024-04-03

## 2024-04-03 RX ORDER — ZOLEDRONIC ACID 5 MG/100ML
5 INJECTION, SOLUTION INTRAVENOUS
Status: CANCELLED | OUTPATIENT
Start: 2024-04-03

## 2024-04-03 RX ORDER — SODIUM CHLORIDE 0.9 % (FLUSH) 0.9 %
10 SYRINGE (ML) INJECTION
Status: CANCELLED | OUTPATIENT
Start: 2024-04-03

## 2024-04-03 NOTE — PATIENT INSTRUCTIONS
"    There are a few things I ask my patients to do before receiving reclast infusion.   1) The day before and day of the infusion please drink plenty of fluids.   2) The day of the infusion take over the counter tylenol one dose every six to eight hours for one day. This minimizes the joint pains that can occur with reclast.  3) Take an extra dose of over the counter vitamin D the day before the infusion.    Reclast infusion may be associated with an flu like reaction that occurs with 1 - 3 days after the infusion. This can include low-grade fever, muscle, joint and bone pains. Pretreatment with tylenol or ibuprofen reduces the incidence (this means take tylenol or ibuprofen before you get the infusion.)     Treatment with these agents (ibuprofen or acetominophen) generally improve the symptoms, and the recurrence of symptoms decreases with subsequent infusions.      - Prolia (denosumab):          - slows down bone loss           - it is a subcutaneous injection (under the skin) every 6 months, given in the office          - needs a medicine to "seal" in the bone you have gained, typically we use a bisphosphonate or prolia    For more information on medications: https://www.nof.org/patients/treatment/medicationadherence/    "

## 2024-04-03 NOTE — PROGRESS NOTES
Subjective:      Patient ID: Estephania Rogers is a 58 y.o. female.    Chief Complaint:  No chief complaint on file.      History of Present Illness    Ms. Rogers is a 58 year old woman who is here for a follow up visit for discussion of osteoporosis medications.  Risk factors: postmenopausal status, bariatric surgery in 2004 (duodenal switch), , family history, vitamin D deficiency and high risk medications   Kidney stones in the past (2015)     Secondary evaluation:  Mild elevated PTH - vitamin D deficiency 27 and 26, previously 30;   Urine 2.3 L volume, calcium 75 mg/specimen, but serum Calcium/phos/albumin are normal.   TTG normal.       Supplements:   Vitamin D   MVI daily     Exercise and balance: discussed   Dental  issues: denies     Review of Systems    Objective:   Physical Exam  There were no vitals filed for this visit.    BP Readings from Last 3 Encounters:   02/27/24 (!) 144/83   02/21/24 138/65   02/14/24 (!) 152/74     Wt Readings from Last 1 Encounters:   03/11/24 0925 88.5 kg (195 lb 1.7 oz)         There is no height or weight on file to calculate BMI.    Lab Review:   Lab Results   Component Value Date    HGBA1C 5.0 10/14/2021     Lab Results   Component Value Date    CHOL 134 10/14/2021    HDL 60 10/14/2021    LDLCALC 63.2 10/14/2021    TRIG 54 10/14/2021    CHOLHDL 44.8 10/14/2021     Lab Results   Component Value Date     03/19/2024    K 4.0 03/19/2024     03/19/2024    CO2 24 03/19/2024    GLU 82 03/19/2024    BUN 8 03/19/2024    CREATININE 0.6 03/19/2024    CALCIUM 9.9 03/19/2024    PROT 7.2 11/07/2023    ALBUMIN 3.9 03/19/2024    BILITOT 1.0 11/07/2023    ALKPHOS 146 (H) 11/07/2023    AST 52 (H) 11/07/2023    ALT 53 (H) 11/07/2023    ANIONGAP 9 03/19/2024    ESTGFRAFRICA >60 06/21/2022    EGFRNONAA >60 06/21/2022    TSH 1.092 11/07/2023         Assessment and Plan     Age-related osteoporosis without current pathological fracture  Risk factors: postmenopausal  status, bariatric surgery in 2004 (duodenal switch), , family history, vitamin D deficiency and high risk medications  Discussed reclast and prolia at length, benefits and risks     No previous fragility fractures   FRAX not elevated     Due for DXA scan in 11/2025, discussed importance of scheduling in the same location to allow for comparison    Secondary evaluation:   Mildly elevated PTH levels   Vitamin D insufficiency   Otherwise normal renal function, 24 hr urine for bin/creat, TTG/IgA  Thyroid function tests are normal. Liver enzymes are mildly abnormal.      Discussed risk of osteonecrosis of the jaw and AFF with bisphosphonates and denosumab. Despite low risk the significant benefit on fracture reduction far outweighs the potential for this rare event.     Recurrent nephrolithiasis  Drink plenty of fluids   Avoids calcium supplements   But takes MVI   Dietary calcium   Continue vitamin D     The patient location is: [place]  The chief complaint leading to consultation is:     Visit type: audiovisual    Face to Face time with patient: 25 minutes of total time spent on the encounter, which includes face to face time and non-face to face time preparing to see the patient (eg, review of tests), Obtaining and/or reviewing separately obtained history, Documenting clinical information in the electronic or other health record, Independently interpreting results (not separately reported) and communicating results to the patient/family/caregiver, or Care coordination (not separately reported).         Each patient to whom he or she provides medical services by telemedicine is:  (1) informed of the relationship between the physician and patient and the respective role of any other health care provider with respect to management of the patient; and (2) notified that he or she may decline to receive medical services by telemedicine and may withdraw from such care at any time.    Notes:

## 2024-04-03 NOTE — ASSESSMENT & PLAN NOTE
Drink plenty of fluids   Avoids calcium supplements   But takes MVI   Dietary calcium   Continue vitamin D

## 2024-04-03 NOTE — ASSESSMENT & PLAN NOTE
Risk factors: postmenopausal status, bariatric surgery in 2004 (duodenal switch), , family history, vitamin D deficiency and high risk medications  Discussed reclast and prolia at length, benefits and risks     No previous fragility fractures   FRAX not elevated     Due for DXA scan in 11/2025, discussed importance of scheduling in the same location to allow for comparison    Secondary evaluation:   Mildly elevated PTH levels   Vitamin D insufficiency   Otherwise normal renal function, 24 hr urine for bin/creat, TTG/IgA  Thyroid function tests are normal. Liver enzymes are mildly abnormal.      Discussed risk of osteonecrosis of the jaw and AFF with bisphosphonates and denosumab. Despite low risk the significant benefit on fracture reduction far outweighs the potential for this rare event.

## 2024-04-08 RX ORDER — ALPRAZOLAM 0.25 MG/1
0.25 TABLET ORAL DAILY PRN
Qty: 20 TABLET | Refills: 0 | Status: SHIPPED | OUTPATIENT
Start: 2024-04-08 | End: 2024-05-08

## 2024-04-11 ENCOUNTER — TELEPHONE (OUTPATIENT)
Dept: INFECTIOUS DISEASES | Facility: HOSPITAL | Age: 59
End: 2024-04-11
Payer: COMMERCIAL

## 2024-04-11 ENCOUNTER — PATIENT MESSAGE (OUTPATIENT)
Dept: ENDOCRINOLOGY | Facility: CLINIC | Age: 59
End: 2024-04-11
Payer: COMMERCIAL

## 2024-04-11 NOTE — TELEPHONE ENCOUNTER
I spoke with pt to schedule her RECLAST. She prefers to do it at the [facility]. Maria G get in contact with her Dr about switching locations.

## 2024-05-07 ENCOUNTER — INFUSION (OUTPATIENT)
Dept: INFUSION THERAPY | Facility: HOSPITAL | Age: 59
End: 2024-05-07
Attending: INTERNAL MEDICINE
Payer: COMMERCIAL

## 2024-05-07 ENCOUNTER — HOSPITAL ENCOUNTER (OUTPATIENT)
Dept: RADIOLOGY | Facility: HOSPITAL | Age: 59
Discharge: HOME | End: 2024-05-07
Attending: NURSE PRACTITIONER
Payer: COMMERCIAL

## 2024-05-07 VITALS
SYSTOLIC BLOOD PRESSURE: 185 MMHG | TEMPERATURE: 98 F | HEART RATE: 54 BPM | OXYGEN SATURATION: 100 % | DIASTOLIC BLOOD PRESSURE: 72 MMHG | RESPIRATION RATE: 16 BRPM

## 2024-05-07 DIAGNOSIS — D50.9 IRON DEFICIENCY ANEMIA: Primary | ICD-10-CM

## 2024-05-07 DIAGNOSIS — K75.81 NASH (NONALCOHOLIC STEATOHEPATITIS): ICD-10-CM

## 2024-05-07 DIAGNOSIS — M81.0 AGE-RELATED OSTEOPOROSIS WITHOUT CURRENT PATHOLOGICAL FRACTURE: ICD-10-CM

## 2024-05-07 DIAGNOSIS — K90.89 OTHER SPECIFIED INTESTINAL MALABSORPTION: ICD-10-CM

## 2024-05-07 DIAGNOSIS — K74.02 HEPATIC FIBROSIS, STAGE 3: ICD-10-CM

## 2024-05-07 PROCEDURE — 76705 ECHO EXAM OF ABDOMEN: CPT | Mod: TC

## 2024-05-07 PROCEDURE — 96375 TX/PRO/DX INJ NEW DRUG ADDON: CPT

## 2024-05-07 PROCEDURE — 63600175 PHARM REV CODE 636 W HCPCS: Performed by: INTERNAL MEDICINE

## 2024-05-07 PROCEDURE — 76705 ECHO EXAM OF ABDOMEN: CPT | Mod: 26,,, | Performed by: RADIOLOGY

## 2024-05-07 PROCEDURE — 96374 THER/PROPH/DIAG INJ IV PUSH: CPT

## 2024-05-07 RX ORDER — SODIUM CHLORIDE 0.9 % (FLUSH) 0.9 %
10 SYRINGE (ML) INJECTION
OUTPATIENT
Start: 2024-05-07

## 2024-05-07 RX ORDER — HEPARIN 100 UNIT/ML
500 SYRINGE INTRAVENOUS
OUTPATIENT
Start: 2024-05-07

## 2024-05-07 RX ORDER — ZOLEDRONIC ACID 5 MG/100ML
5 INJECTION, SOLUTION INTRAVENOUS
OUTPATIENT
Start: 2024-05-07

## 2024-05-07 RX ORDER — ZOLEDRONIC ACID 5 MG/100ML
5 INJECTION, SOLUTION INTRAVENOUS
Status: COMPLETED | OUTPATIENT
Start: 2024-05-07 | End: 2024-05-07

## 2024-05-07 RX ADMIN — ZOLEDRONIC ACID 5 MG: 0.05 INJECTION, SOLUTION INTRAVENOUS at 09:05

## 2024-05-07 RX ADMIN — IRON SUCROSE 200 MG: 20 INJECTION, SOLUTION INTRAVENOUS at 09:05

## 2024-05-07 NOTE — PLAN OF CARE
Pt tolerated infusion-- waiting 20 min after first dose of reclast. Pt denies s/s of reaction. Pt ambulated out of clinic in nad

## 2024-05-10 NOTE — PROGRESS NOTES
Case Management Discharge Planning Note    Patient name Jerod Worthington  Location S /S -01 MRN [id number]  : 1961 Date 5/10/2024       Current Admission Date: 5/3/2024  Current Admission Diagnosis:Atrial flutter (HCC)   Patient Active Problem List    Diagnosis Date Noted    Sinus pause 2024    Atrial flutter (HCC) 2024    Scalp abscess 2024    Hypertensive emergency 2024    Stage 3b chronic kidney disease (HCC) 2022    Hyperlipidemia 2022    Acute renal failure superimposed on stage 3 chronic kidney disease (HCC) 2022    Chronic right-sided low back pain with right-sided sciatica 2021    Severe obstructive sleep apnea 10/18/2017    Type 2 diabetes mellitus with hyperglycemia, with long-term current use of insulin (HCC) 2012    History of urinary stone 2012    Essential hypertension 2012      LOS (days): 6  Geometric Mean LOS (GMLOS) (days): 6.1  Days to GMLOS:-0.6     OBJECTIVE:  Risk of Unplanned Readmission Score: 16.88         Current admission status: Inpatient   Preferred Pharmacy:   UNKNOWN - FOLLOW UP PRIOR TO DISCHARGE TO E-PRESCRIBE  No address on file      Primary Care Provider: No primary care provider on file.    Primary Insurance: [employer]  Secondary Insurance:     DISCHARGE DETAILS:             MARTY spoke with E-me at [facility] ([phone]. [phone]) via phone to answer all pre-admission questions. Patient is formally accepted and on schedule for TTS 2nd shift, first day will be  at 10:15AM, and the chair time will be 10:15AM. MARTY spoke with moncho at bedside to make them aware. Welcome letter provided. Family will transport to/from dialysis while patient is under review for [employer] service. MARTY obtained completed [place] application from [employer] -- CM submitted application via email for their review.         MARTY advised by PT that patient has significantly deconditioned in  Subjective:      Patient ID: Estephania Rogers is a 52 y.o. female.    Chief Complaint: Foot Pain (right outer bottom foot x 1 month )    Estephania is a 52 y.o. female who presents to the podiatry clinic  with complaint of right foot pain (lateral foot). Onset of the symptoms was several weeks ago. Precipitating event: Patient relates that Easter this year she injured her right 5th digit.  She rested and took NSAIDs with relief . Current symptoms include: pain with increased activity.  She has a sister in the hospital and has been walking a lot.  Patient has had prior foot problems, patient relates that previous to weight loss she had plantar fasciitis several times.     Evaluation to date: none. Patients rates pain 7/10 on pain scale.    Current shoe gear:  [employer]        Patient Active Problem List   Diagnosis    Recurrent nephrolithiasis    Other specified intestinal malabsorption    Iron deficiency anemia    B12 deficiency anemia    Anxiety       Current Outpatient Medications on File Prior to Visit   Medication Sig Dispense Refill    ALPRAZolam (XANAX) 0.25 MG tablet Take 1 tablet (0.25 mg total) by mouth daily as needed. 30 tablet 2    ascorbic acid (VITAMIN C) 500 MG tablet Take 500 mg by mouth once daily.        butalbital-acetaminophen-caffeine -40 mg (FIORICET, ESGIC) -40 mg per tablet       cholecalciferol, vitamin D3, 50,000 unit Wafr Take 50,000 Units by mouth once daily.        multivitamin (ONE DAILY MULTIVITAMIN) per tablet Take 1 tablet by mouth once daily.      RESTASIS 0.05 % ophthalmic emulsion   3    vitamin A 34086 UNIT capsule Take 25,000 Units by mouth once daily.        cyclobenzaprine (FLEXERIL) 5 MG tablet Take 1-2 tablets 3 times daily as needed for pain. 30 tablet 0    [DISCONTINUED] calcium citrate-vitamin D (CITRACAL+D) 315-200 mg-unit per tablet Take 1 tablet by mouth 2 (two) times daily.        [DISCONTINUED] HYDROcodone-acetaminophen  (NORCO) 7.5-325 mg per tablet Take 1 tablet by mouth every 6 (six) hours as needed for Pain. 10 tablet 0    [DISCONTINUED] ibuprofen (ADVIL,MOTRIN) 800 MG tablet Take 1 tablet (800 mg total) by mouth 3 (three) times daily. 30 tablet 0     No current facility-administered medications on file prior to visit.        Review of patient's allergies indicates:  No Known Allergies    Past Surgical History:   Procedure Laterality Date    ANAL FISTULOTOMY  2005    APPENDECTOMY  2005    BREAST SURGERY  2014    CHOLECYSTECTOMY  2005    GASTRIC BYPASS  2005    SINUS SURGERY      Dr. Oral Cobb    SPINE SURGERY      TONSILLECTOMY      TOTAL REDUCTION MAMMOPLASTY  2014       Family History   Problem Relation Age of Onset    Cancer Father         pancreatic    Diabetes Father     Diabetes Mother     Hypertension Mother     Miscarriages / Stillbirths Mother     Stroke Mother     Diabetes Sister     Cancer Maternal Aunt         breast    Arthritis Maternal Grandmother     Arthritis Paternal Grandmother     Diabetes Sister     Breast cancer Paternal Aunt        Social History     Socioeconomic History    Marital status:      Spouse name: Not on file    Number of children: Not on file    Years of education: Not on file    Highest education level: Not on file   Social Needs    Financial resource strain: Not on file    Food insecurity - worry: Not on file    Food insecurity - inability: Not on file    Transportation needs - medical: Not on file    Transportation needs - non-medical: Not on file   Occupational History    Not on file   Tobacco Use    Smoking status: Never Smoker    Smokeless tobacco: Never Used    Tobacco comment: Clerical work   Substance and Sexual Activity    Alcohol use: No     Comment: socially    Drug use: No    Sexual activity: Yes     Partners: Male     Birth control/protection: None     Comment: :  Gianni   Other Topics Concern    Not on file   Social History  comparison to initial assessment -- Recommendation is now for STR. OT notes are pending but will be needed for insurance approval.    Both daughters informed of STR recommendation at bedside -- They are onboard with same. Patient also in agreement. [place] referrals placed -- Daughter Kathi confirmed she would provide transportation to/from dialysis while patient is at rehab.    CM will follow for STR responses and present options once available -- Erika states she can be reached via phone at anytime. Insurance authorization will be needed prior to discharge.                                                                                                Narrative    Not on file       Review of Systems   Constitution: Negative for chills, fever and weakness.   Cardiovascular: Negative for claudication and leg swelling.   Respiratory: Negative for cough and shortness of breath.    Skin: Positive for dry skin. Negative for itching, nail changes and rash.   Musculoskeletal: Positive for arthritis, joint pain and myalgias. Negative for falls, joint swelling and muscle weakness.   Gastrointestinal: Negative for diarrhea, nausea and vomiting.   Neurological: Positive for paresthesias. Negative for numbness and tremors.   Psychiatric/Behavioral: Negative for altered mental status and hallucinations. The patient is nervous/anxious.            Objective:       Vitals:    09/24/18 0719   BP: (!) 130/90   PainSc:   7   PainLoc: Foot       Physical Exam   Constitutional:  Non-toxic appearance. She does not have a sickly appearance. No distress.   Cardiovascular:   Pulses:       Dorsalis pedis pulses are 2+ on the right side, and 2+ on the left side.        Posterior tibial pulses are 2+ on the right side, and 2+ on the left side.   Pulmonary/Chest: No respiratory distress.   Musculoskeletal:        Right ankle: She exhibits decreased range of motion. She exhibits no swelling. No tenderness. No lateral malleolus, no medial malleolus, no AITFL, no CF ligament and no posterior TFL tenderness found. Achilles tendon exhibits no pain, no defect and normal Villegas's test results.        Left ankle: She exhibits decreased range of motion. She exhibits no swelling. No tenderness. No lateral malleolus, no medial malleolus, no AITFL, no CF ligament and no posterior TFL tenderness found. Achilles tendon exhibits no pain, no defect and normal Villegas's test results.        Right foot: There is tenderness (Pain on palpation to styloid process region of base of the 5th metatarsal and sub 5th MTPJ). There is no bony tenderness.        Left foot: There is no bony tenderness.   Biomechanical  exam: Pain on palpation right medial calcaneal tubercle at origin of plantar fascia. There is equinus deformity bilateral with decreased dorsiflexion at the ankle joint bilateral. No tenderness with compression of heel. Negative tinels sign. Gait analysis reveals excessive pronation through midstance and propulsion with early heel off. Shoes reveals lateral heel counter wear bilateral     Decreased first MPJ range of motion both weightbearing and nonweightbearing, no crepitus observed the first MP joint, + dorsal flag sign. Mild  bunion deformity is observed .    Patient has hammertoes of digits 2-5 bilateral partially reducible        Neurological: She has normal reflexes. She displays no atrophy and no tremor. No sensory deficit. She exhibits normal muscle tone.   Skin: Skin is warm, dry and intact. No bruising, no burn, no laceration, no lesion and no rash noted. She is not diaphoretic. No cyanosis. No pallor. Nails show no clubbing.   Psychiatric: Her mood appears not anxious. Her affect is not inappropriate. Her speech is not slurred. She is not combative. She is communicative. She is attentive.   Nursing note reviewed.            Assessment:       Encounter Diagnoses   Name Primary?    Foot pain, right Yes    Acquired equinus deformity of both feet     Peroneus brevis tendonitis, right     Metatarsalgia of right foot     Hammer toes of both feet     Hallux limitus, acquired, left     Hallux limitus, acquired, right          Plan:       Estephania was seen today for foot pain.    Diagnoses and all orders for this visit:    Foot pain, right  -     X-Ray Foot Complete Right; Future    Acquired equinus deformity of both feet  -     X-Ray Foot Complete Right; Future    Peroneus brevis tendonitis, right  -     X-Ray Foot Complete Right; Future    Metatarsalgia of right foot  -     X-Ray Foot Complete Right; Future    Hammer toes of both feet  -     X-Ray Foot Complete Right; Future    Hallux limitus, acquired,  left  -     X-Ray Foot Complete Right; Future    Hallux limitus, acquired, right  -     X-Ray Foot Complete Right; Future    Other orders  -     meloxicam (MOBIC) 15 MG tablet; Take 1 tablet (15 mg total) by mouth once daily.      I counseled the patient on her conditions, their implications and medical management.    Discussed biomechanical deformity correction surgically vs conservative   management       Greater than 50% of this visit spent on counseling and coordination of care.    Significant difference in pain reaction on physical exam today than on previous encounter.    Discussed tendonitis and importance of immobilization for healing as well as the lengthy course of treatment for complete resolution.    1. CAM boot for immobilization  2. Patient instructed to rest affected limb, avoid excessive WB and use crutch or walker assistance if needed.  3. Instructions on elevation to reduce pain and swelling. When sleeping, place a pillow under the injured leg. When sitting, support the injured leg so it is level with your waist.   4. Patient instructed on adequate icing techniques. Patient should ice the affected area at least once per day x 10 minutes for 10 days . I advised the  patient that extra icing would also be beneficial to ensure adequate anti inflammatory effect   5. Mobic prescribed. Patient was instructed on dosing information. Discontinue if adverse effects occur     RTC 6-9 weeks, sooner PRN

## 2024-05-14 ENCOUNTER — OFFICE VISIT (OUTPATIENT)
Dept: HEPATOLOGY | Facility: CLINIC | Age: 59
End: 2024-05-14
Payer: COMMERCIAL

## 2024-05-14 DIAGNOSIS — R74.8 ELEVATED LIVER ENZYMES: ICD-10-CM

## 2024-05-14 DIAGNOSIS — K75.81 METABOLIC DYSFUNCTION-ASSOCIATED STEATOHEPATITIS (MASH): ICD-10-CM

## 2024-05-14 DIAGNOSIS — K74.02 HEPATIC FIBROSIS, STAGE 3: Primary | ICD-10-CM

## 2024-05-14 PROCEDURE — 99214 OFFICE O/P EST MOD 30 MIN: CPT | Mod: 95,,, | Performed by: NURSE PRACTITIONER

## 2024-05-14 NOTE — PROGRESS NOTES
[facility] - Established Patient     Last Clinic Visit: 11/14/23    Chief Complaint: Follow-up for MASH with hepatic fibrosis      HISTORY     This is a 58 y.o. female with PMH noted below, here for follow-up of fatty liver/MASH with advanced fibrosis (F3).    She was initially told that she had fatty liver ~25 years ago.    H/o gastric bypass in 2005 - lost >100 lb.     Her transaminases have been intermittently elevated since 2005. AST>ALT, <100. TBili also mildly elevated since 2011. Liver enzymes have improved with weight loss.      Serologic workup has been negative for other etiologies of liver disease. UGT1A1 testing confirms Gilbert's.      Her Fibroscan suggested advanced fibrosis (F3) so she underwent liver biopsy 11/12/20 to confirm.    FINAL PATHOLOGIC DIAGNOSIS  Mild macrovesicular steatosis with minimally active steatohepatitis and multifocal bridging fibrosis with focal nodule formation. The NAFLD activity score (HARIS) = 3 (1+1+1) with stage 3 fibrosis.     Follow-up fibrosis staging:  Fibroscan 11/2022 = F3 (kPa 12.2), S3    Interval history:  Feels well overall, no new concerns from liver standpoint.    Denies symptoms of hepatic decompensation including jaundice, ascites, cognitive problems to suggest hepatic encephalopathy, or GI bleeding.     Started Reclast for osteoporosis.     Updates on risk factors for fatty liver:   Weight -- BMI 27  Has lost 20 lb with Wegovy          Dyslipidemia -- well controlled in 2021                      Insulin resistance / diabetes -- last HgbA1c 5.0 in 2021  Alcohol use -- occasional glass of wine     Liver enzymes still mildly elevated despite weight loss.     Health Maintenance:  -- HCC screening: [facility] 5/7/24 without focal hepatic lesion; AFP <2  -- Variceal screening: no EGD  -- Hepatitis A & B vaccination: complete        Past medical history, surgical history, problem list, family history, social history, allergies: Reviewed and updated in  the appropriate section of the electronic medical record.      Current Outpatient Medications   Medication Sig Dispense Refill    ALPRAZolam (XANAX) 0.25 MG tablet Take 1 tablet (0.25 mg total) by mouth daily as needed for Anxiety. 20 tablet 0    ascorbic acid, vitamin C, (VITAMIN C) 500 MG tablet Take 500 mg by mouth once daily.      cholecalciferol, vitamin D3, 1,250 mcg (50,000 unit) capsule Take 1 tablet by mouth daily x 4 weeks, then transition to 1 tablet twice a week 50 capsule 1    famotidine (PEPCID) 20 MG tablet Take 1 tablet (20 mg total) by mouth 2 (two) times daily. 20 tablet 0    FLUoxetine 20 MG capsule TAKE 1 CAPSULE BY MOUTH EVERY DAY 90 capsule 3    fluticasone propionate (FLONASE) 50 mcg/actuation nasal spray 1 spray (50 mcg total) by Each Nostril route once daily. 16 g 2    gabapentin (NEURONTIN) 300 MG capsule Take 1 capsule (300 mg total) by mouth every evening. 90 capsule 1    multivitamin (THERAGRAN) per tablet Take 1 tablet by mouth once daily.      phytonadione, vit K1, (VITAMIN K1 MISC) 1,000 mg by Misc.(Non-Drug; Combo Route) route once daily.      prednisoLONE acetate (PRED FORTE) 1 % DrpS Place 1 drop into the left eye 3 (three) times daily.      RESTASIS 0.05 % ophthalmic emulsion   3    tiZANidine (ZANAFLEX) 4 MG tablet Take 1 tablet (4 mg total) by mouth every 8 (eight) hours as needed (mucle tightness, spasm). 20 tablet 0    vitamin A 98002 UNIT capsule Take 25,000 Units by mouth once daily.      vitamin E acetate (VITAMIN E ORAL) Take 1 tablet by mouth once daily.       No current facility-administered medications for this visit.     Medication list reviewed and updated.      Review of Systems - as per HPI  Constitutional: Negative for unexpected weight change.   Respiratory: Negative for shortness of breath.    Cardiovascular: Negative for leg swelling.  Gastrointestinal: Negative for abdominal distention or abdominal pain. Negative for melena or hematemesis.  Musculoskeletal:  Negative for myalgias.    Skin: Negative for jaundice or itching.  Neurological: Negative for confusion or slowed mentation. Negative for tremors.   Hematological: Does not bruise/bleed easily.       Physical Exam - limited by virtual visit  Constitutional: No distress. Alert and oriented.  Pulmonary/Chest: No respiratory distress.   Skin: No jaundice.   Psychiatric: Normal mood and affect. Speech, behavior, and thought content normal.        LABS & DIAGNOSTIC STUDIES     I have personally reviewed pertinent laboratory findings:    Lab Results   Component Value Date    ALT 53 (H) 05/07/2024    AST 51 (H) 05/07/2024    ALKPHOS 144 (H) 05/07/2024    BILITOT 1.2 (H) 05/07/2024    ALBUMIN 3.9 05/07/2024    INR 1.1 05/07/2024       Lab Results   Component Value Date    WBC 3.99 05/07/2024    HGB 13.0 05/07/2024    HCT 40.1 05/07/2024    MCV 97 05/07/2024     05/07/2024       Lab Results   Component Value Date     05/07/2024    K 3.9 05/07/2024    BUN 10 05/07/2024    CREATININE 0.7 05/07/2024    ESTGFRAFRICA >60 06/21/2022    EGFRNONAA >60 06/21/2022       Lab Results   Component Value Date    SMOOTHMUSCAB Positive 1:40 (A) 10/08/2020    AMAIFA Negative 1:40 10/08/2020    IGGSERUM 1033 10/08/2020    ANASCREEN Negative <1:80 06/10/2022    FERRITIN 1,473 (H) 01/05/2024    FESATURATED 56 (H) 01/05/2024    NIILG7NMJBSG MM 12/01/2020    MWYXY8CMDXIA 134 12/01/2020    CERULOPLSM 26.0 06/10/2022    HEPBSAG Negative 10/08/2020    HEPBCAB Negative 10/08/2020    HEPCAB Negative 09/22/2020       Lab Results   Component Value Date    AFP <2.0 05/07/2024       I have personally reviewed the following result reports:  Abdominal US - 5/7/24  Liver biopsy - 11/12/20      ASSESSMENT & PLAN     58 y.o. female with:    1. Hepatic fibrosis, stage 3    2. Metabolic dysfunction-associated steatohepatitis (MASH)    3. Elevated liver enzymes        Recommendations:   Offered Rezdiffra for additional treatment of fatty liver (given  F3). Discussed goal of preventing cirrhosis. She will think about this and reach out if interested.   Reassured that liver function remains stable. Monitor LFTs every 6 months  Repeat Fibroscan in 6-12 months  Continue HCC screening every 6 months with ultrasound and AFP, next due 11/2024  Commended on weight loss success, doing well with Wegovy. Encouraged to continue current efforts. Low carbohydrate/sugar, high protein/fiber diet.   150 min/week of moderate exercise (aerobic + resistance), as tolerated  Maintain good control of blood pressure, cholesterol, and blood sugar. Update lipid panel and HgbA1c with next labs.   Avoid alcohol given advanced fibrosis.       Orders Placed This Encounter   Procedures    US Abdomen Limited    CBC Without Differential    Comprehensive Metabolic Panel    AFP Tumor Marker    Protime-INR    Phosphatidylethanol (PETH)    Hemoglobin A1C    Lipid Panel       F/u in 6 months with labs/US prior.       Thank you for allowing me to participate in the care of Estephania Cortezbessie Cornelius, FNP-C  Hepatology        The patient location is: [place], [place]  The chief complaint leading to consultation is: F/u for fatty liver with hepatic fibrosis     Visit type: audiovisual    Face to Face time with patient: 22 min  30 minutes of total time spent on the encounter, which includes face to face time and non-face to face time preparing to see the patient (eg, review of tests), Obtaining and/or reviewing separately obtained history, Documenting clinical information in the electronic or other health record, Independently interpreting results (not separately reported) and communicating results to the patient/family/caregiver, or Care coordination (not separately reported).     Each patient to whom he or she provides medical services by telemedicine is:  (1) informed of the relationship between the physician and patient and the respective role of any other health care provider with  respect to management of the patient; and (2) notified that he or she may decline to receive medical services by telemedicine and may withdraw from such care at any time.

## 2024-05-14 NOTE — PATIENT INSTRUCTIONS
Consider resmetirom / Rezdiffra for additional treatment of fatty liver  Congrats on the weight loss success- keep up the good work  Labs and ultrasound every 6 months  Repeat Fibroscan next year

## 2024-05-24 ENCOUNTER — TELEPHONE (OUTPATIENT)
Dept: HEPATOLOGY | Facility: CLINIC | Age: 59
End: 2024-05-24
Payer: COMMERCIAL

## 2024-05-24 ENCOUNTER — PATIENT MESSAGE (OUTPATIENT)
Dept: HEPATOLOGY | Facility: CLINIC | Age: 59
End: 2024-05-24
Payer: COMMERCIAL

## 2024-05-24 DIAGNOSIS — K74.02 HEPATIC FIBROSIS, STAGE 3: Primary | ICD-10-CM

## 2024-05-24 DIAGNOSIS — K75.81 METABOLIC DYSFUNCTION-ASSOCIATED STEATOHEPATITIS (MASH): ICD-10-CM

## 2024-05-24 RX ORDER — RESMETIROM 80 MG/1
80 TABLET, COATED ORAL DAILY
Qty: 30 TABLET | Refills: 11 | Status: ACTIVE | OUTPATIENT
Start: 2024-05-24

## 2024-05-24 NOTE — TELEPHONE ENCOUNTER
The patient has been contacted and scheduled labs.  An appointment on Monday, August 19, 2024 ([facility]).  Patient confirmed.  A copy of the appointment has been mailed out.

## 2024-06-03 DIAGNOSIS — K74.02 HEPATIC FIBROSIS, STAGE 3: ICD-10-CM

## 2024-06-03 DIAGNOSIS — K75.81 METABOLIC DYSFUNCTION-ASSOCIATED STEATOHEPATITIS (MASH): Primary | ICD-10-CM

## 2024-06-06 ENCOUNTER — TELEPHONE (OUTPATIENT)
Dept: HEPATOLOGY | Facility: CLINIC | Age: 59
End: 2024-06-06
Payer: COMMERCIAL

## 2024-06-06 NOTE — TELEPHONE ENCOUNTER
The patient was contacted to schedule a Fibroscan.  An appointment has been scheduled on Friday, June 14, 2024 at 4:15PM.  Patient confirmed.  A copy of the appointment has been mailed out.        ----- Message from Keri Cornelius NP sent at 6/6/2024 11:29 AM CDT -----  Regarding: RE: Patient advice  Contact: Pt  [phone]  She is correct. I sent a message to get her scheduled for another Fibroscan but it might not have been seen yet. Please schedule, thank you!  ----- Message -----  From: Mile Baig MA  Sent: 6/6/2024   9:42 AM CDT  To: Keri Cornelius NP  Subject: FW: Patient advice                               Good morning   Please advise.  ----- Message -----  From: Anastasiya Felix  Sent: 6/6/2024   9:29 AM CDT  To: Adryan Saavedra Staff  Subject: Patient advice                                           Name of Caller:   Estephania      Contact Preference: [phone]    Nature of Call:  States she was told by pharmacy her insurance will not cover meds without a recent fiborscan  patient would like to touch bases  resmetirom (REZDIFFRA) 80 mg Tab

## 2024-06-14 ENCOUNTER — PROCEDURE VISIT (OUTPATIENT)
Dept: HEPATOLOGY | Facility: CLINIC | Age: 59
End: 2024-06-14
Payer: COMMERCIAL

## 2024-06-14 ENCOUNTER — PATIENT MESSAGE (OUTPATIENT)
Dept: HEPATOLOGY | Facility: CLINIC | Age: 59
End: 2024-06-14

## 2024-06-14 DIAGNOSIS — K75.81 METABOLIC DYSFUNCTION-ASSOCIATED STEATOHEPATITIS (MASH): ICD-10-CM

## 2024-06-14 DIAGNOSIS — K74.02 HEPATIC FIBROSIS, STAGE 3: ICD-10-CM

## 2024-06-14 NOTE — PROCEDURES
FibroScan Transplant Hepatology(Vibration Controlled Transient Elastography)    Date/Time: 6/14/2024 4:15 PM    Performed by: Keri Cornelius NP  Authorized by: Keri Cornelius NP    Diagnosis:  NAFLD    Probe:  M    Universal Protocol: Patient's identity, procedure and site were verified, confirmatory pause was performed.  Discussed procedure including risks and potential complications.  Questions answered.  Patient verbalizes understanding and wishes to proceed with VCTE.     Procedure: After providing explanations of the procedure, patient was placed in the supine position with right arm in maximum abduction to allow optimal exposure of right lateral abdomen.  Patient was briefly assessed, Testing was performed in the mid-axillary location, 50Hz Shear Wave pulses were applied and the resulting Shear Wave and Propagation Speed detected with a 3.5 MHz ultrasonic signal, using the FibroScan probe, Skin to liver capsule distance and liver parenchyma were accessed during the entire examination with the FibroScan probe, Patient was instructed to breathe normally and to abstain from sudden movements during the procedure, allowing for random measurements of liver stiffness. At least 10 Shear Waves were produced, Individual measurements of each Shear Wave were calculated.  Patient tolerated the procedure well with no complications.  Meets discharge criteria as was dismissed.  Rates pain 0 out of 10.  Patient will follow up with ordering provider to review results.    Findings  Median liver stiffness score:  10.7  CAP Reading: dB/m:  292    IQR/med %:  7  Interpretation  Fibrosis interpretation is based on medial liver stiffness - Kilopascal (kPa).    Fibrosis Stage:  F3  Steatosis interpretation is based on controlled attenuation parameter - (dB/m).    Steatosis Grade:  <S1

## 2024-06-24 NOTE — PROGRESS NOTES
Subjective:       Patient ID: Estephania Rogers is a 58 y.o. female.    Chief Complaint: 6 months and Other iron deficiency anemia    HPI Patient has not been seen in Hem/Onc clinic in 4 years. She has been receiving injectafer every 12 weeks at [facility].   Patient presents for follow up for iron defieincy anemia.     She had labs last week.  Her iron studies are all normal or elevated. She is currently getting IV venofer every 12 weeks.   She was taking 50,000 units of vitamin d daily x 3 weeks then twice a week, however she was taking 50,000 units of a supplement of vitamin D daily.   She does follow with Dr. Rivas with endocrinology.   Appetite is good. Bowel movements are good.   Denies PICA and denies shortness of breath.     She is now on reclast for osteoporosis on May 7th.   Started on Rezdiffra 10 days ago.     She started on Wegovy in November, she has lost about 21-22 lbs.       Per Dr. Andres's last note: In regards to her iron deficiency:  This is a 58-year-old female with iron deficiency anemia secondary to bariatric surgery about 7 years ago. In April 2010, she was found to be anemic with a hemoglobin of 9.8. She was placed on iron therapy at that time and repeat blood work in September revealed a ferritin of 5. At that time, she was referred for further evaluation of her iron deficiency. We initiated Ferrlecit infusions in the fall for a total of 8 weeks and she had a good response. She is here today for a follow up. GI evaluation including EGD and colonoscopy was negative.   She has a history of prior bariatric surgery.    Review of Systems   Constitutional:  Positive for fatigue. Negative for activity change, appetite change, chills, diaphoresis, fever and unexpected weight change.   HENT:  Negative for nosebleeds.    Eyes:  Negative for redness and visual disturbance.   Respiratory:  Negative for cough, chest tightness, shortness of breath and wheezing.    Cardiovascular:  Negative for  chest pain, palpitations and leg swelling.   Gastrointestinal:  Negative for abdominal distention, abdominal pain, blood in stool, constipation, diarrhea, nausea and vomiting.        Chronic changes since bypass   Genitourinary:  Negative for hematuria.   Musculoskeletal:         Leg cramping and restless legs a night - takes gabapentin nightly    Skin:  Negative for color change and rash.   Neurological:  Negative for dizziness, weakness, light-headedness, numbness and headaches.   Hematological:  Negative for adenopathy. Does not bruise/bleed easily.   Psychiatric/Behavioral:  Negative for confusion.        Objective:      Physical Exam  Vitals and nursing note reviewed.   Constitutional:       General: She is not in acute distress.     Appearance: Normal appearance. She is well-developed.   HENT:      Head: Normocephalic and atraumatic.      Nose: Nose normal.   Eyes:      Pupils: Pupils are equal, round, and reactive to light.   Cardiovascular:      Rate and Rhythm: Normal rate and regular rhythm.      Heart sounds: Normal heart sounds.   Pulmonary:      Effort: Pulmonary effort is normal.      Breath sounds: Normal breath sounds.   Musculoskeletal:      Cervical back: Normal range of motion.   Skin:     General: Skin is warm and dry.      Findings: No rash.   Neurological:      General: No focal deficit present.      Mental Status: She is alert and oriented to person, place, and time.   Psychiatric:         Mood and Affect: Mood normal.         Behavior: Behavior normal.         Thought Content: Thought content normal.         Judgment: Judgment normal.       Labs- reviewed  Assessment:       1. Other iron deficiency anemia    2. Other vitamin B12 deficiency anemia    3. Age-related osteoporosis without current pathological fracture        Plan:     1. Continue  q15 week venofer replacement  Opts for Thursday afternoons with treatment at [facility].    She is currently taking 50,000 units of Vitamin D, will  transition to daily as level is < 30. Will discuss with Dr. Rivas on how to proceed      Return to clinic in 6 months with MD appointment and labs.     Patient is in agreement with the proposed treatment plan. All questions were answered to the patient's satisfaction. Patient knows to call clinic for any new or worsening symptoms and if anything is needed before the next clinic visit.          Nova Capps, FNP-C  Hematology & Medical Oncology   [address] [place] [address]  ph. [phone]  Fax. [phone]    Collaborating physician, Dr. Andres.      Route Chart for Scheduling    Med Onc Chart Routing      Follow up with physician    Follow up with [facility] 6 months.   Infusion scheduling note   venofer every 15 weeks at [facility] - need to move august back 3 weeks and October back 6 weeks   Injection scheduling note    Labs Ferritin, CBC, vitamin B12 and iron and TIBC   Scheduling:  Preferred lab:  Lab interval:  ferritin the day before infusion on [facility] in August and ferritin, iron and tibc and Vitamin B 12 in 6 months   Imaging    Pharmacy appointment    Other referrals              Therapy Plan Information  VENOFER (IRON SUCROSE) QW  iron sucrose injection 200 mg  200 mg, Intravenous, Every visit    ZOLEDRONIC ACID (RECLAST) VISIT  Medications  zoledronic acid-mannitol & water 5 mg/100 mL infusion 5 mg  5 mg, Intravenous, Every visit  Flushes  heparin, porcine (PF) 100 unit/mL injection flush 500 Units  500 Units, Intravenous, PRN  sodium chloride 0.9% flush 10 mL  10 mL, Intravenous, Every visit  sodium chloride 0.9% 100 mL flush bag  Intravenous, Every visit

## 2024-06-25 ENCOUNTER — PATIENT MESSAGE (OUTPATIENT)
Dept: ADMINISTRATIVE | Facility: HOSPITAL | Age: 59
End: 2024-06-25
Payer: COMMERCIAL

## 2024-06-26 DIAGNOSIS — Z12.11 SCREENING FOR COLON CANCER: ICD-10-CM

## 2024-07-05 ENCOUNTER — LAB VISIT (OUTPATIENT)
Dept: LAB | Facility: HOSPITAL | Age: 59
End: 2024-07-05
Payer: COMMERCIAL

## 2024-07-05 DIAGNOSIS — E55.9 VITAMIN D DEFICIENCY: ICD-10-CM

## 2024-07-05 DIAGNOSIS — D50.8 OTHER IRON DEFICIENCY ANEMIA: ICD-10-CM

## 2024-07-05 LAB
25(OH)D3+25(OH)D2 SERPL-MCNC: 24 NG/ML (ref 30–96)
ERYTHROCYTE [DISTWIDTH] IN BLOOD BY AUTOMATED COUNT: 12.2 % (ref 11.5–14.5)
FERRITIN SERPL-MCNC: 1768 NG/ML (ref 20–300)
HCT VFR BLD AUTO: 41.6 % (ref 37–48.5)
HGB BLD-MCNC: 13.2 G/DL (ref 12–16)
IMM GRANULOCYTES # BLD AUTO: 0.01 K/UL (ref 0–0.04)
IRON SERPL-MCNC: 181 UG/DL (ref 30–160)
MCH RBC QN AUTO: 31.4 PG (ref 27–31)
MCHC RBC AUTO-ENTMCNC: 31.7 G/DL (ref 32–36)
MCV RBC AUTO: 99 FL (ref 82–98)
NEUTROPHILS # BLD AUTO: 2.6 K/UL (ref 1.8–7.7)
PLATELET # BLD AUTO: 201 K/UL (ref 150–450)
PMV BLD AUTO: 11.4 FL (ref 9.2–12.9)
RBC # BLD AUTO: 4.2 M/UL (ref 4–5.4)
SATURATED IRON: 70 % (ref 20–50)
TOTAL IRON BINDING CAPACITY: 260 UG/DL (ref 250–450)
TRANSFERRIN SERPL-MCNC: 176 MG/DL (ref 200–375)
VIT B12 SERPL-MCNC: 1185 PG/ML (ref 210–950)
WBC # BLD AUTO: 4.55 K/UL (ref 3.9–12.7)

## 2024-07-05 PROCEDURE — 82607 VITAMIN B-12: CPT | Performed by: NURSE PRACTITIONER

## 2024-07-05 PROCEDURE — 85027 COMPLETE CBC AUTOMATED: CPT | Performed by: NURSE PRACTITIONER

## 2024-07-05 PROCEDURE — 82728 ASSAY OF FERRITIN: CPT | Performed by: NURSE PRACTITIONER

## 2024-07-05 PROCEDURE — 36415 COLL VENOUS BLD VENIPUNCTURE: CPT | Mod: PO | Performed by: NURSE PRACTITIONER

## 2024-07-05 PROCEDURE — 83540 ASSAY OF IRON: CPT | Performed by: NURSE PRACTITIONER

## 2024-07-05 PROCEDURE — 82306 VITAMIN D 25 HYDROXY: CPT | Performed by: NURSE PRACTITIONER

## 2024-07-08 ENCOUNTER — OFFICE VISIT (OUTPATIENT)
Dept: HEMATOLOGY/ONCOLOGY | Facility: CLINIC | Age: 59
End: 2024-07-08
Payer: COMMERCIAL

## 2024-07-08 VITALS
OXYGEN SATURATION: 97 % | HEART RATE: 73 BPM | HEIGHT: 71 IN | WEIGHT: 193.56 LBS | BODY MASS INDEX: 27.1 KG/M2 | TEMPERATURE: 99 F | DIASTOLIC BLOOD PRESSURE: 66 MMHG | SYSTOLIC BLOOD PRESSURE: 135 MMHG

## 2024-07-08 DIAGNOSIS — E55.9 VITAMIN D DEFICIENCY: ICD-10-CM

## 2024-07-08 DIAGNOSIS — M81.0 AGE-RELATED OSTEOPOROSIS WITHOUT CURRENT PATHOLOGICAL FRACTURE: ICD-10-CM

## 2024-07-08 DIAGNOSIS — D51.8 OTHER VITAMIN B12 DEFICIENCY ANEMIA: ICD-10-CM

## 2024-07-08 DIAGNOSIS — D50.8 OTHER IRON DEFICIENCY ANEMIA: Primary | ICD-10-CM

## 2024-07-08 PROCEDURE — G2211 COMPLEX E/M VISIT ADD ON: HCPCS | Mod: S$GLB,,, | Performed by: NURSE PRACTITIONER

## 2024-07-08 PROCEDURE — 99214 OFFICE O/P EST MOD 30 MIN: CPT | Mod: S$GLB,,, | Performed by: NURSE PRACTITIONER

## 2024-07-08 PROCEDURE — 99999 PR PBB SHADOW E&M-EST. PATIENT-LVL IV: CPT | Mod: PBBFAC,,, | Performed by: NURSE PRACTITIONER

## 2024-07-08 RX ORDER — ASPIRIN 325 MG
50000 TABLET, DELAYED RELEASE (ENTERIC COATED) ORAL DAILY
Qty: 30 CAPSULE | Refills: 2 | Status: SHIPPED | OUTPATIENT
Start: 2024-07-08 | End: 2024-07-11 | Stop reason: SDUPTHER

## 2024-07-11 DIAGNOSIS — E55.9 VITAMIN D DEFICIENCY: ICD-10-CM

## 2024-07-11 RX ORDER — ASPIRIN 325 MG
50000 TABLET, DELAYED RELEASE (ENTERIC COATED) ORAL DAILY
Qty: 30 CAPSULE | Refills: 2 | Status: SHIPPED | OUTPATIENT
Start: 2024-07-11

## 2024-07-12 ENCOUNTER — TELEPHONE (OUTPATIENT)
Dept: HEMATOLOGY/ONCOLOGY | Facility: CLINIC | Age: 59
End: 2024-07-12
Payer: COMMERCIAL

## 2024-07-12 DIAGNOSIS — D50.8 OTHER IRON DEFICIENCY ANEMIA: Primary | ICD-10-CM

## 2024-07-16 ENCOUNTER — PATIENT MESSAGE (OUTPATIENT)
Dept: HEMATOLOGY/ONCOLOGY | Facility: CLINIC | Age: 59
End: 2024-07-16
Payer: COMMERCIAL

## 2024-07-16 ENCOUNTER — TELEPHONE (OUTPATIENT)
Dept: HEMATOLOGY/ONCOLOGY | Facility: CLINIC | Age: 59
End: 2024-07-16
Payer: COMMERCIAL

## 2024-07-29 ENCOUNTER — PATIENT MESSAGE (OUTPATIENT)
Dept: HEMATOLOGY/ONCOLOGY | Facility: CLINIC | Age: 59
End: 2024-07-29
Payer: COMMERCIAL

## 2024-08-01 ENCOUNTER — PATIENT MESSAGE (OUTPATIENT)
Dept: ADMINISTRATIVE | Facility: HOSPITAL | Age: 59
End: 2024-08-01
Payer: COMMERCIAL

## 2024-08-05 ENCOUNTER — TELEPHONE (OUTPATIENT)
Dept: INFUSION THERAPY | Facility: HOSPITAL | Age: 59
End: 2024-08-05
Payer: COMMERCIAL

## 2024-08-06 ENCOUNTER — TELEPHONE (OUTPATIENT)
Dept: HEPATOLOGY | Facility: CLINIC | Age: 59
End: 2024-08-06
Payer: COMMERCIAL

## 2024-08-06 ENCOUNTER — PATIENT MESSAGE (OUTPATIENT)
Dept: HEPATOLOGY | Facility: CLINIC | Age: 59
End: 2024-08-06
Payer: COMMERCIAL

## 2024-08-26 ENCOUNTER — PATIENT MESSAGE (OUTPATIENT)
Dept: OTOLARYNGOLOGY | Facility: CLINIC | Age: 59
End: 2024-08-26
Payer: COMMERCIAL

## 2024-08-27 ENCOUNTER — HOSPITAL ENCOUNTER (OUTPATIENT)
Dept: RADIOLOGY | Facility: HOSPITAL | Age: 59
Discharge: HOME | End: 2024-08-27
Attending: FAMILY MEDICINE
Payer: COMMERCIAL

## 2024-08-27 DIAGNOSIS — J01.91 ACUTE RECURRENT SINUSITIS, UNSPECIFIED LOCATION: Primary | ICD-10-CM

## 2024-08-27 DIAGNOSIS — Z12.31 ENCOUNTER FOR SCREENING MAMMOGRAM FOR BREAST CANCER: ICD-10-CM

## 2024-08-27 PROCEDURE — 77067 SCR MAMMO BI INCL CAD: CPT | Mod: TC,PO

## 2024-08-27 RX ORDER — AMOXICILLIN AND CLAVULANATE POTASSIUM 875; 125 MG/1; MG/1
1 TABLET, FILM COATED ORAL 2 TIMES DAILY
Qty: 20 TABLET | Refills: 0 | Status: SHIPPED | OUTPATIENT
Start: 2024-08-27 | End: 2024-09-06

## 2024-08-28 ENCOUNTER — E-CONSULT (OUTPATIENT)
Dept: HEMATOLOGY/ONCOLOGY | Facility: CLINIC | Age: 59
End: 2024-08-28
Payer: COMMERCIAL

## 2024-08-28 ENCOUNTER — PATIENT MESSAGE (OUTPATIENT)
Dept: HEMATOLOGY/ONCOLOGY | Facility: CLINIC | Age: 59
End: 2024-08-28
Payer: COMMERCIAL

## 2024-08-28 DIAGNOSIS — E83.19 IRON OVERLOAD: Primary | ICD-10-CM

## 2024-08-28 NOTE — CONSULTS
[facility] Hematology Oncology  Response for E-Consult     Patient Name: Estephania Rogers  MRN: [id number]  Primary Care Provider: Dulce Altamirano MD   Requesting Provider: Nova Capps NP  Consults    Recommendation: ***    Contingency that warrants a repeat eConsult or referral: ***    Total time of Consultation: {TIME IN MINUTES:49609}    I {did/did not:80939} speak to the requesting provider verbally about this.     *This eConsult is based on the clinical data available to me and is furnished without benefit of a physical examination. The eConsult will need to be interpreted in light of any clinical issues or changes in patient status not available to me at the time of filing this eConsults. Significant changes in patient condition or level of acuity should result in immediate formal consultation and reevaluation. Please alert me if you have further questions.    Thank you for this eConsult referral.     Bernard Hernandez MD  [facility] Hematology Oncology    {If you have any feedback, please reach out to our eConsult clinician lead, Dr. Romeo Akers ([email or web address]) or , Kavya Lu ([email or web address]):[phone]}

## 2024-08-28 NOTE — CONSULTS
[facility] - Hematology Oncology  Response for E-Consult     Patient Name: Estephania Rogers  MRN: [id number]  Primary Care Provider: Dulce Altamirano MD   Requesting Provider: Nova Capps NP  [facility]  Consult performed by: Bernard Hernandez MD  Consult ordered by: Nova Capps NP  Reason for consult: Patient was receiving venofer every 12 weeks (per patient request). Ferritin is now elevated despite not receiving venofer in 4 months. Thoughts?          Recommendation: patient appears to have biochemical evidence of iron overload as evidenced by iron saturation of 70%.  Ferritin is expected to remain elevated without either phlebotomy or iron chelation.    Contingency that warrants a repeat eConsult or referral: new questions    Total time of Consultation: 10 minute    I did not speak to the requesting provider verbally about this.     *This eConsult is based on the clinical data available to me and is furnished without benefit of a physical examination. The eConsult will need to be interpreted in light of any clinical issues or changes in patient status not available to me at the time of filing this eConsults. Significant changes in patient condition or level of acuity should result in immediate formal consultation and reevaluation. Please alert me if you have further questions.    Thank you for this eConsult referral.     Bernard Hernandez MD  [facility] - Hematology Oncology

## 2024-08-29 ENCOUNTER — PATIENT MESSAGE (OUTPATIENT)
Dept: HEMATOLOGY/ONCOLOGY | Facility: CLINIC | Age: 59
End: 2024-08-29
Payer: COMMERCIAL

## 2024-08-29 DIAGNOSIS — E83.19 IRON OVERLOAD: Primary | ICD-10-CM

## 2024-08-30 DIAGNOSIS — R79.89 ELEVATED FERRITIN: Primary | ICD-10-CM

## 2024-09-07 ENCOUNTER — PATIENT MESSAGE (OUTPATIENT)
Dept: HEPATOLOGY | Facility: CLINIC | Age: 59
End: 2024-09-07
Payer: COMMERCIAL

## 2024-09-18 DIAGNOSIS — F43.9 SITUATIONAL STRESS: ICD-10-CM

## 2024-09-18 NOTE — TELEPHONE ENCOUNTER
No care due was identified.  [facility] Embedded Care Due Messages. Reference number: [id number].   9/18/2024 9:18:55 AM CDT

## 2024-09-19 RX ORDER — FLUOXETINE HYDROCHLORIDE 20 MG/1
20 CAPSULE ORAL DAILY
Qty: 90 CAPSULE | Refills: 3 | Status: SHIPPED | OUTPATIENT
Start: 2024-09-19

## 2024-09-24 ENCOUNTER — LAB VISIT (OUTPATIENT)
Dept: LAB | Facility: HOSPITAL | Age: 59
End: 2024-09-24
Attending: NURSE PRACTITIONER
Payer: COMMERCIAL

## 2024-09-24 DIAGNOSIS — R79.89 ELEVATED FERRITIN: ICD-10-CM

## 2024-09-24 DIAGNOSIS — E83.19 IRON OVERLOAD: ICD-10-CM

## 2024-09-24 LAB
ERYTHROCYTE [DISTWIDTH] IN BLOOD BY AUTOMATED COUNT: 12.2 % (ref 11.5–14.5)
FERRITIN SERPL-MCNC: 1851 NG/ML (ref 20–300)
HCT VFR BLD AUTO: 38.6 % (ref 37–48.5)
HGB BLD-MCNC: 12.5 G/DL (ref 12–16)
IMM GRANULOCYTES # BLD AUTO: 0.01 K/UL (ref 0–0.04)
MCH RBC QN AUTO: 32.2 PG (ref 27–31)
MCHC RBC AUTO-ENTMCNC: 32.4 G/DL (ref 32–36)
MCV RBC AUTO: 100 FL (ref 82–98)
NEUTROPHILS # BLD AUTO: 2.7 K/UL (ref 1.8–7.7)
PLATELET # BLD AUTO: 186 K/UL (ref 150–450)
PMV BLD AUTO: 11.4 FL (ref 9.2–12.9)
RBC # BLD AUTO: 3.88 M/UL (ref 4–5.4)
WBC # BLD AUTO: 4.63 K/UL (ref 3.9–12.7)

## 2024-09-24 PROCEDURE — 81256 HFE GENE: CPT | Performed by: NURSE PRACTITIONER

## 2024-09-24 PROCEDURE — 36415 COLL VENOUS BLD VENIPUNCTURE: CPT | Mod: PO | Performed by: NURSE PRACTITIONER

## 2024-09-24 PROCEDURE — 85027 COMPLETE CBC AUTOMATED: CPT | Performed by: NURSE PRACTITIONER

## 2024-09-24 PROCEDURE — 82728 ASSAY OF FERRITIN: CPT | Performed by: NURSE PRACTITIONER

## 2024-09-26 ENCOUNTER — PATIENT MESSAGE (OUTPATIENT)
Dept: HEMATOLOGY/ONCOLOGY | Facility: CLINIC | Age: 59
End: 2024-09-26
Payer: COMMERCIAL

## 2024-09-27 DIAGNOSIS — E83.19 IRON OVERLOAD: Primary | ICD-10-CM

## 2024-09-27 DIAGNOSIS — D50.8 OTHER IRON DEFICIENCY ANEMIA: ICD-10-CM

## 2024-09-27 LAB
GENETICIST REVIEW: NORMAL
HFE GENE MUT ANL BLD/T: NORMAL
HFE RELEASED BY: NORMAL
HFE RESULT SUMMARY: NEGATIVE
REF LAB TEST METHOD: NORMAL
SPECIMEN SOURCE: NORMAL
SPECIMEN,  HEMOCHROMATOSIS: NORMAL

## 2024-10-03 ENCOUNTER — PATIENT MESSAGE (OUTPATIENT)
Dept: ENDOCRINOLOGY | Facility: CLINIC | Age: 59
End: 2024-10-03
Payer: COMMERCIAL

## 2024-10-03 ENCOUNTER — TELEPHONE (OUTPATIENT)
Dept: ENDOCRINOLOGY | Facility: HOSPITAL | Age: 59
End: 2024-10-03
Payer: COMMERCIAL

## 2024-10-03 NOTE — TELEPHONE ENCOUNTER
"  Received reclast 5/2024  Developed "hazy vision" four days ago  Seen by ophthal yesterday, diagnosed mild uveitis currently on topical steroids only.     Unclear if related.   Would consider alternative medication next year   F/U DXA 11/2025  Recommended: exercise, balance, vitamin D and calcium  "

## 2024-10-12 DIAGNOSIS — E55.9 VITAMIN D DEFICIENCY: ICD-10-CM

## 2024-10-14 RX ORDER — ASPIRIN 325 MG
50000 TABLET, DELAYED RELEASE (ENTERIC COATED) ORAL
Qty: 90 CAPSULE | Refills: 1 | Status: SHIPPED | OUTPATIENT
Start: 2024-10-14

## 2024-10-22 ENCOUNTER — OFFICE VISIT (OUTPATIENT)
Dept: OTOLARYNGOLOGY | Facility: CLINIC | Age: 59
End: 2024-10-22
Payer: COMMERCIAL

## 2024-10-22 VITALS — WEIGHT: 193.13 LBS | BODY MASS INDEX: 26.94 KG/M2

## 2024-10-22 DIAGNOSIS — J01.91 ACUTE RECURRENT SINUSITIS, UNSPECIFIED LOCATION: Primary | ICD-10-CM

## 2024-10-22 PROCEDURE — 99214 OFFICE O/P EST MOD 30 MIN: CPT | Mod: S$GLB,,, | Performed by: OTOLARYNGOLOGY

## 2024-10-22 PROCEDURE — 99999 PR PBB SHADOW E&M-EST. PATIENT-LVL III: CPT | Mod: PBBFAC,,, | Performed by: OTOLARYNGOLOGY

## 2024-10-22 RX ORDER — AMOXICILLIN AND CLAVULANATE POTASSIUM 875; 125 MG/1; MG/1
1 TABLET, FILM COATED ORAL 2 TIMES DAILY
Qty: 20 TABLET | Refills: 0 | Status: SHIPPED | OUTPATIENT
Start: 2024-10-22 | End: 2024-11-01

## 2024-10-22 NOTE — PROGRESS NOTES
is a 59-year-old white female who presents with complaints of sinus infection.  I had last seen her ten months ago with identical complaints and had placed her on Augmentin as well as sinus rinses and nasal steroid sprays.  She states that since our last visit she has had 2 more infections.   Her latest began approximately 10 days ago with some left-sided cheek pain and some brownish discharge with blowing her nose.  She states that she began amoxicillin with some extra pills that she had and took these for 2 days until they ran out.  This improved her symptoms for a couple of days however this has recurred.  Significant past history positive for balloon sinuplasty 10 or 11 years ago which did help her significantly.  She states that she has recently begun using her saline sinus rinses again over the last week.  On exam her external nose shows good valve support, slight septal deviation spur to the right but no purulence or polyps evident.  Her tonsils are absent  She is nontender to percussion in her paranasal sinuses at this time.  I have discussed my findings with her in detail as well as my recommendations for treatment.  I will place her on Augmentin 875 p.o. b.i.d. times 10 days.  I have encouraged her to continue with her saline sinus rinses and nasal steroid sprays.  She is headed out of town in a week and a half for vacation so I will order a CT scan of her sinuses to evaluate these to be performed in 1 month.  She will contact us after this is completed to arrange for follow-up as needed.

## 2024-11-12 ENCOUNTER — HOSPITAL ENCOUNTER (OUTPATIENT)
Dept: RADIOLOGY | Facility: HOSPITAL | Age: 59
Discharge: HOME | End: 2024-11-12
Attending: NURSE PRACTITIONER
Payer: COMMERCIAL

## 2024-11-12 DIAGNOSIS — K74.02 HEPATIC FIBROSIS, STAGE 3: ICD-10-CM

## 2024-11-12 DIAGNOSIS — K75.81 METABOLIC DYSFUNCTION-ASSOCIATED STEATOHEPATITIS (MASH): ICD-10-CM

## 2024-11-12 PROCEDURE — 76705 ECHO EXAM OF ABDOMEN: CPT | Mod: TC

## 2024-11-19 ENCOUNTER — OFFICE VISIT (OUTPATIENT)
Dept: HEPATOLOGY | Facility: CLINIC | Age: 59
End: 2024-11-19
Payer: COMMERCIAL

## 2024-11-19 DIAGNOSIS — E80.4 GILBERT'S SYNDROME: ICD-10-CM

## 2024-11-19 DIAGNOSIS — R74.8 ELEVATED LIVER ENZYMES: ICD-10-CM

## 2024-11-19 DIAGNOSIS — K75.81 METABOLIC DYSFUNCTION-ASSOCIATED STEATOHEPATITIS (MASH): Primary | ICD-10-CM

## 2024-11-19 DIAGNOSIS — E83.19 IRON OVERLOAD: ICD-10-CM

## 2024-11-19 DIAGNOSIS — K74.02 HEPATIC FIBROSIS, STAGE 3: ICD-10-CM

## 2024-11-19 PROCEDURE — 99214 OFFICE O/P EST MOD 30 MIN: CPT | Mod: 95,,, | Performed by: NURSE PRACTITIONER

## 2024-11-19 NOTE — Clinical Note
Hey! I wanted to touch base about the iron levels. I know these don't seem to be improving on their own. Do you think she will need phlebotomy or chelation? I thought a blood donation might be helpful but I don't want her to become anemic. I told her I'd defer to you guys for recommendations. Thanks! -Keri

## 2024-11-19 NOTE — PROGRESS NOTES
[facility] - Established Patient     Last Clinic Visit: 5/14/24    Chief Complaint: Follow-up for MASH with hepatic fibrosis      HISTORY     This is a 59 y.o. female with PMH noted below, here for follow-up of fatty liver/MASH with advanced fibrosis (F3).    She was initially told that she had fatty liver ~25 years ago.    H/o gastric bypass in 2005 - lost >100 lb.     Her transaminases have been intermittently elevated since 2005. AST>ALT, <100. TBili also mildly elevated since 2011. Liver enzymes have improved with weight loss.      Serologic workup has been negative for other etiologies of liver disease. UGT1A1 testing confirms Gilbert's.      Her Fibroscan suggested advanced fibrosis (F3) so she underwent liver biopsy 11/12/20 to confirm.    FINAL PATHOLOGIC DIAGNOSIS  Mild macrovesicular steatosis with minimally active steatohepatitis and multifocal bridging fibrosis with focal nodule formation. The NAFLD activity score (HARIS) = 3 (1+1+1) with stage 3 fibrosis.     Follow-up fibrosis staging:  Fibroscan 11/2022 = F3 (kPa 12.2), S3  Fibroscan 6/2024 = F3 (kPa 10.7), <S1     Interval history:  Feels well overall, no new concerns from liver standpoint.    Denies symptoms of hepatic decompensation including jaundice, ascites, cognitive problems to suggest hepatic encephalopathy, or GI bleeding.     Started Rezdiffra early July. Did not have any side effects. Doing well with this.     Updates on risk factors for fatty liver:   Weight -- BMI 27  Has lost 20 lb with Wegovy and maintained this          Dyslipidemia -- well controlled                 Insulin resistance / diabetes -- HgbA1c 4.6  Alcohol use -- PETH 13    Liver enzymes: 50s      Has been followed by Hematology for AMINTA. Last iron infusion in June. This was stopped as she was found to have iron overload. Iron sat now 88 and ferritin >2300. HH DNA negative.    Health Maintenance:  -- HCC screening: abd US 11/12/24 without focal hepatic lesion;  AFP <2  -- Variceal screening: no EGD  -- Hepatitis A & B vaccination: complete        Past medical history, surgical history, problem list, family history, social history, allergies: Reviewed and updated in the appropriate section of the electronic medical record.      Current Outpatient Medications   Medication Sig Dispense Refill    ALPRAZolam (XANAX) 0.25 MG tablet Take 1 tablet (0.25 mg total) by mouth daily as needed for Anxiety. 20 tablet 0    ascorbic acid, vitamin C, (VITAMIN C) 500 MG tablet Take 500 mg by mouth once daily.      cholecalciferol, vitamin D3, 1,250 mcg (50,000 unit) capsule TAKE 1 CAPSULE (50,000 UNITS TOTAL) BY MOUTH ONCE DAILY. 90 capsule 1    famotidine (PEPCID) 20 MG tablet Take 1 tablet (20 mg total) by mouth 2 (two) times daily. 20 tablet 0    FLUoxetine 20 MG capsule Take 1 capsule (20 mg total) by mouth once daily. 90 capsule 3    fluticasone propionate (FLONASE) 50 mcg/actuation nasal spray 1 spray (50 mcg total) by Each Nostril route once daily. 16 g 2    gabapentin (NEURONTIN) 300 MG capsule Take 1 capsule (300 mg total) by mouth every evening. 90 capsule 1    multivitamin (THERAGRAN) per tablet Take 1 tablet by mouth once daily.      phytonadione, vit K1, (VITAMIN K1 MISC) 1,000 mg by Misc.(Non-Drug; Combo Route) route once daily.      resmetirom (REZDIFFRA) 80 mg Tab Take 80 mg by mouth once daily. 30 tablet 11    tiZANidine (ZANAFLEX) 4 MG tablet Take 1 tablet (4 mg total) by mouth every 8 (eight) hours as needed (mucle tightness, spasm). 20 tablet 0    vitamin A 46786 UNIT capsule Take 25,000 Units by mouth once daily.      vitamin E acetate (VITAMIN E ORAL) Take 1 tablet by mouth once daily.       No current facility-administered medications for this visit.     Medication list reviewed and updated.      Review of Systems - as per HPI  Constitutional: Negative for unexpected weight change.   Respiratory: Negative for shortness of breath.    Cardiovascular: Negative for leg  swelling.  Gastrointestinal: Negative for abdominal distention or abdominal pain. Negative for melena or hematemesis.  Musculoskeletal: Negative for myalgias.    Skin: Negative for jaundice or itching.  Neurological: Negative for confusion or slowed mentation. Negative for tremors.   Hematological: Does not bruise/bleed easily.       Physical Exam - limited by virtual visit  Constitutional: No distress. Alert and oriented.  Pulmonary/Chest: No respiratory distress.   Skin: No jaundice.   Psychiatric: Normal mood and affect. Speech, behavior, and thought content normal.        LABS & DIAGNOSTIC STUDIES     I have personally reviewed pertinent laboratory findings:    Lab Results   Component Value Date    ALT 53 (H) 11/12/2024    AST 52 (H) 11/12/2024    ALKPHOS 81 11/12/2024    BILITOT 1.1 (H) 11/12/2024    ALBUMIN 3.7 11/12/2024    INR 1.1 11/12/2024       Lab Results   Component Value Date    WBC 4.41 11/12/2024    WBC 4.41 11/12/2024    HGB 12.8 11/12/2024    HGB 12.8 11/12/2024    HCT 39.1 11/12/2024    HCT 39.1 11/12/2024     (H) 11/12/2024     (H) 11/12/2024     11/12/2024     11/12/2024       Lab Results   Component Value Date     11/12/2024    K 4.0 11/12/2024    BUN 10 11/12/2024    CREATININE 0.7 11/12/2024    ESTGFRAFRICA >60 06/21/2022    EGFRNONAA >60 06/21/2022       Lab Results   Component Value Date    SMOOTHMUSCAB Positive 1:40 (A) 10/08/2020    AMAIFA Negative 1:40 10/08/2020    IGGSERUM 1033 10/08/2020    ANASCREEN Negative <1:80 06/10/2022    FERRITIN 2,386 (H) 11/12/2024    FESATURATED 88 (H) 11/12/2024    GXNDI3XNEPIZ MM 12/01/2020    WXRSA1XMQQLF 134 12/01/2020    CERULOPLSM 26.0 06/10/2022    HEPBSAG Negative 10/08/2020    HEPBCAB Negative 10/08/2020    HEPCAB Negative 09/22/2020       Lab Results   Component Value Date    AFP <2.0 11/12/2024       I have personally reviewed the following result reports:  Abdominal US - 11/12/24  Liver biopsy -  11/12/20      ASSESSMENT & PLAN     59 y.o. female with:    1. Metabolic dysfunction-associated steatohepatitis (MASH)    2. Hepatic fibrosis, stage 3    3. Gilbert's syndrome    4. Elevated liver enzymes    5. Iron overload        Recommendations:   Continue Rezdiffra  Reassured that liver function remains stable. Monitor LFTs every 6 months  Repeat Fibroscan in 6 months  Continue HCC screening every 6 months with ultrasound and AFP, next due 5/2025  Unclear if high iron levels are contributing to elevated liver enzymes. Will reach out to Hematology; she may need phlebotomy or chelation to get these down. Advised to avoid iron supplements, vitamins with iron, and vitamin C.   Commended on weight loss success, doing well with Wegovy. Encouraged to continue current efforts. Low carbohydrate/sugar, high protein/fiber diet.   150 min/week of moderate exercise (aerobic + resistance), as tolerated  Maintain good control of blood pressure, cholesterol, and blood sugar.   No alcohol       Orders Placed This Encounter   Procedures    FibroScan Transplant Hepatology(Vibration Controlled Transient Elastography)    US Abdomen Limited    CBC Without Differential    Comprehensive Metabolic Panel    AFP Tumor Marker    Protime-INR       F/u in 6 months with labs/US and Fibroscan prior.       Thank you for allowing me to participate in the care of Estephania CortezEDWARD FerreiraP-C  Hepatology        The patient location is: [place], [place]  The chief complaint leading to consultation is: F/u for fatty liver with hepatic fibrosis     Visit type: audiovisual    Face to Face time with patient: 22 min  30 minutes of total time spent on the encounter, which includes face to face time and non-face to face time preparing to see the patient (eg, review of tests), Obtaining and/or reviewing separately obtained history, Documenting clinical information in the electronic or other health record, Independently interpreting results  (not separately reported) and communicating results to the patient/family/caregiver, or Care coordination (not separately reported).     Each patient to whom he or she provides medical services by telemedicine is:  (1) informed of the relationship between the physician and patient and the respective role of any other health care provider with respect to management of the patient; and (2) notified that he or she may decline to receive medical services by telemedicine and may withdraw from such care at any time.

## 2024-11-22 NOTE — PATIENT INSTRUCTIONS
Continue Rezdiffra  Labs and ultrasound every 6 months  Repeat Fibroscan with next visit   No iron supplements, vitamin C, or alcohol

## 2024-11-25 ENCOUNTER — HOSPITAL ENCOUNTER (OUTPATIENT)
Dept: RADIOLOGY | Facility: HOSPITAL | Age: 59
Discharge: HOME | End: 2024-11-25
Attending: OTOLARYNGOLOGY
Payer: COMMERCIAL

## 2024-11-25 DIAGNOSIS — J01.91 ACUTE RECURRENT SINUSITIS, UNSPECIFIED LOCATION: ICD-10-CM

## 2024-11-25 PROCEDURE — 70486 CT MAXILLOFACIAL W/O DYE: CPT | Mod: 26,,, | Performed by: RADIOLOGY

## 2024-11-25 PROCEDURE — 70486 CT MAXILLOFACIAL W/O DYE: CPT | Mod: TC

## 2024-11-26 ENCOUNTER — PATIENT MESSAGE (OUTPATIENT)
Dept: OTOLARYNGOLOGY | Facility: CLINIC | Age: 59
End: 2024-11-26
Payer: COMMERCIAL

## 2024-11-26 ENCOUNTER — DOCUMENTATION ONLY (OUTPATIENT)
Dept: OTOLARYNGOLOGY | Facility: CLINIC | Age: 59
End: 2024-11-26
Payer: COMMERCIAL

## 2024-11-26 ENCOUNTER — TELEPHONE (OUTPATIENT)
Dept: OTOLARYNGOLOGY | Facility: CLINIC | Age: 59
End: 2024-11-26
Payer: COMMERCIAL

## 2024-11-26 NOTE — PROGRESS NOTES
I have reviewed her CT scans in their entirety which show significant evidence of mucosal thickening consistent with chronic sinus infections affecting her maxillaries, ethmoids, and frontal sinuses.  She also has a left matilde bullosa.  She could benefit from Medtronic Fess addressing these sinuses as well as the left matilde bullosa and possible endoscopic septoplasty.  She is certainly welcome to return to clinic to review these scans in person if she so desires.

## 2024-11-26 NOTE — TELEPHONE ENCOUNTER
----- Message from Vishal Guallpa MD sent at 11/26/2024  9:05 AM CST -----  I have reviewed her CT scans in their entirety which show significant evidence of mucosal thickening consistent with chronic sinus infections affecting her maxillaries, ethmoids, and frontal sinuses.  She also has a left matilde bullosa.  She could benefit from Medtronic Fess addressing these sinuses as well as the left matilde bullosa and possible endoscopic septoplasty.  She is certainly welcome to return to clinic to review these scans in person if she so desires.

## 2024-11-27 DIAGNOSIS — D50.8 OTHER IRON DEFICIENCY ANEMIA: Primary | ICD-10-CM

## 2024-11-29 ENCOUNTER — TELEPHONE (OUTPATIENT)
Dept: HEMATOLOGY/ONCOLOGY | Facility: CLINIC | Age: 59
End: 2024-11-29
Payer: COMMERCIAL

## 2024-11-29 NOTE — TELEPHONE ENCOUNTER
----- Message from LB Pulliam sent at 11/27/2024  4:24 PM CST -----  Please make sure this gets scheduled ASAP  ----- Message -----  From: Nova Capps NP  Sent: 11/27/2024  10:55 AM CST  To: [facility]    Please schedule liver US.    Scheduling due to high iron levels.  ----- Message -----  From: Awilda Andres MD  Sent: 11/27/2024   8:55 AM CST  To: Nova Capps NP    I have not seen her in a long time  I would do a liver scan as wonder if related to issues there  ----- Message -----  From: Nova Capps NP  Sent: 11/27/2024   8:01 AM CST  To: Awilda Andres MD    Her iron stores went up without receiving iron.    We were just rechecking labs periodically. Thoughts on blood donation or phlebotomy?  ----- Message -----  From: Keri Cornelius NP  Sent: 11/22/2024   4:11 PM CST  To: Nova Capps NP    Hey! I wanted to touch base about the iron levels. I know these don't seem to be improving on their own. Do you think she will need phlebotomy or chelation? I thought a blood donation might be helpful but I don't want her to become anemic. I told her I'd defer to you guys for recommendations. Thanks! -Keri

## 2024-12-03 ENCOUNTER — PATIENT MESSAGE (OUTPATIENT)
Dept: HEMATOLOGY/ONCOLOGY | Facility: CLINIC | Age: 59
End: 2024-12-03
Payer: COMMERCIAL

## 2024-12-03 ENCOUNTER — TELEPHONE (OUTPATIENT)
Dept: HEMATOLOGY/ONCOLOGY | Facility: CLINIC | Age: 59
End: 2024-12-03
Payer: COMMERCIAL

## 2024-12-03 NOTE — TELEPHONE ENCOUNTER
Called patient left voicemail, letting her know Lucilamicheal Martinens reached out and I dicussed the increasing Ferritin level with Dr. Andres. Dr. Andres would like to proceed with a liver US to see if her known liver issues are affecting her metabolism of ferritin. Dr. Andres did note she has not see the patient in a long time.

## 2024-12-05 ENCOUNTER — TELEPHONE (OUTPATIENT)
Dept: HEMATOLOGY/ONCOLOGY | Facility: CLINIC | Age: 59
End: 2024-12-05
Payer: COMMERCIAL

## 2024-12-05 DIAGNOSIS — D50.8 OTHER IRON DEFICIENCY ANEMIA: Primary | ICD-10-CM

## 2024-12-05 NOTE — TELEPHONE ENCOUNTER
Spoke to patient. We discussed a phlebotomy to pull off some iron stores. We also discussed chelation therapy. She is very concerned about the iron stores given her family history of liver caner in her father and her sister being a liver transplant patient.   I consulted Dr. Andres who felt this is related to her liver issues. Consulted Dr. Hernandez as well. He agrees this is most likely related to the liver. He did recommend we draw a STFR, we will get this done 12/10 and determine the next course of action.  She has lost about 30 lbs in the last year. She is off all iron therapy.

## 2024-12-10 ENCOUNTER — LAB VISIT (OUTPATIENT)
Dept: LAB | Facility: HOSPITAL | Age: 59
End: 2024-12-10
Attending: NURSE PRACTITIONER
Payer: COMMERCIAL

## 2024-12-10 ENCOUNTER — PATIENT OUTREACH (OUTPATIENT)
Dept: ADMINISTRATIVE | Facility: HOSPITAL | Age: 59
End: 2024-12-10
Payer: COMMERCIAL

## 2024-12-10 DIAGNOSIS — D50.8 OTHER IRON DEFICIENCY ANEMIA: ICD-10-CM

## 2024-12-10 DIAGNOSIS — E55.9 VITAMIN D DEFICIENCY: ICD-10-CM

## 2024-12-10 DIAGNOSIS — D51.8 OTHER VITAMIN B12 DEFICIENCY ANEMIA: ICD-10-CM

## 2024-12-10 LAB
25(OH)D3+25(OH)D2 SERPL-MCNC: 30 NG/ML (ref 30–96)
ERYTHROCYTE [DISTWIDTH] IN BLOOD BY AUTOMATED COUNT: 12.1 % (ref 11.5–14.5)
FERRITIN SERPL-MCNC: 1766 NG/ML (ref 20–300)
HCT VFR BLD AUTO: 39.8 % (ref 37–48.5)
HGB BLD-MCNC: 12.3 G/DL (ref 12–16)
IMM GRANULOCYTES # BLD AUTO: 0.01 K/UL (ref 0–0.04)
IRON SERPL-MCNC: 164 UG/DL (ref 30–160)
MCH RBC QN AUTO: 31.3 PG (ref 27–31)
MCHC RBC AUTO-ENTMCNC: 30.9 G/DL (ref 32–36)
MCV RBC AUTO: 101 FL (ref 82–98)
NEUTROPHILS # BLD AUTO: 2.8 K/UL (ref 1.8–7.7)
PLATELET # BLD AUTO: 168 K/UL (ref 150–450)
PMV BLD AUTO: 11.4 FL (ref 9.2–12.9)
RBC # BLD AUTO: 3.93 M/UL (ref 4–5.4)
SATURATED IRON: 70 % (ref 20–50)
TOTAL IRON BINDING CAPACITY: 234 UG/DL (ref 250–450)
TRANSFERRIN SERPL-MCNC: 158 MG/DL (ref 200–375)
VIT B12 SERPL-MCNC: 508 PG/ML (ref 210–950)
WBC # BLD AUTO: 4.69 K/UL (ref 3.9–12.7)

## 2024-12-10 PROCEDURE — 82607 VITAMIN B-12: CPT | Performed by: NURSE PRACTITIONER

## 2024-12-10 PROCEDURE — 84238 ASSAY NONENDOCRINE RECEPTOR: CPT | Performed by: NURSE PRACTITIONER

## 2024-12-10 PROCEDURE — 85027 COMPLETE CBC AUTOMATED: CPT | Performed by: NURSE PRACTITIONER

## 2024-12-10 PROCEDURE — 36415 COLL VENOUS BLD VENIPUNCTURE: CPT | Mod: PO | Performed by: NURSE PRACTITIONER

## 2024-12-10 PROCEDURE — 82728 ASSAY OF FERRITIN: CPT | Performed by: NURSE PRACTITIONER

## 2024-12-10 PROCEDURE — 82306 VITAMIN D 25 HYDROXY: CPT | Performed by: NURSE PRACTITIONER

## 2024-12-10 PROCEDURE — 84466 ASSAY OF TRANSFERRIN: CPT | Performed by: NURSE PRACTITIONER

## 2024-12-11 ENCOUNTER — PATIENT MESSAGE (OUTPATIENT)
Dept: HEMATOLOGY/ONCOLOGY | Facility: CLINIC | Age: 59
End: 2024-12-11
Payer: COMMERCIAL

## 2024-12-11 LAB — STFR SERPL-MCNC: 1.5 MG/L (ref 1.8–4.6)

## 2024-12-12 ENCOUNTER — PATIENT MESSAGE (OUTPATIENT)
Dept: HEMATOLOGY/ONCOLOGY | Facility: CLINIC | Age: 59
End: 2024-12-12
Payer: COMMERCIAL

## 2024-12-12 DIAGNOSIS — E83.19 IRON OVERLOAD: Primary | ICD-10-CM

## 2025-01-02 ENCOUNTER — PATIENT MESSAGE (OUTPATIENT)
Dept: HEMATOLOGY/ONCOLOGY | Facility: CLINIC | Age: 60
End: 2025-01-02
Payer: COMMERCIAL

## 2025-01-14 ENCOUNTER — OFFICE VISIT (OUTPATIENT)
Dept: OTOLARYNGOLOGY | Facility: CLINIC | Age: 60
End: 2025-01-14
Payer: COMMERCIAL

## 2025-01-14 VITALS — BODY MASS INDEX: 25.95 KG/M2 | WEIGHT: 186.06 LBS

## 2025-01-14 DIAGNOSIS — J01.91 ACUTE RECURRENT SINUSITIS, UNSPECIFIED LOCATION: Primary | ICD-10-CM

## 2025-01-14 PROCEDURE — 99212 OFFICE O/P EST SF 10 MIN: CPT | Mod: S$GLB,,, | Performed by: OTOLARYNGOLOGY

## 2025-01-14 PROCEDURE — 99999 PR PBB SHADOW E&M-EST. PATIENT-LVL III: CPT | Mod: PBBFAC,,, | Performed by: OTOLARYNGOLOGY

## 2025-01-14 NOTE — PROGRESS NOTES
is a 59-year-old white female who presents back in follow-up to discuss the findings of her CT scans.  I have reviewed her CT scans with her in their entirety which show significant evidence of mucosal thickening consistent with chronic sinus infections affecting her maxillaries, ethmoids, and frontal sinuses. She also has a left matilde bullosa.  We discussed that she could benefit from Medtronic Fess addressing these sinuses as well as the left matilde bullosa and possible endoscopic septoplasty.  Have discussed the pros and cons risks and benefits as well as technical aspects of this procedure in detail with her.  She understands and accepts these and will consider this information and let us know of her decision.

## 2025-01-31 NOTE — PROGRESS NOTES
Subjective:       Patient ID: Estephania Rogers is a 59 y.o. female.    Chief Complaint: Other iron deficiency anemia    HPI Patient has not been seen in Hem/Onc clinic in 4 years. She has been receiving injectafer every 12 weeks at [facility].   Patient presents for follow up for iron defieincy anemia.     Overall she is okay.  She had achilles tendonitis again. So she was off her feet.  She has been sleeping well.  She also has a sinus infection right now started earlier this week.   Appetite is good. Bowel movements are good.   Denies PICA and denies shortness of breath.    She had labs again today.  She has fatigue, does not feel bad, doesn't feel great.     She had labs last week.  Her iron studies are all normal or elevated. She is currently getting IV venofer every 12 weeks.   She was taking 50,000 units of vitamin d daily x 3 weeks then twice a week, however she was taking 50,000 units of a supplement of vitamin D daily.   She does follow with Dr. Rivas with endocrinology.      She is now on reclast for osteoporosis on May 7th.   Started on Rezdiffra 10 days ago.     She started on Wegovy in November, she has lost about 21-22 lbs.       Per Dr. Andres's last note: In regards to her iron deficiency:  This is a 58-year-old female with iron deficiency anemia secondary to bariatric surgery about 7 years ago. In April 2010, she was found to be anemic with a hemoglobin of 9.8. She was placed on iron therapy at that time and repeat blood work in September revealed a ferritin of 5. At that time, she was referred for further evaluation of her iron deficiency. We initiated Ferrlecit infusions in the fall for a total of 8 weeks and she had a good response. She is here today for a follow up. GI evaluation including EGD and colonoscopy was negative.   She has a history of prior bariatric surgery.    Review of Systems   Constitutional:  Positive for fatigue. Negative for activity change, appetite change, chills,  diaphoresis, fever and unexpected weight change.   HENT:  Negative for nosebleeds.    Eyes:  Negative for redness and visual disturbance.   Respiratory:  Negative for cough, chest tightness, shortness of breath and wheezing.    Cardiovascular:  Negative for chest pain, palpitations and leg swelling.   Gastrointestinal:  Negative for abdominal distention, abdominal pain, blood in stool, constipation, diarrhea, nausea and vomiting.        Chronic changes since bypass   Genitourinary:  Negative for hematuria.   Musculoskeletal:         Leg cramping and restless legs a night - takes gabapentin nightly    Skin:  Negative for color change and rash.   Neurological:  Negative for dizziness, weakness, light-headedness, numbness and headaches.   Hematological:  Negative for adenopathy. Does not bruise/bleed easily.   Psychiatric/Behavioral:  Negative for confusion.        Objective:      Physical Exam  Vitals and nursing note reviewed.   Constitutional:       General: She is not in acute distress.     Appearance: Normal appearance. She is well-developed.   HENT:      Head: Normocephalic and atraumatic.      Nose: Nose normal.   Eyes:      Pupils: Pupils are equal, round, and reactive to light.   Cardiovascular:      Rate and Rhythm: Normal rate and regular rhythm.      Heart sounds: Normal heart sounds.   Pulmonary:      Effort: Pulmonary effort is normal.      Breath sounds: Normal breath sounds.   Musculoskeletal:      Cervical back: Normal range of motion.   Skin:     General: Skin is warm and dry.      Findings: No rash.   Neurological:      General: No focal deficit present.      Mental Status: She is alert and oriented to person, place, and time.   Psychiatric:         Mood and Affect: Mood normal.         Behavior: Behavior normal.         Thought Content: Thought content normal.         Judgment: Judgment normal.       Labs- reviewed  Assessment:       1. Gilbert's syndrome    2. Age-related osteoporosis without  current pathological fracture    3. Other iron deficiency anemia    4. Other vitamin B12 deficiency anemia        Plan:     1. Venofer on hold since May 2024.  Ferritin consistently elevated at this time.  Discussed with Dr. Hernandez, no cause for concern will continue to monitor for now  Opts for Thursday afternoons with treatment at [facility].    She is currently taking 50,000 units of Vitamin D, will transition to daily as level is < 30. Will discuss with Dr. Rivas on how to proceed      Return to clinic in 3 months with MD appointment and labs.     Patient is in agreement with the proposed treatment plan. All questions were answered to the patient's satisfaction. Patient knows to call clinic for any new or worsening symptoms and if anything is needed before the next clinic visit.          Nova Capps, FNP-C  Hematology & Medical Oncology   [facility] [place] [address]  ph. [phone]  Fax. [phone]    Collaborating physician, Dr. Andres.      Route Chart for Scheduling    Med Onc Chart Routing      Follow up with physician    Follow up with [facility] 6 months.   Infusion scheduling note    Injection scheduling note    Labs CBC, iron and TIBC and ferritin   Scheduling:  Preferred lab:  Lab interval:  labs on the [facility] in 3 months and again in 6 months when she sees me   Imaging    Pharmacy appointment    Other referrals              Therapy Plan Information  VENOFER (IRON SUCROSE) QW for Iron deficiency anemia, noted on 10/19/2012  iron sucrose injection 200 mg  200 mg, Intravenous, Every visit    ZOLEDRONIC ACID (RECLAST) VISIT for Age-related osteoporosis without current pathological fracture, noted on 1/25/2024  ZOLEDRONIC ACID (RECLAST) VISIT for Other specified intestinal malabsorption, noted on 8/1/2012  Medications  zoledronic acid-mannitol & water 5 mg/100 mL infusion 5 mg  5 mg, Intravenous, Every visit  Flushes  heparin, porcine (PF) 100 unit/mL injection flush 500 Units  500  Units, Intravenous, PRN  sodium chloride 0.9% flush 10 mL  10 mL, Intravenous, Every visit  sodium chloride 0.9% 100 mL flush bag  Intravenous, Every visit      No therapy plan of the specified type found.

## 2025-02-03 ENCOUNTER — TELEPHONE (OUTPATIENT)
Dept: PODIATRY | Facility: CLINIC | Age: 60
End: 2025-02-03
Payer: COMMERCIAL

## 2025-02-03 NOTE — TELEPHONE ENCOUNTER
Returned call to pt regarding appt. Pt states first avail appt with Dr. Vasquez is to far out and requesting to be seen with first avail provider at this location.  accepted first avail w/Dr. Michael 2/6. Notified to got to ER or UC if pain worsen before scheduled appt date. Pt verbalized understanding.

## 2025-02-04 ENCOUNTER — OFFICE VISIT (OUTPATIENT)
Dept: FAMILY MEDICINE | Facility: CLINIC | Age: 60
End: 2025-02-04
Payer: COMMERCIAL

## 2025-02-04 VITALS
WEIGHT: 184.44 LBS | TEMPERATURE: 98 F | SYSTOLIC BLOOD PRESSURE: 120 MMHG | DIASTOLIC BLOOD PRESSURE: 80 MMHG | OXYGEN SATURATION: 96 % | BODY MASS INDEX: 25.72 KG/M2 | HEART RATE: 62 BPM

## 2025-02-04 DIAGNOSIS — Z23 NEED FOR SHINGLES VACCINE: ICD-10-CM

## 2025-02-04 DIAGNOSIS — Z11.4 ENCOUNTER FOR SCREENING FOR HIV: ICD-10-CM

## 2025-02-04 DIAGNOSIS — M76.61 RIGHT ACHILLES TENDINITIS: Primary | ICD-10-CM

## 2025-02-04 DIAGNOSIS — F41.9 ANXIETY: ICD-10-CM

## 2025-02-04 PROCEDURE — 96372 THER/PROPH/DIAG INJ SC/IM: CPT | Mod: S$GLB,,, | Performed by: NURSE PRACTITIONER

## 2025-02-04 PROCEDURE — 99999 PR PBB SHADOW E&M-EST. PATIENT-LVL V: CPT | Mod: PBBFAC,,, | Performed by: NURSE PRACTITIONER

## 2025-02-04 PROCEDURE — 99214 OFFICE O/P EST MOD 30 MIN: CPT | Mod: 25,S$GLB,, | Performed by: NURSE PRACTITIONER

## 2025-02-04 RX ORDER — KETOROLAC TROMETHAMINE 30 MG/ML
30 INJECTION, SOLUTION INTRAMUSCULAR; INTRAVENOUS
Status: COMPLETED | OUTPATIENT
Start: 2025-02-04 | End: 2025-02-04

## 2025-02-04 RX ORDER — METHYLPREDNISOLONE 4 MG/1
TABLET ORAL
Qty: 21 TABLET | Refills: 0 | Status: SHIPPED | OUTPATIENT
Start: 2025-02-04

## 2025-02-04 RX ORDER — HYDROCODONE BITARTRATE AND ACETAMINOPHEN 5; 325 MG/1; MG/1
1 TABLET ORAL EVERY 8 HOURS PRN
Qty: 12 TABLET | Refills: 0 | Status: SHIPPED | OUTPATIENT
Start: 2025-02-04

## 2025-02-04 RX ADMIN — KETOROLAC TROMETHAMINE 30 MG: 30 INJECTION, SOLUTION INTRAMUSCULAR; INTRAVENOUS at 09:02

## 2025-02-04 NOTE — PROGRESS NOTES
HPI     Estephania Rogers is a 59 y.o. female with multiple medical diagnoses as listed in the medical history and problem list that presents for   Chief Complaint   Patient presents with    Foot Pain     right       Ankle Pain   The incident occurred more than 1 week ago. The incident occurred at home. There was no injury mechanism. The pain is present in the right ankle and right heel. The quality of the pain is described as aching. The pain is at a severity of 10/10. The pain has been Worsening since onset. Associated symptoms include an inability to bear weight. Pertinent negatives include no loss of motion, loss of sensation, muscle weakness, numbness or tingling. She reports no foreign bodies present. The symptoms are aggravated by movement, palpation and weight bearing. She has tried acetaminophen, ice, elevation, NSAIDs, non-weight bearing, rest and immobilization for the symptoms. The treatment provided mild relief.       See below message thread between pt and podiatry from pt on 2/3/25:    Dr. Vasquez,  I am having another flare up of the A/T that I had visited the ER for on 2/14/24.  Flare is not as bad yet but doesn't seem to be improving much.  Icing several times a day and taking Ibuprofen, although with my liver issues I'm afraid to take too much but the pain is quite noticeable.  Would I benefit from a Toradol injection, or the like?  Also, any help with pain that doesn't include massive doses of Ibuprofen, suggestions please?  Thanks for your help.     Trying to avoid another 6 hour ER visit this time and trying to get back to work as soon as I can.  Thanks.     p.s.  I did not fall or hurt myself in any way but simply started noticing the pain on Saturday and it intensified as the day went on.      Good morning,      I am sorry to hear that you are experiencing this level of pain.  I can have staff get you in with the 1st available provider to see what is going on and direct you from there.   We do not do Toradol injections in our clinic but your primary care doctor may be able to do that for you if you found it helpful in the past.  Please let myself or my staff know if you want to be seen with the 1st available provider as soon as possible.     Dr. Vasquez    Returned call to pt regarding appt. Pt states first avail appt with Dr. Vasquez is to far out and requesting to be seen with first avail provider at this location. Dr accepted first avail w/Dr. Michael 2/6. Notified to got to ER or UC if pain worsen before scheduled appt date. Pt verbalized understanding.            Pt was treated in the ED on 2/14/24 for Right Achilles Tendinitis. See note below:         Chief Complaint   Patient presents with    Heel Pain       Pain to right heel, onset last night, denies injury or fall, hx of tight ligaments to same      HPI     58-year-old female with past medical history of anxiety, diabetes, hypertension, migraines presents with right heel pain x2 days.  Patient reports camping recently was going up and down the stairs into the camper, states she noticed a little bit of some pain in her right heel 2 days ago, states it progressively got worse to the point that she can not walk on it as of earlier today.  She reports little bit some swelling redness, states she has been icing it, has no history of gout.  She does report having this issue previously and has been seeing podiatrist, Dr. Vasquez.  She does have a boot at home but has not used it yet.  She reports taking some ibuprofen at home but it has not significantly helped.  No further complaints reported.    Medications   ketorolac injection 30 mg (30 mg Intramuscular Given 2/14/24 2239)      Medical Decision Making  Amount and/or Complexity of Data Reviewed  Radiology: ordered.     Risk  Prescription drug management.        MDM:     58-year-old female with past medical history as noted above presenting with right heel pain.  Differential Diagnosis includes:  Achilles  rupture, fracture, Achilles tendonitis, sepsis, cellulitis.  Physical exam as noted above.  ED workup notable for x-ray right foot unremarkable.  Patient presentation appears consistent with right sided Achilles tendonitis, advised on further supportive care, follow-up outpatient with podiatry/Orthopedics.  Do not suspect any additional surgical or medical emergency. Discussed diagnosis and further treatment with patient, including f/u.  Return precautions given and all questions answered.  Patient in understanding of plan.  Pt discharged to home improved and stable.      See below podiatry note from 2/27/24:    Chief Complaint: Heel Pain     Estephania Rogers is a 58 y.o. female who presents to the podiatry clinic  with complaint of painful lump to the plantar lateral right foot pain, especially with palpation and ambulation in certain shoe gear . Description: mild and moderate Nature: aching and sharp Onset of the symptoms was  approx 2 weeks ago . Precipitating event:  use of a worn sandal .  History of injury: no Current symptoms include: swelling. Alleviating factors: icing and change in shoes with improvement Patient has had prior foot problems. Evaluation to date: none. . Patients rates pain 3/10 on pain scale.     09/28/2022 returns to clinic for a flare of plantar fascial pain.     11/08/2022 returns to clinic complaining of bilateral foot pain.  She relates pain began with right midfoot pain and then right 1st MTPJ pain which became so unbearable that when returned to [facility] last week.  She now has left plantar arch pain. Presents to clinic in [facility] boot right and [employer] tennis shoe left     2/27/2024  Presents to the podiatry clinic  with complaint of  posterior right heel pain, especially with palpation and ambulation. Description: moderate and severe Nature: aching, sharp, and throbbing  Onset of the symptoms was approx 02/12/2024 while camping.  She was in crocs and was on stairs when she first  noted some heel pain that became so severe she presented to ED when she could no longer bear weight on the foot.  Evaluation to date: presented to the ED on 02/04/2024.     Plan:      Problem List Items Addressed This Visit         Right foot pain - Primary     Relevant Orders     MRI Foot ([facility]) Right Without Contrast (Completed)      Other Visit Diagnoses         Foot swelling         Relevant Orders     MRI Foot ([facility]) Right Without Contrast (Completed)     Pain in Achilles tendon         Relevant Orders     MRI Foot ([facility]) Right Without Contrast (Completed)     Achilles tendinitis of right lower extremity         Acquired equinus deformity of both feet                        Orders Placed This Encounter    MRI Foot ([facility]) Right Without Contrast    methylPREDNISolone (MEDROL DOSEPACK) 4 mg tablet    HYDROcodone-acetaminophen (NORCO) 7.5-325 mg per tablet         I counseled the patient on her conditions, their implications and medical management.     Personally and independently visualized and interpreted imaging with patient. Discussed my interpretation in detail, answering patient inquiries.       Clinical exam and subjective concerning for possible partial tearing of achilles.  MRI ordered.     CAM Boot for immobilization, discussed that due to the immobilization of the ankle while in the boot excess ambulation could cause referred pain to the knees hips or the contralateral limb.  Patient instructed to rest affected limb, avoid excessive WB and careful use of a new scooter, crutch or walker assistance if needed.  Instructions on elevation to reduce pain and swelling. When sleeping, place a pillow under the injured leg. When sitting, support the injured leg so it is level with your waist.   Patient instructed on adequate icing techniques. Patient should ice the affected area at least once per day x 10 minutes for 10 days . I advised the  patient that extra icing would also be beneficial to  ensure adequate anti inflammatory effect   Rx medrol with norco for breakthrough pain   RTC s/p MRI, sooner PRN        Assessment & Plan     1. Right Achilles tendinitis  HPI reviewed. Right ankle is tender to palpation. Pt is using scooter for ambulation due to pain.   Administer ketorolac  The patient was advised that NSAID-type medications have two very important potential side effects: gastrointestinal irritation including hemorrhage and renal injuries. Take the medication with food and to stop if  experiencing any GI upset. Call for vomiting, abdominal pain or black/bloody stools. The patient expresses understanding of these issues and questions were answered.  Start norco prn   reviewed, no aberrancy or inconsistencies noted.  Education provided on medication. Do not divert, take only as prescribed.   No alcohol, no driving, no swimming, no operating machinery, no working, no extra Tylenol (Acetaminophen) while taking this medication.  Discussed potential benefits and AE of treatment with oral steroids (medrol) including but not limited to tendon rupture. Pt was treated with medrol previously for this condition with good effect. She wishes to proceed with course of medrol.   Follow up with podiatry on 2/6/25  Discussed DDx, condition, and treatment.   Education sent to patient portal/included in after visit summary.  ED precautions given.   Notify provider if symptoms do not resolve or increase in severity.   Patient verbalizes understanding and agrees with plan of care.   - ketorolac injection 30 mg  - methylPREDNISolone (MEDROL DOSEPACK) 4 mg tablet; follow package directions  Dispense: 21 tablet; Refill: 0  - HYDROcodone-acetaminophen (NORCO) 5-325 mg per tablet; Take 1 tablet by mouth every 8 (eight) hours as needed for Pain.  Dispense: 12 tablet; Refill: 0    2. Encounter for screening for HIV  - HIV 1/2 Ag/Ab (4th Gen); Future    3. Anxiety  Encouraged the patient to perform self-calming techniques,  such as deep breathing/relaxation techniques and exercise.     4. Need for shingles vaccine  - Ambulatory referral/consult to the [facility]  - varicella-zoster gE-AS01B, PF, (SHINGRIX) 50 mcg/0.5 mL injection; Inject 0.5 mLs into the muscle once. for 1 dose (Patient not taking: Reported on 2/4/2025)  Dispense: 1 each; Refill: 0          --------------------------------------------      Health Maintenance:  Health Maintenance         Date Due Completion Date    HIV Screening Never done ---    TETANUS VACCINE Never done ---    Shingles Vaccine (1 of 2) Never done ---    Pneumococcal Vaccines (Age 50+) (2 of 2 - PCV) 08/02/2023 8/2/2022    COVID-19 Vaccine (4 - 2024-25 season) 09/01/2024 11/8/2021    Colorectal Cancer Screening 07/22/2025 7/22/2024    Mammogram 08/27/2025 8/27/2024    DEXA Scan 11/09/2025 11/9/2023    Hemoglobin A1c (Diabetic Prevention Screening) 11/12/2027 11/12/2024    Cervical Cancer Screening 03/31/2028 3/31/2023    Lipid Panel 11/12/2029 11/12/2024    RSV Vaccine (Age 60+ and Pregnant patients) (1 - 1-dose 75+ series) 10/01/2041 ---            Advised patient on the importance of completing overdue health maintenance items    Follow Up:  Follow up in about 2 weeks (around 2/18/2025), or if symptoms worsen or fail to improve.    Exam     Review of Systems:  (as noted above)  Review of Systems   Constitutional:  Negative for fever.   HENT:  Negative for trouble swallowing.    Eyes:  Negative for visual disturbance.   Respiratory:  Negative for chest tightness and shortness of breath.    Cardiovascular:  Negative for chest pain.   Gastrointestinal:  Negative for blood in stool.   Musculoskeletal:  Positive for gait problem and myalgias.   Neurological:  Negative for tingling and numbness.       Physical Exam:   Physical Exam  Constitutional:       General: She is not in acute distress.     Appearance: She is not ill-appearing or diaphoretic.   HENT:      Head: Normocephalic and  atraumatic.   Cardiovascular:      Rate and Rhythm: Normal rate and regular rhythm.   Pulmonary:      Effort: Pulmonary effort is normal. No respiratory distress.   Chest:      Chest wall: No tenderness.   Musculoskeletal:         General: Swelling and tenderness present.   Neurological:      General: No focal deficit present.      Mental Status: She is alert and oriented to person, place, and time.      Gait: Gait abnormal.       Vitals:    02/04/25 0837   BP: 120/80   BP Location: Left arm   Patient Position: Sitting   Pulse: 62   Temp: 98.3 °F (36.8 °C)   TempSrc: Oral   SpO2: 96%   Weight: 83.7 kg (184 lb 6.6 oz)      Body mass index is 25.72 kg/m².        History     Past Medical History:  Past Medical History:   Diagnosis Date    Anemia     iron deficiency    Anxiety     Diabetes mellitus, type 2     Hypertension     Malabsorption     Migraine headache     Nephrolithiasis     Other specified intestinal malabsorption        Past Surgical History:  Past Surgical History:   Procedure Laterality Date    ANAL FISTULOTOMY  2005    APPENDECTOMY  2005    AUGMENTATION OF BREAST  2014    BREAST SURGERY  2014    CHOLECYSTECTOMY  2005    GASTRIC BYPASS  2005    SINUS SURGERY      Dr. Oral Cobb    SPINE SURGERY      TONSILLECTOMY      TOTAL REDUCTION MAMMOPLASTY  2014       Social History:  Social History     Socioeconomic History    Marital status:     Number of children: 0   Tobacco Use    Smoking status: Never     Passive exposure: Never    Smokeless tobacco: Never    Tobacco comments:     Clerical work   Substance and Sexual Activity    Alcohol use: Yes     Alcohol/week: 1.0 standard drink of alcohol     Types: 1 Glasses of wine per week    Drug use: No    Sexual activity: Yes     Partners: Male     Birth control/protection: None     Comment: :  Gianni     Social Drivers of Health     Financial Resource Strain: Patient Declined (11/14/2023)    Overall Financial Resource Strain (CARDIA)     Difficulty of  Paying Living Expenses: Patient declined   Food Insecurity: Patient Declined (11/14/2023)    Hunger Vital Sign     Worried About Running Out of Food in the Last Year: Patient declined     Ran Out of Food in the Last Year: Patient declined   Transportation Needs: Patient Declined (11/14/2023)    PRAPARE - Transportation     Lack of Transportation (Medical): Patient declined     Lack of Transportation (Non-Medical): Patient declined   Physical Activity: Unknown (11/14/2023)    Exercise Vital Sign     Days of Exercise per Week: Patient declined   Stress: Patient Declined (11/14/2023)    [employer] [employer] - Occupational Stress Questionnaire     Feeling of Stress : Patient declined   Housing Stability: Unknown (11/14/2023)    Housing Stability Vital Sign     Unable to Pay for Housing in the Last Year: Patient refused     Unstable Housing in the Last Year: Patient refused       Family History:  Family History   Problem Relation Name Age of Onset    Cancer Father          pancreatic    Diabetes Father      Diabetes Mother      Hypertension Mother      Miscarriages / Stillbirths Mother      Stroke Mother      Diabetes Sister      Cancer Maternal Aunt          breast    Arthritis Maternal Grandmother      Arthritis Paternal Grandmother      Diabetes Sister      Breast cancer Paternal Aunt         Allergies and Medications: (updated and reviewed)  Review of patient's allergies indicates:  No Known Allergies  Current Outpatient Medications   Medication Sig Dispense Refill    ascorbic acid, vitamin C, (VITAMIN C) 500 MG tablet Take 500 mg by mouth once daily.      cholecalciferol, vitamin D3, 1,250 mcg (50,000 unit) capsule TAKE 1 CAPSULE (50,000 UNITS TOTAL) BY MOUTH ONCE DAILY. 90 capsule 1    famotidine (PEPCID) 20 MG tablet Take 1 tablet (20 mg total) by mouth 2 (two) times daily. 20 tablet 0    FLUoxetine 20 MG capsule Take 1 capsule (20 mg total) by mouth once daily. 90 capsule 3    fluticasone  propionate (FLONASE) 50 mcg/actuation nasal spray 1 spray (50 mcg total) by Each Nostril route once daily. 16 g 2    multivitamin (THERAGRAN) per tablet Take 1 tablet by mouth once daily.      phytonadione, vit K1, (VITAMIN K1 MISC) 1,000 mg by Misc.(Non-Drug; Combo Route) route once daily.      resmetirom (REZDIFFRA) 80 mg Tab Take 80 mg by mouth once daily. 30 tablet 11    tiZANidine (ZANAFLEX) 4 MG tablet Take 1 tablet (4 mg total) by mouth every 8 (eight) hours as needed (mucle tightness, spasm). 20 tablet 0    vitamin A 57347 UNIT capsule Take 25,000 Units by mouth once daily.      vitamin E acetate (VITAMIN E ORAL) Take 1 tablet by mouth once daily.      ALPRAZolam (XANAX) 0.25 MG tablet Take 1 tablet (0.25 mg total) by mouth daily as needed for Anxiety. 20 tablet 0    gabapentin (NEURONTIN) 300 MG capsule Take 1 capsule (300 mg total) by mouth every evening. 90 capsule 1    HYDROcodone-acetaminophen (NORCO) 5-325 mg per tablet Take 1 tablet by mouth every 8 (eight) hours as needed for Pain. 12 tablet 0    methylPREDNISolone (MEDROL DOSEPACK) 4 mg tablet follow package directions 21 tablet 0    varicella-zoster gE-AS01B, PF, (SHINGRIX) 50 mcg/0.5 mL injection Inject 0.5 mLs into the muscle once. for 1 dose (Patient not taking: Reported on 2/4/2025) 1 each 0     Current Facility-Administered Medications   Medication Dose Route Frequency Provider Last Rate Last Admin    ketorolac injection 30 mg  30 mg Intramuscular 1 time in Clinic/HOD            Patient Care Team:  Dulce Altamirano MD as PCP - General (Family Medicine)  Awilda Andres MD as Consulting Physician (Hematology and Oncology)  [facility] (Gastroenterology)         - The patient is given an After Visit Summary that lists all medications with directions, allergies, education, orders placed during this encounter and follow-up instructions.      - I have reviewed the patient's medical information  including past medical, family, and social history sections including the medications and allergies.      - We discussed the patient's current medications.     This note was created by combination of typed  and MModal dictation.  Transcription errors may be present.  If there are any questions, please contact me.

## 2025-02-06 ENCOUNTER — OFFICE VISIT (OUTPATIENT)
Dept: PODIATRY | Facility: CLINIC | Age: 60
End: 2025-02-06
Payer: COMMERCIAL

## 2025-02-06 ENCOUNTER — PATIENT MESSAGE (OUTPATIENT)
Dept: PODIATRY | Facility: CLINIC | Age: 60
End: 2025-02-06

## 2025-02-06 ENCOUNTER — HOSPITAL ENCOUNTER (OUTPATIENT)
Dept: RADIOLOGY | Facility: HOSPITAL | Age: 60
Discharge: HOME | End: 2025-02-06
Attending: PODIATRIST
Payer: COMMERCIAL

## 2025-02-06 VITALS — WEIGHT: 184.5 LBS | HEIGHT: 71 IN | BODY MASS INDEX: 25.83 KG/M2

## 2025-02-06 DIAGNOSIS — M79.671 PAIN IN BOTH FEET: ICD-10-CM

## 2025-02-06 DIAGNOSIS — M76.62 ACHILLES TENDINITIS OF BOTH LOWER EXTREMITIES: ICD-10-CM

## 2025-02-06 DIAGNOSIS — M79.671 PAIN IN BOTH FEET: Primary | ICD-10-CM

## 2025-02-06 DIAGNOSIS — M79.672 PAIN IN BOTH FEET: ICD-10-CM

## 2025-02-06 DIAGNOSIS — M79.672 PAIN IN BOTH FEET: Primary | ICD-10-CM

## 2025-02-06 DIAGNOSIS — M76.61 ACHILLES TENDINITIS OF BOTH LOWER EXTREMITIES: ICD-10-CM

## 2025-02-06 PROCEDURE — 99999 PR PBB SHADOW E&M-EST. PATIENT-LVL III: CPT | Mod: PBBFAC,,, | Performed by: PODIATRIST

## 2025-02-06 PROCEDURE — 73630 X-RAY EXAM OF FOOT: CPT | Mod: TC,50,FY,PO

## 2025-02-06 PROCEDURE — 73630 X-RAY EXAM OF FOOT: CPT | Mod: 26,50,, | Performed by: RADIOLOGY

## 2025-02-06 PROCEDURE — 99214 OFFICE O/P EST MOD 30 MIN: CPT | Mod: S$GLB,,, | Performed by: PODIATRIST

## 2025-02-06 NOTE — PROGRESS NOTES
Subjective:     Patient ID: Estephania Rogers is a 59 y.o. female.    Chief Complaint: Foot Pain (Left foot) and Diabetes Mellitus (4/3/24 Dr Rob Cifuentes)    Estephania is a 59 y.o. female who presents to the podiatry clinic  with complaint of  bilateral foot pain. Onset of the symptoms was several weeks ago. Precipitating event: none known. Current symptoms include: ability to bear weight, but with some pain. Aggravating factors: any weight bearing. Symptoms have progressed to a point and plateaued. Patient has had no prior foot problems. Evaluation to date: none. Treatment to date: none.     Review of Systems   Constitutional: Negative for chills.   Cardiovascular:  Negative for chest pain and claudication.   Respiratory:  Negative for cough.    Skin:  Positive for color change, dry skin and nail changes.   Musculoskeletal:  Positive for joint pain.   Gastrointestinal:  Negative for nausea.   Neurological:  Positive for paresthesias. Negative for numbness.   Psychiatric/Behavioral:  The patient is not nervous/anxious.         Objective:     Physical Exam  Constitutional:       Appearance: She is well-developed.      Comments: Oriented to time, place, and person.   Cardiovascular:      Comments: DP and PT pulses are palpable bilaterally. 3 sec capillary refill time and toes and feet are warm to touch proximally .  There is  hair growth on the feet and toes b/l. There is no edema b/l. No spider veins or varicosities present b/l.     Musculoskeletal:      Comments: Equinus noted b/l ankles with < 10 deg DF noted. MMT 5/5 in DF/PF/Inv/Ev resistance with no reproduction of pain in any direction. Passive range of motion of ankle and pedal joints is painless b/l.  Decreased right  ankle joint ROM, pain with palpation of posterior 1/3 calcaneus at region of Achilles tendon insertion. No  pain with ankle joint ROM   Mild pain left lateral foot.      Feet:      Right foot:      Skin integrity: No callus or dry skin.       Left foot:      Skin integrity: No callus or dry skin.   Lymphadenopathy:      Comments: Negative lymphadenopathy bilateral popliteal fossa and tarsal tunnel.   Skin:     Comments: No open lesions, lacerations or wounds noted.Interdigital spaces clean, dry and intact b/l. No erythema noted to b/l foot.  Nails normal color and trophic qualities.     Neurological:      Mental Status: She is alert.      Comments: Light touch, proprioception, and sharp/dull sensation are all intact bilaterally. Protective threshold with the [place]-Wienstein monofilament is intact bilaterally.    Psychiatric:         Behavior: Behavior is cooperative.           Assessment:      Encounter Diagnoses   Name Primary?    Pain in both feet Yes    Achilles tendinitis of both lower extremities      Plan:     Estephania was seen today for foot pain and diabetes mellitus.    Diagnoses and all orders for this visit:    Pain in both feet  -     X-Ray Foot Complete Bilateral; Future    Achilles tendinitis of both lower extremities      I counseled the patient on her conditions, their implications and medical management.        Xray ordered B/L    Heel lifts dispensed.     - Patient will stretch the tendo achilles complex three times daily as demonstrated in the office to lengthen heel cord and increase motion at ankle offloading the load on the forefoot and MTPJ..  Literature was dispensed illustrating proper stretching technique.  - Patient will obtain over the counter arch supports and wear them in shoes whenever possible to support the arches and decrease motion at the MTPJ.      Discussed conservative treatment with shoes of adequate dimensions, material, and style to alleviate symptoms and delay or prevent surgical intervention.      RTC PRN

## 2025-02-14 ENCOUNTER — LAB VISIT (OUTPATIENT)
Dept: LAB | Facility: HOSPITAL | Age: 60
End: 2025-02-14
Payer: COMMERCIAL

## 2025-02-14 ENCOUNTER — PATIENT MESSAGE (OUTPATIENT)
Dept: OTOLARYNGOLOGY | Facility: CLINIC | Age: 60
End: 2025-02-14
Payer: COMMERCIAL

## 2025-02-14 ENCOUNTER — OFFICE VISIT (OUTPATIENT)
Dept: HEMATOLOGY/ONCOLOGY | Facility: CLINIC | Age: 60
End: 2025-02-14
Payer: COMMERCIAL

## 2025-02-14 VITALS
OXYGEN SATURATION: 98 % | BODY MASS INDEX: 25.03 KG/M2 | WEIGHT: 178.81 LBS | HEART RATE: 56 BPM | DIASTOLIC BLOOD PRESSURE: 70 MMHG | HEIGHT: 71 IN | SYSTOLIC BLOOD PRESSURE: 135 MMHG

## 2025-02-14 DIAGNOSIS — D50.8 OTHER IRON DEFICIENCY ANEMIA: ICD-10-CM

## 2025-02-14 DIAGNOSIS — J01.10 ACUTE NON-RECURRENT FRONTAL SINUSITIS: ICD-10-CM

## 2025-02-14 DIAGNOSIS — M81.0 AGE-RELATED OSTEOPOROSIS WITHOUT CURRENT PATHOLOGICAL FRACTURE: ICD-10-CM

## 2025-02-14 DIAGNOSIS — D51.8 OTHER VITAMIN B12 DEFICIENCY ANEMIA: ICD-10-CM

## 2025-02-14 DIAGNOSIS — E80.4 GILBERT'S SYNDROME: Primary | ICD-10-CM

## 2025-02-14 DIAGNOSIS — E83.19 IRON OVERLOAD: ICD-10-CM

## 2025-02-14 LAB
ERYTHROCYTE [DISTWIDTH] IN BLOOD BY AUTOMATED COUNT: 11.9 % (ref 11.5–14.5)
FERRITIN SERPL-MCNC: 2259 NG/ML (ref 20–300)
HCT VFR BLD AUTO: 39.8 % (ref 37–48.5)
HGB BLD-MCNC: 12.6 G/DL (ref 12–16)
IMM GRANULOCYTES # BLD AUTO: 0.01 K/UL (ref 0–0.04)
IRON SERPL-MCNC: 216 UG/DL (ref 30–160)
MCH RBC QN AUTO: 31.5 PG (ref 27–31)
MCHC RBC AUTO-ENTMCNC: 31.7 G/DL (ref 32–36)
MCV RBC AUTO: 100 FL (ref 82–98)
NEUTROPHILS # BLD AUTO: 2.4 K/UL (ref 1.8–7.7)
PLATELET # BLD AUTO: 190 K/UL (ref 150–450)
PMV BLD AUTO: 10.5 FL (ref 9.2–12.9)
RBC # BLD AUTO: 4 M/UL (ref 4–5.4)
SATURATED IRON: 88 % (ref 20–50)
TOTAL IRON BINDING CAPACITY: 246 UG/DL (ref 250–450)
TRANSFERRIN SERPL-MCNC: 166 MG/DL (ref 200–375)
WBC # BLD AUTO: 4.67 K/UL (ref 3.9–12.7)

## 2025-02-14 PROCEDURE — 84466 ASSAY OF TRANSFERRIN: CPT | Performed by: NURSE PRACTITIONER

## 2025-02-14 PROCEDURE — 36415 COLL VENOUS BLD VENIPUNCTURE: CPT | Performed by: NURSE PRACTITIONER

## 2025-02-14 PROCEDURE — 85027 COMPLETE CBC AUTOMATED: CPT | Performed by: NURSE PRACTITIONER

## 2025-02-14 PROCEDURE — 99999 PR PBB SHADOW E&M-EST. PATIENT-LVL IV: CPT | Mod: PBBFAC,,, | Performed by: NURSE PRACTITIONER

## 2025-02-14 PROCEDURE — 82728 ASSAY OF FERRITIN: CPT | Performed by: NURSE PRACTITIONER

## 2025-02-14 RX ORDER — AMOXICILLIN 500 MG/1
500 CAPSULE ORAL EVERY 12 HOURS
Qty: 20 CAPSULE | Refills: 0 | Status: SHIPPED | OUTPATIENT
Start: 2025-02-14 | End: 2025-02-24

## 2025-02-17 NOTE — TELEPHONE ENCOUNTER
From Dr Guallpa.........      She will then need to come in to be scoped and evaluated to see what's going on.   She really needs the surgery to open up her sinus drainage to keep from having these recurrent infections.

## 2025-02-19 ENCOUNTER — TELEPHONE (OUTPATIENT)
Dept: OTOLARYNGOLOGY | Facility: CLINIC | Age: 60
End: 2025-02-19
Payer: COMMERCIAL

## 2025-02-19 ENCOUNTER — OFFICE VISIT (OUTPATIENT)
Dept: OTOLARYNGOLOGY | Facility: CLINIC | Age: 60
End: 2025-02-19
Payer: COMMERCIAL

## 2025-02-19 VITALS
HEART RATE: 55 BPM | SYSTOLIC BLOOD PRESSURE: 148 MMHG | DIASTOLIC BLOOD PRESSURE: 77 MMHG | BODY MASS INDEX: 25.71 KG/M2 | WEIGHT: 184.31 LBS

## 2025-02-19 DIAGNOSIS — H92.02 LEFT EAR PAIN: ICD-10-CM

## 2025-02-19 DIAGNOSIS — J01.91 ACUTE RECURRENT SINUSITIS, UNSPECIFIED LOCATION: Primary | ICD-10-CM

## 2025-02-19 RX ORDER — AMOXICILLIN AND CLAVULANATE POTASSIUM 875; 125 MG/1; MG/1
1 TABLET, FILM COATED ORAL 2 TIMES DAILY
Qty: 20 TABLET | Refills: 0 | Status: SHIPPED | OUTPATIENT
Start: 2025-02-19 | End: 2025-03-01

## 2025-02-19 NOTE — TELEPHONE ENCOUNTER
----- Message from Vishal Guallpa MD sent at 2/19/2025  8:40 AM CST -----  I sent message yesterday that she needs to come in.Is she unable to come into clinic?

## 2025-02-19 NOTE — TELEPHONE ENCOUNTER
Spoke with patient and informed her that Dr. Guallpa wants her to come in for symptoms. Patient stated that she needed to check her work schedule and will call us back or write us via portal to let us know what day works for her. AM

## 2025-02-19 NOTE — PROGRESS NOTES
Ms. Rogers     Vitals:    25 1509   BP: (!) 148/77   Pulse: (!) 55       Chief Complaint:  Sinusitis       HPI:   is a 59-year-old white female who presents back with a 10 day history of left ear pain and occipital pain.  We have treated her for multiple sinus infections with antibiotics and steroids over the last year and a CT scan obtained in November showed significant evidence of mucosal thickening consistent with chronic sinus infections affecting her maxillary ethmoid and frontal sinuses as well as left matilde bullosa.  We had discussed [employer] Fess to address these issues with her.  She has been on 7 days of Amoxil though this has not helped her symptoms.    Review of Systems:  Constitutional:   weight loss or weight gain: Negative  Allergy/Immunologic:   Negative  Nasal Congestion/Obstruction:   Negative  Nosebleeds:   Negative  Sinus infections:   Negative  Headache/Facial Pain:   Positive as above and positive for migraine headaches  Snoring/NORMA:   Negative  Throat: Infections/Pain:   Negative  Hoarseness/Speech Disturbance:   Negative  Trauma Hx:  Negative    Cardiovascular:  M/I Angina: Negative  Hypertension: Negative  Endocrine:    DM/Steroids: Negative  GI:   Dysphagia/Reflux: Negative  :   GYN Pregnancy: Negative  Renal:   Dialysis: Negative  Lymphatic:   Neck Mass/Lymphadenopathy: Negative  Muscoloskeletal:   Negative  Hematologic:   Bleeding Disorders/Anemia: Negative  Neurologic:    Cranial/Neuralgia: Negative  Pulmonary:   Asthma/SOB/Cough: Negative  Skin Disorders: Negative    Past Medical/Surgical/Family/Social History:    ENT Surgery: Negative  Occupational Exposure: Negative   Problems: Negative  Cancer: Negative    Past Family History:   Family history of Cancer: Negative    Past Social History:   Tobacco: Nonsmoker   Alcohol: Social Drinker      Allergies and medications: Reviewed per med card.    Physical Examination:  Ears:   External auditory canals:   Clear   Hearing: Grossly intact   Tympanic Membranes: Clear  Nose:   External: Normal   Intranasal:  Septal spur to the right and some high deviation to the left  Mouth:   Intraorally: Lips, teeth, and gums: Normal   Oropharynx: Normal   Mucosa: Normal   Tongue: Normal  Throat:      Palate: Normal palate with elevation   Tonsils: Normal   Posterior Pharynx: Normal  Fiberoptic exam:  Examination with 0 degree scope number 100QUS does not show any intranasal masses lesions polyps or purulence at this time.  Head/Face:     Inspection: Normal and atraumatic   Palpation/Percussion: Non tender   Facial strength: Normal and symmetric   Salivary glands: Normal  Neck: Supple  Thyroid: No masses  Lymphatics: No nodes  Respiratory:   Effort: Normal  Eyes:   Ocular Mobility: Normal   Vision: Grossly intact  Neuro/Psych:   Cranial Nerves: Grossly Intact   Orientation: Normal   Mood/Affect: Normal      Assessment/Plan:  I will prescribe Augmentin 875 p.o. b.i.d. times 10 days to begin after she completes her Amoxil prescription.  I have encouraged her to continue with her saline sinus rinses and nasal steroid sprays.  I will suggest a repeat CT scan of her sinuses in 1 month after these are completed.  We again discussed [employer] Fess including the pros and cons risks and benefits as well as technical aspects of this procedure.  She will contact us after her scan is completed to arrange for follow-up.

## 2025-02-22 NOTE — Clinical Note
Hey! Just wanted to reach out about the iron levels, I see these are trending up again. Do you think she will need phlebotomy or chelation to get these down? I thought a blood donation may be helpful as these don't appear to be improving on their own, though I will defer to you guys  no

## 2025-04-23 DIAGNOSIS — E55.9 VITAMIN D DEFICIENCY: ICD-10-CM

## 2025-04-28 RX ORDER — ASPIRIN 325 MG
50000 TABLET, DELAYED RELEASE (ENTERIC COATED) ORAL
Qty: 90 CAPSULE | Refills: 1 | Status: SHIPPED | OUTPATIENT
Start: 2025-04-28

## 2025-05-06 ENCOUNTER — PATIENT MESSAGE (OUTPATIENT)
Dept: HEMATOLOGY/ONCOLOGY | Facility: CLINIC | Age: 60
End: 2025-05-06
Payer: COMMERCIAL

## 2025-05-07 ENCOUNTER — LAB VISIT (OUTPATIENT)
Dept: LAB | Facility: HOSPITAL | Age: 60
End: 2025-05-07
Attending: INTERNAL MEDICINE
Payer: COMMERCIAL

## 2025-05-07 ENCOUNTER — PATIENT MESSAGE (OUTPATIENT)
Dept: ENDOCRINOLOGY | Facility: CLINIC | Age: 60
End: 2025-05-07
Payer: COMMERCIAL

## 2025-05-07 ENCOUNTER — PATIENT MESSAGE (OUTPATIENT)
Dept: HEMATOLOGY/ONCOLOGY | Facility: CLINIC | Age: 60
End: 2025-05-07
Payer: COMMERCIAL

## 2025-05-07 DIAGNOSIS — M81.0 AGE-RELATED OSTEOPOROSIS WITHOUT CURRENT PATHOLOGICAL FRACTURE: Primary | ICD-10-CM

## 2025-05-07 DIAGNOSIS — M81.0 AGE-RELATED OSTEOPOROSIS WITHOUT CURRENT PATHOLOGICAL FRACTURE: ICD-10-CM

## 2025-05-07 LAB
25(OH)D3+25(OH)D2 SERPL-MCNC: 35 NG/ML (ref 30–96)
ANION GAP (OHS): 4 MMOL/L (ref 8–16)
BUN SERPL-MCNC: 8 MG/DL (ref 6–20)
CALCIUM SERPL-MCNC: 8.9 MG/DL (ref 8.7–10.5)
CHLORIDE SERPL-SCNC: 109 MMOL/L (ref 95–110)
CO2 SERPL-SCNC: 27 MMOL/L (ref 23–29)
CREAT SERPL-MCNC: 0.6 MG/DL (ref 0.5–1.4)
GFR SERPLBLD CREATININE-BSD FMLA CKD-EPI: >60 ML/MIN/1.73/M2
GLUCOSE SERPL-MCNC: 67 MG/DL (ref 70–110)
POTASSIUM SERPL-SCNC: 3.9 MMOL/L (ref 3.5–5.1)
SODIUM SERPL-SCNC: 140 MMOL/L (ref 136–145)

## 2025-05-07 PROCEDURE — 36415 COLL VENOUS BLD VENIPUNCTURE: CPT | Mod: PO

## 2025-05-07 PROCEDURE — 80048 BASIC METABOLIC PNL TOTAL CA: CPT

## 2025-05-07 PROCEDURE — 82306 VITAMIN D 25 HYDROXY: CPT

## 2025-05-09 ENCOUNTER — RESULTS FOLLOW-UP (OUTPATIENT)
Dept: ENDOCRINOLOGY | Facility: CLINIC | Age: 60
End: 2025-05-09
Payer: COMMERCIAL

## 2025-05-09 DIAGNOSIS — M81.0 AGE-RELATED OSTEOPOROSIS WITHOUT CURRENT PATHOLOGICAL FRACTURE: Primary | ICD-10-CM

## 2025-05-13 ENCOUNTER — RESULTS FOLLOW-UP (OUTPATIENT)
Facility: CLINIC | Age: 60
End: 2025-05-13

## 2025-05-13 ENCOUNTER — HOSPITAL ENCOUNTER (OUTPATIENT)
Dept: RADIOLOGY | Facility: HOSPITAL | Age: 60
Discharge: HOME | End: 2025-05-13
Attending: NURSE PRACTITIONER
Payer: COMMERCIAL

## 2025-05-13 ENCOUNTER — RESULTS FOLLOW-UP (OUTPATIENT)
Dept: HEMATOLOGY/ONCOLOGY | Facility: CLINIC | Age: 60
End: 2025-05-13

## 2025-05-13 DIAGNOSIS — K75.81 METABOLIC DYSFUNCTION-ASSOCIATED STEATOHEPATITIS (MASH): ICD-10-CM

## 2025-05-13 DIAGNOSIS — K74.02 HEPATIC FIBROSIS, STAGE 3: ICD-10-CM

## 2025-05-13 PROCEDURE — 76705 ECHO EXAM OF ABDOMEN: CPT | Mod: 26,,, | Performed by: RADIOLOGY

## 2025-05-13 PROCEDURE — 76705 ECHO EXAM OF ABDOMEN: CPT | Mod: TC

## 2025-05-15 ENCOUNTER — PROCEDURE VISIT (OUTPATIENT)
Dept: HEPATOLOGY | Facility: CLINIC | Age: 60
End: 2025-05-15
Payer: COMMERCIAL

## 2025-05-15 DIAGNOSIS — K75.81 METABOLIC DYSFUNCTION-ASSOCIATED STEATOHEPATITIS (MASH): ICD-10-CM

## 2025-05-15 DIAGNOSIS — K74.02 HEPATIC FIBROSIS, STAGE 3: ICD-10-CM

## 2025-05-15 PROCEDURE — 91200 LIVER ELASTOGRAPHY: CPT | Mod: S$GLB,,, | Performed by: NURSE PRACTITIONER

## 2025-05-16 NOTE — PROCEDURES
FibroScan Transplant Hepatology(Vibration Controlled Transient Elastography)    Date/Time: 5/15/2025 4:30 PM    Performed by: Keri Cornelius NP  Authorized by: Keri Cornelius NP    Diagnosis:  NAFLD    Probe:  XL    Universal Protocol: Patient's identity, procedure and site were verified, confirmatory pause was performed.  Discussed procedure including risks and potential complications.  Questions answered.  Patient verbalizes understanding and wishes to proceed with VCTE.     Procedure: After providing explanations of the procedure, patient was placed in the supine position with right arm in maximum abduction to allow optimal exposure of right lateral abdomen.  Patient was briefly assessed, Testing was performed in the mid-axillary location, 50Hz Shear Wave pulses were applied and the resulting Shear Wave and Propagation Speed detected with a 3.5 MHz ultrasonic signal, using the FibroScan probe, Skin to liver capsule distance and liver parenchyma were accessed during the entire examination with the FibroScan probe, Patient was instructed to breathe normally and to abstain from sudden movements during the procedure, allowing for random measurements of liver stiffness. At least 10 Shear Waves were produced, Individual measurements of each Shear Wave were calculated.  Patient tolerated the procedure well with no complications.  Meets discharge criteria as was dismissed.  Rates pain 0 out of 10.  Patient will follow up with ordering provider to review results.    Findings  Median liver stiffness score:  9.1  CAP Reading: dB/m:  293    IQR/med %:  6  Interpretation  Fibrosis interpretation is based on medial liver stiffness - Kilopascal (kPa).    Fibrosis Stage:  F2  Steatosis interpretation is based on controlled attenuation parameter - (dB/m).    Steatosis Grade:  S1

## 2025-05-20 ENCOUNTER — OFFICE VISIT (OUTPATIENT)
Facility: CLINIC | Age: 60
End: 2025-05-20
Payer: COMMERCIAL

## 2025-05-20 DIAGNOSIS — E83.19 IRON OVERLOAD: ICD-10-CM

## 2025-05-20 DIAGNOSIS — E80.4 GILBERT'S SYNDROME: ICD-10-CM

## 2025-05-20 DIAGNOSIS — K75.81 METABOLIC DYSFUNCTION-ASSOCIATED STEATOHEPATITIS (MASH): Primary | ICD-10-CM

## 2025-05-20 DIAGNOSIS — R79.89 ELEVATED FERRITIN: ICD-10-CM

## 2025-05-20 DIAGNOSIS — R74.8 ELEVATED LIVER ENZYMES: ICD-10-CM

## 2025-05-20 DIAGNOSIS — K74.02 HEPATIC FIBROSIS, STAGE 3: ICD-10-CM

## 2025-05-20 PROCEDURE — 98007 SYNCH AUDIO-VIDEO EST HI 40: CPT | Mod: 95,,, | Performed by: NURSE PRACTITIONER

## 2025-05-20 NOTE — PROGRESS NOTES
[facility] - Established Patient     Last Clinic Visit: 11/19/24    Chief Complaint: Follow-up for MASH with hepatic fibrosis      HISTORY     This is a 59 y.o. female with PMH noted below, here for follow-up of fatty liver/MASH with advanced fibrosis (F3).    She was initially told that she had fatty liver ~25 years ago.    H/o gastric bypass in 2005 - lost >100 lb.     Her transaminases have been intermittently elevated since 2005. AST>ALT, <100. TBili also mildly elevated since 2011. Liver enzymes have improved with weight loss.      Serologic workup has been negative for other etiologies of liver disease. UGT1A1 testing confirms Gilbert's.      Her Fibroscan suggested advanced fibrosis (F3) so she underwent liver biopsy 11/12/20 to confirm.    FINAL PATHOLOGIC DIAGNOSIS  Mild macrovesicular steatosis with minimally active steatohepatitis and multifocal bridging fibrosis with focal nodule formation. The NAFLD activity score (HARIS) = 3 (1+1+1) with stage 3 fibrosis.     Follow-up fibrosis staging:  Fibroscan 10/2020 = F3 (kPa 13.1), S3   Fibroscan 11/2022 = F3 (kPa 12.2), S3  Fibroscan 6/2024 = F3 (kPa 10.7), <S1   Fibroscan 5/2025 = F2 (kPa 9.1), S1    Interval history:  Feels well overall, no new concerns from liver standpoint.    No symptoms of hepatic decompensation including jaundice, ascites, cognitive problems to suggest hepatic encephalopathy, or GI bleeding.     Remains on Rezdiffra. Doing well with this.     Updates on risk factors for MASLD:   Weight -- BMI 24  Has lost ~35 lb with Wegovy         Dyslipidemia -- well controlled                 Insulin resistance / diabetes -- HgbA1c 4.6    Alcohol use -- 2 glasses of wine/week     Liver enzymes: improving, 30-40s    Previously followed by Hematology for AMINTA. Last iron infusion in June 2024. This was stopped as she was found to have iron overload. Iron sat now 60-80s and ferritin >2K. HH DNA negative.    Health Maintenance:  -- HCC  screening: abd US 5/13/25 without focal hepatic lesion; AFP <2  -- Variceal screening: no EGD  -- Hepatitis A & B vaccination: complete        Past medical history, surgical history, problem list, family history, social history, allergies: Reviewed and updated in the appropriate section of the electronic medical record.      Current Outpatient Medications   Medication Sig Dispense Refill    ALPRAZolam (XANAX) 0.25 MG tablet Take 1 tablet (0.25 mg total) by mouth daily as needed for Anxiety. 20 tablet 0    cholecalciferol, vitamin D3, 1,250 mcg (50,000 unit) capsule TAKE 1 CAPSULE BY MOUTH EVERY DAY 90 capsule 1    famotidine (PEPCID) 20 MG tablet Take 1 tablet (20 mg total) by mouth 2 (two) times daily. 20 tablet 0    FLUoxetine 20 MG capsule Take 1 capsule (20 mg total) by mouth once daily. 90 capsule 3    fluticasone propionate (FLONASE) 50 mcg/actuation nasal spray 1 spray (50 mcg total) by Each Nostril route once daily. 16 g 2    gabapentin (NEURONTIN) 300 MG capsule Take 1 capsule (300 mg total) by mouth every evening. 90 capsule 1    multivitamin (THERAGRAN) per tablet Take 1 tablet by mouth once daily.      phytonadione, vit K1, (VITAMIN K1 MISC) 1,000 mg by Misc.(Non-Drug; Combo Route) route once daily.      resmetirom (REZDIFFRA) 80 mg Tab Take 80 mg by mouth once daily. 30 tablet 11    tiZANidine (ZANAFLEX) 4 MG tablet Take 1 tablet (4 mg total) by mouth every 8 (eight) hours as needed (mucle tightness, spasm). 20 tablet 0    vitamin A 11335 UNIT capsule Take 25,000 Units by mouth once daily.      vitamin E acetate (VITAMIN E ORAL) Take 1 tablet by mouth once daily.       No current facility-administered medications for this visit.     Medication list reviewed and updated.      Review of Systems - as per HPI  Constitutional: Negative for unexpected weight change.   Respiratory: Negative for shortness of breath.    Cardiovascular: Negative for leg swelling.  Gastrointestinal: Negative for abdominal  distention or abdominal pain. Negative for melena or hematemesis.  Musculoskeletal: Negative for myalgias.    Skin: Negative for jaundice or itching.  Neurological: Negative for confusion or slowed mentation. Negative for tremors.   Hematological: Does not bruise/bleed easily.       Physical Exam - limited by virtual visit  Constitutional: No distress. Alert and oriented.  Pulmonary/Chest: No respiratory distress.   Skin: No jaundice.   Psychiatric: Normal mood and affect. Speech, behavior, and thought content normal.        LABS & DIAGNOSTIC STUDIES     I have personally reviewed pertinent laboratory findings:    Lab Results   Component Value Date    ALT 38 05/13/2025    AST 45 05/13/2025    ALKPHOS 74 05/13/2025    BILITOT 1.4 (H) 05/13/2025    ALBUMIN 4.1 05/13/2025    INR 1.0 05/13/2025       Lab Results   Component Value Date    WBC 3.66 (L) 05/13/2025    HGB 13.3 05/13/2025    HCT 42.1 05/13/2025     (H) 05/13/2025     05/13/2025       Lab Results   Component Value Date     05/13/2025    K 4.1 05/13/2025    BUN 12 05/13/2025    CREATININE 0.7 05/13/2025    ESTGFRAFRICA >60 06/21/2022    EGFRNONAA >60 06/21/2022       Lab Results   Component Value Date    SMOOTHMUSCAB Positive 1:40 (A) 10/08/2020    AMAIFA Negative 1:40 10/08/2020    IGGSERUM 1033 10/08/2020    ANASCREEN Negative <1:80 06/10/2022    FERRITIN 2,240.9 (H) 05/13/2025    FERRITIN 2,148.7 (H) 05/13/2025    FESATURATED 88 (H) 02/14/2025    [id number] MM 12/01/2020    [id number] 134 12/01/2020    CERULOPLSM 26.0 06/10/2022    HEPBSAG Negative 10/08/2020    HEPBCAB Negative 10/08/2020    HEPCAB Negative 09/22/2020       Lab Results   Component Value Date    AFP <2.0 05/13/2025       I have personally reviewed the following result reports:  Abdominal US - 5/13/25  Liver biopsy - 11/12/20      ASSESSMENT & PLAN     59 y.o. female with:    1. Metabolic dysfunction-associated steatohepatitis (MASH)    2. Hepatic fibrosis, stage 3     3. Gilbert's syndrome    4. Elevated liver enzymes    5. Elevated ferritin    6. Iron overload        Liver enzymes continue to improve with Rezdiffra + GLP/weight loss. Fibroscan also suggests that fibrosis may be improving.     Recommendations:   Continue Rezdiffra  Reassured that liver function remains stable. Monitor LFTs every 6 months  Repeat Fibroscan in 1 year. Add ELF to next labs.  Continue HCC screening every 6 months with ultrasound and AFP, next due 11/2025  Iron levels remain high despite no infusion x 1 year. She may need phlebotomy to get these down. H/H is normal and I think she would tolerate a blood donation. Recommend she discuss with Hematology. Advised to avoid iron supplements, vitamins with iron, vitamin C, and alcohol.   Commended on weight loss success, doing well with Wegovy. Encouraged to maintain this in addition to good control of blood pressure, cholesterol, and blood sugar.       Orders Placed This Encounter   Procedures    US Abdomen Complete    CBC Without Differential    Comprehensive Metabolic Panel    AFP Tumor Marker    Protime-INR    Enhanced Liver Fibrosis (ELF)       F/u in 6 months with labs/US prior.       Thank you for allowing me to participate in the care of Estephania Cornelius, FNP-C  Hepatology        The patient location is: [place], [place]  The chief complaint leading to consultation is: F/u for fatty liver with hepatic fibrosis     Visit type: audiovisual    Face to Face time with patient: 32 min  40 minutes of total time spent on the encounter, which includes face to face time and non-face to face time preparing to see the patient (eg, review of tests), Obtaining and/or reviewing separately obtained history, Documenting clinical information in the electronic or other health record, Independently interpreting results (not separately reported) and communicating results to the patient/family/caregiver, or Care coordination (not separately  reported).     Each patient to whom he or she provides medical services by telemedicine is:  (1) informed of the relationship between the physician and patient and the respective role of any other health care provider with respect to management of the patient; and (2) notified that he or she may decline to receive medical services by telemedicine and may withdraw from such care at any time.

## 2025-05-28 ENCOUNTER — PATIENT MESSAGE (OUTPATIENT)
Dept: ENDOCRINOLOGY | Facility: CLINIC | Age: 60
End: 2025-05-28
Payer: COMMERCIAL

## 2025-05-28 ENCOUNTER — TELEPHONE (OUTPATIENT)
Facility: CLINIC | Age: 60
End: 2025-05-28
Payer: COMMERCIAL

## 2025-05-28 DIAGNOSIS — K74.02 HEPATIC FIBROSIS, STAGE 3: ICD-10-CM

## 2025-05-28 DIAGNOSIS — K75.81 METABOLIC DYSFUNCTION-ASSOCIATED STEATOHEPATITIS (MASH): ICD-10-CM

## 2025-05-28 RX ORDER — RESMETIROM 80 MG/1
80 TABLET, COATED ORAL DAILY
Qty: 30 TABLET | Refills: 11 | Status: SHIPPED | OUTPATIENT
Start: 2025-05-28

## 2025-06-04 ENCOUNTER — TELEPHONE (OUTPATIENT)
Facility: CLINIC | Age: 60
End: 2025-06-04
Payer: COMMERCIAL

## 2025-06-04 DIAGNOSIS — K74.02 HEPATIC FIBROSIS, STAGE 3: ICD-10-CM

## 2025-06-04 DIAGNOSIS — K75.81 METABOLIC DYSFUNCTION-ASSOCIATED STEATOHEPATITIS (MASH): ICD-10-CM

## 2025-06-04 DIAGNOSIS — F41.9 ANXIETY: ICD-10-CM

## 2025-06-04 RX ORDER — ALPRAZOLAM 0.25 MG/1
0.25 TABLET ORAL DAILY PRN
Qty: 20 TABLET | Refills: 0 | OUTPATIENT
Start: 2025-06-04 | End: 2025-07-04

## 2025-06-04 RX ORDER — RESMETIROM 80 MG/1
80 TABLET, COATED ORAL DAILY
Qty: 30 TABLET | Refills: 11 | Status: ACTIVE | OUTPATIENT
Start: 2025-06-04

## 2025-07-01 ENCOUNTER — HOSPITAL ENCOUNTER (OUTPATIENT)
Dept: RADIOLOGY | Facility: CLINIC | Age: 60
Discharge: HOME | End: 2025-07-01
Attending: INTERNAL MEDICINE
Payer: COMMERCIAL

## 2025-07-01 DIAGNOSIS — M81.0 AGE-RELATED OSTEOPOROSIS WITHOUT CURRENT PATHOLOGICAL FRACTURE: ICD-10-CM

## 2025-07-01 PROCEDURE — 77091 TBS TECHL CALCULATION ONLY: CPT

## 2025-07-01 PROCEDURE — 77092 TBS I&R FX RSK QHP: CPT | Mod: S$GLB,,, | Performed by: INTERNAL MEDICINE

## 2025-07-01 PROCEDURE — 77080 DXA BONE DENSITY AXIAL: CPT | Mod: 26,,, | Performed by: INTERNAL MEDICINE

## 2025-07-15 DIAGNOSIS — M81.0 AGE-RELATED OSTEOPOROSIS WITHOUT CURRENT PATHOLOGICAL FRACTURE: Primary | ICD-10-CM

## 2025-07-15 DIAGNOSIS — N20.0 RECURRENT NEPHROLITHIASIS: ICD-10-CM

## 2025-07-17 ENCOUNTER — LAB VISIT (OUTPATIENT)
Dept: LAB | Facility: HOSPITAL | Age: 60
End: 2025-07-17
Attending: INTERNAL MEDICINE
Payer: COMMERCIAL

## 2025-07-17 DIAGNOSIS — M81.0 AGE-RELATED OSTEOPOROSIS WITHOUT CURRENT PATHOLOGICAL FRACTURE: ICD-10-CM

## 2025-07-17 DIAGNOSIS — N20.0 RECURRENT NEPHROLITHIASIS: ICD-10-CM

## 2025-07-17 LAB
ALBUMIN SERPL BCP-MCNC: 4 G/DL (ref 3.5–5.2)
ANION GAP (OHS): 4 MMOL/L (ref 8–16)
BUN SERPL-MCNC: 8 MG/DL (ref 6–20)
CALCIUM SERPL-MCNC: 8.6 MG/DL (ref 8.7–10.5)
CHLORIDE SERPL-SCNC: 110 MMOL/L (ref 95–110)
CO2 SERPL-SCNC: 27 MMOL/L (ref 23–29)
CREAT SERPL-MCNC: 0.8 MG/DL (ref 0.5–1.4)
GFR SERPLBLD CREATININE-BSD FMLA CKD-EPI: >60 ML/MIN/1.73/M2
GLUCOSE SERPL-MCNC: 77 MG/DL (ref 70–110)
PHOSPHATE SERPL-MCNC: 3.9 MG/DL (ref 2.7–4.5)
POTASSIUM SERPL-SCNC: 4 MMOL/L (ref 3.5–5.1)
PTH-INTACT SERPL-MCNC: 185.4 PG/ML (ref 9–77)
SODIUM SERPL-SCNC: 141 MMOL/L (ref 136–145)

## 2025-07-17 PROCEDURE — 80069 RENAL FUNCTION PANEL: CPT

## 2025-07-17 PROCEDURE — 36415 COLL VENOUS BLD VENIPUNCTURE: CPT | Mod: PO

## 2025-07-17 PROCEDURE — 83970 ASSAY OF PARATHORMONE: CPT

## 2025-07-21 ENCOUNTER — OFFICE VISIT (OUTPATIENT)
Dept: ENDOCRINOLOGY | Facility: CLINIC | Age: 60
End: 2025-07-21
Payer: COMMERCIAL

## 2025-07-21 DIAGNOSIS — M81.0 AGE-RELATED OSTEOPOROSIS WITHOUT CURRENT PATHOLOGICAL FRACTURE: Primary | ICD-10-CM

## 2025-07-21 DIAGNOSIS — N25.81 SECONDARY HYPERPARATHYROIDISM: ICD-10-CM

## 2025-07-21 PROCEDURE — 98006 SYNCH AUDIO-VIDEO EST MOD 30: CPT | Mod: 95,,, | Performed by: INTERNAL MEDICINE

## 2025-07-21 PROCEDURE — G2211 COMPLEX E/M VISIT ADD ON: HCPCS | Mod: 95,,, | Performed by: INTERNAL MEDICINE

## 2025-07-21 PROCEDURE — 99211 OFF/OP EST MAY X REQ PHY/QHP: CPT | Performed by: INTERNAL MEDICINE

## 2025-07-21 NOTE — PROGRESS NOTES
Audiovisual Virtual Visit Established Patient    Subjective:      Chief Complaint: No chief complaint on file.      HPI: Estephania Rogers is a 59 y.o. female who is seen for a follow up evaluation via audiovisual telemedicine for osteoporosis and nephrolithiasis.    Reviewed past medical, family, social history and updated as appropriate.  At her last visit with me, started semaglutide and has 35 - 40 lbs, with improvement in MASLD  With regards to osteoporosis:   Diagnosed with osteoporosis in 11/2023 based on bone density test.  Risk factors: postmenopausal status, bariatric surgery in 2004 (duodenal switch), , family history, vitamin D deficiency and high risk medications   Kidney stones (large)in the past (percutaneous extraction 2015, lithotripsy 2012 )      Secondary evaluation:  Elevated PTH - vitamin D deficiency 30/35  Urine 2.3 L volume, calcium 75 mg/specimen, but serum recent calcium from 24 hr urine 75 mg/specimen   Phos/albumin are normal.   TTG normal.     Osteoporosis medication history:   Reclast - 1 doses (2024)    Calcium supplements?  No   Consumes dairy products regularly? Yes  Eats fresh green vegetables regularly? Yes    Vit D3 intake?  50K IU per day     Lab Results   Component Value Date    [id number] 35 05/07/2025       Weight bearing exercise?   no  Sense of balance? good  Recent falls? no  Patient is using the following assist devices for ambulation: none  Height loss (>2 inches)? no    Fracture history: denies     Tobacco use?  no  EtOH use?   no     Current diarrhea or h/o malabsorption? no  GERD or stomach ulcers? no  Thyroid disease? no  Anemia? History of   Kidney Stones? yes  Hyperparathyroidism? yes    High risk medications include:  none    Post-menopausal? Age?: 51  ERT after menopause?: no    Mom or dad with hip fracture?: no  Family history of osteoporosis?: no     Malignancy involving bone (active or history)? no  Prior radiation treatment? no  Unexplained  "elevation of alkaline phosphatase? no    Dental work planned? No (just cleaning)    Lab Results   Component Value Date    .4 (H) 07/17/2025    .6 (H) 03/19/2024     (H) 02/27/2012    [id number] 35 05/07/2025    MMGSGTRY93ZC 30 12/10/2024    SIOKAPMQ36OS 24 (L) 07/05/2024    CALCIUM 8.6 (L) 07/17/2025    CALCIUM 9.0 05/13/2025    CALCIUM 8.9 05/07/2025    PHOS 3.9 07/17/2025    PHOS 3.3 03/19/2024    ALKPHOS 74 05/13/2025    ALKPHOS 81 11/12/2024    ALKPHOS 87 08/27/2024    TSH 1.092 11/07/2023    TTGIGA 0.2 03/19/2024    LABCALC 3.2 03/19/2024    WOTCBRY14YKF 3 (L) 03/19/2024         No results found for: "CTELOPEPTIDE", "PROCOLLAGEN"        Review of Systems  Objective:     BP Readings from Last 5 Encounters:   02/19/25 (!) 148/77   02/14/25 135/70   02/04/25 120/80   07/08/24 135/66   05/07/24 (!) 185/72       Physical exam was not performed and vitals were not obtained due to this being a telemedicine (virtual) visit.    Wt Readings from Last 10 Encounters:   02/19/25 1509 83.6 kg (184 lb 4.9 oz)   02/14/25 1139 81.1 kg (178 lb 12.7 oz)   02/06/25 0903 83.7 kg (184 lb 8.4 oz)   02/04/25 0837 83.7 kg (184 lb 6.6 oz)   01/14/25 1613 84.4 kg (186 lb 1.1 oz)   10/22/24 1616 87.6 kg (193 lb 2 oz)   07/08/24 1328 87.8 kg (193 lb 9 oz)   03/11/24 0925 88.5 kg (195 lb 1.7 oz)   02/27/24 0713 88.5 kg (195 lb 1.7 oz)   02/14/24 1746 88.5 kg (195 lb)       Lab Results   Component Value Date    HGBA1C 4.6 11/12/2024    HGBA1C 5.0 10/14/2021     Lab Results   Component Value Date    CHOL 116 (L) 11/12/2024    CHOL 134 10/14/2021    HDL 59 11/12/2024    HDL 60 10/14/2021    LDLCALC 49.2 (L) 11/12/2024    LDLCALC 63.2 10/14/2021    TRIG 39 11/12/2024    TRIG 54 10/14/2021    CHOLHDL 50.9 (H) 11/12/2024    CHOLHDL 44.8 10/14/2021     Lab Results   Component Value Date     07/17/2025    K 4.0 07/17/2025     07/17/2025    CO2 27 07/17/2025    GLU 77 07/17/2025    BUN 8 07/17/2025    CREATININE " "0.8 07/17/2025    CALCIUM 8.6 (L) 07/17/2025    PROT 7.4 05/13/2025    ALBUMIN 4.0 07/17/2025    BILITOT 1.4 (H) 05/13/2025    ALKPHOS 74 05/13/2025    AST 45 05/13/2025    ALT 38 05/13/2025    ANIONGAP 4 (L) 07/17/2025    ESTGFRAFRICA >60 06/21/2022    EGFRNONAA >60 06/21/2022    TSH 1.092 11/07/2023      No results found for: "LABMICR", "CREATRANDUR", "MICALBCREAT"    Assessment/Plan:     Age-related osteoporosis without current pathological fracture  Risk factors: postmenopausal status, bariatric surgery in 2004 (duodenal switch), , family history, vitamin D deficiency and high risk medications  Discussed reclast and prolia at length, benefits and risks     No previous fragility fractures   FRAX not elevated     DXA scan in done early in 2025,   Has lost bone density at LS 3% and TH 7.9% since last visit   Coincidentally stopped taking calcium supplementation with lower serum calcium 8.6 mg/dl and higher PTH level.   Lost 35 - 40 lbs on semaglutide    Secondary evaluation:   Repeat 24 hr urine for ca/creat   CTX and TSH    TTG, Thyroid function tests are normal. Liver enzymes are mildly abnormal.      Discussed risk of osteonecrosis of the jaw and AFF with bisphosphonates and denosumab. Despite low risk the significant benefit on fracture reduction far outweighs the potential for this rare event.     Secondary hyperparathyroidism  History of vitamin D deficiency   Low serum calcium    See #1    The patient location is: home/[place]  The chief complaint leading to consultation is: see above   Visit type: Virtual visit with synchronous audio and video  Total time spent with patient: 32    RTC in 12 months      Vani Rivas MD    "

## 2025-07-21 NOTE — PATIENT INSTRUCTIONS
The day you turn in the urine collection please stay for fasting labs.     HOW TO COLLECT AN ACCURATE   24 HOUR URINE SPECIMEN     I want you to collect EVERY drop of your urine during a 24 hour period. I do not care what the volume of the urine is as long as it represents every drop that you pass during that 24 hour period. If you need to have a bowel movement, you may separate the urine but I want every drop.     Begin the collection on a morning which is convenient for you. At that time, pass your urine, flush it down the toilet and note the exact time. Now you have an empty bladder and empty bottle. That starts the collection. Collect every drop all during the day and night until exactly the same time the next morning, when you should pass your urine and add it to the bottle.     Bring the closed container back to the same laboratory that issued the empty container.

## 2025-07-21 NOTE — ASSESSMENT & PLAN NOTE
Risk factors: postmenopausal status, bariatric surgery in 2004 (duodenal switch), , family history, vitamin D deficiency and high risk medications  Discussed reclast and prolia at length, benefits and risks     No previous fragility fractures   FRAX not elevated     DXA scan in done early in 2025,   Has lost bone density at LS 3% and TH 7.9% since last visit   Coincidentally stopped taking calcium supplementation with lower serum calcium 8.6 mg/dl and higher PTH level.   Lost 35 - 40 lbs on semaglutide    Secondary evaluation:   Repeat 24 hr urine for ca/creat   CTX and TSH    TTG, Thyroid function tests are normal. Liver enzymes are mildly abnormal.      Discussed risk of osteonecrosis of the jaw and AFF with bisphosphonates and denosumab. Despite low risk the significant benefit on fracture reduction far outweighs the potential for this rare event.

## 2025-07-22 ENCOUNTER — PATIENT MESSAGE (OUTPATIENT)
Dept: ENDOCRINOLOGY | Facility: CLINIC | Age: 60
End: 2025-07-22
Payer: COMMERCIAL

## 2025-07-28 ENCOUNTER — OFFICE VISIT (OUTPATIENT)
Dept: FAMILY MEDICINE | Facility: CLINIC | Age: 60
End: 2025-07-28
Payer: COMMERCIAL

## 2025-07-28 ENCOUNTER — LAB VISIT (OUTPATIENT)
Dept: LAB | Facility: HOSPITAL | Age: 60
End: 2025-07-28
Attending: INTERNAL MEDICINE
Payer: COMMERCIAL

## 2025-07-28 VITALS
HEIGHT: 71 IN | SYSTOLIC BLOOD PRESSURE: 120 MMHG | HEART RATE: 62 BPM | TEMPERATURE: 98 F | DIASTOLIC BLOOD PRESSURE: 60 MMHG | WEIGHT: 177.5 LBS | OXYGEN SATURATION: 98 % | BODY MASS INDEX: 24.85 KG/M2

## 2025-07-28 DIAGNOSIS — Z01.818 PRE-OP EVALUATION: ICD-10-CM

## 2025-07-28 DIAGNOSIS — Z12.31 SCREENING MAMMOGRAM FOR BREAST CANCER: ICD-10-CM

## 2025-07-28 DIAGNOSIS — N25.81 SECONDARY HYPERPARATHYROIDISM: ICD-10-CM

## 2025-07-28 DIAGNOSIS — M81.0 AGE-RELATED OSTEOPOROSIS WITHOUT CURRENT PATHOLOGICAL FRACTURE: ICD-10-CM

## 2025-07-28 DIAGNOSIS — Z00.00 ANNUAL PHYSICAL EXAM: Primary | ICD-10-CM

## 2025-07-28 LAB
CALCIUM 24H UR-MRATE: 5 MG/HR (ref 4–12)
CALCIUM UR-MCNC: 3.9 MG/DL
OHS QRS DURATION: 98 MS
OHS QTC CALCULATION: 438 MS
TOTAL HOURS OF COLLECTION (OHS): 24 HR
TOTAL VOLUME  (OHS): 2850 ML
TSH SERPL-ACNC: 1.36 UIU/ML (ref 0.4–4)
URINE CALCIUM ABSOLUTE (OHS): 111 MG/SPEC

## 2025-07-28 PROCEDURE — 82340 ASSAY OF CALCIUM IN URINE: CPT

## 2025-07-28 PROCEDURE — 82523 COLLAGEN CROSSLINKS: CPT

## 2025-07-28 PROCEDURE — 82570 ASSAY OF URINE CREATININE: CPT

## 2025-07-28 PROCEDURE — 36415 COLL VENOUS BLD VENIPUNCTURE: CPT | Mod: PO

## 2025-07-28 PROCEDURE — 84443 ASSAY THYROID STIM HORMONE: CPT

## 2025-07-28 PROCEDURE — 93005 ELECTROCARDIOGRAM TRACING: CPT | Mod: S$GLB,,, | Performed by: FAMILY MEDICINE

## 2025-07-28 PROCEDURE — 99999 PR PBB SHADOW E&M-EST. PATIENT-LVL V: CPT | Mod: PBBFAC,,, | Performed by: FAMILY MEDICINE

## 2025-07-28 PROCEDURE — 93010 ELECTROCARDIOGRAM REPORT: CPT | Mod: S$GLB,,, | Performed by: INTERNAL MEDICINE

## 2025-07-28 PROCEDURE — 99396 PREV VISIT EST AGE 40-64: CPT | Mod: S$GLB,,, | Performed by: FAMILY MEDICINE

## 2025-07-28 NOTE — PROGRESS NOTES
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  - Discussed ongoing osteoporosis management with Dr. Mayorga, including recent Reclast infusion and pending lab results to determine next steps.  - Considered timing of potential future Reclast infusion in relation to planned breast implant exchange surgery.  - Evaluated candidacy for breast implant exchange surgery, taking into account liver status, age, and current health improvements.  - Noted cardioprotective benefits of current semaglutide use for perioperative period.    PLAN SUMMARY:  - EKG ordered for pre-operative clearance  - Discontinue semaglutide 1-2 weeks before breast implant exchange surgery  - Contact office when surgery date is confirmed  - Schedule pre-operative labs within 30 days of planned surgery    PRE-OP EVALUATION:  - Discontinue semaglutide 1-2 weeks before scheduled breast implant exchange surgery.  - Ordered EKG today as part of pre-operative clearance, valid for at least 6 months.  - Contact the office when surgery date is confirmed to arrange pre-operative labs, which should be completed within 30 days of the planned surgery.         Problem List Items Addressed This Visit    None  Visit Diagnoses         Annual physical exam    -  Primary    Relevant Orders    Comprehensive Metabolic Panel    CBC Auto Differential    Hemoglobin A1C    Lipid Panel    TSH    IN OFFICE EKG 12-LEAD (to Muse) (Completed)    Protime-INR    APTT      Screening mammogram for breast cancer        Relevant Orders    Mammo Digital Screening Bilat      Pre-op evaluation        Relevant Orders    Comprehensive Metabolic Panel    CBC  Auto Differential    Hemoglobin A1C    Lipid Panel    TSH    IN OFFICE EKG 12-LEAD (to Muse) (Completed)    Protime-INR    APTT            Health Maintenance reviewed.    ______________________________________________________________________    Chief Complaint  Chief Complaint   Patient presents with    Annual Exam       Estephania Rogers is a 59 y.o. female with multiple medical diagnoses as listed in the medical history and problem list that presents for annual exam.  Pt is known to me with last appointment 12/20/2023 .        Subjective     History of Present Illness    CHIEF COMPLAINT:  Patient presents today for annual physical.    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 She is currently in her second year of treatment, with her physician indicating most significant improvement typically occurs during this period. Monitoring includes liver ultrasound and labs every six months.    OSTEOPOROSIS:  She completed one year of Reclast infusion for osteoporosis treatment without improvement, noting continued bone loss. Her physician indicated most patients see improvement after the second year of treatment. Blood work and 24-hour urine collection were completed today for ongoing management.    WEIGHT MANAGEMENT:  She has been taking semaglutide for approximately 1.5 years through a compound pharmacy, prescribed by her weight loss surgeon. She has lost approximately 35 lbs since starting the medication in conjunction with her previous weight loss procedure.    SURGICAL HISTORY:  She has breast implants, with the right implant positioned higher and more laterally near her axilla due to scar tissue. Recent weight loss has exacerbated the positioning issues. She is planning implant exchange surgery to address the asymmetry.    REVIEW OF SYSTEMS:  She denies recent falls, fevers, chills,  "or night sweats. She reports sleeping well.      ROS:  General: -fever, -chills, -fatigue, -weight gain, -weight loss  Eyes: -vision changes, -redness, -discharge  ENT: -ear pain, -nasal congestion, -sore throat  Cardiovascular: -chest pain, -palpitations, -lower extremity edema  Respiratory: -cough, -shortness of breath  Gastrointestinal: -abdominal pain, -nausea, -vomiting, -diarrhea, -constipation, -blood in stool  Genitourinary: -dysuria, -hematuria, -frequency  Musculoskeletal: -joint pain, -muscle pain  Skin: -rash, -lesion  Neurological: -headache, -dizziness, -numbness, -tingling  Psychiatric: -anxiety, -depression, -sleep difficulty         Complaint outside of annual   Pre-op evaluation for silicone implant exchanges, back lift at bra line    Objective     /60   Pulse 62   Temp 98.4 °F (36.9 °C) (Oral)   Ht 5' 11" (1.803 m)   Wt 80.5 kg (177 lb 7.5 oz)   LMP 11/16/2014   SpO2 98%   BMI 24.75 kg/m²   Physical Exam  Vitals reviewed.   Constitutional:       Appearance: Normal appearance. She is well-developed.   HENT:      Head: Normocephalic and atraumatic.      Right Ear: External ear normal.      Left Ear: External ear normal.      Nose: Nose normal.      Mouth/Throat:      Mouth: Mucous membranes are moist.      Pharynx: Oropharynx is clear.   Eyes:      Extraocular Movements: Extraocular movements intact.      Conjunctiva/sclera: Conjunctivae normal.      Pupils: Pupils are equal, round, and reactive to light.   Cardiovascular:      Rate and Rhythm: Normal rate and regular rhythm.      Heart sounds: Normal heart sounds.   Pulmonary:      Effort: Pulmonary effort is normal.      Breath sounds: Normal breath sounds.   Skin:     General: Skin is warm and dry.   Neurological:      Mental Status: She is alert and oriented to person, place, and time.             This note was generated with the assistance of ambient listening technology. Verbal consent was obtained by the patient and accompanying " visitor(s) for the recording of patient appointment to facilitate this note. I attest to having reviewed and edited the generated note for accuracy, though some syntax or spelling errors may persist. Please contact the author of this note for any clarification.

## 2025-07-29 LAB
CREAT 24H UR-MRATE: 51.1 MG/HR (ref 40–75)
CREAT UR-MCNC: 43 MG/DL (ref 15–325)
TOTAL HOURS OF COLLECTION (OHS): 24 HR
TOTAL VOLUME  (OHS): 2850 ML
URINE CREATININE ABSOLUTE (OHS): 1225.5 MG/SPEC

## 2025-07-31 LAB — CEFOTAXIME ISLT MIC: 709 PG/ML

## 2025-08-04 ENCOUNTER — PATIENT MESSAGE (OUTPATIENT)
Dept: ADMINISTRATIVE | Facility: HOSPITAL | Age: 60
End: 2025-08-04
Payer: COMMERCIAL

## 2025-08-04 RX ORDER — ZOLEDRONIC ACID 5 MG/100ML
5 INJECTION, SOLUTION INTRAVENOUS
OUTPATIENT
Start: 2025-08-04

## 2025-08-04 RX ORDER — SODIUM CHLORIDE 0.9 % (FLUSH) 0.9 %
10 SYRINGE (ML) INJECTION
OUTPATIENT
Start: 2025-08-04

## 2025-08-04 RX ORDER — HEPARIN 100 UNIT/ML
500 SYRINGE INTRAVENOUS
OUTPATIENT
Start: 2025-08-04

## 2025-08-05 DIAGNOSIS — Z12.11 SCREENING FOR COLON CANCER: ICD-10-CM

## 2025-08-11 DIAGNOSIS — T85.49XS: Primary | ICD-10-CM

## 2025-08-12 ENCOUNTER — PATIENT MESSAGE (OUTPATIENT)
Dept: ENDOCRINOLOGY | Facility: CLINIC | Age: 60
End: 2025-08-12
Payer: COMMERCIAL

## 2025-08-13 ENCOUNTER — PATIENT MESSAGE (OUTPATIENT)
Dept: ENDOCRINOLOGY | Facility: CLINIC | Age: 60
End: 2025-08-13
Payer: COMMERCIAL

## 2025-08-13 DIAGNOSIS — F41.9 ANXIETY: ICD-10-CM

## 2025-08-13 RX ORDER — ALPRAZOLAM 0.25 MG/1
0.25 TABLET ORAL DAILY PRN
Qty: 20 TABLET | Refills: 0 | Status: SHIPPED | OUTPATIENT
Start: 2025-08-13 | End: 2025-09-12

## 2025-08-13 RX ORDER — GABAPENTIN 300 MG/1
300 CAPSULE ORAL NIGHTLY
Qty: 90 CAPSULE | Refills: 1 | Status: SHIPPED | OUTPATIENT
Start: 2025-08-13 | End: 2026-08-13

## 2025-08-19 ENCOUNTER — LAB VISIT (OUTPATIENT)
Dept: LAB | Facility: HOSPITAL | Age: 60
End: 2025-08-19
Attending: FAMILY MEDICINE
Payer: COMMERCIAL

## 2025-08-19 DIAGNOSIS — Z01.818 PRE-OP EVALUATION: ICD-10-CM

## 2025-08-19 DIAGNOSIS — Z00.00 ANNUAL PHYSICAL EXAM: ICD-10-CM

## 2025-08-19 LAB
ABSOLUTE EOSINOPHIL (OHS): 0.17 K/UL
ABSOLUTE MONOCYTE (OHS): 0.29 K/UL (ref 0.3–1)
ABSOLUTE NEUTROPHIL COUNT (OHS): 2.03 K/UL (ref 1.8–7.7)
ALBUMIN SERPL BCP-MCNC: 3.9 G/DL (ref 3.5–5.2)
ALP SERPL-CCNC: 78 UNIT/L (ref 40–150)
ALT SERPL W/O P-5'-P-CCNC: 55 UNIT/L (ref 0–55)
ANION GAP (OHS): 8 MMOL/L (ref 8–16)
APTT PPP: 32.1 SECONDS (ref 21–32)
AST SERPL-CCNC: 56 UNIT/L (ref 0–50)
BASOPHILS # BLD AUTO: 0.03 K/UL
BASOPHILS NFR BLD AUTO: 0.8 %
BILIRUB SERPL-MCNC: 1.2 MG/DL (ref 0.1–1)
BUN SERPL-MCNC: 9 MG/DL (ref 6–20)
CALCIUM SERPL-MCNC: 8.6 MG/DL (ref 8.7–10.5)
CHLORIDE SERPL-SCNC: 110 MMOL/L (ref 95–110)
CHOLEST SERPL-MCNC: 125 MG/DL (ref 120–199)
CHOLEST/HDLC SERPL: 2 {RATIO} (ref 2–5)
CO2 SERPL-SCNC: 24 MMOL/L (ref 23–29)
CREAT SERPL-MCNC: 0.6 MG/DL (ref 0.5–1.4)
EAG (OHS): 88 MG/DL (ref 68–131)
ERYTHROCYTE [DISTWIDTH] IN BLOOD BY AUTOMATED COUNT: 12.2 % (ref 11.5–14.5)
GFR SERPLBLD CREATININE-BSD FMLA CKD-EPI: >60 ML/MIN/1.73/M2
GLUCOSE SERPL-MCNC: 79 MG/DL (ref 70–110)
HBA1C MFR BLD: 4.7 % (ref 4–5.6)
HCT VFR BLD AUTO: 37.9 % (ref 37–48.5)
HDLC SERPL-MCNC: 62 MG/DL (ref 40–75)
HDLC SERPL: 49.6 % (ref 20–50)
HGB BLD-MCNC: 12.3 GM/DL (ref 12–16)
IMM GRANULOCYTES # BLD AUTO: 0 K/UL (ref 0–0.04)
IMM GRANULOCYTES NFR BLD AUTO: 0 % (ref 0–0.5)
INR PPP: 1.2 (ref 0.8–1.2)
LDLC SERPL CALC-MCNC: 54.4 MG/DL (ref 63–159)
LYMPHOCYTES # BLD AUTO: 1.08 K/UL (ref 1–4.8)
MCH RBC QN AUTO: 32 PG (ref 27–31)
MCHC RBC AUTO-ENTMCNC: 32.5 G/DL (ref 32–36)
MCV RBC AUTO: 99 FL (ref 82–98)
NONHDLC SERPL-MCNC: 63 MG/DL
NUCLEATED RBC (/100WBC) (OHS): 0 /100 WBC
PLATELET # BLD AUTO: 167 K/UL (ref 150–450)
PMV BLD AUTO: 11.5 FL (ref 9.2–12.9)
POTASSIUM SERPL-SCNC: 3.7 MMOL/L (ref 3.5–5.1)
PROT SERPL-MCNC: 6.5 GM/DL (ref 6–8.4)
PROTHROMBIN TIME: 12.6 SECONDS (ref 9–12.5)
RBC # BLD AUTO: 3.84 M/UL (ref 4–5.4)
RELATIVE EOSINOPHIL (OHS): 4.7 %
RELATIVE LYMPHOCYTE (OHS): 30 % (ref 18–48)
RELATIVE MONOCYTE (OHS): 8.1 % (ref 4–15)
RELATIVE NEUTROPHIL (OHS): 56.4 % (ref 38–73)
SODIUM SERPL-SCNC: 142 MMOL/L (ref 136–145)
TRIGL SERPL-MCNC: 43 MG/DL (ref 30–150)
TSH SERPL-ACNC: 1.74 UIU/ML (ref 0.4–4)
WBC # BLD AUTO: 3.6 K/UL (ref 3.9–12.7)

## 2025-08-19 PROCEDURE — 85610 PROTHROMBIN TIME: CPT

## 2025-08-19 PROCEDURE — 85025 COMPLETE CBC W/AUTO DIFF WBC: CPT

## 2025-08-19 PROCEDURE — 82465 ASSAY BLD/SERUM CHOLESTEROL: CPT

## 2025-08-19 PROCEDURE — 84443 ASSAY THYROID STIM HORMONE: CPT

## 2025-08-19 PROCEDURE — 36415 COLL VENOUS BLD VENIPUNCTURE: CPT | Mod: PO

## 2025-08-19 PROCEDURE — 82040 ASSAY OF SERUM ALBUMIN: CPT

## 2025-08-19 PROCEDURE — 83036 HEMOGLOBIN GLYCOSYLATED A1C: CPT

## 2025-08-19 PROCEDURE — 85730 THROMBOPLASTIN TIME PARTIAL: CPT

## 2025-08-20 ENCOUNTER — INFUSION (OUTPATIENT)
Dept: INFUSION THERAPY | Facility: HOSPITAL | Age: 60
End: 2025-08-20
Attending: INTERNAL MEDICINE
Payer: COMMERCIAL

## 2025-08-20 ENCOUNTER — HOSPITAL ENCOUNTER (OUTPATIENT)
Dept: RADIOLOGY | Facility: HOSPITAL | Age: 60
Discharge: HOME | End: 2025-08-20
Attending: PLASTIC SURGERY
Payer: COMMERCIAL

## 2025-08-20 VITALS
WEIGHT: 178.38 LBS | DIASTOLIC BLOOD PRESSURE: 73 MMHG | BODY MASS INDEX: 24.97 KG/M2 | RESPIRATION RATE: 18 BRPM | OXYGEN SATURATION: 96 % | TEMPERATURE: 98 F | HEART RATE: 52 BPM | HEIGHT: 71 IN | SYSTOLIC BLOOD PRESSURE: 165 MMHG

## 2025-08-20 DIAGNOSIS — M81.0 AGE-RELATED OSTEOPOROSIS WITHOUT CURRENT PATHOLOGICAL FRACTURE: Primary | ICD-10-CM

## 2025-08-20 DIAGNOSIS — K90.89 OTHER SPECIFIED INTESTINAL MALABSORPTION: ICD-10-CM

## 2025-08-20 DIAGNOSIS — T85.49XS: ICD-10-CM

## 2025-08-20 PROCEDURE — 96374 THER/PROPH/DIAG INJ IV PUSH: CPT

## 2025-08-20 PROCEDURE — 76641 ULTRASOUND BREAST COMPLETE: CPT | Mod: TC,50

## 2025-08-20 PROCEDURE — 76641 ULTRASOUND BREAST COMPLETE: CPT | Mod: 26,50,, | Performed by: RADIOLOGY

## 2025-08-20 PROCEDURE — 63600175 PHARM REV CODE 636 W HCPCS: Performed by: INTERNAL MEDICINE

## 2025-08-20 RX ORDER — HEPARIN 100 UNIT/ML
500 SYRINGE INTRAVENOUS
OUTPATIENT
Start: 2025-08-20

## 2025-08-20 RX ORDER — ZOLEDRONIC ACID 5 MG/100ML
5 INJECTION, SOLUTION INTRAVENOUS
OUTPATIENT
Start: 2025-08-20 | End: 2025-08-20

## 2025-08-20 RX ORDER — ZOLEDRONIC ACID 5 MG/100ML
5 INJECTION, SOLUTION INTRAVENOUS
Status: COMPLETED | OUTPATIENT
Start: 2025-08-20 | End: 2025-08-20

## 2025-08-20 RX ORDER — SODIUM CHLORIDE 0.9 % (FLUSH) 0.9 %
10 SYRINGE (ML) INJECTION
OUTPATIENT
Start: 2025-08-20

## 2025-08-20 RX ADMIN — ZOLEDRONIC ACID 5 MG: 0.05 INJECTION, SOLUTION INTRAVENOUS at 04:08

## 2025-08-28 ENCOUNTER — HOSPITAL ENCOUNTER (OUTPATIENT)
Dept: RADIOLOGY | Facility: HOSPITAL | Age: 60
Discharge: HOME | End: 2025-08-28
Attending: FAMILY MEDICINE
Payer: COMMERCIAL

## 2025-08-28 DIAGNOSIS — Z12.31 SCREENING MAMMOGRAM FOR BREAST CANCER: ICD-10-CM

## 2025-08-28 PROCEDURE — 77067 SCR MAMMO BI INCL CAD: CPT | Mod: TC,PO
